# Patient Record
Sex: FEMALE | Race: WHITE | NOT HISPANIC OR LATINO | Employment: OTHER | URBAN - NONMETROPOLITAN AREA
[De-identification: names, ages, dates, MRNs, and addresses within clinical notes are randomized per-mention and may not be internally consistent; named-entity substitution may affect disease eponyms.]

---

## 2017-01-20 ENCOUNTER — HOSPITAL ENCOUNTER (INPATIENT)
Facility: HOSPITAL | Age: 77
LOS: 1 days | Discharge: HOME | End: 2017-01-21
Attending: EMERGENCY MEDICINE | Admitting: INTERNAL MEDICINE

## 2017-01-20 ENCOUNTER — APPOINTMENT (OUTPATIENT)
Dept: GENERAL RADIOLOGY | Facility: HOSPITAL | Age: 77
End: 2017-01-20

## 2017-01-20 DIAGNOSIS — I48.91 ATRIAL FIBRILLATION WITH RAPID VENTRICULAR RESPONSE (HCC): Primary | ICD-10-CM

## 2017-01-20 PROBLEM — I10 ESSENTIAL HYPERTENSION: Status: ACTIVE | Noted: 2017-01-20

## 2017-01-20 LAB
ALBUMIN SERPL-MCNC: 4.5 G/DL (ref 3.5–5)
ALBUMIN/GLOB SERPL: 1.3 G/DL (ref 1.1–2.5)
ALP SERPL-CCNC: 72 U/L (ref 24–120)
ALT SERPL W P-5'-P-CCNC: 20 U/L (ref 0–54)
ANION GAP SERPL CALCULATED.3IONS-SCNC: 14 MMOL/L (ref 4–13)
APTT PPP: 30.3 SECONDS (ref 24.1–34.8)
AST SERPL-CCNC: 20 U/L (ref 7–45)
BASOPHILS # BLD AUTO: 0.06 10*3/MM3 (ref 0–0.2)
BASOPHILS NFR BLD AUTO: 1.1 % (ref 0–2)
BILIRUB SERPL-MCNC: 0.9 MG/DL (ref 0.1–1)
BUN BLD-MCNC: 16 MG/DL (ref 5–21)
BUN/CREAT SERPL: 12.8 (ref 7–25)
CALCIUM SPEC-SCNC: 9.4 MG/DL (ref 8.4–10.4)
CHLORIDE SERPL-SCNC: 106 MMOL/L (ref 98–110)
CO2 SERPL-SCNC: 25 MMOL/L (ref 24–31)
CREAT BLD-MCNC: 1.25 MG/DL (ref 0.5–1.4)
DEPRECATED RDW RBC AUTO: 46.9 FL (ref 40–54)
EOSINOPHIL # BLD AUTO: 0.19 10*3/MM3 (ref 0–0.7)
EOSINOPHIL NFR BLD AUTO: 3.6 % (ref 0–4)
ERYTHROCYTE [DISTWIDTH] IN BLOOD BY AUTOMATED COUNT: 14.1 % (ref 12–15)
GFR SERPL CREATININE-BSD FRML MDRD: 42 ML/MIN/1.73
GLOBULIN UR ELPH-MCNC: 3.4 GM/DL
GLUCOSE BLD-MCNC: 105 MG/DL (ref 70–100)
HCT VFR BLD AUTO: 37.8 % (ref 37–47)
HGB BLD-MCNC: 12.6 G/DL (ref 12–16)
IMM GRANULOCYTES # BLD: 0.02 10*3/MM3 (ref 0–0.03)
IMM GRANULOCYTES NFR BLD: 0.4 % (ref 0–5)
INR PPP: 1.27 (ref 0.91–1.09)
LYMPHOCYTES # BLD AUTO: 1.42 10*3/MM3 (ref 0.72–4.86)
LYMPHOCYTES NFR BLD AUTO: 27.2 % (ref 15–45)
MCH RBC QN AUTO: 30.6 PG (ref 28–32)
MCHC RBC AUTO-ENTMCNC: 33.3 G/DL (ref 33–36)
MCV RBC AUTO: 91.7 FL (ref 82–98)
MONOCYTES # BLD AUTO: 0.48 10*3/MM3 (ref 0.19–1.3)
MONOCYTES NFR BLD AUTO: 9.2 % (ref 4–12)
NEUTROPHILS # BLD AUTO: 3.06 10*3/MM3 (ref 1.87–8.4)
NEUTROPHILS NFR BLD AUTO: 58.5 % (ref 39–78)
PLATELET # BLD AUTO: 167 10*3/MM3 (ref 130–400)
PMV BLD AUTO: 11.4 FL (ref 6–12)
POTASSIUM BLD-SCNC: 4.1 MMOL/L (ref 3.5–5.3)
PROT SERPL-MCNC: 7.9 G/DL (ref 6.3–8.7)
PROTHROMBIN TIME: 16.3 SECONDS (ref 11.9–14.6)
RBC # BLD AUTO: 4.12 10*6/MM3 (ref 4.2–5.4)
SODIUM BLD-SCNC: 145 MMOL/L (ref 135–145)
TROPONIN I SERPL-MCNC: 0 NG/ML (ref 0–0.07)
WBC NRBC COR # BLD: 5.23 10*3/MM3 (ref 4.8–10.8)

## 2017-01-20 PROCEDURE — 93005 ELECTROCARDIOGRAM TRACING: CPT

## 2017-01-20 PROCEDURE — 71010 HC CHEST PA OR AP: CPT

## 2017-01-20 PROCEDURE — 80053 COMPREHEN METABOLIC PANEL: CPT | Performed by: EMERGENCY MEDICINE

## 2017-01-20 PROCEDURE — 85730 THROMBOPLASTIN TIME PARTIAL: CPT | Performed by: EMERGENCY MEDICINE

## 2017-01-20 PROCEDURE — 5A2204Z RESTORATION OF CARDIAC RHYTHM, SINGLE: ICD-10-PCS | Performed by: INTERNAL MEDICINE

## 2017-01-20 PROCEDURE — 85610 PROTHROMBIN TIME: CPT | Performed by: EMERGENCY MEDICINE

## 2017-01-20 PROCEDURE — 85025 COMPLETE CBC W/AUTO DIFF WBC: CPT | Performed by: EMERGENCY MEDICINE

## 2017-01-20 PROCEDURE — 93010 ELECTROCARDIOGRAM REPORT: CPT | Performed by: INTERNAL MEDICINE

## 2017-01-20 PROCEDURE — 93005 ELECTROCARDIOGRAM TRACING: CPT | Performed by: EMERGENCY MEDICINE

## 2017-01-20 PROCEDURE — 99223 1ST HOSP IP/OBS HIGH 75: CPT | Performed by: INTERNAL MEDICINE

## 2017-01-20 PROCEDURE — 92960 CARDIOVERSION ELECTRIC EXT: CPT | Performed by: EMERGENCY MEDICINE

## 2017-01-20 PROCEDURE — 99285 EMERGENCY DEPT VISIT HI MDM: CPT

## 2017-01-20 PROCEDURE — 84484 ASSAY OF TROPONIN QUANT: CPT

## 2017-01-20 RX ORDER — ATENOLOL 25 MG/1
25 TABLET ORAL
Status: DISCONTINUED | OUTPATIENT
Start: 2017-01-20 | End: 2017-01-20

## 2017-01-20 RX ORDER — FUROSEMIDE 40 MG/1
40 TABLET ORAL DAILY
Status: DISCONTINUED | OUTPATIENT
Start: 2017-01-21 | End: 2017-01-21 | Stop reason: HOSPADM

## 2017-01-20 RX ORDER — FUROSEMIDE 20 MG/1
20 TABLET ORAL DAILY
Status: DISCONTINUED | OUTPATIENT
Start: 2017-01-20 | End: 2017-01-20

## 2017-01-20 RX ORDER — LEVOTHYROXINE SODIUM 0.05 MG/1
50 TABLET ORAL
Status: DISCONTINUED | OUTPATIENT
Start: 2017-01-20 | End: 2017-01-21 | Stop reason: HOSPADM

## 2017-01-20 RX ORDER — DILTIAZEM HYDROCHLORIDE 120 MG/1
120 TABLET, FILM COATED ORAL DAILY
Status: DISCONTINUED | OUTPATIENT
Start: 2017-01-20 | End: 2017-01-21 | Stop reason: HOSPADM

## 2017-01-20 RX ORDER — PROPAFENONE HYDROCHLORIDE 225 MG/1
225 TABLET, FILM COATED ORAL EVERY 8 HOURS SCHEDULED
Status: DISCONTINUED | OUTPATIENT
Start: 2017-01-20 | End: 2017-01-21 | Stop reason: HOSPADM

## 2017-01-20 RX ORDER — CHLORTHALIDONE 25 MG/1
12.5 TABLET ORAL DAILY
Status: DISCONTINUED | OUTPATIENT
Start: 2017-01-20 | End: 2017-01-20

## 2017-01-20 RX ORDER — SODIUM CHLORIDE 0.9 % (FLUSH) 0.9 %
1-10 SYRINGE (ML) INJECTION AS NEEDED
Status: DISCONTINUED | OUTPATIENT
Start: 2017-01-20 | End: 2017-01-21 | Stop reason: HOSPADM

## 2017-01-20 RX ORDER — POTASSIUM CHLORIDE 750 MG/1
10 CAPSULE, EXTENDED RELEASE ORAL DAILY
Status: DISCONTINUED | OUTPATIENT
Start: 2017-01-20 | End: 2017-01-21 | Stop reason: HOSPADM

## 2017-01-20 RX ORDER — ETOMIDATE 2 MG/ML
INJECTION INTRAVENOUS
Status: COMPLETED
Start: 2017-01-20 | End: 2017-01-20

## 2017-01-20 RX ORDER — MIRTAZAPINE 15 MG/1
15 TABLET, FILM COATED ORAL NIGHTLY
Status: DISCONTINUED | OUTPATIENT
Start: 2017-01-20 | End: 2017-01-21 | Stop reason: HOSPADM

## 2017-01-20 RX ORDER — DILTIAZEM HYDROCHLORIDE 5 MG/ML
10 INJECTION INTRAVENOUS ONCE
Status: DISCONTINUED | OUTPATIENT
Start: 2017-01-20 | End: 2017-01-20

## 2017-01-20 RX ORDER — DILTIAZEM HCL/D5W 125 MG/125
5-15 PLASTIC BAG, INJECTION (ML) INTRAVENOUS
Status: DISCONTINUED | OUTPATIENT
Start: 2017-01-20 | End: 2017-01-21 | Stop reason: HOSPADM

## 2017-01-20 RX ORDER — METOPROLOL TARTRATE 5 MG/5ML
INJECTION INTRAVENOUS
Status: COMPLETED
Start: 2017-01-20 | End: 2017-01-20

## 2017-01-20 RX ORDER — CARVEDILOL 6.25 MG/1
12.5 TABLET ORAL 2 TIMES DAILY WITH MEALS
Status: DISCONTINUED | OUTPATIENT
Start: 2017-01-20 | End: 2017-01-21 | Stop reason: HOSPADM

## 2017-01-20 RX ORDER — ETOMIDATE 2 MG/ML
5 INJECTION INTRAVENOUS ONCE
Status: COMPLETED | OUTPATIENT
Start: 2017-01-20 | End: 2017-01-20

## 2017-01-20 RX ORDER — MECLIZINE HYDROCHLORIDE 25 MG/1
25 TABLET ORAL 3 TIMES DAILY PRN
Status: DISCONTINUED | OUTPATIENT
Start: 2017-01-20 | End: 2017-01-21 | Stop reason: HOSPADM

## 2017-01-20 RX ORDER — METOPROLOL TARTRATE 5 MG/5ML
5 INJECTION INTRAVENOUS ONCE
Status: COMPLETED | OUTPATIENT
Start: 2017-01-20 | End: 2017-01-20

## 2017-01-20 RX ADMIN — METOPROLOL TARTRATE 5 MG: 5 INJECTION INTRAVENOUS at 14:22

## 2017-01-20 RX ADMIN — ETOMIDATE 5 MG: 2 INJECTION INTRAVENOUS at 14:17

## 2017-01-20 RX ADMIN — MIRTAZAPINE 15 MG: 15 TABLET, FILM COATED ORAL at 21:13

## 2017-01-20 RX ADMIN — RIVAROXABAN 15 MG: 15 TABLET, FILM COATED ORAL at 21:13

## 2017-01-20 RX ADMIN — CARVEDILOL 12.5 MG: 6.25 TABLET, FILM COATED ORAL at 21:13

## 2017-01-20 RX ADMIN — PROPAFENONE HYDROCHLORIDE 225 MG: 225 TABLET, COATED ORAL at 21:14

## 2017-01-20 RX ADMIN — Medication 5 MG/HR: at 12:37

## 2017-01-20 RX ADMIN — ETOMIDATE 5 MG: 2 INJECTION, SOLUTION INTRAVENOUS at 14:17

## 2017-01-21 VITALS
RESPIRATION RATE: 18 BRPM | BODY MASS INDEX: 28.21 KG/M2 | DIASTOLIC BLOOD PRESSURE: 60 MMHG | HEIGHT: 66 IN | HEART RATE: 86 BPM | TEMPERATURE: 97.8 F | OXYGEN SATURATION: 99 % | SYSTOLIC BLOOD PRESSURE: 103 MMHG | WEIGHT: 175.5 LBS

## 2017-01-21 PROCEDURE — 93005 ELECTROCARDIOGRAM TRACING: CPT | Performed by: NURSE PRACTITIONER

## 2017-01-21 PROCEDURE — 93010 ELECTROCARDIOGRAM REPORT: CPT | Performed by: INTERNAL MEDICINE

## 2017-01-21 PROCEDURE — 99238 HOSP IP/OBS DSCHRG MGMT 30/<: CPT | Performed by: INTERNAL MEDICINE

## 2017-01-21 RX ORDER — PROPAFENONE HYDROCHLORIDE 225 MG/1
225 TABLET, FILM COATED ORAL EVERY 8 HOURS SCHEDULED
Qty: 90 TABLET | Refills: 5 | Status: SHIPPED | OUTPATIENT
Start: 2017-01-21 | End: 2017-01-21 | Stop reason: HOSPADM

## 2017-01-21 RX ORDER — PROPAFENONE HYDROCHLORIDE 150 MG/1
150 TABLET, COATED ORAL EVERY 8 HOURS
Qty: 90 TABLET | Refills: 11 | Status: SHIPPED | OUTPATIENT
Start: 2017-01-21 | End: 2017-09-21 | Stop reason: SDUPTHER

## 2017-01-21 RX ADMIN — LEVOTHYROXINE SODIUM 50 MCG: 50 TABLET ORAL at 06:15

## 2017-01-21 RX ADMIN — CARVEDILOL 12.5 MG: 6.25 TABLET, FILM COATED ORAL at 08:23

## 2017-01-21 RX ADMIN — PROPAFENONE HYDROCHLORIDE 225 MG: 225 TABLET, COATED ORAL at 06:15

## 2017-01-21 RX ADMIN — FUROSEMIDE 40 MG: 40 TABLET ORAL at 08:23

## 2017-01-21 RX ADMIN — DILTIAZEM HYDROCHLORIDE 120 MG: 120 TABLET, FILM COATED ORAL at 08:23

## 2017-01-21 RX ADMIN — POTASSIUM CHLORIDE 10 MEQ: 750 CAPSULE, EXTENDED RELEASE ORAL at 08:23

## 2017-02-08 DIAGNOSIS — C50.919 MALIGNANT NEOPLASM OF FEMALE BREAST, UNSPECIFIED LATERALITY, UNSPECIFIED SITE OF BREAST: Primary | ICD-10-CM

## 2017-02-09 ENCOUNTER — LAB (OUTPATIENT)
Dept: ONCOLOGY | Facility: HOSPITAL | Age: 77
End: 2017-02-09

## 2017-02-09 DIAGNOSIS — C50.919 MALIGNANT NEOPLASM OF FEMALE BREAST, UNSPECIFIED LATERALITY, UNSPECIFIED SITE OF BREAST: ICD-10-CM

## 2017-02-09 LAB
BASOPHILS # BLD AUTO: 0.1 10*3/MM3 (ref 0–0.2)
BASOPHILS NFR BLD AUTO: 2.2 % (ref 0–2)
DEPRECATED RDW RBC AUTO: 47 FL (ref 36.4–46.3)
EOSINOPHIL # BLD AUTO: 0.27 10*3/MM3 (ref 0–0.7)
EOSINOPHIL NFR BLD AUTO: 5.9 % (ref 0–7)
ERYTHROCYTE [DISTWIDTH] IN BLOOD BY AUTOMATED COUNT: 13.8 % (ref 11.5–14.5)
HCT VFR BLD AUTO: 36.1 % (ref 35–45)
HGB BLD-MCNC: 12.1 G/DL (ref 12–15.5)
IMM GRANULOCYTES # BLD: 0.03 10*3/MM3 (ref 0–0.02)
IMM GRANULOCYTES NFR BLD: 0.7 % (ref 0–0.5)
LYMPHOCYTES # BLD AUTO: 1.19 10*3/MM3 (ref 0.6–4.2)
LYMPHOCYTES NFR BLD AUTO: 26.2 % (ref 10–50)
MCH RBC QN AUTO: 31.2 PG (ref 26.5–34)
MCHC RBC AUTO-ENTMCNC: 33.5 G/DL (ref 31.4–36)
MCV RBC AUTO: 93 FL (ref 80–98)
MONOCYTES # BLD AUTO: 0.52 10*3/MM3 (ref 0–0.9)
MONOCYTES NFR BLD AUTO: 11.5 % (ref 0–12)
NEUTROPHILS # BLD AUTO: 2.43 10*3/MM3 (ref 2–8.6)
NEUTROPHILS NFR BLD AUTO: 53.5 % (ref 37–80)
PLATELET # BLD AUTO: 178 10*3/MM3 (ref 150–450)
PMV BLD AUTO: 11.2 FL (ref 8–12)
RBC # BLD AUTO: 3.88 10*6/MM3 (ref 3.77–5.16)
WBC NRBC COR # BLD: 4.54 10*3/MM3 (ref 3.2–9.8)

## 2017-02-09 PROCEDURE — 85025 COMPLETE CBC W/AUTO DIFF WBC: CPT | Performed by: HOSPITALIST

## 2017-02-09 PROCEDURE — 36415 COLL VENOUS BLD VENIPUNCTURE: CPT | Performed by: HOSPITALIST

## 2017-02-09 PROCEDURE — 86300 IMMUNOASSAY TUMOR CA 15-3: CPT | Performed by: HOSPITALIST

## 2017-02-10 LAB — CANCER AG27-29 SERPL-ACNC: 54.9 U/ML (ref 0–38.6)

## 2017-03-03 ENCOUNTER — TRANSCRIBE ORDERS (OUTPATIENT)
Dept: ADMINISTRATIVE | Facility: HOSPITAL | Age: 77
End: 2017-03-03

## 2017-03-16 ENCOUNTER — CLINICAL SUPPORT (OUTPATIENT)
Dept: CARDIOLOGY | Facility: CLINIC | Age: 77
End: 2017-03-16

## 2017-03-16 DIAGNOSIS — Z95.0 CARDIAC PACEMAKER IN SITU: Primary | ICD-10-CM

## 2017-03-16 DIAGNOSIS — I48.0 PAROXYSMAL ATRIAL FIBRILLATION (HCC): ICD-10-CM

## 2017-03-16 PROCEDURE — 93280 PM DEVICE PROGR EVAL DUAL: CPT | Performed by: INTERNAL MEDICINE

## 2017-03-16 NOTE — PROGRESS NOTES
Dual Chamber Pacemaker Evaluation Report  In office    March 16, 2017    Primary Cardiologist: Jessica  : Medtronic Model: Advisa MRI  Implant date: March 20, 2015    Reason for evaluation:routine  Indication for pacemaker: paroxysmal a-fib    Measurements  Atrial sensing - P wave: 2.1 mV  Atrial threshold:  1.25V@ 0.4ms  Atrial lead impedance: 380 ohms  Ventricular sensing - R wave: 7.3 mV  Ventricular threshold: 1.25 V @ 0.4 ms  Ventricular lead impedance:   741 ohms     Diagnostic Data  Atrial paced: 88 %  Ventricular paced: <0.1 %  Other: no new events since checked last with Dr Mcallister  Battery status: satisfactory   Est 7.5 years      Final Parameters  Mode:  AAIR+  Lower rate: 60 bpm   Upper rate: 130 bpm  AV Delay: paced- 180 ms  Sensed-150 ms  Atrial - Amplitude: 2.5 V   Pulse width: 0.4 ms   Sensitivity: 0.3 mV     Ventricular - Amplitude: 2.5 V  Pulse width: 0.4 ms  Sensitivity: 0.9 mV    Changes made: changed atrial and ventricular outputs to 2.5V  Conclusions: normal pacemaker function and stable pacing and sensing thresholds    Follow up: 6 months

## 2017-03-19 NOTE — PROGRESS NOTES
I have reviewed the notes, assessments, and/or procedures performed by Azra Rose RN, I concur with her documentation of Sultana Lin.

## 2017-04-06 ENCOUNTER — HOSPITAL ENCOUNTER (OUTPATIENT)
Facility: HOSPITAL | Age: 77
Setting detail: HOSPITAL OUTPATIENT SURGERY
Discharge: HOME | End: 2017-04-06
Attending: INTERNAL MEDICINE | Admitting: INTERNAL MEDICINE

## 2017-04-06 VITALS
RESPIRATION RATE: 16 BRPM | DIASTOLIC BLOOD PRESSURE: 70 MMHG | OXYGEN SATURATION: 97 % | TEMPERATURE: 97.3 F | WEIGHT: 179 LBS | HEART RATE: 74 BPM | SYSTOLIC BLOOD PRESSURE: 131 MMHG | HEIGHT: 66 IN | BODY MASS INDEX: 28.77 KG/M2

## 2017-04-06 LAB
ANION GAP SERPL CALCULATED.3IONS-SCNC: 14 MMOL/L (ref 4–13)
BUN BLD-MCNC: 15 MG/DL (ref 5–21)
BUN/CREAT SERPL: 12.9 (ref 7–25)
CALCIUM SPEC-SCNC: 9.7 MG/DL (ref 8.4–10.4)
CHLORIDE SERPL-SCNC: 103 MMOL/L (ref 98–110)
CO2 SERPL-SCNC: 28 MMOL/L (ref 24–31)
CREAT BLD-MCNC: 1.16 MG/DL (ref 0.5–1.4)
DEPRECATED RDW RBC AUTO: 46.2 FL (ref 40–54)
ERYTHROCYTE [DISTWIDTH] IN BLOOD BY AUTOMATED COUNT: 13.5 % (ref 12–15)
GFR SERPL CREATININE-BSD FRML MDRD: 45 ML/MIN/1.73
GLUCOSE BLD-MCNC: 117 MG/DL (ref 70–100)
HCT VFR BLD AUTO: 37.7 % (ref 37–47)
HGB BLD-MCNC: 12.4 G/DL (ref 12–16)
MCH RBC QN AUTO: 30.6 PG (ref 28–32)
MCHC RBC AUTO-ENTMCNC: 32.9 G/DL (ref 33–36)
MCV RBC AUTO: 93.1 FL (ref 82–98)
PLATELET # BLD AUTO: 184 10*3/MM3 (ref 130–400)
PMV BLD AUTO: 11.5 FL (ref 6–12)
POTASSIUM BLD-SCNC: 4.9 MMOL/L (ref 3.5–5.3)
RBC # BLD AUTO: 4.05 10*6/MM3 (ref 4.2–5.4)
SODIUM BLD-SCNC: 145 MMOL/L (ref 135–145)
WBC NRBC COR # BLD: 5.72 10*3/MM3 (ref 4.8–10.8)

## 2017-04-06 PROCEDURE — C1733 CATH, EP, OTHR THAN COOL-TIP: HCPCS | Performed by: INTERNAL MEDICINE

## 2017-04-06 PROCEDURE — 93650 ICAR CATH ABLTJ AV NODE FUNC: CPT | Performed by: INTERNAL MEDICINE

## 2017-04-06 PROCEDURE — C1732 CATH, EP, DIAG/ABL, 3D/VECT: HCPCS | Performed by: INTERNAL MEDICINE

## 2017-04-06 PROCEDURE — C1894 INTRO/SHEATH, NON-LASER: HCPCS | Performed by: INTERNAL MEDICINE

## 2017-04-06 PROCEDURE — 25010000002 MIDAZOLAM PER 1 MG: Performed by: INTERNAL MEDICINE

## 2017-04-06 PROCEDURE — 93620 COMP EP EVL R AT VEN PAC&REC: CPT | Performed by: INTERNAL MEDICINE

## 2017-04-06 PROCEDURE — C1731 CATH, EP, 20 OR MORE ELEC: HCPCS | Performed by: INTERNAL MEDICINE

## 2017-04-06 PROCEDURE — 85027 COMPLETE CBC AUTOMATED: CPT | Performed by: INTERNAL MEDICINE

## 2017-04-06 PROCEDURE — 25010000002 FENTANYL CITRATE (PF) 100 MCG/2ML SOLUTION: Performed by: INTERNAL MEDICINE

## 2017-04-06 PROCEDURE — 80048 BASIC METABOLIC PNL TOTAL CA: CPT | Performed by: INTERNAL MEDICINE

## 2017-04-06 RX ORDER — SODIUM CHLORIDE 0.9 % (FLUSH) 0.9 %
1-10 SYRINGE (ML) INJECTION AS NEEDED
Status: DISCONTINUED | OUTPATIENT
Start: 2017-04-06 | End: 2017-04-06 | Stop reason: HOSPADM

## 2017-04-06 RX ORDER — SODIUM CHLORIDE 9 MG/ML
50 INJECTION, SOLUTION INTRAVENOUS CONTINUOUS
Status: DISCONTINUED | OUTPATIENT
Start: 2017-04-06 | End: 2017-04-06 | Stop reason: HOSPADM

## 2017-04-06 RX ORDER — MIDAZOLAM HYDROCHLORIDE 1 MG/ML
INJECTION INTRAMUSCULAR; INTRAVENOUS AS NEEDED
Status: DISCONTINUED | OUTPATIENT
Start: 2017-04-06 | End: 2017-04-06 | Stop reason: HOSPADM

## 2017-04-06 RX ORDER — LIDOCAINE HYDROCHLORIDE 20 MG/ML
INJECTION, SOLUTION INFILTRATION; PERINEURAL AS NEEDED
Status: DISCONTINUED | OUTPATIENT
Start: 2017-04-06 | End: 2017-04-06 | Stop reason: HOSPADM

## 2017-04-06 RX ORDER — ACETAMINOPHEN 325 MG/1
650 TABLET ORAL EVERY 4 HOURS PRN
Status: DISCONTINUED | OUTPATIENT
Start: 2017-04-06 | End: 2017-04-06 | Stop reason: HOSPADM

## 2017-04-06 RX ORDER — FENTANYL CITRATE 50 UG/ML
INJECTION, SOLUTION INTRAMUSCULAR; INTRAVENOUS AS NEEDED
Status: DISCONTINUED | OUTPATIENT
Start: 2017-04-06 | End: 2017-04-06 | Stop reason: HOSPADM

## 2017-04-06 RX ADMIN — SODIUM CHLORIDE 50 ML/HR: 9 INJECTION, SOLUTION INTRAVENOUS at 08:36

## 2017-04-06 NOTE — H&P
Pt seen and examined. Unchanged from office visit on 3/2.    In brief, pt with h/o persistent atrial fibrillation s/p ablation. Also required ablation of left atrial flutter. Noted persistent AFl suggestive of right atrial flutter recently. Associasted with CARDOSO.    PMH: HTN, A fib -see 3/2/2017 note for details    FMH/SH/ROS: reviewed and unchanged    PE: VSS Lungs clear. CV - normal S1S2 no S3S4m  Ext no edema Neuro - O x 3 ,alert    Imp atrial flutter - plan EPS ablation today guided by 3D mapping. Pt agrees to proceed.

## 2017-04-06 NOTE — PROCEDURES
Procedure: EPS/ablation  MD: Esvin    Findings:  1. Normal basic intervals  2. Normal AV herb conduction  3. Inducible atrial flutter - nonsustained  4. Successful Ablation of right atrial flutter with CTI line

## 2017-04-10 NOTE — OP NOTE
DATE OF PROCEDURE: 04/06/2017     PREOPERATIVE DIAGNOSIS: Atrial flutter.     POSTOPERATIVE DIAGNOSIS: Atrial flutter.     PROCEDURES PERFORMED:  1.  Atrial comprehensive ablation.   2.  Electrophysiology testing with left atrial recordings.   3.  Intracardiac 3-dimensional mapping.     DESCRIPTION OF PROCEDURE: The patient was brought to the EP lab in the fasting, nonsedated state. IV conscious sedation was administered. The patient's right groin was prepped and draped in the usual sterile fashion. After local anesthesia with 1% lidocaine, 3 sheaths were placed in the right femoral vein via the modified Seldinger technique. Under fluoroscopic guidance, a decapolar catheter was placed in the coronary sinus for left atrial recordings, 20 pole catheter placed laterally around the tricuspid annulus for mapping and 8 mm Blazer 2 large curve initially positioned in the His bundle region. The following basic cardiac intervals were noted:  ms, AH 90 ms, HV 56 ms. With programmed atrial stimulation, the AVN ERP was 280 ms with rapid atrial pacing. The AVN WCL was 450 ms. With ventricular pacing, there was no VA conduction. The patient underwent additional programmed atrial stimulation with double atrial extrastimuli. Atrial flutter was induced with a cycle length of 360 ms. The arrhythmia terminated spontaneously. The activation pattern in the right atrium and the left atrium was counterclockwise, consistent with typical isthmus-dependent atrial flutter. Ablation of the CTI region was indicated.     The ablation catheter was deflected across the tricuspid valve in the lower portion of Jurado’s triangle. This area was mapped during sinus rhythm. An area for placement of line of block was identified. A series of radiofrequency energy applications were applied during proximal CS pacing at 6:00 Kosovan. During application of these lesions, bidirectional block was achieved in the cavotricuspid isthmus. Additional pacing was  performed after completion to confirm bidirectional block.  In addition, all sharp signals were eliminated along this path.     Followup testing confirmed no adverse effect on AV herb conduction  The catheters and sheaths were removed and direct pressure was applied to achieve hemostasis. The patient was returned to the COU in good condition.     CONCLUSION:  1.  Normal basic cardiac intervals.   2.  Normal AV herb conduction.   3.  Inducible self-terminating  in atrial flutter consistent with tricuspid isthmus -dependent atrial flutter.   4.  Successful ablation of atrial flutter with the creation of bidirectional block in the cavotricuspid isthmus.         cc:                   ANABEL SAWYER/[id number]  D:  04/10/2017 07:24:07(Eastern Time)  T:  04/10/2017 09:48:25(Eastern Time)  Voice ID:  [id number]/Document ID:  [id number]

## 2017-05-12 ENCOUNTER — LAB (OUTPATIENT)
Dept: ONCOLOGY | Facility: HOSPITAL | Age: 77
End: 2017-05-12

## 2017-05-12 DIAGNOSIS — C50.919 MALIGNANT NEOPLASM OF FEMALE BREAST, UNSPECIFIED LATERALITY, UNSPECIFIED SITE OF BREAST: Primary | ICD-10-CM

## 2017-05-12 PROCEDURE — 86300 IMMUNOASSAY TUMOR CA 15-3: CPT

## 2017-05-12 PROCEDURE — 36415 COLL VENOUS BLD VENIPUNCTURE: CPT

## 2017-05-13 LAB — CANCER AG27-29 SERPL-ACNC: 66.6 U/ML (ref 0–38.6)

## 2017-08-07 DIAGNOSIS — C50.919 MALIGNANT NEOPLASM OF FEMALE BREAST, UNSPECIFIED LATERALITY, UNSPECIFIED SITE OF BREAST: Primary | ICD-10-CM

## 2017-08-11 ENCOUNTER — LAB (OUTPATIENT)
Dept: ONCOLOGY | Facility: HOSPITAL | Age: 77
End: 2017-08-11

## 2017-08-11 DIAGNOSIS — C50.919 MALIGNANT NEOPLASM OF FEMALE BREAST, UNSPECIFIED LATERALITY, UNSPECIFIED SITE OF BREAST: ICD-10-CM

## 2017-08-11 LAB
BASOPHILS # BLD AUTO: 0.06 10*3/MM3 (ref 0–0.2)
BASOPHILS NFR BLD AUTO: 1.2 % (ref 0–2)
DEPRECATED RDW RBC AUTO: 46.4 FL (ref 36.4–46.3)
EOSINOPHIL # BLD AUTO: 0.36 10*3/MM3 (ref 0–0.7)
EOSINOPHIL NFR BLD AUTO: 7.4 % (ref 0–7)
ERYTHROCYTE [DISTWIDTH] IN BLOOD BY AUTOMATED COUNT: 13.9 % (ref 11.5–14.5)
HCT VFR BLD AUTO: 37 % (ref 35–45)
HGB BLD-MCNC: 12 G/DL (ref 12–15.5)
IMM GRANULOCYTES # BLD: 0.02 10*3/MM3 (ref 0–0.02)
IMM GRANULOCYTES NFR BLD: 0.4 % (ref 0–0.5)
LYMPHOCYTES # BLD AUTO: 1.47 10*3/MM3 (ref 0.6–4.2)
LYMPHOCYTES NFR BLD AUTO: 30.2 % (ref 10–50)
MCH RBC QN AUTO: 29.9 PG (ref 26.5–34)
MCHC RBC AUTO-ENTMCNC: 32.4 G/DL (ref 31.4–36)
MCV RBC AUTO: 92.3 FL (ref 80–98)
MONOCYTES # BLD AUTO: 0.41 10*3/MM3 (ref 0–0.9)
MONOCYTES NFR BLD AUTO: 8.4 % (ref 0–12)
NEUTROPHILS # BLD AUTO: 2.54 10*3/MM3 (ref 2–8.6)
NEUTROPHILS NFR BLD AUTO: 52.4 % (ref 37–80)
PLATELET # BLD AUTO: 160 10*3/MM3 (ref 150–450)
PMV BLD AUTO: 11 FL (ref 8–12)
RBC # BLD AUTO: 4.01 10*6/MM3 (ref 3.77–5.16)
WBC NRBC COR # BLD: 4.86 10*3/MM3 (ref 3.2–9.8)

## 2017-08-11 PROCEDURE — 86300 IMMUNOASSAY TUMOR CA 15-3: CPT | Performed by: INTERNAL MEDICINE

## 2017-08-11 PROCEDURE — 36415 COLL VENOUS BLD VENIPUNCTURE: CPT | Performed by: INTERNAL MEDICINE

## 2017-08-11 PROCEDURE — 85025 COMPLETE CBC W/AUTO DIFF WBC: CPT | Performed by: INTERNAL MEDICINE

## 2017-08-12 LAB — CANCER AG27-29 SERPL-ACNC: 63.9 U/ML (ref 0–38.6)

## 2017-09-18 ENCOUNTER — CLINICAL SUPPORT (OUTPATIENT)
Dept: CARDIOLOGY | Facility: CLINIC | Age: 77
End: 2017-09-18

## 2017-09-18 DIAGNOSIS — Z95.0 PACEMAKER: ICD-10-CM

## 2017-09-18 PROCEDURE — 93294 REM INTERROG EVL PM/LDLS PM: CPT | Performed by: PHYSICIAN ASSISTANT

## 2017-09-18 PROCEDURE — 93296 REM INTERROG EVL PM/IDS: CPT | Performed by: PHYSICIAN ASSISTANT

## 2017-09-19 ENCOUNTER — TELEPHONE (OUTPATIENT)
Dept: CARDIOLOGY | Facility: CLINIC | Age: 77
End: 2017-09-19

## 2017-09-19 NOTE — PROGRESS NOTES
Dual Chamber Pacemaker Evaluation Report  [facility]    September 19, 2017    Primary Cardiologist: Jessica  : Medtronic Model: Advisa  Implant date: 3/20/15    Reason for evaluation: routine  Indication for pacemaker: a-fib and bradycardia    Measurements  Atrial sensing - P wave: 1.5 mV  Atrial threshold: 0.875 V@ 0.4 ms  Atrial lead impedance: 361 ohms  Ventricular sensing - R wave: 7.0 mV  Ventricular threshold: 0.875 V @ 0.4 ms  Ventricular lead impedance:   722 ohms     Diagnostic Data  Atrial paced: 66.6 %  Ventricular paced: 3.2 %  Other: multiple episodes of AF noted; rates over 100  Battery status: satisfactory         Final Parameters  Mode:  AAIR+  Lower rate: 60 bpm   Upper rate: 130 bpm  AV Delay: paced- 180 ms  Sensed-150 ms  Atrial - Amplitude: 1.75 V   Pulse width: 0.4 ms   Sensitivity: 0.3mV     Ventricular - Amplitude: 2.0 V  Pulse width: 0.4 ms  Sensitivity: 0.9 mV    Changes made: n/a  Conclusions: normal pacemaker function    Follow up: 6 months; will schedule for ov to increase rate limiting meds.

## 2017-09-20 NOTE — PROGRESS NOTES
I have reviewed the notes, assessments, and/or procedures performed by  Loretta Peralta PA-C  , I concur with her  documentation of  Sultana Lin.

## 2017-09-21 ENCOUNTER — OFFICE VISIT (OUTPATIENT)
Dept: CARDIOLOGY | Facility: CLINIC | Age: 77
End: 2017-09-21

## 2017-09-21 VITALS
DIASTOLIC BLOOD PRESSURE: 81 MMHG | OXYGEN SATURATION: 98 % | WEIGHT: 173.8 LBS | BODY MASS INDEX: 27.28 KG/M2 | SYSTOLIC BLOOD PRESSURE: 137 MMHG | HEART RATE: 85 BPM | HEIGHT: 67 IN

## 2017-09-21 DIAGNOSIS — I10 ESSENTIAL HYPERTENSION: ICD-10-CM

## 2017-09-21 DIAGNOSIS — I48.0 PAROXYSMAL ATRIAL FIBRILLATION (HCC): Primary | ICD-10-CM

## 2017-09-21 DIAGNOSIS — R00.1 BRADYCARDIA: ICD-10-CM

## 2017-09-21 PROCEDURE — 93000 ELECTROCARDIOGRAM COMPLETE: CPT | Performed by: NURSE PRACTITIONER

## 2017-09-21 PROCEDURE — 99213 OFFICE O/P EST LOW 20 MIN: CPT | Performed by: NURSE PRACTITIONER

## 2017-09-21 RX ORDER — CARVEDILOL 12.5 MG/1
18.75 TABLET ORAL 2 TIMES DAILY WITH MEALS
Qty: 90 TABLET | Refills: 11 | Status: SHIPPED | OUTPATIENT
Start: 2017-09-21 | End: 2021-10-15 | Stop reason: HOSPADM

## 2017-09-21 RX ORDER — PROPAFENONE HYDROCHLORIDE 225 MG/1
225 TABLET, FILM COATED ORAL EVERY 8 HOURS
Start: 2017-09-21 | End: 2018-12-11 | Stop reason: HOSPADM

## 2017-09-21 NOTE — PROGRESS NOTES
"    Subjective:     Encounter Date:09/21/2017      Patient ID: Sultana Lin is a 76 y.o. female.    Chief Complaint:  HPI Comments: Pt reports she is feeling well. No complaints of dyspnea, chest pain, palpitations, edema, weight gain, syncope or pre syncope. Stamina is good. Pt was noted to have multiple episodes of atrial fibrillation with heart rates above 100 per Loretta Peralta PA-C, on her most recent pacemaker interrogation this month and follow up appointment was ordered. The patient does admit a recent increase in stress.       The following portions of the patient's history were reviewed and updated as appropriate: allergies, current medications, past family history, past medical history, past social history, past surgical history and problem list.  /81 (BP Location: Right arm, Patient Position: Sitting, Cuff Size: Adult)  Pulse 85  Ht 67\" (170.2 cm)  Wt 173 lb 12.8 oz (78.8 kg)  SpO2 98%  BMI 27.22 kg/m2  Allergies   Allergen Reactions   • Penicillins        Current Outpatient Prescriptions:   •  carvedilol (COREG) 12.5 MG tablet, Take 1.5 tablets by mouth 2 (Two) Times a Day With Meals., Disp: 90 tablet, Rfl: 11  •  diltiaZEM (CARDIZEM) 120 MG tablet, Take 120 mg by mouth Daily., Disp: , Rfl:   •  furosemide (LASIX) 20 MG tablet, Take 40 mg by mouth Daily., Disp: , Rfl:   •  HYDROcodone-acetaminophen (NORCO)  MG per tablet, Take 1 tablet by mouth 2 (Two) Times a Day As Needed for moderate pain (4-6)., Disp: , Rfl:   •  levothyroxine (SYNTHROID, LEVOTHROID) 50 MCG tablet, Take 50 mcg by mouth Daily., Disp: , Rfl:   •  meclizine (ANTIVERT) 25 MG tablet, Take 25 mg by mouth 3 (three) times a day as needed for dizziness., Disp: , Rfl:   •  mirtazapine (REMERON) 15 MG tablet, Take 15 mg by mouth Every Night., Disp: , Rfl:   •  potassium chloride (K-DUR,KLOR-CON) 10 MEQ CR tablet, Take 10 mEq by mouth daily., Disp: , Rfl:   •  promethazine-codeine (PHENERGAN with CODEINE) 6.25-10 MG/5ML syrup, " Take 10 mL by mouth 2 (two) times a day as needed for cough., Disp: , Rfl:   •  propafenone (RYTHMOL) 225 MG tablet, Take 1 tablet by mouth Every 8 (Eight) Hours., Disp: , Rfl:   •  rivaroxaban (XARELTO) 15 MG tablet, Take 1 tablet by mouth Daily With Dinner., Disp: 30 tablet, Rfl: 11  Past Medical History:   Diagnosis Date   • Arthritis    • Atrial fibrillation    • Atrial fibrillation    • Breast cancer     right   • Cancer    • CHF (congestive heart failure)    • Disease of thyroid gland    • Hypertension      Past Surgical History:   Procedure Laterality Date   • ABLATION OF DYSRHYTHMIC FOCUS     • BREAST SURGERY     • CARDIAC ELECTROPHYSIOLOGY PROCEDURE N/A 4/6/2017    Procedure: EP/Ablation;  Surgeon: Blake Mcallister MD;  Location: [facility] INVASIVE LOC[facility];  Service:    • CARDIOVERSION     • INSERT / REPLACE / REMOVE PACEMAKER     • PACEMAKER IMPLANTATION     • SKIN BIOPSY       Social History     Social History   • Marital status:      Spouse name: N/A   • Number of children: N/A   • Years of education: N/A     Occupational History   • Not on file.     Social History Main Topics   • Smoking status: Never Smoker   • Smokeless tobacco: Never Used   • Alcohol use No   • Drug use: Yes     Special: Oxycodone   • Sexual activity: Defer     Other Topics Concern   • Not on file     Social History Narrative     Family History   Problem Relation Age of Onset   • Hypertension Daughter    • Hypertension Son    • No Known Problems Mother    • No Known Problems Father        Review of Systems   Constitution: Negative for chills, diaphoresis, fever and weakness.   HENT: Negative for nosebleeds.    Eyes: Negative for visual disturbance.   Cardiovascular: Negative for chest pain, claudication, cyanosis, dyspnea on exertion, irregular heartbeat, leg swelling, near-syncope, orthopnea, palpitations, paroxysmal nocturnal dyspnea and syncope.   Respiratory: Negative for cough, hemoptysis, shortness of breath,  sputum production and wheezing.    Hematologic/Lymphatic: Negative for bleeding problem.   Skin: Negative for color change and flushing.   Musculoskeletal: Negative for falls.   Gastrointestinal: Negative for bloating, abdominal pain, hematemesis, hematochezia, melena, nausea and vomiting.   Genitourinary: Negative for hematuria.   Neurological: Negative for dizziness and light-headedness.   Psychiatric/Behavioral: Negative for altered mental status.         ECG 12 Lead  Date/Time: 9/21/2017 10:54 AM  Performed by: BERNARDO VANG  Authorized by: BERNARDO VANG   Comparison: compared with previous ECG from 1/21/2017  Similar to previous ECG  Comparison to previous ECG: NSR with atrial pacing   Rhythm: sinus rhythm and paced               Objective:     Physical Exam   Constitutional: She is oriented to person, place, and time. She appears well-developed and well-nourished. No distress.   HENT:   Head: Normocephalic and atraumatic.   Eyes: Pupils are equal, round, and reactive to light.   Neck: Normal range of motion. Neck supple. No JVD present. No thyromegaly present.   Cardiovascular: Normal rate, regular rhythm, normal heart sounds and intact distal pulses.  Exam reveals no gallop and no friction rub.    No murmur heard.  Pulses:       Dorsalis pedis pulses are 2+ on the right side, and 2+ on the left side.   Pulmonary/Chest: Effort normal and breath sounds normal. No respiratory distress. She has no wheezes. She has no rales. She exhibits no tenderness.   Abdominal: Soft. Bowel sounds are normal. She exhibits no distension. There is no tenderness.   Musculoskeletal: Normal range of motion. She exhibits no edema.   Neurological: She is alert and oriented to person, place, and time. No cranial nerve deficit.   Skin: Skin is warm and dry. She is not diaphoretic.   Psychiatric: She has a normal mood and affect. Her behavior is normal.       Lab Review:       Assessment:          Diagnosis Plan   1. Paroxysmal atrial  fibrillation  Multiple episodes of afib, rvr per interrogation this month- appt scheduled by TERESA Pride; asymptomatic- increase coreg for better rate control and follow up with Dr. Mcallister.  Continue Xarelto for anticoagulation, Rythmol 225 mg TID as prescribed by Dr. Mcallister, and cardizem cd 120 mg daily as well  History of ablations x3 per Dr. Mcallister - most recent 4/2017 for atrial flutter    2. Bradycardia  History of ; s/p pacemaker   3. Essential hypertension  controlled     12/2016 echo LVEF 55%     Plan:       Increase coreg to 18.75 mg BID  Follow up with Dr. Mcallister- will notify his office of interrogation results and med change.  Keep follow up with Dr. Lopez in [place] in December.

## 2017-09-25 ENCOUNTER — TELEPHONE (OUTPATIENT)
Dept: CARDIOLOGY | Facility: CLINIC | Age: 77
End: 2017-09-25

## 2017-09-25 ENCOUNTER — DOCUMENTATION (OUTPATIENT)
Dept: CARDIOLOGY | Facility: CLINIC | Age: 77
End: 2017-09-25

## 2017-09-25 NOTE — PROGRESS NOTES
I called and talked to Mariel (Dr Simms nurse) about this patient and she requested the patients last interrogation report faxed to them. I faxed the last office visit to him and sent a message to Criselda to fax what she had on her pacemaker workup.

## 2017-09-25 NOTE — TELEPHONE ENCOUNTER
----- Message from Mariel Chu MA sent at 9/25/2017 12:31 PM CDT -----  I talked to his nurse and I have faxed over information she needed.  ----- Message -----     From: DOC Hare     Sent: 9/21/2017  11:06 AM       To: Mariel Chu MA    Please notify Dr. Mcallister's office that she was seen today for episodes of afib, rvr on her pacemaker interrogation this month and her coreg has been increased. She is asymptomatic. Her Rythmol, Xarelto, and cardizem were continued at the same doses. Please make sure she has a follow up scheduled with Dr. Mcallister and ask if they would like to see her any sooner. Thanks.

## 2017-12-06 ENCOUNTER — LAB (OUTPATIENT)
Dept: ONCOLOGY | Facility: HOSPITAL | Age: 77
End: 2017-12-06

## 2017-12-06 DIAGNOSIS — C50.919 MALIGNANT NEOPLASM OF FEMALE BREAST, UNSPECIFIED ESTROGEN RECEPTOR STATUS, UNSPECIFIED LATERALITY, UNSPECIFIED SITE OF BREAST (HCC): Primary | ICD-10-CM

## 2017-12-06 LAB
BASOPHILS # BLD AUTO: 0.06 10*3/MM3 (ref 0–0.2)
BASOPHILS NFR BLD AUTO: 1.1 % (ref 0–2)
DEPRECATED RDW RBC AUTO: 44.1 FL (ref 36.4–46.3)
EOSINOPHIL # BLD AUTO: 0.24 10*3/MM3 (ref 0–0.7)
EOSINOPHIL NFR BLD AUTO: 4.6 % (ref 0–7)
ERYTHROCYTE [DISTWIDTH] IN BLOOD BY AUTOMATED COUNT: 13.5 % (ref 11.5–14.5)
HCT VFR BLD AUTO: 37.3 % (ref 35–45)
HGB BLD-MCNC: 12.3 G/DL (ref 12–15.5)
IMM GRANULOCYTES # BLD: 0.01 10*3/MM3 (ref 0–0.02)
IMM GRANULOCYTES NFR BLD: 0.2 % (ref 0–0.5)
LYMPHOCYTES # BLD AUTO: 1.59 10*3/MM3 (ref 0.6–4.2)
LYMPHOCYTES NFR BLD AUTO: 30.4 % (ref 10–50)
MCH RBC QN AUTO: 29.6 PG (ref 26.5–34)
MCHC RBC AUTO-ENTMCNC: 33 G/DL (ref 31.4–36)
MCV RBC AUTO: 89.9 FL (ref 80–98)
MONOCYTES # BLD AUTO: 0.45 10*3/MM3 (ref 0–0.9)
MONOCYTES NFR BLD AUTO: 8.6 % (ref 0–12)
NEUTROPHILS # BLD AUTO: 2.88 10*3/MM3 (ref 2–8.6)
NEUTROPHILS NFR BLD AUTO: 55.1 % (ref 37–80)
PLATELET # BLD AUTO: 173 10*3/MM3 (ref 150–450)
PMV BLD AUTO: 11.3 FL (ref 8–12)
RBC # BLD AUTO: 4.15 10*6/MM3 (ref 3.77–5.16)
WBC NRBC COR # BLD: 5.23 10*3/MM3 (ref 3.2–9.8)

## 2017-12-06 PROCEDURE — 36415 COLL VENOUS BLD VENIPUNCTURE: CPT | Performed by: NURSE PRACTITIONER

## 2017-12-06 PROCEDURE — 85025 COMPLETE CBC W/AUTO DIFF WBC: CPT

## 2017-12-06 PROCEDURE — 86300 IMMUNOASSAY TUMOR CA 15-3: CPT

## 2017-12-07 LAB — CANCER AG27-29 SERPL-ACNC: 55.3 U/ML (ref 0–38.6)

## 2017-12-17 ENCOUNTER — HOSPITAL ENCOUNTER (EMERGENCY)
Facility: HOSPITAL | Age: 77
Discharge: HOME | End: 2017-12-18
Attending: EMERGENCY MEDICINE | Admitting: EMERGENCY MEDICINE

## 2017-12-17 DIAGNOSIS — I48.91 ATRIAL FIBRILLATION WITH RVR (HCC): Primary | ICD-10-CM

## 2017-12-17 DIAGNOSIS — R00.2 PALPITATIONS: ICD-10-CM

## 2017-12-17 PROCEDURE — 85610 PROTHROMBIN TIME: CPT | Performed by: EMERGENCY MEDICINE

## 2017-12-17 PROCEDURE — 84484 ASSAY OF TROPONIN QUANT: CPT | Performed by: EMERGENCY MEDICINE

## 2017-12-17 PROCEDURE — 85025 COMPLETE CBC W/AUTO DIFF WBC: CPT | Performed by: EMERGENCY MEDICINE

## 2017-12-17 PROCEDURE — 99284 EMERGENCY DEPT VISIT MOD MDM: CPT

## 2017-12-17 PROCEDURE — 80053 COMPREHEN METABOLIC PANEL: CPT | Performed by: EMERGENCY MEDICINE

## 2017-12-17 PROCEDURE — 85730 THROMBOPLASTIN TIME PARTIAL: CPT | Performed by: EMERGENCY MEDICINE

## 2017-12-17 PROCEDURE — 93005 ELECTROCARDIOGRAM TRACING: CPT

## 2017-12-17 PROCEDURE — 93010 ELECTROCARDIOGRAM REPORT: CPT | Performed by: INTERNAL MEDICINE

## 2017-12-18 VITALS
DIASTOLIC BLOOD PRESSURE: 81 MMHG | RESPIRATION RATE: 18 BRPM | HEIGHT: 67 IN | OXYGEN SATURATION: 98 % | BODY MASS INDEX: 26.84 KG/M2 | TEMPERATURE: 97.6 F | HEART RATE: 90 BPM | SYSTOLIC BLOOD PRESSURE: 123 MMHG | WEIGHT: 171 LBS

## 2017-12-18 LAB
ALBUMIN SERPL-MCNC: 4.4 G/DL (ref 3.5–5)
ALBUMIN/GLOB SERPL: 1.3 G/DL (ref 1.1–2.5)
ALP SERPL-CCNC: 85 U/L (ref 24–120)
ALT SERPL W P-5'-P-CCNC: 29 U/L (ref 0–54)
ANION GAP SERPL CALCULATED.3IONS-SCNC: 12 MMOL/L (ref 4–13)
APTT PPP: 40 SECONDS (ref 24.1–34.8)
AST SERPL-CCNC: 29 U/L (ref 7–45)
BASOPHILS # BLD AUTO: 0.08 10*3/MM3 (ref 0–0.2)
BASOPHILS NFR BLD AUTO: 1.1 % (ref 0–2)
BILIRUB SERPL-MCNC: 0.4 MG/DL (ref 0.1–1)
BUN BLD-MCNC: 21 MG/DL (ref 5–21)
BUN/CREAT SERPL: 15.8 (ref 7–25)
CALCIUM SPEC-SCNC: 9.6 MG/DL (ref 8.4–10.4)
CHLORIDE SERPL-SCNC: 106 MMOL/L (ref 98–110)
CO2 SERPL-SCNC: 27 MMOL/L (ref 24–31)
CREAT BLD-MCNC: 1.33 MG/DL (ref 0.5–1.4)
DEPRECATED RDW RBC AUTO: 45.6 FL (ref 40–54)
EOSINOPHIL # BLD AUTO: 0.3 10*3/MM3 (ref 0–0.7)
EOSINOPHIL NFR BLD AUTO: 4.1 % (ref 0–4)
ERYTHROCYTE [DISTWIDTH] IN BLOOD BY AUTOMATED COUNT: 13.9 % (ref 12–15)
GFR SERPL CREATININE-BSD FRML MDRD: 39 ML/MIN/1.73
GLOBULIN UR ELPH-MCNC: 3.4 GM/DL
GLUCOSE BLD-MCNC: 113 MG/DL (ref 70–100)
HCT VFR BLD AUTO: 38.5 % (ref 37–47)
HGB BLD-MCNC: 12.5 G/DL (ref 12–16)
HOLD SPECIMEN: NORMAL
HOLD SPECIMEN: NORMAL
IMM GRANULOCYTES # BLD: 0.02 10*3/MM3 (ref 0–0.03)
IMM GRANULOCYTES NFR BLD: 0.3 % (ref 0–5)
INR PPP: 2.28 (ref 0.91–1.09)
LYMPHOCYTES # BLD AUTO: 2.48 10*3/MM3 (ref 0.72–4.86)
LYMPHOCYTES NFR BLD AUTO: 33.8 % (ref 15–45)
MCH RBC QN AUTO: 29.8 PG (ref 28–32)
MCHC RBC AUTO-ENTMCNC: 32.5 G/DL (ref 33–36)
MCV RBC AUTO: 91.9 FL (ref 82–98)
MONOCYTES # BLD AUTO: 0.75 10*3/MM3 (ref 0.19–1.3)
MONOCYTES NFR BLD AUTO: 10.2 % (ref 4–12)
NEUTROPHILS # BLD AUTO: 3.7 10*3/MM3 (ref 1.87–8.4)
NEUTROPHILS NFR BLD AUTO: 50.5 % (ref 39–78)
PLATELET # BLD AUTO: 180 10*3/MM3 (ref 130–400)
PMV BLD AUTO: 12.5 FL (ref 6–12)
POTASSIUM BLD-SCNC: 4.9 MMOL/L (ref 3.5–5.3)
PROT SERPL-MCNC: 7.8 G/DL (ref 6.3–8.7)
PROTHROMBIN TIME: 26 SECONDS (ref 11.9–14.6)
RBC # BLD AUTO: 4.19 10*6/MM3 (ref 4.2–5.4)
SODIUM BLD-SCNC: 145 MMOL/L (ref 135–145)
TROPONIN I SERPL-MCNC: <0.012 NG/ML (ref 0–0.03)
TROPONIN I SERPL-MCNC: <0.012 NG/ML (ref 0–0.03)
WBC NRBC COR # BLD: 7.33 10*3/MM3 (ref 4.8–10.8)
WHOLE BLOOD HOLD SPECIMEN: NORMAL
WHOLE BLOOD HOLD SPECIMEN: NORMAL

## 2017-12-18 PROCEDURE — 96375 TX/PRO/DX INJ NEW DRUG ADDON: CPT

## 2017-12-18 PROCEDURE — 84484 ASSAY OF TROPONIN QUANT: CPT | Performed by: EMERGENCY MEDICINE

## 2017-12-18 PROCEDURE — 93005 ELECTROCARDIOGRAM TRACING: CPT | Performed by: EMERGENCY MEDICINE

## 2017-12-18 PROCEDURE — 96374 THER/PROPH/DIAG INJ IV PUSH: CPT

## 2017-12-18 PROCEDURE — 93010 ELECTROCARDIOGRAM REPORT: CPT | Performed by: INTERNAL MEDICINE

## 2017-12-18 PROCEDURE — 25010000002 ONDANSETRON PER 1 MG: Performed by: EMERGENCY MEDICINE

## 2017-12-18 RX ORDER — DILTIAZEM HYDROCHLORIDE EXTENDED-RELEASE TABLETS 180 MG/1
180 TABLET, EXTENDED RELEASE ORAL DAILY
Qty: 30 TABLET | Refills: 0 | Status: SHIPPED | OUTPATIENT
Start: 2017-12-18 | End: 2021-10-15 | Stop reason: HOSPADM

## 2017-12-18 RX ORDER — ONDANSETRON 2 MG/ML
4 INJECTION INTRAMUSCULAR; INTRAVENOUS ONCE
Status: COMPLETED | OUTPATIENT
Start: 2017-12-18 | End: 2017-12-18

## 2017-12-18 RX ORDER — DILTIAZEM HYDROCHLORIDE 5 MG/ML
0.25 INJECTION INTRAVENOUS ONCE
Status: COMPLETED | OUTPATIENT
Start: 2017-12-18 | End: 2017-12-18

## 2017-12-18 RX ADMIN — DILTIAZEM HYDROCHLORIDE 30 MG: 30 TABLET, FILM COATED ORAL at 02:12

## 2017-12-18 RX ADMIN — ONDANSETRON 4 MG: 2 INJECTION, SOLUTION INTRAMUSCULAR; INTRAVENOUS at 01:53

## 2017-12-18 RX ADMIN — DILTIAZEM HYDROCHLORIDE 19.4 MG: 5 INJECTION INTRAVENOUS at 01:53

## 2017-12-18 NOTE — ED PROVIDER NOTES
Subjective   HPI Comments: Patient complaining of a rapid heartbeat.  She states that the approximate 4 days ago she noticed that her heart was beating rapidly.  She states that she has a history of A. fib and when her heart, rhythm she has some dyspnea and feels short of breath as well as nausea associated with it.  She is not complaining of any chest pain associated with the rapid heart rate.        History provided by:  Patient      Review of Systems   Constitutional: Negative for activity change, fatigue and fever.   HENT: Negative for congestion, ear pain, facial swelling, postnasal drip, rhinorrhea, sinus pressure, sore throat and trouble swallowing.    Eyes: Negative for photophobia and redness.   Respiratory: Positive for shortness of breath. Negative for chest tightness and wheezing.    Cardiovascular: Positive for palpitations. Negative for chest pain.   Gastrointestinal: Positive for nausea. Negative for abdominal distention, abdominal pain, diarrhea and vomiting.   Genitourinary: Negative for difficulty urinating, dysuria and flank pain.   Musculoskeletal: Negative for back pain and neck pain.   Skin: Negative for color change and wound.   Neurological: Negative for dizziness, speech difficulty, weakness, light-headedness and headaches.   Psychiatric/Behavioral: Negative for agitation, confusion, self-injury and suicidal ideas.       Past Medical History:   Diagnosis Date   • Arthritis    • Atrial fibrillation    • Atrial fibrillation    • Breast cancer     right   • Cancer    • CHF (congestive heart failure)    • Disease of thyroid gland    • Hypertension        Allergies   Allergen Reactions   • Penicillins        Past Surgical History:   Procedure Laterality Date   • ABLATION OF DYSRHYTHMIC FOCUS     • BREAST SURGERY     • CARDIAC ELECTROPHYSIOLOGY PROCEDURE N/A 4/6/2017    Procedure: EP/Ablation;  Surgeon: Blake Mcallister MD;  Location: [facility] INVASIVE LOCATION;  Service:    • CARDIOVERSION      • INSERT / REPLACE / REMOVE PACEMAKER     • PACEMAKER IMPLANTATION     • SKIN BIOPSY         Family History   Problem Relation Age of Onset   • Hypertension Daughter    • Hypertension Son    • No Known Problems Mother    • No Known Problems Father        Social History     Social History   • Marital status:      Spouse name: N/A   • Number of children: N/A   • Years of education: N/A     Social History Main Topics   • Smoking status: Never Smoker   • Smokeless tobacco: Never Used   • Alcohol use No   • Drug use: Yes     Special: Oxycodone   • Sexual activity: Defer     Other Topics Concern   • None     Social History Narrative           Objective   Physical Exam   Constitutional: She is oriented to person, place, and time. She appears well-developed and well-nourished. No distress.   HENT:   Head: Normocephalic and atraumatic.   Mouth/Throat: Oropharynx is clear and moist. No oropharyngeal exudate.   Eyes: EOM are normal. Pupils are equal, round, and reactive to light.   Neck: Normal range of motion. Neck supple. No JVD present.   Cardiovascular: S1 normal, S2 normal and normal heart sounds.  An irregularly irregular rhythm present. Tachycardia present.  Exam reveals no friction rub.    No murmur heard.  Pulmonary/Chest: Effort normal and breath sounds normal. No respiratory distress. She has no wheezes. She has no rales.   Abdominal: Soft. Bowel sounds are normal. She exhibits no distension. There is no tenderness. There is no rebound and no guarding.   Musculoskeletal: Normal range of motion. She exhibits no edema.   Neurological: She is alert and oriented to person, place, and time. No cranial nerve deficit.   Skin: Skin is warm and dry. No rash noted.   Psychiatric: She has a normal mood and affect. Her behavior is normal. Judgment and thought content normal.   Nursing note and vitals reviewed.      Procedures         ED Course  ED Course   Value Comment By Time   ECG 12 Lead H will fib with a rate of  98, no acute ST elevations or depressions.  There T-wave inversions in lead 2, 3, aVF, V3, V4, V5, V6, there are pacer spikes present. Mark Atkinson MD 12/18 0152   ECG 12 Lead Normal sinus rhythm with a rate of 84, normal axis, T-wave inversions in leads 2, 3, aVF, V3 through V6 similar to the initial 12-lead Mark Atkinson MD 12/18 0218      Lab Results (last 24 hours)     Procedure Component Value Units Date/Time    CBC & Differential [[id number]] Collected:  12/17/17 2336    Specimen:  Blood Updated:  12/18/17 0116    Narrative:       The following orders were created for panel order CBC & Differential.  Procedure                               Abnormality         Status                     ---------                               -----------         ------                     CBC Auto Differential[[id number]]        Abnormal            Final result                 Please view results for these tests on the individual orders.    Comprehensive Metabolic Panel [[id number]]  (Abnormal) Collected:  12/17/17 2336    Specimen:  Blood Updated:  12/18/17 0116     Glucose 113 (H) mg/dL      BUN 21 mg/dL      Creatinine 1.33 mg/dL      Sodium 145 mmol/L      Potassium 4.9 mmol/L      Chloride 106 mmol/L      CO2 27.0 mmol/L      Calcium 9.6 mg/dL      Total Protein 7.8 g/dL      Albumin 4.40 g/dL      ALT (SGPT) 29 U/L      AST (SGOT) 29 U/L      Alkaline Phosphatase 85 U/L      Total Bilirubin 0.4 mg/dL      eGFR Non African Amer 39 (L) mL/min/1.73      Globulin 3.4 gm/dL      A/G Ratio 1.3 g/dL      BUN/Creatinine Ratio 15.8     Anion Gap 12.0 mmol/L     Narrative:       The MDRD GFR formula is only valid for adults with stable renal function between ages 18 and 70.    Protime-INR [[id number]]  (Abnormal) Collected:  12/17/17 2336    Specimen:  Blood Updated:  12/18/17 0118     Protime 26.0 (H) Seconds      INR 2.28 (H)    aPTT [[id number]]  (Abnormal) Collected:  12/17/17 2336    Specimen:  Blood Updated:  12/18/17  0118     PTT 40.0 (H) seconds     Troponin [[id number]]  (Normal) Collected:  12/17/17 2336    Specimen:  Blood Updated:  12/18/17 0126     Troponin I <0.012 ng/mL     CBC Auto Differential [[id number]]  (Abnormal) Collected:  12/17/17 2336    Specimen:  Blood Updated:  12/18/17 0116     WBC 7.33 10*3/mm3      RBC 4.19 (L) 10*6/mm3      Hemoglobin 12.5 g/dL      Hematocrit 38.5 %      MCV 91.9 fL      MCH 29.8 pg      MCHC 32.5 (L) g/dL      RDW 13.9 %      RDW-SD 45.6 fl      MPV 12.5 (H) fL      Platelets 180 10*3/mm3      Neutrophil % 50.5 %      Lymphocyte % 33.8 %      Monocyte % 10.2 %      Eosinophil % 4.1 (H) %      Basophil % 1.1 %      Immature Grans % 0.3 %      Neutrophils, Absolute 3.70 10*3/mm3      Lymphocytes, Absolute 2.48 10*3/mm3      Monocytes, Absolute 0.75 10*3/mm3      Eosinophils, Absolute 0.30 10*3/mm3      Basophils, Absolute 0.08 10*3/mm3      Immature Grans, Absolute 0.02 10*3/mm3     Troponin [[id number]]  (Normal) Collected:  12/18/17 0201    Specimen:  Blood Updated:  12/18/17 0237     Troponin I <0.012 ng/mL                       MDM  Number of Diagnoses or Management Options  Atrial fibrillation with RVR: new and requires workup  Palpitations: new and requires workup  Diagnosis management comments: Patient's rate was controlled with Cardizem.  Her palpitations and the sensation of shortness of breath resolved.  I discussed lab work with her.  I spoke to Dr. Lopez and I explained to him that I feel the patient is flipping in and out of A. fib and not being properly rate controlled.  He would like to increase her Cardizem from 120 her day to 180 per day.  She has follow-up with him in the next 3 days.  She was told to keep this appointment.  Should to return to ED if any further issues or new complaints       Amount and/or Complexity of Data Reviewed  Clinical lab tests: ordered and reviewed  Tests in the medicine section of CPT®: ordered and reviewed  Decide to obtain previous medical  records or to obtain history from someone other than the patient: yes  Independent visualization of images, tracings, or specimens: yes    Risk of Complications, Morbidity, and/or Mortality  Presenting problems: moderate  Diagnostic procedures: moderate  Management options: moderate    Patient Progress  Patient progress: stable      Final diagnoses:   None            Mark Atkinson MD  12/18/17 0443

## 2018-01-23 ENCOUNTER — CLINICAL SUPPORT NO REQUIREMENTS (OUTPATIENT)
Dept: CARDIOLOGY | Facility: CLINIC | Age: 78
End: 2018-01-23

## 2018-01-23 DIAGNOSIS — R00.1 BRADYCARDIA: ICD-10-CM

## 2018-01-23 PROCEDURE — 93288 INTERROG EVL PM/LDLS PM IP: CPT | Performed by: PHYSICIAN ASSISTANT

## 2018-02-19 RX ORDER — RIVAROXABAN 15 MG/1
TABLET, FILM COATED ORAL
Qty: 30 TABLET | Refills: 11 | Status: SHIPPED | OUTPATIENT
Start: 2018-02-19 | End: 2019-08-06

## 2018-10-20 ENCOUNTER — HOSPITAL ENCOUNTER (EMERGENCY)
Facility: HOSPITAL | Age: 78
Discharge: HOME | End: 2018-10-20
Attending: EMERGENCY MEDICINE | Admitting: EMERGENCY MEDICINE

## 2018-10-20 VITALS
DIASTOLIC BLOOD PRESSURE: 72 MMHG | WEIGHT: 172 LBS | RESPIRATION RATE: 20 BRPM | BODY MASS INDEX: 27 KG/M2 | HEART RATE: 83 BPM | SYSTOLIC BLOOD PRESSURE: 118 MMHG | TEMPERATURE: 97.8 F | OXYGEN SATURATION: 92 % | HEIGHT: 67 IN

## 2018-10-20 DIAGNOSIS — R00.2 PALPITATIONS: Primary | ICD-10-CM

## 2018-10-20 LAB
ALBUMIN SERPL-MCNC: 4.9 G/DL (ref 3.5–5)
ALBUMIN/GLOB SERPL: 1.3 G/DL (ref 1.1–2.5)
ALP SERPL-CCNC: 73 U/L (ref 24–120)
ALT SERPL W P-5'-P-CCNC: <15 U/L (ref 0–54)
ANION GAP SERPL CALCULATED.3IONS-SCNC: 15 MMOL/L (ref 4–13)
AST SERPL-CCNC: 38 U/L (ref 7–45)
BASOPHILS # BLD AUTO: 0.07 10*3/MM3 (ref 0–0.2)
BASOPHILS NFR BLD AUTO: 0.7 % (ref 0–2)
BILIRUB SERPL-MCNC: 0.8 MG/DL (ref 0.1–1)
BUN BLD-MCNC: 22 MG/DL (ref 5–21)
BUN/CREAT SERPL: 19 (ref 7–25)
CALCIUM SPEC-SCNC: 9.8 MG/DL (ref 8.4–10.4)
CHLORIDE SERPL-SCNC: 104 MMOL/L (ref 98–110)
CO2 SERPL-SCNC: 26 MMOL/L (ref 24–31)
CREAT BLD-MCNC: 1.16 MG/DL (ref 0.5–1.4)
DEPRECATED RDW RBC AUTO: 43.4 FL (ref 40–54)
EOSINOPHIL # BLD AUTO: 0.08 10*3/MM3 (ref 0–0.7)
EOSINOPHIL NFR BLD AUTO: 0.8 % (ref 0–4)
ERYTHROCYTE [DISTWIDTH] IN BLOOD BY AUTOMATED COUNT: 13.4 % (ref 12–15)
GFR SERPL CREATININE-BSD FRML MDRD: 45 ML/MIN/1.73
GLOBULIN UR ELPH-MCNC: 3.9 GM/DL
GLUCOSE BLD-MCNC: 140 MG/DL (ref 70–100)
HCT VFR BLD AUTO: 38.8 % (ref 37–47)
HGB BLD-MCNC: 13.2 G/DL (ref 12–16)
IMM GRANULOCYTES # BLD: 0.08 10*3/MM3 (ref 0–0.03)
IMM GRANULOCYTES NFR BLD: 0.8 % (ref 0–5)
LYMPHOCYTES # BLD AUTO: 1.73 10*3/MM3 (ref 0.72–4.86)
LYMPHOCYTES NFR BLD AUTO: 16.9 % (ref 15–45)
MCH RBC QN AUTO: 30.6 PG (ref 28–32)
MCHC RBC AUTO-ENTMCNC: 34 G/DL (ref 33–36)
MCV RBC AUTO: 89.8 FL (ref 82–98)
MONOCYTES # BLD AUTO: 0.65 10*3/MM3 (ref 0.19–1.3)
MONOCYTES NFR BLD AUTO: 6.3 % (ref 4–12)
NEUTROPHILS # BLD AUTO: 7.63 10*3/MM3 (ref 1.87–8.4)
NEUTROPHILS NFR BLD AUTO: 74.5 % (ref 39–78)
NRBC BLD MANUAL-RTO: 0 /100 WBC (ref 0–0)
PLATELET # BLD AUTO: 257 10*3/MM3 (ref 130–400)
PMV BLD AUTO: 11.2 FL (ref 6–12)
POTASSIUM BLD-SCNC: 4.9 MMOL/L (ref 3.5–5.3)
PROT SERPL-MCNC: 8.8 G/DL (ref 6.3–8.7)
RBC # BLD AUTO: 4.32 10*6/MM3 (ref 4.2–5.4)
SODIUM BLD-SCNC: 145 MMOL/L (ref 135–145)
TROPONIN I SERPL-MCNC: <0.012 NG/ML (ref 0–0.03)
WBC NRBC COR # BLD: 10.24 10*3/MM3 (ref 4.8–10.8)

## 2018-10-20 PROCEDURE — 84484 ASSAY OF TROPONIN QUANT: CPT | Performed by: EMERGENCY MEDICINE

## 2018-10-20 PROCEDURE — 93005 ELECTROCARDIOGRAM TRACING: CPT | Performed by: EMERGENCY MEDICINE

## 2018-10-20 PROCEDURE — 93005 ELECTROCARDIOGRAM TRACING: CPT

## 2018-10-20 PROCEDURE — 85025 COMPLETE CBC W/AUTO DIFF WBC: CPT | Performed by: EMERGENCY MEDICINE

## 2018-10-20 PROCEDURE — 99284 EMERGENCY DEPT VISIT MOD MDM: CPT

## 2018-10-20 PROCEDURE — 80053 COMPREHEN METABOLIC PANEL: CPT | Performed by: EMERGENCY MEDICINE

## 2018-10-20 PROCEDURE — 93010 ELECTROCARDIOGRAM REPORT: CPT | Performed by: INTERNAL MEDICINE

## 2018-10-20 RX ORDER — SODIUM CHLORIDE 0.9 % (FLUSH) 0.9 %
10 SYRINGE (ML) INJECTION AS NEEDED
Status: DISCONTINUED | OUTPATIENT
Start: 2018-10-20 | End: 2018-10-20 | Stop reason: HOSPADM

## 2018-10-20 NOTE — ED PROVIDER NOTES
"Subjective   Patient says she was awakened early this morning with a sudden onset of racing in her heart.  She has had this before and has a history of atrial fibrillation.  She says it lasted for \"a long time\" so she decided to come get checked out though it has stopped now.  She says it feels normal now.  She says this seems to happen each time she has some type of procedure done and she did recently have some cancers cut off her face.        History provided by:  Patient   used: No    Palpitations   Palpitations quality:  Fast  Onset quality:  Sudden  Duration:  3 hours  Timing:  Constant  Progression:  Resolved  Chronicity:  Recurrent  Context: not anxiety, not appetite suppressants, not blood loss, not bronchodilators, not caffeine, not dehydration, not exercise, not hyperventilation, not illicit drugs, not nicotine and not stimulant use    Relieved by:  Nothing  Worsened by:  Nothing  Ineffective treatments:  None tried  Associated symptoms: no back pain, no chest pain, no chest pressure, no cough, no diaphoresis, no dizziness, no hemoptysis, no leg pain, no lower extremity edema, no malaise/fatigue, no nausea, no near-syncope, no numbness, no orthopnea, no PND, no shortness of breath, no syncope, no vomiting and no weakness    Risk factors: hx of atrial fibrillation    Risk factors: no diabetes mellitus, no heart disease, no hx of DVT, no hx of PE, no hx of thyroid disease, no hypercoagulable state, no hyperthyroidism, no OTC sinus medications and no stress        Review of Systems   Constitutional: Negative.  Negative for diaphoresis and malaise/fatigue.   HENT: Negative.    Respiratory: Negative.  Negative for cough, hemoptysis and shortness of breath.    Cardiovascular: Positive for palpitations. Negative for chest pain, orthopnea, syncope, PND and near-syncope.   Gastrointestinal: Negative.  Negative for nausea and vomiting.   Genitourinary: Negative.    Musculoskeletal: Negative.  " Negative for back pain.   Skin: Negative.    Neurological: Negative.  Negative for dizziness, weakness and numbness.   Hematological: Negative.    Psychiatric/Behavioral: Negative.    All other systems reviewed and are negative.      Past Medical History:   Diagnosis Date   • Arthritis    • Atrial fibrillation (CMS/HCC)    • Atrial fibrillation (CMS/HCC)    • Breast cancer (CMS/HCC)     right   • Cancer (CMS/HCC)    • CHF (congestive heart failure) (CMS/HCC)    • Disease of thyroid gland    • Hypertension        Allergies   Allergen Reactions   • Penicillins        Past Surgical History:   Procedure Laterality Date   • ABLATION OF DYSRHYTHMIC FOCUS     • BREAST SURGERY     • CARDIAC ELECTROPHYSIOLOGY PROCEDURE N/A 4/6/2017    Procedure: EP/Ablation;  Surgeon: Blake Mcallister MD;  Location: [facility] CATH INVASIVE LOCATION;  Service:    • CARDIOVERSION     • INSERT / REPLACE / REMOVE PACEMAKER     • PACEMAKER IMPLANTATION     • SKIN BIOPSY     • SKIN TAG REMOVAL         Family History   Problem Relation Age of Onset   • Hypertension Daughter    • Hypertension Son    • No Known Problems Mother    • No Known Problems Father        Social History     Social History   • Marital status:      Social History Main Topics   • Smoking status: Never Smoker   • Smokeless tobacco: Never Used   • Alcohol use No   • Drug use: Yes     Types: Oxycodone   • Sexual activity: Defer     Other Topics Concern   • Not on file       Prior to Admission medications    Medication Sig Start Date End Date Taking? Authorizing Provider   carvedilol (COREG) 12.5 MG tablet Take 1.5 tablets by mouth 2 (Two) Times a Day With Meals. 9/21/17   Cheli Monsalve APRN   diltiazem LA (CARDIZEM LA) 180 MG 24 hr tablet Take 1 tablet by mouth Daily. 12/18/17 12/18/18  Mark Atkinson MD   furosemide (LASIX) 20 MG tablet Take 40 mg by mouth Daily.    Provider, MD Bradly   HYDROcodone-acetaminophen (NORCO)  MG per tablet Take 1 tablet by mouth  2 (Two) Times a Day As Needed for moderate pain (4-6).    Bradly Montana MD   levothyroxine (SYNTHROID, LEVOTHROID) 50 MCG tablet Take 50 mcg by mouth Daily.    Bradly Montana MD   meclizine (ANTIVERT) 25 MG tablet Take 25 mg by mouth 3 (three) times a day as needed for dizziness.    Bradly Montana MD   mirtazapine (REMERON) 15 MG tablet Take 15 mg by mouth Every Night.    Bradly Montana MD   potassium chloride (K-DUR,KLOR-CON) 10 MEQ CR tablet Take 10 mEq by mouth daily.    Bradly Montana MD   propafenone (RYTHMOL) 225 MG tablet Take 1 tablet by mouth Every 8 (Eight) Hours. 9/21/17   Gricel, DOC Alexander   XARELTO 15 MG tablet TAKE 1 TABLET BY MOUTH DAILY WITH DINNER. 2/19/18   Flash Lopez MD       Medications   sodium chloride 0.9 % flush 10 mL (not administered)       Vitals:    10/20/18 1301   BP: 107/57   Pulse: 81   Resp:    Temp:    SpO2: 96%         Objective   Physical Exam   Constitutional: She is oriented to person, place, and time. She appears well-developed and well-nourished.   HENT:   Head: Normocephalic.   Patient has a dressing over the left side of her face where she had a surgical procedure performed.   Eyes: Pupils are equal, round, and reactive to light. EOM are normal.   Cardiovascular: Normal rate and regular rhythm.    Pulmonary/Chest: Effort normal and breath sounds normal.   Musculoskeletal: Normal range of motion.   Neurological: She is alert and oriented to person, place, and time.   Skin: Skin is warm and dry.   Psychiatric: She has a normal mood and affect. Her behavior is normal.   Nursing note and vitals reviewed.      Procedures         Lab Results (last 24 hours)     Procedure Component Value Units Date/Time    CBC & Differential [[id number]] Collected:  10/20/18 1252    Specimen:  Blood Updated:  10/20/18 1303    Narrative:       The following orders were created for panel order CBC & Differential.  Procedure                                Abnormality         Status                     ---------                               -----------         ------                     CBC Auto Differential[[id number]]        Abnormal            Final result                 Please view results for these tests on the individual orders.    Comprehensive Metabolic Panel [[id number]]  (Abnormal) Collected:  10/20/18 1252    Specimen:  Blood Updated:  10/20/18 1317     Glucose 140 (H) mg/dL      BUN 22 (H) mg/dL      Creatinine 1.16 mg/dL      Sodium 145 mmol/L      Potassium 4.9 mmol/L      Comment: Specimen hemolyzed.  Results may be affected.        Chloride 104 mmol/L      CO2 26.0 mmol/L      Calcium 9.8 mg/dL      Total Protein 8.8 (H) g/dL      Albumin 4.90 g/dL      ALT (SGPT) <15 U/L      Comment: Specimen hemolyzed.  Results may be affected.        AST (SGOT) 38 U/L      Comment: Specimen hemolyzed.  Results may be affected.        Alkaline Phosphatase 73 U/L      Comment: Specimen hemolyzed. Results may be affected.        Total Bilirubin 0.8 mg/dL      eGFR Non African Amer 45 (L) mL/min/1.73      Globulin 3.9 gm/dL      A/G Ratio 1.3 g/dL      BUN/Creatinine Ratio 19.0     Anion Gap 15.0 (H) mmol/L     Narrative:       The MDRD GFR formula is only valid for adults with stable renal function between ages 18 and 70.    Troponin [[id number]]  (Normal) Collected:  10/20/18 1252    Specimen:  Blood Updated:  10/20/18 1323     Troponin I <0.012 ng/mL     CBC Auto Differential [[id number]]  (Abnormal) Collected:  10/20/18 1252    Specimen:  Blood Updated:  10/20/18 1303     WBC 10.24 10*3/mm3      RBC 4.32 10*6/mm3      Hemoglobin 13.2 g/dL      Hematocrit 38.8 %      MCV 89.8 fL      MCH 30.6 pg      MCHC 34.0 g/dL      RDW 13.4 %      RDW-SD 43.4 fl      MPV 11.2 fL      Platelets 257 10*3/mm3      Neutrophil % 74.5 %      Lymphocyte % 16.9 %      Monocyte % 6.3 %      Eosinophil % 0.8 %      Basophil % 0.7 %      Immature Grans % 0.8 %      Neutrophils, Absolute  7.63 10*3/mm3      Lymphocytes, Absolute 1.73 10*3/mm3      Monocytes, Absolute 0.65 10*3/mm3      Eosinophils, Absolute 0.08 10*3/mm3      Basophils, Absolute 0.07 10*3/mm3      Immature Grans, Absolute 0.08 (H) 10*3/mm3      nRBC 0.0 /100 WBC           No orders to display       ED Course  ED Course as of Oct 20 1339   Sat Oct 20, 2018   1337 I told the patient that her testing was all fine here.  She almost certainly had an episode of atrial fib earlier this morning but it is already resolved so there is nothing more to do.  I told her this would not be and, her body was under some type of physical stress such as the procedure she had done earlier.  If this is the only time is occurring a would not worried about it but it continued to occur at other times and was problematic she should follow with her physician or cardiologist.  She is discharged stable condition.  [TR]      ED Course User Index  [TR] Jd Hunter Jr., MD          MDM  Number of Diagnoses or Management Options  Palpitations: new and requires workup     Amount and/or Complexity of Data Reviewed  Clinical lab tests: ordered and reviewed  Tests in the radiology section of CPT®: ordered and reviewed  Tests in the medicine section of CPT®: ordered and reviewed    Risk of Complications, Morbidity, and/or Mortality  Presenting problems: moderate  Diagnostic procedures: moderate  Management options: moderate    Patient Progress  Patient progress: stable      Final diagnoses:   Palpitations          Jd Hunter Jr., MD  10/20/18 3876

## 2018-12-10 ENCOUNTER — APPOINTMENT (OUTPATIENT)
Dept: GENERAL RADIOLOGY | Facility: HOSPITAL | Age: 78
End: 2018-12-10

## 2018-12-10 ENCOUNTER — HOSPITAL ENCOUNTER (OUTPATIENT)
Facility: HOSPITAL | Age: 78
Setting detail: OBSERVATION
Discharge: HOME | End: 2018-12-11
Attending: INTERNAL MEDICINE | Admitting: FAMILY MEDICINE

## 2018-12-10 DIAGNOSIS — R00.0 IRREGULAR TACHYCARDIA: Primary | ICD-10-CM

## 2018-12-10 LAB
ALBUMIN SERPL-MCNC: 4 G/DL (ref 3.5–5)
ALBUMIN/GLOB SERPL: 1.1 G/DL (ref 1.1–2.5)
ALP SERPL-CCNC: 96 U/L (ref 24–120)
ALT SERPL W P-5'-P-CCNC: 15 U/L (ref 0–54)
ANION GAP SERPL CALCULATED.3IONS-SCNC: 9 MMOL/L (ref 4–13)
APTT PPP: 33.2 SECONDS (ref 24.1–34.8)
AST SERPL-CCNC: 30 U/L (ref 7–45)
BASOPHILS # BLD AUTO: 0.1 10*3/MM3 (ref 0–0.2)
BASOPHILS NFR BLD AUTO: 1.2 % (ref 0–2)
BILIRUB SERPL-MCNC: 0.5 MG/DL (ref 0.1–1)
BUN BLD-MCNC: 18 MG/DL (ref 5–21)
BUN/CREAT SERPL: 15.7 (ref 7–25)
CALCIUM SPEC-SCNC: 9 MG/DL (ref 8.4–10.4)
CHLORIDE SERPL-SCNC: 102 MMOL/L (ref 98–110)
CO2 SERPL-SCNC: 27 MMOL/L (ref 24–31)
CREAT BLD-MCNC: 1.15 MG/DL (ref 0.5–1.4)
D DIMER PPP FEU-MCNC: 0.23 MG/L (FEU) (ref 0–0.5)
DEPRECATED RDW RBC AUTO: 49.3 FL (ref 40–54)
EOSINOPHIL # BLD AUTO: 0.37 10*3/MM3 (ref 0–0.7)
EOSINOPHIL NFR BLD AUTO: 4.4 % (ref 0–4)
ERYTHROCYTE [DISTWIDTH] IN BLOOD BY AUTOMATED COUNT: 14.6 % (ref 12–15)
GFR SERPL CREATININE-BSD FRML MDRD: 46 ML/MIN/1.73
GLOBULIN UR ELPH-MCNC: 3.5 GM/DL
GLUCOSE BLD-MCNC: 117 MG/DL (ref 70–100)
HCT VFR BLD AUTO: 34.5 % (ref 37–47)
HGB BLD-MCNC: 11.2 G/DL (ref 12–16)
HOLD SPECIMEN: NORMAL
HOLD SPECIMEN: NORMAL
IMM GRANULOCYTES # BLD: 0.07 10*3/MM3 (ref 0–0.03)
IMM GRANULOCYTES NFR BLD: 0.8 % (ref 0–5)
INR PPP: 1.33 (ref 0.91–1.09)
LIPASE SERPL-CCNC: 131 U/L (ref 23–203)
LYMPHOCYTES # BLD AUTO: 1.59 10*3/MM3 (ref 0.72–4.86)
LYMPHOCYTES NFR BLD AUTO: 19.1 % (ref 15–45)
MAGNESIUM SERPL-MCNC: 1.7 MG/DL (ref 1.4–2.2)
MCH RBC QN AUTO: 30 PG (ref 28–32)
MCHC RBC AUTO-ENTMCNC: 32.5 G/DL (ref 33–36)
MCV RBC AUTO: 92.5 FL (ref 82–98)
MONOCYTES # BLD AUTO: 0.63 10*3/MM3 (ref 0.19–1.3)
MONOCYTES NFR BLD AUTO: 7.6 % (ref 4–12)
NEUTROPHILS # BLD AUTO: 5.58 10*3/MM3 (ref 1.87–8.4)
NEUTROPHILS NFR BLD AUTO: 66.9 % (ref 39–78)
NRBC BLD MANUAL-RTO: 0 /100 WBC (ref 0–0)
PHOSPHATE SERPL-MCNC: 3.9 MG/DL (ref 2.5–4.5)
PLATELET # BLD AUTO: 243 10*3/MM3 (ref 130–400)
PMV BLD AUTO: 11.1 FL (ref 6–12)
POTASSIUM BLD-SCNC: 4.2 MMOL/L (ref 3.5–5.3)
PROT SERPL-MCNC: 7.5 G/DL (ref 6.3–8.7)
PROTHROMBIN TIME: 16.9 SECONDS (ref 11.9–14.6)
RBC # BLD AUTO: 3.73 10*6/MM3 (ref 4.2–5.4)
SODIUM BLD-SCNC: 138 MMOL/L (ref 135–145)
TROPONIN I SERPL-MCNC: <0.012 NG/ML (ref 0–0.03)
TSH SERPL DL<=0.05 MIU/L-ACNC: 2.35 MIU/ML (ref 0.47–4.68)
WBC NRBC COR # BLD: 8.34 10*3/MM3 (ref 4.8–10.8)
WHOLE BLOOD HOLD SPECIMEN: NORMAL
WHOLE BLOOD HOLD SPECIMEN: NORMAL

## 2018-12-10 PROCEDURE — 96366 THER/PROPH/DIAG IV INF ADDON: CPT

## 2018-12-10 PROCEDURE — G0378 HOSPITAL OBSERVATION PER HR: HCPCS

## 2018-12-10 PROCEDURE — 93010 ELECTROCARDIOGRAM REPORT: CPT | Performed by: INTERNAL MEDICINE

## 2018-12-10 PROCEDURE — 84484 ASSAY OF TROPONIN QUANT: CPT | Performed by: INTERNAL MEDICINE

## 2018-12-10 PROCEDURE — 93005 ELECTROCARDIOGRAM TRACING: CPT

## 2018-12-10 PROCEDURE — 83880 ASSAY OF NATRIURETIC PEPTIDE: CPT | Performed by: INTERNAL MEDICINE

## 2018-12-10 PROCEDURE — 84443 ASSAY THYROID STIM HORMONE: CPT | Performed by: PHYSICIAN ASSISTANT

## 2018-12-10 PROCEDURE — 85730 THROMBOPLASTIN TIME PARTIAL: CPT | Performed by: PHYSICIAN ASSISTANT

## 2018-12-10 PROCEDURE — 96376 TX/PRO/DX INJ SAME DRUG ADON: CPT

## 2018-12-10 PROCEDURE — 96365 THER/PROPH/DIAG IV INF INIT: CPT

## 2018-12-10 PROCEDURE — 93005 ELECTROCARDIOGRAM TRACING: CPT | Performed by: INTERNAL MEDICINE

## 2018-12-10 PROCEDURE — 93005 ELECTROCARDIOGRAM TRACING: CPT | Performed by: PHYSICIAN ASSISTANT

## 2018-12-10 PROCEDURE — 84484 ASSAY OF TROPONIN QUANT: CPT | Performed by: PHYSICIAN ASSISTANT

## 2018-12-10 PROCEDURE — 83735 ASSAY OF MAGNESIUM: CPT | Performed by: PHYSICIAN ASSISTANT

## 2018-12-10 PROCEDURE — 94640 AIRWAY INHALATION TREATMENT: CPT

## 2018-12-10 PROCEDURE — 94799 UNLISTED PULMONARY SVC/PX: CPT

## 2018-12-10 PROCEDURE — 85610 PROTHROMBIN TIME: CPT | Performed by: PHYSICIAN ASSISTANT

## 2018-12-10 PROCEDURE — 80053 COMPREHEN METABOLIC PANEL: CPT | Performed by: PHYSICIAN ASSISTANT

## 2018-12-10 PROCEDURE — 84436 ASSAY OF TOTAL THYROXINE: CPT | Performed by: PHYSICIAN ASSISTANT

## 2018-12-10 PROCEDURE — 85379 FIBRIN DEGRADATION QUANT: CPT | Performed by: PHYSICIAN ASSISTANT

## 2018-12-10 PROCEDURE — 93005 ELECTROCARDIOGRAM TRACING: CPT | Performed by: NURSE PRACTITIONER

## 2018-12-10 PROCEDURE — 99284 EMERGENCY DEPT VISIT MOD MDM: CPT

## 2018-12-10 PROCEDURE — 96375 TX/PRO/DX INJ NEW DRUG ADDON: CPT

## 2018-12-10 PROCEDURE — 71045 X-RAY EXAM CHEST 1 VIEW: CPT

## 2018-12-10 PROCEDURE — 96361 HYDRATE IV INFUSION ADD-ON: CPT

## 2018-12-10 PROCEDURE — 83690 ASSAY OF LIPASE: CPT | Performed by: PHYSICIAN ASSISTANT

## 2018-12-10 PROCEDURE — 84100 ASSAY OF PHOSPHORUS: CPT | Performed by: PHYSICIAN ASSISTANT

## 2018-12-10 PROCEDURE — 85025 COMPLETE CBC W/AUTO DIFF WBC: CPT | Performed by: PHYSICIAN ASSISTANT

## 2018-12-10 RX ORDER — DILTIAZEM HYDROCHLORIDE 180 MG/1
180 CAPSULE, COATED, EXTENDED RELEASE ORAL
Status: DISCONTINUED | OUTPATIENT
Start: 2018-12-11 | End: 2018-12-11 | Stop reason: HOSPADM

## 2018-12-10 RX ORDER — FUROSEMIDE 10 MG/ML
40 INJECTION INTRAMUSCULAR; INTRAVENOUS ONCE
Status: COMPLETED | OUTPATIENT
Start: 2018-12-11 | End: 2018-12-10

## 2018-12-10 RX ORDER — ONDANSETRON 4 MG/1
4 TABLET, ORALLY DISINTEGRATING ORAL EVERY 6 HOURS PRN
Status: DISCONTINUED | OUTPATIENT
Start: 2018-12-10 | End: 2018-12-11 | Stop reason: HOSPADM

## 2018-12-10 RX ORDER — HYDROCODONE BITARTRATE AND ACETAMINOPHEN 10; 325 MG/1; MG/1
1 TABLET ORAL 2 TIMES DAILY PRN
Status: DISCONTINUED | OUTPATIENT
Start: 2018-12-10 | End: 2018-12-11 | Stop reason: HOSPADM

## 2018-12-10 RX ORDER — NITROGLYCERIN 0.4 MG/1
0.4 TABLET SUBLINGUAL
Status: DISCONTINUED | OUTPATIENT
Start: 2018-12-10 | End: 2018-12-11 | Stop reason: HOSPADM

## 2018-12-10 RX ORDER — LEVOTHYROXINE SODIUM 0.05 MG/1
50 TABLET ORAL DAILY
Status: DISCONTINUED | OUTPATIENT
Start: 2018-12-11 | End: 2018-12-11 | Stop reason: HOSPADM

## 2018-12-10 RX ORDER — ESOMEPRAZOLE MAGNESIUM 40 MG/1
40 CAPSULE, DELAYED RELEASE ORAL
COMMUNITY

## 2018-12-10 RX ORDER — ONDANSETRON 4 MG/1
4 TABLET, FILM COATED ORAL EVERY 6 HOURS PRN
Status: DISCONTINUED | OUTPATIENT
Start: 2018-12-10 | End: 2018-12-11 | Stop reason: HOSPADM

## 2018-12-10 RX ORDER — CARVEDILOL 6.25 MG/1
18.75 TABLET ORAL 2 TIMES DAILY WITH MEALS
Status: DISCONTINUED | OUTPATIENT
Start: 2018-12-11 | End: 2018-12-11 | Stop reason: HOSPADM

## 2018-12-10 RX ORDER — IPRATROPIUM BROMIDE AND ALBUTEROL SULFATE 2.5; .5 MG/3ML; MG/3ML
3 SOLUTION RESPIRATORY (INHALATION) ONCE
Status: COMPLETED | OUTPATIENT
Start: 2018-12-10 | End: 2018-12-10

## 2018-12-10 RX ORDER — PANTOPRAZOLE SODIUM 40 MG/1
40 TABLET, DELAYED RELEASE ORAL EVERY MORNING
Status: DISCONTINUED | OUTPATIENT
Start: 2018-12-11 | End: 2018-12-11 | Stop reason: HOSPADM

## 2018-12-10 RX ORDER — DILTIAZEM HYDROCHLORIDE 5 MG/ML
10 INJECTION INTRAVENOUS ONCE
Status: COMPLETED | OUTPATIENT
Start: 2018-12-10 | End: 2018-12-10

## 2018-12-10 RX ORDER — POTASSIUM CHLORIDE 750 MG/1
10 CAPSULE, EXTENDED RELEASE ORAL DAILY
Status: DISCONTINUED | OUTPATIENT
Start: 2018-12-11 | End: 2018-12-11 | Stop reason: HOSPADM

## 2018-12-10 RX ORDER — SODIUM CHLORIDE 0.9 % (FLUSH) 0.9 %
3-10 SYRINGE (ML) INJECTION AS NEEDED
Status: DISCONTINUED | OUTPATIENT
Start: 2018-12-10 | End: 2018-12-11 | Stop reason: HOSPADM

## 2018-12-10 RX ORDER — MECLIZINE HYDROCHLORIDE 25 MG/1
25 TABLET ORAL 3 TIMES DAILY PRN
Status: DISCONTINUED | OUTPATIENT
Start: 2018-12-10 | End: 2018-12-11 | Stop reason: HOSPADM

## 2018-12-10 RX ORDER — IPRATROPIUM BROMIDE AND ALBUTEROL SULFATE 2.5; .5 MG/3ML; MG/3ML
3 SOLUTION RESPIRATORY (INHALATION) EVERY 4 HOURS PRN
Status: DISCONTINUED | OUTPATIENT
Start: 2018-12-10 | End: 2018-12-11 | Stop reason: HOSPADM

## 2018-12-10 RX ORDER — PROPAFENONE HYDROCHLORIDE 225 MG/1
225 TABLET, FILM COATED ORAL EVERY 8 HOURS
Status: DISCONTINUED | OUTPATIENT
Start: 2018-12-11 | End: 2018-12-11

## 2018-12-10 RX ORDER — ONDANSETRON 2 MG/ML
4 INJECTION INTRAMUSCULAR; INTRAVENOUS EVERY 6 HOURS PRN
Status: DISCONTINUED | OUTPATIENT
Start: 2018-12-10 | End: 2018-12-11 | Stop reason: HOSPADM

## 2018-12-10 RX ORDER — SODIUM CHLORIDE 0.9 % (FLUSH) 0.9 %
3 SYRINGE (ML) INJECTION EVERY 12 HOURS SCHEDULED
Status: DISCONTINUED | OUTPATIENT
Start: 2018-12-11 | End: 2018-12-11 | Stop reason: HOSPADM

## 2018-12-10 RX ORDER — MIRTAZAPINE 15 MG/1
15 TABLET, FILM COATED ORAL NIGHTLY
Status: DISCONTINUED | OUTPATIENT
Start: 2018-12-11 | End: 2018-12-11 | Stop reason: HOSPADM

## 2018-12-10 RX ADMIN — DILTIAZEM HYDROCHLORIDE 10 MG: 5 INJECTION INTRAVENOUS at 19:20

## 2018-12-10 RX ADMIN — RIVAROXABAN 15 MG: 15 TABLET, FILM COATED ORAL at 23:40

## 2018-12-10 RX ADMIN — CARVEDILOL 18.75 MG: 6.25 TABLET, FILM COATED ORAL at 23:40

## 2018-12-10 RX ADMIN — DILTIAZEM HYDROCHLORIDE 5 MG/HR: 5 INJECTION INTRAVENOUS at 21:42

## 2018-12-10 RX ADMIN — MIRTAZAPINE 15 MG: 15 TABLET, FILM COATED ORAL at 23:40

## 2018-12-10 RX ADMIN — FUROSEMIDE 40 MG: 10 INJECTION, SOLUTION INTRAVENOUS at 23:40

## 2018-12-10 RX ADMIN — SODIUM CHLORIDE 1000 ML: 9 INJECTION, SOLUTION INTRAVENOUS at 19:12

## 2018-12-10 RX ADMIN — PROPAFENONE HYDROCHLORIDE 225 MG: 225 TABLET, COATED ORAL at 23:40

## 2018-12-10 RX ADMIN — IPRATROPIUM BROMIDE AND ALBUTEROL SULFATE 3 ML: 2.5; .5 SOLUTION RESPIRATORY (INHALATION) at 19:21

## 2018-12-11 VITALS
SYSTOLIC BLOOD PRESSURE: 121 MMHG | RESPIRATION RATE: 18 BRPM | HEART RATE: 77 BPM | OXYGEN SATURATION: 96 % | WEIGHT: 169 LBS | DIASTOLIC BLOOD PRESSURE: 65 MMHG | BODY MASS INDEX: 27.16 KG/M2 | HEIGHT: 66 IN | TEMPERATURE: 97.5 F

## 2018-12-11 PROBLEM — I48.0 PAROXYSMAL ATRIAL FIBRILLATION WITH RAPID VENTRICULAR RESPONSE: Status: ACTIVE | Noted: 2018-12-11

## 2018-12-11 LAB
ANION GAP SERPL CALCULATED.3IONS-SCNC: 12 MMOL/L (ref 4–13)
ARTICHOKE IGE QN: 136 MG/DL (ref 0–99)
BUN BLD-MCNC: 14 MG/DL (ref 5–21)
BUN/CREAT SERPL: 15.4 (ref 7–25)
CALCIUM SPEC-SCNC: 9.1 MG/DL (ref 8.4–10.4)
CHLORIDE SERPL-SCNC: 104 MMOL/L (ref 98–110)
CHOLEST SERPL-MCNC: 228 MG/DL (ref 130–200)
CO2 SERPL-SCNC: 26 MMOL/L (ref 24–31)
CREAT BLD-MCNC: 0.91 MG/DL (ref 0.5–1.4)
DEPRECATED RDW RBC AUTO: 47.8 FL (ref 40–54)
ERYTHROCYTE [DISTWIDTH] IN BLOOD BY AUTOMATED COUNT: 14.3 % (ref 12–15)
GFR SERPL CREATININE-BSD FRML MDRD: 60 ML/MIN/1.73
GLUCOSE BLD-MCNC: 110 MG/DL (ref 70–100)
HCT VFR BLD AUTO: 33.5 % (ref 37–47)
HDLC SERPL-MCNC: 31 MG/DL
HGB BLD-MCNC: 11.2 G/DL (ref 12–16)
LDLC/HDLC SERPL: 5.02 {RATIO}
MAGNESIUM SERPL-MCNC: 1.7 MG/DL (ref 1.4–2.2)
MCH RBC QN AUTO: 30.9 PG (ref 28–32)
MCHC RBC AUTO-ENTMCNC: 33.4 G/DL (ref 33–36)
MCV RBC AUTO: 92.3 FL (ref 82–98)
NT-PROBNP SERPL-MCNC: 1500 PG/ML (ref 0–1800)
NT-PROBNP SERPL-MCNC: 1650 PG/ML (ref 0–1800)
PHOSPHATE SERPL-MCNC: 3.9 MG/DL (ref 2.5–4.5)
PLATELET # BLD AUTO: 209 10*3/MM3 (ref 130–400)
PMV BLD AUTO: 10.6 FL (ref 6–12)
POTASSIUM BLD-SCNC: 4.2 MMOL/L (ref 3.5–5.3)
RBC # BLD AUTO: 3.63 10*6/MM3 (ref 4.2–5.4)
SODIUM BLD-SCNC: 142 MMOL/L (ref 135–145)
TRIGL SERPL-MCNC: 207 MG/DL (ref 0–149)
TROPONIN I SERPL-MCNC: <0.012 NG/ML (ref 0–0.03)
TROPONIN I SERPL-MCNC: <0.012 NG/ML (ref 0–0.03)
WBC NRBC COR # BLD: 6.87 10*3/MM3 (ref 4.8–10.8)

## 2018-12-11 PROCEDURE — 83880 ASSAY OF NATRIURETIC PEPTIDE: CPT | Performed by: INTERNAL MEDICINE

## 2018-12-11 PROCEDURE — 25010000002 FUROSEMIDE PER 20 MG: Performed by: INTERNAL MEDICINE

## 2018-12-11 PROCEDURE — 94799 UNLISTED PULMONARY SVC/PX: CPT

## 2018-12-11 PROCEDURE — G0378 HOSPITAL OBSERVATION PER HR: HCPCS

## 2018-12-11 PROCEDURE — 80048 BASIC METABOLIC PNL TOTAL CA: CPT | Performed by: INTERNAL MEDICINE

## 2018-12-11 PROCEDURE — 85027 COMPLETE CBC AUTOMATED: CPT | Performed by: INTERNAL MEDICINE

## 2018-12-11 PROCEDURE — 84100 ASSAY OF PHOSPHORUS: CPT | Performed by: INTERNAL MEDICINE

## 2018-12-11 PROCEDURE — 96366 THER/PROPH/DIAG IV INF ADDON: CPT

## 2018-12-11 PROCEDURE — 84484 ASSAY OF TROPONIN QUANT: CPT | Performed by: INTERNAL MEDICINE

## 2018-12-11 PROCEDURE — 93010 ELECTROCARDIOGRAM REPORT: CPT | Performed by: INTERNAL MEDICINE

## 2018-12-11 PROCEDURE — 94760 N-INVAS EAR/PLS OXIMETRY 1: CPT

## 2018-12-11 PROCEDURE — 93005 ELECTROCARDIOGRAM TRACING: CPT | Performed by: INTERNAL MEDICINE

## 2018-12-11 PROCEDURE — 80061 LIPID PANEL: CPT | Performed by: INTERNAL MEDICINE

## 2018-12-11 PROCEDURE — 99214 OFFICE O/P EST MOD 30 MIN: CPT | Performed by: INTERNAL MEDICINE

## 2018-12-11 PROCEDURE — 83735 ASSAY OF MAGNESIUM: CPT | Performed by: INTERNAL MEDICINE

## 2018-12-11 RX ORDER — AMIODARONE HYDROCHLORIDE 200 MG/1
200 TABLET ORAL
Status: DISCONTINUED | OUTPATIENT
Start: 2018-12-17 | End: 2018-12-11 | Stop reason: HOSPADM

## 2018-12-11 RX ORDER — AMIODARONE HYDROCHLORIDE 200 MG/1
TABLET ORAL
Qty: 40 TABLET | Refills: 1 | Status: SHIPPED | OUTPATIENT
Start: 2018-12-11 | End: 2019-08-06

## 2018-12-11 RX ORDER — AMIODARONE HYDROCHLORIDE 200 MG/1
200 TABLET ORAL 3 TIMES DAILY
Status: DISCONTINUED | OUTPATIENT
Start: 2018-12-11 | End: 2018-12-11 | Stop reason: HOSPADM

## 2018-12-11 RX ADMIN — PANTOPRAZOLE SODIUM 40 MG: 40 TABLET, DELAYED RELEASE ORAL at 08:14

## 2018-12-11 RX ADMIN — DILTIAZEM HYDROCHLORIDE 180 MG: 180 CAPSULE, COATED, EXTENDED RELEASE ORAL at 08:14

## 2018-12-11 RX ADMIN — HYDROCODONE BITARTRATE AND ACETAMINOPHEN 1 TABLET: 10; 325 TABLET ORAL at 13:06

## 2018-12-11 RX ADMIN — PROPAFENONE HYDROCHLORIDE 225 MG: 225 TABLET, COATED ORAL at 08:14

## 2018-12-11 RX ADMIN — AMIODARONE HYDROCHLORIDE 200 MG: 200 TABLET ORAL at 13:39

## 2018-12-11 RX ADMIN — LEVOTHYROXINE SODIUM 50 MCG: 50 TABLET ORAL at 08:14

## 2018-12-11 RX ADMIN — POTASSIUM CHLORIDE 10 MEQ: 750 CAPSULE, EXTENDED RELEASE ORAL at 08:14

## 2018-12-11 RX ADMIN — CARVEDILOL 18.75 MG: 6.25 TABLET, FILM COATED ORAL at 08:14

## 2018-12-11 NOTE — PLAN OF CARE
Problem: Patient Care Overview  Goal: Plan of Care Review  Outcome: Ongoing (interventions implemented as appropriate)   12/11/18 0408   Coping/Psychosocial   Plan of Care Reviewed With patient   Plan of Care Review   Progress improving   OTHER   Outcome Summary Patient HR slowing, no AF noted. Cardizem drip continued, HR . Cardiology consult this am. Taking xarelto at home. Continue to monitor.      Goal: Individualization and Mutuality  Outcome: Ongoing (interventions implemented as appropriate)    Goal: Discharge Needs Assessment  Outcome: Ongoing (interventions implemented as appropriate)    Goal: Interprofessional Rounds/Family Conf  Outcome: Ongoing (interventions implemented as appropriate)      Problem: Arrhythmia/Dysrhythmia (Symptomatic) (Adult)  Goal: Signs and Symptoms of Listed Potential Problems Will be Absent, Minimized or Managed (Arrhythmia/Dysrhythmia)  Outcome: Ongoing (interventions implemented as appropriate)

## 2018-12-11 NOTE — PROGRESS NOTES
Discharge Planning Assessment  [facility]     Patient Name: Sultana Lin  MRN: [id number]  Today's Date: 12/11/2018    Admit Date: 12/10/2018    Discharge Needs Assessment     Row Name 12/11/18 1520       Living Environment    Lives With  spouse    Current Living Arrangements  home/apartment/condo    Primary Care Provided by  self    Provides Primary Care For  no one    Family Caregiver if Needed  spouse    Quality of Family Relationships  helpful;involved;supportive    Able to Return to Prior Arrangements  yes       Resource/Environmental Concerns    Resource/Environmental Concerns  none    Transportation Concerns  car, none       Transition Planning    Patient/Family Anticipates Transition to  home    Patient/Family Anticipated Services at Transition  none    Transportation Anticipated  family or friend will provide       Discharge Needs Assessment    Readmission Within the Last 30 Days  no previous admission in last 30 days    Concerns to be Addressed  denies needs/concerns at this time    Equipment Currently Used at Home  cane, straight    Anticipated Changes Related to Illness  none    Equipment Needed After Discharge  none        Discharge Plan     Row Name 12/11/18 1521       Plan    Plan  HOME    Patient/Family in Agreement with Plan  yes    Plan Comments  PT. REPORTS SHE LIVES INDEPENDENTLY AT HOME WITH HER SPOUSE AND DENIES ANY IDENTIFIED D/C PLANNING NEEDS AT PRESENT.  PT. STATES SHE HAS RX INS COVERAGE AND CAN AFFORD COPAY'S.      Final Discharge Disposition Code  01 - home or self-care        Destination      No service coordination in this encounter.      Durable Medical Equipment      No service coordination in this encounter.      Dialysis/Infusion      No service coordination in this encounter.      Home Medical Care      No service coordination in this encounter.      Community Resources      No service coordination in this encounter.        Expected Discharge Date and Time     Expected Discharge  Date Expected Discharge Time    Dec 11, 2018         Demographic Summary    No documentation.       Functional Status    No documentation.       Psychosocial    No documentation.       Abuse/Neglect    No documentation.       Legal    No documentation.       Substance Abuse    No documentation.       Patient Forms    No documentation.           SADAF Donaldson

## 2018-12-11 NOTE — CONSULTS
LOS: 0 days   Patient Care Team:  Sandip Lozano MD as PCP - General  Sandip Lozano MD as PCP - Family Medicine  Adam Prather MD as Consulting Physician (Hematology and Oncology)    Chief Complaint:   Paroxysmal atrial fibrillation with rapid ventricular response (CMS/HCC)    Hypothyroidism    Essential hypertension    Paroxysmal atrial fibrillation (CMS/HCC)    Reason for evaluation: Versus major phonation.    History of current illness    Extremely pleasant 78-year-old  lady accompanied by her family member is being evaluated for paroxysmal atrial fibrillation with rapid ventricular response.  She has sick sinus syndrome and underwent AV sequential MRI compatible pacemaker in the past.  She is compliant with her prescribed medication regimen.  She has breakthrough paroxysmal atrial fibrillation with rapid ventricular response that are highly symptomatic.  Denies any chest pain  No significant change in baseline shortness of breath  No presyncope or syncope  Denies any orthopnea paroxysmal nocturnal dyspnea.  Has had some generalized weakness and easy fatigability  Currently in sinus rhythm with intermittent atrial pacing    Interval History: Improved overall    Patient Complaints:   Chief Complaint   Patient presents with   • Palpitations       Telemetry: no malignant arrhythmia. No significant pauses.    Review of Systems     Constitutional: No chills   Has fatigue   No fever.     HENT: Negative.    Eyes: Negative.      Respiratory: Negative for cough,   No chest wall soreness,   Shortness of breath,   no wheezing, no stridor.      Cardiovascular:  Palpitations     Gastrointestinal: Negative for abdominal distention,  No abdominal pain,   No blood in stool,   No constipation,   No diarrhea,   No nausea   No vomiting.     Endocrine: Negative.    Genitourinary: Negative for difficulty urinating, dysuria, flank pain and hematuria.     Musculoskeletal: Negative.    Skin: Negative for rash and wound.    Allergic/Immunologic: Negative.      Neurological: Negative for dizziness, syncope, weakness,   No light-headedness  No  headaches.     Hematological: Does not bruise/bleed easily.     Psychiatric/Behavioral: Negative for agitation or behavioral problems,   No confusion,   the patient is  nervous/anxious.       History:   Past Medical History:   Diagnosis Date   • Arthritis    • Atrial fibrillation (CMS/HCC)    • Atrial fibrillation (CMS/HCC)    • Atrial fibrillation with rapid ventricular response (CMS/HCC) 1/20/2017   • Breast cancer (CMS/HCC)     right   • Cancer (CMS/HCC)    • Disease of thyroid gland    • Hypertension      Past Surgical History:   Procedure Laterality Date   • ABLATION OF DYSRHYTHMIC FOCUS     • BREAST SURGERY     • CARDIOVERSION     • INSERT / REPLACE / REMOVE PACEMAKER     • PACEMAKER IMPLANTATION     • SKIN BIOPSY     • SKIN TAG REMOVAL       Social History     Socioeconomic History   • Marital status:      Spouse name: Not on file   • Number of children: Not on file   • Years of education: Not on file   • Highest education level: Not on file   Social Needs   • Financial resource strain: Not on file   • Food insecurity - worry: Not on file   • Food insecurity - inability: Not on file   • Transportation needs - medical: Not on file   • Transportation needs - non-medical: Not on file   Occupational History   • Not on file   Tobacco Use   • Smoking status: Never Smoker   • Smokeless tobacco: Former User     Types: Snuff, Chew   Substance and Sexual Activity   • Alcohol use: No   • Drug use: Yes     Types: Oxycodone   • Sexual activity: Defer   Other Topics Concern   • Not on file   Social History Narrative   • Not on file     Family History   Problem Relation Age of Onset   • Hypertension Daughter    • Hypertension Son    • No Known Problems Mother    • No Known Problems Father        Labs:  WBC WBC   Date Value Ref Range Status   12/11/2018 6.87 4.80 - 10.80 10*3/mm3 Final    12/10/2018 8.34 4.80 - 10.80 10*3/mm3 Final      HGB Hemoglobin   Date Value Ref Range Status   12/11/2018 11.2 (L) 12.0 - 16.0 g/dL Final   12/10/2018 11.2 (L) 12.0 - 16.0 g/dL Final      HCT Hematocrit   Date Value Ref Range Status   12/11/2018 33.5 (L) 37.0 - 47.0 % Final   12/10/2018 34.5 (L) 37.0 - 47.0 % Final      Platelets Platelets   Date Value Ref Range Status   12/11/2018 209 130 - 400 10*3/mm3 Final   12/10/2018 243 130 - 400 10*3/mm3 Final      MCV MCV   Date Value Ref Range Status   12/11/2018 92.3 82.0 - 98.0 fL Final   12/10/2018 92.5 82.0 - 98.0 fL Final        Results from last 7 days   Lab Units  12/11/18   0345  12/10/18   1530   SODIUM mmol/L  142  138   POTASSIUM mmol/L  4.2  4.2   CHLORIDE mmol/L  104  102   CO2 mmol/L  26.0  27.0   BUN mg/dL  14  18   CREATININE mg/dL  0.91  1.15   CALCIUM mg/dL  9.1  9.0   BILIRUBIN mg/dL   --   0.5   ALK PHOS U/L   --   96   ALT (SGPT) U/L   --   15   AST (SGOT) U/L   --   30   GLUCOSE mg/dL  110*  117*     Lab Results   Component Value Date    CKMB 0.82 12/13/2016    TROPONINI <0.012 12/11/2018     PT/INR:    Protime   Date Value Ref Range Status   12/10/2018 16.9 (H) 11.9 - 14.6 Seconds Final   /  INR   Date Value Ref Range Status   12/10/2018 1.33 (H) 0.91 - 1.09 Final       Imaging Results (last 72 hours)     Procedure Component Value Units Date/Time    XR Chest 1 View [[id number]] Collected:  12/10/18 1831     Updated:  12/10/18 1835    Narrative:       Exam:   XR CHEST 1 VW-       Date:  12/10/2018      History:  Female, age  78 years;cp     COMPARISON:  Chest x-ray dated 1/30/2018.     Findings :  Low lung volumes. Left-sided cardiac device in place.  The heart and mediastinum are normal in size. Lungs are without focal  infiltrate, mass or effusions.  The bones show no acute pathology.         Impression:       Impression:     No acute cardiopulmonary disease.     This report was finalized on 12/10/2018 18:32 by Dr. Jyoti Soto MD.           Objective     Allergies   Allergen Reactions   • Penicillins        Medication Review: Performed  Current Facility-Administered Medications   Medication Dose Route Frequency Provider Last Rate Last Dose   • carvedilol (COREG) tablet 18.75 mg  18.75 mg Oral BID With Meals Mark Delacruz MD   18.75 mg at 12/11/18 0814   • diltiaZEM (CARDIZEM) 125 mg in sodium chloride 0.9 % 125 mL (1 mg/mL) infusion  5-15 mg/hr Intravenous Titrated Minh Whitman PA-C 10 mL/hr at 12/11/18 0816 10 mg/hr at 12/11/18 0816   • diltiaZEM CD (CARDIZEM CD) 24 hr capsule 180 mg  180 mg Oral Q24H Mark Delacruz MD   180 mg at 12/11/18 0814   • HYDROcodone-acetaminophen (NORCO)  MG per tablet 1 tablet  1 tablet Oral BID PRN Mark Delacruz MD       • ipratropium-albuterol (DUO-NEB) nebulizer solution 3 mL  3 mL Nebulization Q4H PRN Mark Delacruz MD       • levothyroxine (SYNTHROID, LEVOTHROID) tablet 50 mcg  50 mcg Oral Daily Mark Delacruz MD   50 mcg at 12/11/18 0814   • meclizine (ANTIVERT) tablet 25 mg  25 mg Oral TID PRN Mark Delacruz MD       • mirtazapine (REMERON) tablet 15 mg  15 mg Oral Nightly Mark Delacruz MD   15 mg at 12/10/18 2340   • nitroglycerin (NITROSTAT) SL tablet 0.4 mg  0.4 mg Sublingual Q5 Min PRN Mark Delacruz MD       • ondansetron (ZOFRAN) tablet 4 mg  4 mg Oral Q6H PRN Mark Delacruz MD        Or   • ondansetron ODT (ZOFRAN-ODT) disintegrating tablet 4 mg  4 mg Oral Q6H PRN Mark Delacruz MD        Or   • ondansetron (ZOFRAN) injection 4 mg  4 mg Intravenous Q6H PRN Mark Delacruz MD       • pantoprazole (PROTONIX) EC tablet 40 mg  40 mg Oral QAM Mark Delacruz MD   40 mg at 12/11/18 0814   • potassium chloride (MICRO-K) CR capsule 10 mEq  10 mEq Oral Daily Mark Delacruz MD   10 mEq at 12/11/18 0814   • propafenone (RYTHMOL) tablet 225 mg  225 mg Oral Q8H Mark Delacruz MD   225 mg at 12/11/18 0814   • rivaroxaban (XARELTO) tablet 15 mg  15  "mg Oral Daily With Dinner Mark Delacruz MD   15 mg at 12/10/18 7330   • sodium chloride 0.9 % flush 3 mL  3 mL Intravenous Q12H Mark Delacruz MD       • sodium chloride 0.9 % flush 3-10 mL  3-10 mL Intravenous PRN Mark Delacruz MD           Vital Sign Min/Max for last 24 hours  Temp  Min: 97 °F (36.1 °C)  Max: 98.2 °F (36.8 °C)   BP  Min: 120/69  Max: 141/68   Pulse  Min: 64  Max: 125   Resp  Min: 18  Max: 20   SpO2  Min: 95 %  Max: 100 %   No Data Recorded   Weight  Min: 76.7 kg (169 lb)  Max: 76.7 kg (169 lb)     Flowsheet Rows      First Filed Value   Admission Height  167.6 cm (66\") Documented at 12/10/2018 1523   Admission Weight  76.7 kg (169 lb) Documented at 12/10/2018 1523          Results for orders placed during the hospital encounter of 12/13/16   Adult Transthoracic Echo Complete    Narrative · All left ventricular wall segments contract normally.  · Left ventricular function is normal. Estimated EF = 55%.  · Left ventricular diastolic dysfunction (grade I) consistent with   impaired relaxation.  · No pulmonary hypertension          Physical Exam:    General Appearance: Awake, alert, in no acute distress  Eyes: Pupils equal and reactive    Ears: Appear intact with no abnormalities noted  Nose: Nares normal, no drainage  Neck: supple, trachea midline, no carotid bruit and no JVD  Back: no kyphosis present,    Lungs: respirations regular, respirations even and respirations unlabored    Heart: normal S1, S2,  2/6 pansystolic murmur in the left sternal border,  no rub and no click    Abdomen: normal bowel sounds, no tenderness   Skin: no bleeding, bruising or rash  Extremities: no cyanosis  Psychiatric/Behavioral: Negative for agitation, behavioral problems, confusion, the patient does  appear to be nervous/anxious.          Results Review:   I reviewed the patient's new clinical results.  I reviewed the patient's new imaging results and agree with the interpretation.  I reviewed the " patient's other test results and agree with the interpretation  I personally viewed and interpreted the patient's EKG/Telemetry data  Discussed with patient    Reviewed available prior notes, consults, prior visits, laboratory findings, radiology and cardiology relevant reports. Updated chart as applicable. I have reviewed the patient's medical history in detail and updated the computerized patient record as relevant.      Updated patient regarding any new or relevant abnormalities on review of records or any new findings on physical exam. Mentioned to patient about purpose of visit and desirable health short and long term goals and objectives.     Assessment/Plan       Paroxysmal atrial fibrillation with rapid ventricular response (CMS/HCC)    Hypothyroidism    Essential hypertension    Paroxysmal atrial fibrillation (CMS/HCC)      Plan      Patient has a [employer] pacemaker will check this and see atrial fibrillation burden.  If this substantial atrial fibrillation burden will   discontinue propafenone and start amiodarone 200 mg 3 times a day for 5 days and then 200 mg daily  Supportive care  Telemetry  Optimal medical therapy    Recommend serial TSH and maintain euthyroid status  TSH yesterday was within normal range    If no recent echocardiogram will repeat this.    Deep vein thrombosis prophylaxis/precautions  Appropriate diet, fluid, sodium, caffeine, stimulants intake     Questions were encouraged, asked and answered to the patient's  understanding and satisfaction.    Compliance to diet and medications   Avoid NSAIDS    Flash Lopez MD  12/11/18  8:18 AM    EMR Dragon/Transcription was used to dictate part of this note

## 2018-12-11 NOTE — H&P
[facility] Medicine Services  HISTORY AND PHYSICAL    Date of Admission: 12/10/2018  Primary Care Physician: Sandip Lozano MD    Subjective     Chief Complaint: Palpitations    History of Present Illness  Patient is a 78-year-old white female past medical history of atrial fibrillation with previous ablation.  She also has a pacemaker and is on chronic therapy for rhythm control.  She presents to the emergency room with about a 2 to three-day history of episodic palpitations and rapid heart rate.  She finally came in today because it progressively got worse and did not seem to be going away like it had in the past times.  She denies missing any medications she denies any changes in her medications and that she is very adamant about how often to take it and when to take it.  This leads me to believe that she is compliant with it.  She is being admitted for further evaluation and management of this rapid tachycardia A. Fib.  She has been started on a Cardizem drip which seems to be improving her overall rate.        Review of Systems   14 point review of systems negative except for as per HPI    Otherwise complete ROS reviewed and negative except as mentioned in the HPI.    Past Medical History:   Past Medical History:   Diagnosis Date   • Arthritis    • Atrial fibrillation (CMS/HCC)    • Atrial fibrillation (CMS/HCC)    • Breast cancer (CMS/HCC)     right   • Cancer (CMS/HCC)    • CHF (congestive heart failure) (CMS/HCC)    • Disease of thyroid gland    • Hypertension      Past Surgical History:  Past Surgical History:   Procedure Laterality Date   • ABLATION OF DYSRHYTHMIC FOCUS     • BREAST SURGERY     • CARDIOVERSION     • INSERT / REPLACE / REMOVE PACEMAKER     • PACEMAKER IMPLANTATION     • SKIN BIOPSY     • SKIN TAG REMOVAL       Social History:  reports that  has never smoked. she has never used smokeless tobacco. She reports that she uses drugs. Drug: Oxycodone. She  "reports that she does not drink alcohol.    Family History: family history includes Hypertension in her daughter and son; No Known Problems in her father and mother.       Allergies:  Allergies   Allergen Reactions   • Penicillins      Medications:  Prior to Admission medications    Medication Sig Start Date End Date Taking? Authorizing Provider   carvedilol (COREG) 12.5 MG tablet Take 1.5 tablets by mouth 2 (Two) Times a Day With Meals. 9/21/17  Yes Cheli Monsalve APRN   diltiazem LA (CARDIZEM LA) 180 MG 24 hr tablet Take 1 tablet by mouth Daily. 12/18/17 12/18/18 Yes Mark Atkinson MD   esomeprazole (nexIUM) 40 MG capsule Take 40 mg by mouth Every Morning Before Breakfast.   Yes ProviderBradly MD   furosemide (LASIX) 20 MG tablet Take 40 mg by mouth Daily.   Yes ProviderBradly MD   HYDROcodone-acetaminophen (NORCO)  MG per tablet Take 1 tablet by mouth 2 (Two) Times a Day As Needed for moderate pain (4-6).   Yes ProviderBradly MD   levothyroxine (SYNTHROID, LEVOTHROID) 50 MCG tablet Take 50 mcg by mouth Daily.   Yes ProviderBradly MD   meclizine (ANTIVERT) 25 MG tablet Take 25 mg by mouth 3 (three) times a day as needed for dizziness.   Yes ProviderBradly MD   mirtazapine (REMERON) 15 MG tablet Take 15 mg by mouth Every Night.   Yes ProviderBradly MD   potassium chloride (K-DUR,KLOR-CON) 10 MEQ CR tablet Take 10 mEq by mouth daily.   Yes ProviderBradly MD   propafenone (RYTHMOL) 225 MG tablet Take 1 tablet by mouth Every 8 (Eight) Hours. 9/21/17  Yes Cheli Monsalve APRN   XARELTO 15 MG tablet TAKE 1 TABLET BY MOUTH DAILY WITH DINNER. 2/19/18  Yes Flash Lopez MD     Objective     Vital Signs: /87 (BP Location: Left arm, Patient Position: Lying)   Pulse (!) 122 Comment: tachy  Temp 97.8 °F (36.6 °C) (Tympanic)   Resp 18   Ht 167.6 cm (66\")   Wt 76.7 kg (169 lb)   LMP  (LMP Unknown)   SpO2 96%   BMI 27.28 kg/m²   Physical Exam  Gen. " well-nourished well-developed WF in no acute distress  HEENT: Atraumatic normocephalic pupils equal round reactive to light extraocular movements intact sclerae anicteric TMs negative mucous membranes moist neck is supple without lymphadenopathy no JVD is noted no carotid bruits are auscultated oropharynx is without erythema or exudate  Chest: coarse BS bilaterally   CV: irregular rate and rhythm normal S1-S2 no gallops murmurs or rubs  Abdomen: Soft nontender nondistended active bowel sounds no hepatosplenomegaly no masses no hernias  Extremities: No clubbing edema or cyanosis capillary refill is normal pulses are 2+ and symmetric at radial and dorsalis pedis posterior tibial pulses are intact and 2+ palpable  Neuro: Patient is awake alert and oriented ×3, cranial nerves II through XII are grossly intact, motor is 5 out of 5 bilaterally, DTRs are 2+ and symmetric bilaterally sensory exam is nonfocal  Skin: Warm dry and intact no evidence of breakdown  Sensorium: Normal thought and affect  Nursing notes and vital signs reviewed        Results Reviewed:  Lab Results (last 24 hours)     Procedure Component Value Units Date/Time    Troponin [[id number]] Collected:  12/10/18 2358    Specimen:  Blood Updated:  12/11/18 0036    TSH [[id number]]  (Normal) Collected:  12/10/18 1530    Specimen:  Blood from Arm, Right Updated:  12/10/18 1833     TSH 2.350 mIU/mL     T4 [[id number]] Collected:  12/10/18 1813    Specimen:  Blood Updated:  12/10/18 1829    Troponin [[id number]]  (Normal) Collected:  12/10/18 1530    Specimen:  Blood from Arm, Right Updated:  12/10/18 1806     Troponin I <0.012 ng/mL     Comprehensive Metabolic Panel [[id number]]  (Abnormal) Collected:  12/10/18 1530    Specimen:  Blood from Arm, Right Updated:  12/10/18 1755     Glucose 117 mg/dL      BUN 18 mg/dL      Creatinine 1.15 mg/dL      Sodium 138 mmol/L      Potassium 4.2 mmol/L      Chloride 102 mmol/L      CO2 27.0 mmol/L      Calcium 9.0 mg/dL       Total Protein 7.5 g/dL      Albumin 4.00 g/dL      ALT (SGPT) 15 U/L      AST (SGOT) 30 U/L      Alkaline Phosphatase 96 U/L      Total Bilirubin 0.5 mg/dL      eGFR Non African Amer 46 mL/min/1.73      Globulin 3.5 gm/dL      A/G Ratio 1.1 g/dL      BUN/Creatinine Ratio 15.7     Anion Gap 9.0 mmol/L     Narrative:       The MDRD GFR formula is only valid for adults with stable renal function between ages 18 and 70.    Lipase [[id number]]  (Normal) Collected:  12/10/18 1530    Specimen:  Blood from Arm, Right Updated:  12/10/18 1755     Lipase 131 U/L     Phosphorus [[id number]]  (Normal) Collected:  12/10/18 1530    Specimen:  Blood from Arm, Right Updated:  12/10/18 1755     Phosphorus 3.9 mg/dL     Magnesium [[id number]]  (Normal) Collected:  12/10/18 1530    Specimen:  Blood from Arm, Right Updated:  12/10/18 1755     Magnesium 1.7 mg/dL     D-dimer, Quantitative [[id number]]  (Normal) Collected:  12/10/18 1530    Specimen:  Blood from Arm, Right Updated:  12/10/18 1740     D-Dimer, Quantitative 0.23 mg/L (FEU)     Narrative:       Reference Range is 0-0.50 mg/L FEU. However, results <0.50 mg/L FEU tends to rule out DVT or PE. Results >0.50 mg/L FEU are not useful in predicting absence or presence of DVT or PE.    Protime-INR [[id number]]  (Abnormal) Collected:  12/10/18 1530    Specimen:  Blood from Arm, Right Updated:  12/10/18 1740     Protime 16.9 Seconds      INR 1.33    aPTT [[id number]]  (Normal) Collected:  12/10/18 1530    Specimen:  Blood from Arm, Right Updated:  12/10/18 1740     PTT 33.2 seconds     CBC & Differential [[id number]] Collected:  12/10/18 1530    Specimen:  Blood Updated:  12/10/18 1736    Narrative:       The following orders were created for panel order CBC & Differential.  Procedure                               Abnormality         Status                     ---------                               -----------         ------                     CBC Auto Differential[[id number]]         Abnormal            Final result                 Please view results for these tests on the individual orders.    CBC Auto Differential [[id number]]  (Abnormal) Collected:  12/10/18 1530    Specimen:  Blood from Arm, Right Updated:  12/10/18 1736     WBC 8.34 10*3/mm3      RBC 3.73 10*6/mm3      Hemoglobin 11.2 g/dL      Hematocrit 34.5 %      MCV 92.5 fL      MCH 30.0 pg      MCHC 32.5 g/dL      RDW 14.6 %      RDW-SD 49.3 fl      MPV 11.1 fL      Platelets 243 10*3/mm3      Neutrophil % 66.9 %      Lymphocyte % 19.1 %      Monocyte % 7.6 %      Eosinophil % 4.4 %      Basophil % 1.2 %      Immature Grans % 0.8 %      Neutrophils, Absolute 5.58 10*3/mm3      Lymphocytes, Absolute 1.59 10*3/mm3      Monocytes, Absolute 0.63 10*3/mm3      Eosinophils, Absolute 0.37 10*3/mm3      Basophils, Absolute 0.10 10*3/mm3      Immature Grans, Absolute 0.07 10*3/mm3      nRBC 0.0 /100 WBC     [place] Draw [[id number]] Collected:  12/10/18 1530    Specimen:  Blood Updated:  12/10/18 1646    Narrative:       The following orders were created for panel order [place] Draw.  Procedure                               Abnormality         Status                     ---------                               -----------         ------                     Light Blue Top[[id number]]                                   Final result               Green Top (Gel)[[id number]]                                  Final result               Lavender Top[[id number]]                                     Final result               Red Top[[id number]]                                          Final result                 Please view results for these tests on the individual orders.    Light Blue Top [[id number]] Collected:  12/10/18 1530    Specimen:  Blood from Arm, Right Updated:  12/10/18 1646     Extra Tube hold for add-on     Comment: Auto resulted       Green Top (Gel) [[id number]] Collected:  12/10/18 1530    Specimen:  Blood from Arm, Right Updated:  12/10/18  1646     Extra Tube Hold for add-ons.     Comment: Auto resulted.       Lavender Top [[id number]] Collected:  12/10/18 1530    Specimen:  Blood from Arm, Right Updated:  12/10/18 1646     Extra Tube hold for add-on     Comment: Auto resulted       Red Top [[id number]] Collected:  12/10/18 1530    Specimen:  Blood from Arm, Right Updated:  12/10/18 1646     Extra Tube Hold for add-ons.     Comment: Auto resulted.           Imaging Results (last 24 hours)     Procedure Component Value Units Date/Time    XR Chest 1 View [[id number]] Collected:  12/10/18 1831     Updated:  12/10/18 1835    Narrative:       Exam:   XR CHEST 1 VW-       Date:  12/10/2018      History:  Female, age  78 years;cp     COMPARISON:  Chest x-ray dated 1/30/2018.     Findings :  Low lung volumes. Left-sided cardiac device in place.  The heart and mediastinum are normal in size. Lungs are without focal  infiltrate, mass or effusions.  The bones show no acute pathology.         Impression:       Impression:     No acute cardiopulmonary disease.     This report was finalized on 12/10/2018 18:32 by Dr. Jyoti Soto MD.        I have personally reviewed and interpreted the radiology studies and ECG obtained at time of admission.     Assessment / Plan     Assessment:   Active Hospital Problems    Diagnosis   • Paroxysmal atrial fibrillation with rapid ventricular response (CMS/HCC)   • Irregular tachycardia   1.  A. fib with rapid ventricular response is to admit patient monitor on telemetry.  We will continue Cardizem drip.  We will consult cardiology to follow and evaluate.  We will monitor serial enzymes and EKGs.  We will check electrolytes as well.  Also check BNP.  Patient may need some diuresis.  She is being given a neb treatments steroids in the emergency room which she denies a history of smoking she does admit to previous tobacco use with snuff but she states she has not done that in many years.  So therefore we will hold off on any further  nebulizer treatments even though she does have home but I do not see any active evidence of COPD.  2.  Shortness of breath we will get BNP as stated consider diuresing with Lasix if continues to be a problem.  3.  Hypertension monitor blood pressure while here adjust meds as needed.    Patient seen prior to midnight         Code Status: Full     I discussed the patient's findings and my recommendations with the patient and granddaughter.  Her daughter is her POA    Estimated length of stay 1-2 days    Mark Delacruz MD   12/11/18   12:53 AM

## 2018-12-11 NOTE — DISCHARGE SUMMARY
[facility] Medicine Services  DISCHARGE SUMMARY       Date of Admission: 12/10/2018  Date of Discharge:  12/11/2018  Primary Care Physician: Sandip Lozano MD    Presenting Problem/History of Present Illness:  Irregular tachycardia [R00.0]     Discharge Diagnoses:  1.  Paroxysmal atrial fibrillation with rapid ventricular response, status post ablation 4/6/17  2.  Essential hypertension  3.  Hypothyroidism  4.  Sick sinus syndrome status post AV sequential pacemaker MRI compatible pacemaker  5.  Elevated proBNP secondary to atrial fibrillation with rapid ventricular response.  No heart failure.    Chief Complaint on Day of Discharge: Feels better today.  History of Present Illness on Day of Discharge:   Sitting up in bed.  Nephew in room.  Denies further palpitations.  Has remained normal sinus rhythm on telemetry.  Pacemaker interrogated.  Settings adjusted.  Dr. Lopez has seen.  Change medications and has cleared for discharge today.  She feels better.  She denies nausea, vomiting or abdominal pain.  She denies palpitations.  She is not wearing oxygen.     Consults: Dr. Lopez, cardiology    Procedures Performed: None    Pertinent Test Results:   Echocardiogram 8/18/18 outside facility  Left ventricular cavity size normal  Ejection fraction 40%  Moderate pulmonary hypertension  Insignificant pericardial effusion    Laboratory Data:    Results from last 7 days   Lab Units  12/11/18   0345  12/10/18   1530   WBC 10*3/mm3  6.87  8.34   HEMOGLOBIN g/dL  11.2*  11.2*   HEMATOCRIT %  33.5*  34.5*   PLATELETS 10*3/mm3  209  243        Results from last 7 days   Lab Units  12/11/18   0345  12/10/18   1530   SODIUM mmol/L  142  138   POTASSIUM mmol/L  4.2  4.2   CHLORIDE mmol/L  104  102   CO2 mmol/L  26.0  27.0   BUN mg/dL  14  18   CREATININE mg/dL  0.91  1.15   CALCIUM mg/dL  9.1  9.0   BILIRUBIN mg/dL   --   0.5   ALK PHOS U/L   --   96   ALT (SGPT) U/L   --   15   AST (SGOT) U/L   --    30   GLUCOSE mg/dL  110*  117*       Troponin I <0.012 ng/mL                12/11/2018 0345  12/11/2018 0438  Lipid Panel [[id number]]   (Abnormal)   Blood     Final result  Total Cholesterol 228 Abnormally high  mg/dL   Triglycerides 207 Abnormally high  mg/dL   HDL Cholesterol 31 Abnormally low  mg/dL   LDL Cholesterol  136 Abnormally high  mg/dL   LDL/HDL Ratio 5.02           12/11/2018 0345  12/11/2018 0439  BNP [[id number]]   Blood     Final result  proBNP 1,650.0 pg/mL          Magnesium 1.7 mg/dL                12/11/2018 0345 12/11/2018 0428  Phosphorus [[id number]]   Blood     Final result  Phosphorus 3.9 mg/dL         Troponin I <0.012 ng/mL                 12/10/2018 2358  12/11/2018 0120  BNP [[id number]]   Blood     Final result  proBNP 1,500.0 pg/mL         Protime 16.9 Abnormally high  Seconds   INR 1.33 Abnormally high                   12/10/2018 1530  12/10/2018 1740  aPTT [[id number]]   Blood from Arm, Right     Final result  aPTT 33.2 seconds          12/10/2018 1530  12/10/2018 1740  D-dimer, Quantitative [[id number]]    Blood from Arm, Right     Final result  D-dimer, Quant 0.23 mg/L (FEU)          12/10/2018 1530  12/10/2018 1755  Lipase [[id number]]   Blood from Arm, Right     Final result  Lipase 131 U/L          12/10/2018 1530  12/10/2018 1806  Troponin [[id number]]   Blood from Arm, Right     Final result  Troponin I <0.012 ng/mL          12/10/2018 1530  12/10/2018 1755  Phosphorus [[id number]]   Blood from Arm, Right     Final result  Phosphorus 3.9 mg/dL          12/10/2018 1530  12/10/2018 1755  Magnesium [[id number]]   Blood from Arm, Right     Final result  Magnesium 1.7 mg/dL          12/10/2018 1530  12/10/2018 1833  TSH [[id number]]   Blood from Arm, Right     Final result  TSH Baseline 2.350 mIU/mL        Imaging Results (all)     Procedure Component Value Units Date/Time    XR Chest 1 View [[id number]] Collected:  12/10/18 1831     Updated:  12/10/18 1835    Narrative:       Exam:   XR  CHEST 1 VW-       Date:  12/10/2018      History:  Female, age  78 years;cp     COMPARISON:  Chest x-ray dated 1/30/2018.     Findings :  Low lung volumes. Left-sided cardiac device in place.  The heart and mediastinum are normal in size. Lungs are without focal  infiltrate, mass or effusions.  The bones show no acute pathology.         Impression:       Impression:     No acute cardiopulmonary disease.     This report was finalized on 12/10/2018 18:32 by Dr. Jyoti Soto MD.        Hospital Course  Patient is a 78 y.o. female presented to [facility] emergency room 12/10/18 with 2-3 day history of episodic palpitations and increased heart rate.  She has history of atrial fibrillation status post ablation and pacemaker insertion.  She is on Rythmol for rate control.  She had sick sinus syndrome and underwent AV sequential pacemaker insertion MRI compatible.  She is noted to have breakthrough paroxysmal atrial fibrillation symptomatic.  She reports worsening progressive episodic palpitations over the last 2-3 days.  She denies missing any medications.  She is compliant with her medication.  She also takes Xarelto as anticoagulation.  She denies chest pain, syncopal episodes.  She denies orthopnea or nocturnal dyspnea.  She noted easily fatigued.  She was found be in atrial fibrillation with rapid ventricular response placed on a Cardizem drip.    She was admitted to the telemetry floor.  She was on Cardizem drip initially.  She has remained normal sinus rhythm with intermittent pacing.  No further atrial fibrillation.  Cardizem discontinued. She denied chest pain.  Serial troponins were monitored and were negative.  She was seen in consultation by Dr. Lopez with cardiology as he has followed her in the past. He recommended to have pacemaker interrogated which was done.  Results of interrogation reported to Dr. Lopez by pacemaker representative.  Dr. Lopez elected to discontinue propafenone and start  "amiodarone 200 mg 3 times daily for 5 days then 200 mg daily.  Patient had recent echocardiogram 8/18/18 at outside facility.  Results noted left ventricular cavity size normal.  Ejection fraction 40%.  Moderate pulmonary hypertension.  Insignificant pericardial effusion.  Patient was cleared for discharge by Dr. Lopez.  Recommend follow-up TSH and maintaining euthyroid status.  Primary care provider to follow.    On 12/11/18 she is medically stable for discharge.  She has remained normal sinus rhythm and paced rhythm on telemetry.  No further atrial fibrillation.  Medications adjusted by Dr. Lopez and then cleared for discharge.  She denies chest pain or shortness of breath.  Troponins negative.  Have arranged follow-up with her primary care provider Dr. Sandip Lozano on 12/18/18.  She will follow-up with Dr. Lopez in the [facility] office.  Medication changes discussed with patient and nephew present in room.  Patient and nephew voice understanding.     Physical Exam on Discharge:  /65 (BP Location: Left arm, Patient Position: Lying)   Pulse 77   Temp 97.5 °F (36.4 °C) (Temporal)   Resp 18   Ht 167.6 cm (66\")   Wt 76.7 kg (169 lb)   LMP  (LMP Unknown)   SpO2 96%   BMI 27.28 kg/m²   Physical Exam   Constitutional: She is oriented to person, place, and time. She appears well-developed and well-nourished.   HENT:   Head: Normocephalic and atraumatic.   Eyes: EOM are normal. Pupils are equal, round, and reactive to light.   Neck: Normal range of motion. Neck supple.   Cardiovascular: Normal rate, regular rhythm, normal heart sounds and intact distal pulses. Exam reveals no gallop and no friction rub.   No murmur heard.  Paced rhythm intermittent sinus.  No atrial fibrillation   Pulmonary/Chest: Effort normal and breath sounds normal. No respiratory distress. She has no wheezes. She has no rales.   No oxygen in use   Abdominal: Soft. Bowel sounds are normal. She exhibits no distension.   Genitourinary: "   Genitourinary Comments: Voiding   Musculoskeletal: Normal range of motion. She exhibits no edema.   Neurological: She is alert and oriented to person, place, and time.   Skin: Skin is warm and dry.   Psychiatric: She has a normal mood and affect. Her behavior is normal. Judgment and thought content normal.     Condition on Discharge: Stable    Discharge Disposition:  Home with family    Discharge Diet:   Diet Instructions     Advance Diet As Tolerated         as tolerated    Activity at Discharge:   Activity Instructions     Activity as Tolerated         as tolerated    Discharge Care Plan / Instructions:   1.  For worsening returning symptoms of persistent palpitations and rapid heart rate medical attention.  2.  Discontinue propafenone (rhthmol) per Dr. Lopez  3.  Amiodarone 200 mg 3 times daily ×5 days then 200 mg daily per Dr. Lopez  4.  Follow-up with Dr. Lopez 2/17/18 [facility].    Discharge Medications:     Discharge Medications      New Medications      Instructions Start Date   amiodarone 200 MG tablet  Commonly known as:  PACERONE   Take 1 tablet 3 times daily for 5 days, then 1 tablet daily         Continue These Medications      Instructions Start Date   carvedilol 12.5 MG tablet  Commonly known as:  COREG   18.75 mg, Oral, 2 Times Daily With Meals      diltiazem  MG 24 hr tablet  Commonly known as:  CARDIZEM LA   180 mg, Oral, Daily      esomeprazole 40 MG capsule  Commonly known as:  nexIUM   40 mg, Oral, Every Morning Before Breakfast      furosemide 20 MG tablet  Commonly known as:  LASIX   40 mg, Oral, Daily      HYDROcodone-acetaminophen  MG per tablet  Commonly known as:  NORCO   1 tablet, Oral, 2 Times Daily PRN      levothyroxine 50 MCG tablet  Commonly known as:  SYNTHROID, LEVOTHROID   50 mcg, Oral, Daily      meclizine 25 MG tablet  Commonly known as:  ANTIVERT   25 mg, Oral, 3 Times Daily PRN      mirtazapine 15 MG tablet  Commonly known as:  REMERON   15 mg, Oral,  Nightly      potassium chloride 10 MEQ CR tablet  Commonly known as:  K-DUR,KLOR-CON   10 mEq, Oral, Daily      XARELTO 15 MG tablet  Generic drug:  rivaroxaban   TAKE 1 TABLET BY MOUTH DAILY WITH DINNER.         Stop These Medications    propafenone 225 MG tablet  Commonly known as:  RYTHMOL          Follow-up Appointments:   Follow-up Information     Sandip Lozano MD. Go in 1 week(s).    Specialty:  Family Medicine  Why:  Follow up on 12/18 @ 8:30am.   Contact information:  [address]  [address]  [place] [address]  [phone]             Flash Lopez MD Follow up.    Specialty:  Cardiology  Why:  APPOINTMENT ON FEBRUARY 07, 2019 AT 01:15 PM  Contact information:  [address] [facility] [address]  [phone]                   Future Appointments:  No future appointments.    Test Results Pending at Discharge: none    The above documentation resulted from a face-to-face encounter by me Allyssa ROACH, [facility].    DOC Cuevas  12/11/18  2:10 PM    Time:  This discharge process required 35 minutes for completion.     Plan discussed with Dr. Torre, Dr. Lopez, patient, and nephew.    Time spent in face-to-face evaluation, chart review, planning and education 35 minutes.  Please note that portions of this note may have been completed with a voice recognition program. Efforts were made to edit the dictations, but occasionally words are mistranscribed.

## 2018-12-11 NOTE — ED PROVIDER NOTES
Subjective   History of Present Illness  78-year-old female presents a chief complaint of palpitations and rapid heart rate.  The patient reports she has a history of atrial fibrillation and frequently has episodes of A. fib.  Patient reports associated symptoms include shortness of breath and weakness.  No chest pain nausea vomiting diarrhea or cough.  Review of Systems   All other systems reviewed and are negative.      Past Medical History:   Diagnosis Date   • Arthritis    • Atrial fibrillation (CMS/HCC)    • Atrial fibrillation (CMS/HCC)    • Breast cancer (CMS/HCC)     right   • Cancer (CMS/HCC)    • CHF (congestive heart failure) (CMS/HCC)    • Disease of thyroid gland    • Hypertension        Allergies   Allergen Reactions   • Penicillins        Past Surgical History:   Procedure Laterality Date   • ABLATION OF DYSRHYTHMIC FOCUS     • BREAST SURGERY     • CARDIOVERSION     • INSERT / REPLACE / REMOVE PACEMAKER     • PACEMAKER IMPLANTATION     • SKIN BIOPSY     • SKIN TAG REMOVAL         Family History   Problem Relation Age of Onset   • Hypertension Daughter    • Hypertension Son    • No Known Problems Mother    • No Known Problems Father        Social History     Socioeconomic History   • Marital status:      Spouse name: Not on file   • Number of children: Not on file   • Years of education: Not on file   • Highest education level: Not on file   Tobacco Use   • Smoking status: Never Smoker   • Smokeless tobacco: Never Used   Substance and Sexual Activity   • Alcohol use: No   • Drug use: Yes     Types: Oxycodone   • Sexual activity: Defer           Objective   Physical Exam   Constitutional: She is oriented to person, place, and time. She appears well-developed and well-nourished.   HENT:   Head: Normocephalic and atraumatic.   Eyes: EOM are normal. Pupils are equal, round, and reactive to light.   Neck: Normal range of motion. Neck supple.   Cardiovascular: Tachycardia present.   Pulmonary/Chest:  Effort normal and breath sounds normal.   Abdominal: Soft. Bowel sounds are normal.   Musculoskeletal: Normal range of motion.   Neurological: She is alert and oriented to person, place, and time. No cranial nerve deficit. Coordination normal.   Skin: Skin is warm and dry.   Psychiatric: She has a normal mood and affect. Her behavior is normal.   Nursing note and vitals reviewed.      Procedures           ED Course      Labs Reviewed   COMPREHENSIVE METABOLIC PANEL - Abnormal; Notable for the following components:       Result Value    Glucose 117 (*)     eGFR Non  Amer 46 (*)     All other components within normal limits    Narrative:     The MDRD GFR formula is only valid for adults with stable renal function between ages 18 and 70.   PROTIME-INR - Abnormal; Notable for the following components:    Protime 16.9 (*)     INR 1.33 (*)     All other components within normal limits   CBC WITH AUTO DIFFERENTIAL - Abnormal; Notable for the following components:    RBC 3.73 (*)     Hemoglobin 11.2 (*)     Hematocrit 34.5 (*)     MCHC 32.5 (*)     Eosinophil % 4.4 (*)     Immature Grans, Absolute 0.07 (*)     All other components within normal limits   APTT - Normal   D-DIMER, QUANTITATIVE - Normal    Narrative:     Reference Range is 0-0.50 mg/L FEU. However, results <0.50 mg/L FEU tends to rule out DVT or PE. Results >0.50 mg/L FEU are not useful in predicting absence or presence of DVT or PE.   LIPASE - Normal   TROPONIN (IN-HOUSE) - Normal   PHOSPHORUS - Normal   MAGNESIUM - Normal   TSH - Normal   RAINBOW DRAW    Narrative:     The following orders were created for panel order [place] Draw.  Procedure                               Abnormality         Status                     ---------                               -----------         ------                     Light Blue Top[[id number]]                                   Final result               Green Top (Gel)[[id number]]                                   Final result               Lavender Top[[id number]]                                     Final result               Red Top[[id number]]                                          Final result                 Please view results for these tests on the individual orders.   T4   LIGHT BLUE TOP   GREEN TOP   LAVENDER TOP   RED TOP   CBC AND DIFFERENTIAL    Narrative:     The following orders were created for panel order CBC & Differential.  Procedure                               Abnormality         Status                     ---------                               -----------         ------                     CBC Auto Differential[[id number]]        Abnormal            Final result                 Please view results for these tests on the individual orders.     XR Chest 1 View   Final Result   Impression:       No acute cardiopulmonary disease.       This report was finalized on 12/10/2018 18:32 by Dr. Jyoti Soto MD.                    MDM  Number of Diagnoses or Management Options  Diagnosis management comments: Will admit for continued irregular tachycardia       Amount and/or Complexity of Data Reviewed  Clinical lab tests: reviewed and ordered  Tests in the radiology section of CPT®: ordered and reviewed  Tests in the medicine section of CPT®: ordered and reviewed    Risk of Complications, Morbidity, and/or Mortality  Presenting problems: moderate  Diagnostic procedures: moderate  Management options: moderate    Patient Progress  Patient progress: stable        Final diagnoses:   Irregular tachycardia            Minh Whitman PA-C  12/10/18 2228

## 2018-12-11 NOTE — ED NOTES
Minh MOORE notified pt requesting something to eat and drink. Ok per Minh MOORE.  Milton and sprite provided for pt     Desmond Maldonado RN  12/10/18 1913

## 2018-12-12 LAB — T4 SERPL-MCNC: 7.9 UG/DL (ref 4.5–12)

## 2019-08-06 RX ORDER — AMIODARONE HYDROCHLORIDE 200 MG/1
200 TABLET ORAL 2 TIMES DAILY
Status: ON HOLD | COMMUNITY
End: 2021-10-15 | Stop reason: SDUPTHER

## 2019-08-09 ENCOUNTER — ANESTHESIA EVENT (OUTPATIENT)
Dept: PERIOP | Facility: HOSPITAL | Age: 79
End: 2019-08-09

## 2019-08-09 ENCOUNTER — ANESTHESIA (OUTPATIENT)
Dept: PERIOP | Facility: HOSPITAL | Age: 79
End: 2019-08-09

## 2019-08-09 ENCOUNTER — HOSPITAL ENCOUNTER (OUTPATIENT)
Facility: HOSPITAL | Age: 79
Setting detail: HOSPITAL OUTPATIENT SURGERY
Discharge: HOME | End: 2019-08-09
Attending: OPHTHALMOLOGY | Admitting: OPHTHALMOLOGY

## 2019-08-09 VITALS
HEART RATE: 75 BPM | OXYGEN SATURATION: 99 % | SYSTOLIC BLOOD PRESSURE: 161 MMHG | TEMPERATURE: 97.1 F | WEIGHT: 176.15 LBS | BODY MASS INDEX: 27.65 KG/M2 | DIASTOLIC BLOOD PRESSURE: 68 MMHG | RESPIRATION RATE: 18 BRPM | HEIGHT: 67 IN

## 2019-08-09 PROCEDURE — 25010000002 VANCOMYCIN 1 G RECONSTITUTED SOLUTION 1 EACH VIAL: Performed by: OPHTHALMOLOGY

## 2019-08-09 PROCEDURE — 25010000002 MIDAZOLAM PER 1 MG: Performed by: NURSE ANESTHETIST, CERTIFIED REGISTERED

## 2019-08-09 PROCEDURE — V2632 POST CHMBR INTRAOCULAR LENS: HCPCS | Performed by: OPHTHALMOLOGY

## 2019-08-09 PROCEDURE — 25010000002 EPINEPHRINE PER 0.1 MG: Performed by: OPHTHALMOLOGY

## 2019-08-09 DEVICE — LENS ACRYSOF IQ 6X13MM SN60WF 19.5: Type: IMPLANTABLE DEVICE | Site: EYE | Status: FUNCTIONAL

## 2019-08-09 RX ORDER — BRIMONIDINE TARTRATE 0.15 %
DROPS OPHTHALMIC (EYE) AS NEEDED
Status: DISCONTINUED | OUTPATIENT
Start: 2019-08-09 | End: 2019-08-09 | Stop reason: HOSPADM

## 2019-08-09 RX ORDER — TETRACAINE HYDROCHLORIDE 5 MG/ML
1 SOLUTION OPHTHALMIC AS NEEDED
Status: COMPLETED | OUTPATIENT
Start: 2019-08-09 | End: 2019-08-09

## 2019-08-09 RX ORDER — PHENYLEPHRINE HCL 2.5 %
1 DROPS OPHTHALMIC (EYE)
Status: COMPLETED | OUTPATIENT
Start: 2019-08-09 | End: 2019-08-09

## 2019-08-09 RX ORDER — MOXIFLOXACIN 5 MG/ML
SOLUTION/ DROPS OPHTHALMIC AS NEEDED
Status: DISCONTINUED | OUTPATIENT
Start: 2019-08-09 | End: 2019-08-09 | Stop reason: HOSPADM

## 2019-08-09 RX ORDER — SODIUM CHLORIDE 0.9 % (FLUSH) 0.9 %
10 SYRINGE (ML) INJECTION AS NEEDED
Status: DISCONTINUED | OUTPATIENT
Start: 2019-08-09 | End: 2019-08-09 | Stop reason: HOSPADM

## 2019-08-09 RX ORDER — TETRACAINE HYDROCHLORIDE 5 MG/ML
SOLUTION OPHTHALMIC AS NEEDED
Status: DISCONTINUED | OUTPATIENT
Start: 2019-08-09 | End: 2019-08-09 | Stop reason: HOSPADM

## 2019-08-09 RX ORDER — CYCLOPENTOLATE HYDROCHLORIDE 10 MG/ML
1 SOLUTION/ DROPS OPHTHALMIC
Status: COMPLETED | OUTPATIENT
Start: 2019-08-09 | End: 2019-08-09

## 2019-08-09 RX ORDER — MIDAZOLAM HYDROCHLORIDE 1 MG/ML
INJECTION INTRAMUSCULAR; INTRAVENOUS AS NEEDED
Status: DISCONTINUED | OUTPATIENT
Start: 2019-08-09 | End: 2019-08-09 | Stop reason: SURG

## 2019-08-09 RX ORDER — PREDNISOLONE ACETATE 10 MG/ML
SUSPENSION/ DROPS OPHTHALMIC AS NEEDED
Status: DISCONTINUED | OUTPATIENT
Start: 2019-08-09 | End: 2019-08-09 | Stop reason: HOSPADM

## 2019-08-09 RX ADMIN — TETRACAINE HYDROCHLORIDE 1 DROP: 5 SOLUTION OPHTHALMIC at 06:58

## 2019-08-09 RX ADMIN — CYCLOPENTOLATE HYDROCHLORIDE 1 DROP: 10 SOLUTION/ DROPS OPHTHALMIC at 06:58

## 2019-08-09 RX ADMIN — PHENYLEPHRINE HYDROCHLORIDE 1 DROP: 25 SOLUTION/ DROPS OPHTHALMIC at 06:58

## 2019-08-09 RX ADMIN — PHENYLEPHRINE HYDROCHLORIDE 1 DROP: 25 SOLUTION/ DROPS OPHTHALMIC at 07:20

## 2019-08-09 RX ADMIN — SODIUM CHLORIDE, PRESERVATIVE FREE 10 ML: 5 INJECTION INTRAVENOUS at 07:09

## 2019-08-09 RX ADMIN — CYCLOPENTOLATE HYDROCHLORIDE 1 DROP: 10 SOLUTION/ DROPS OPHTHALMIC at 07:20

## 2019-08-09 RX ADMIN — TETRACAINE HYDROCHLORIDE 1 DROP: 5 SOLUTION OPHTHALMIC at 07:20

## 2019-08-09 RX ADMIN — SODIUM CHLORIDE, PRESERVATIVE FREE 5 ML: 5 INJECTION INTRAVENOUS at 08:55

## 2019-08-09 RX ADMIN — MIDAZOLAM HYDROCHLORIDE 1 MG: 2 INJECTION, SOLUTION INTRAMUSCULAR; INTRAVENOUS at 08:55

## 2019-08-09 RX ADMIN — PHENYLEPHRINE HYDROCHLORIDE 1 DROP: 25 SOLUTION/ DROPS OPHTHALMIC at 07:09

## 2019-08-09 RX ADMIN — CYCLOPENTOLATE HYDROCHLORIDE 1 DROP: 10 SOLUTION/ DROPS OPHTHALMIC at 07:09

## 2019-08-09 NOTE — BRIEF OP NOTE
OPERATIVE NOTE  Sultana Lin  1940  8/9/2019    PREOP DIAGNOSES:  age-related nuclear cataract  of right eye    POSTOP DIAGNOSES:  Post-Op Diagnosis Codes:     * Age-related nuclear cataract of right eye [H25.11]    PROCEDURE:      Procedure(s):  REMOVE CATARACT AND IMPLANT INTRAOCULAR LENS    SURGEON: Lenin Dennison MD    STAFF:   Circulator: Clemencia Rodriguez RN; Lorie Byrnes RN  Scrub Person: Enedina Steinberg; Candie Mariee    ANESTHESIA: Monitored Anesthesia Care    ANESTHESIA STAFF:  Anesthesiologist: Clark Whalen MD  CRNA: Ramonita Tran CRNA    ESTIMATED BLOOD LOSS: none    SPECIMENS: none    URINE VOIDED: none    FINDINGS: Age related cataract right eye    COMPLICATIONS: none      This document has been electronically signed by Lenin Dennison MD on August 9, 2019 10:14 AM

## 2019-08-09 NOTE — ANESTHESIA PREPROCEDURE EVALUATION
Anesthesia Evaluation     Patient summary reviewed and Nursing notes reviewed   no history of anesthetic complications:  NPO Solid Status: > 8 hours  NPO Liquid Status: > 8 hours           Airway   Mallampati: II  TM distance: >3 FB  Neck ROM: full  possible difficult intubation  Dental    (+) upper dentures and lower dentures    Pulmonary - negative pulmonary ROS and normal exam    breath sounds clear to auscultation  Cardiovascular - normal exam    ECG reviewed  PT is on anticoagulation therapy  Patient on routine beta blocker and Beta blocker given within 24 hours of surgery  Rhythm: regular  Rate: normal    (+) pacemaker pacemaker interrogated >year ago, hypertension, dysrhythmias Paroxysmal Atrial Fib,   (-) murmur    ROS comment: Sinus tachycardia  Nonspecific ST abnormality  Abnormal ECG  When compared with ECG of 10-DEC-2018 17:49,  Premature supraventricular complexes are no longer Present  Nonspecific T wave abnormality no longer evident in Lateral leads  Atrial pacing     Referred By:  ALEA           Confirmed By:Flash Lopez MD    Specimen Collected: 12/11/18 07:53      · All left ventricular wall segments contract normally.  · Left ventricular function is normal. Estimated EF = 55%.  · Left ventricular diastolic dysfunction (grade I) consistent with impaired relaxation.  · No pulmonary hypertension       Neuro/Psych- negative ROS  GI/Hepatic/Renal/Endo    (+)   hypothyroidism,     Musculoskeletal     Abdominal    Substance History - negative use     OB/GYN negative ob/gyn ROS         Other   (+) arthritis   history of cancer (Breast treated.) remission                    Anesthesia Plan    ASA 3     MAC     intravenous induction   Anesthetic plan, all risks, benefits, and alternatives have been provided, discussed and informed consent has been obtained with: patient and spouse/significant other.

## 2019-08-09 NOTE — ANESTHESIA POSTPROCEDURE EVALUATION
Patient: Sultana Lin    Procedure Summary     Date:  08/09/19 Room / Location:  [facility] OR 06 / [facility] OR    Anesthesia Start:  0855 Anesthesia Stop:      Procedure:  REMOVE CATARACT AND IMPLANT INTRAOCULAR LENS (Right Eye) Diagnosis:       Age-related nuclear cataract of right eye      (age-related nuclear cataract  of right eye)    Surgeon:  Lenin Dennison MD Provider:  Clark Whalen MD    Anesthesia Type:  MAC ASA Status:  3          Anesthesia Type: MAC  Last vitals  BP   131/86 (08/09/19 0703)   Temp   97.2 °F (36.2 °C) (08/09/19 0703)   Pulse   60 (08/09/19 0703)   Resp   18 (08/09/19 0703)     SpO2   96 % (08/09/19 0703)     Post Anesthesia Care and Evaluation    Patient location during evaluation: bedside  Patient participation: complete - patient participated  Level of consciousness: awake  Pain score: 0  Pain management: adequate  Airway patency: patent  Anesthetic complications: No anesthetic complications  PONV Status: none  Cardiovascular status: acceptable  Respiratory status: acceptable  Hydration status: acceptable

## 2019-08-16 ENCOUNTER — HOSPITAL ENCOUNTER (OUTPATIENT)
Facility: HOSPITAL | Age: 79
Setting detail: HOSPITAL OUTPATIENT SURGERY
Discharge: HOME | End: 2019-08-16
Attending: OPHTHALMOLOGY | Admitting: OPHTHALMOLOGY

## 2019-08-16 ENCOUNTER — ANESTHESIA (OUTPATIENT)
Dept: PERIOP | Facility: HOSPITAL | Age: 79
End: 2019-08-16

## 2019-08-16 ENCOUNTER — ANESTHESIA EVENT (OUTPATIENT)
Dept: PERIOP | Facility: HOSPITAL | Age: 79
End: 2019-08-16

## 2019-08-16 VITALS
TEMPERATURE: 97.8 F | DIASTOLIC BLOOD PRESSURE: 82 MMHG | HEIGHT: 67 IN | BODY MASS INDEX: 27.75 KG/M2 | WEIGHT: 176.81 LBS | OXYGEN SATURATION: 97 % | SYSTOLIC BLOOD PRESSURE: 169 MMHG | HEART RATE: 82 BPM | RESPIRATION RATE: 18 BRPM

## 2019-08-16 PROCEDURE — V2632 POST CHMBR INTRAOCULAR LENS: HCPCS | Performed by: OPHTHALMOLOGY

## 2019-08-16 PROCEDURE — 25010000002 MIDAZOLAM PER 1 MG: Performed by: NURSE ANESTHETIST, CERTIFIED REGISTERED

## 2019-08-16 PROCEDURE — 25010000002 EPINEPHRINE PER 0.1 MG: Performed by: OPHTHALMOLOGY

## 2019-08-16 PROCEDURE — 25010000002 VANCOMYCIN 1 G RECONSTITUTED SOLUTION 1 EACH VIAL: Performed by: OPHTHALMOLOGY

## 2019-08-16 DEVICE — LENS ACRYSOF IQ 6X13MM SN60WF 19.0: Type: IMPLANTABLE DEVICE | Site: EYE | Status: FUNCTIONAL

## 2019-08-16 RX ORDER — BRIMONIDINE TARTRATE 0.15 %
DROPS OPHTHALMIC (EYE) AS NEEDED
Status: DISCONTINUED | OUTPATIENT
Start: 2019-08-16 | End: 2019-08-16 | Stop reason: HOSPADM

## 2019-08-16 RX ORDER — MOXIFLOXACIN 5 MG/ML
SOLUTION/ DROPS OPHTHALMIC AS NEEDED
Status: DISCONTINUED | OUTPATIENT
Start: 2019-08-16 | End: 2019-08-16 | Stop reason: HOSPADM

## 2019-08-16 RX ORDER — KETAMINE HYDROCHLORIDE 100 MG/ML
INJECTION INTRAMUSCULAR; INTRAVENOUS AS NEEDED
Status: DISCONTINUED | OUTPATIENT
Start: 2019-08-16 | End: 2019-08-16 | Stop reason: SURG

## 2019-08-16 RX ORDER — PHENYLEPHRINE HCL 2.5 %
1 DROPS OPHTHALMIC (EYE)
Status: COMPLETED | OUTPATIENT
Start: 2019-08-16 | End: 2019-08-16

## 2019-08-16 RX ORDER — TETRACAINE HYDROCHLORIDE 5 MG/ML
1 SOLUTION OPHTHALMIC AS NEEDED
Status: COMPLETED | OUTPATIENT
Start: 2019-08-16 | End: 2019-08-16

## 2019-08-16 RX ORDER — TETRACAINE HYDROCHLORIDE 5 MG/ML
SOLUTION OPHTHALMIC AS NEEDED
Status: DISCONTINUED | OUTPATIENT
Start: 2019-08-16 | End: 2019-08-16 | Stop reason: HOSPADM

## 2019-08-16 RX ORDER — MIDAZOLAM HYDROCHLORIDE 1 MG/ML
INJECTION INTRAMUSCULAR; INTRAVENOUS AS NEEDED
Status: DISCONTINUED | OUTPATIENT
Start: 2019-08-16 | End: 2019-08-16 | Stop reason: SURG

## 2019-08-16 RX ORDER — PREDNISOLONE ACETATE 10 MG/ML
SUSPENSION/ DROPS OPHTHALMIC AS NEEDED
Status: DISCONTINUED | OUTPATIENT
Start: 2019-08-16 | End: 2019-08-16 | Stop reason: HOSPADM

## 2019-08-16 RX ORDER — SODIUM CHLORIDE 0.9 % (FLUSH) 0.9 %
10 SYRINGE (ML) INJECTION AS NEEDED
Status: DISCONTINUED | OUTPATIENT
Start: 2019-08-16 | End: 2019-08-16 | Stop reason: HOSPADM

## 2019-08-16 RX ORDER — BALANCED SALT SOLUTION ENRICHED WITH BICARBONATE, DEXTROSE, AND GLUTATHIONE
KIT INTRAOCULAR AS NEEDED
Status: DISCONTINUED | OUTPATIENT
Start: 2019-08-16 | End: 2019-08-16 | Stop reason: HOSPADM

## 2019-08-16 RX ORDER — CYCLOPENTOLATE HYDROCHLORIDE 10 MG/ML
1 SOLUTION/ DROPS OPHTHALMIC
Status: COMPLETED | OUTPATIENT
Start: 2019-08-16 | End: 2019-08-16

## 2019-08-16 RX ADMIN — KETAMINE HYDROCHLORIDE 5 MG: 100 INJECTION INTRAMUSCULAR; INTRAVENOUS at 08:33

## 2019-08-16 RX ADMIN — MIDAZOLAM HYDROCHLORIDE 1 MG: 2 INJECTION, SOLUTION INTRAMUSCULAR; INTRAVENOUS at 08:30

## 2019-08-16 RX ADMIN — PHENYLEPHRINE HYDROCHLORIDE 1 DROP: 25 SOLUTION/ DROPS OPHTHALMIC at 06:48

## 2019-08-16 RX ADMIN — TETRACAINE HYDROCHLORIDE 1 DROP: 5 SOLUTION OPHTHALMIC at 06:27

## 2019-08-16 RX ADMIN — SODIUM CHLORIDE, PRESERVATIVE FREE 10 ML: 5 INJECTION INTRAVENOUS at 06:34

## 2019-08-16 RX ADMIN — CYCLOPENTOLATE HYDROCHLORIDE 1 DROP: 10 SOLUTION/ DROPS OPHTHALMIC at 06:27

## 2019-08-16 RX ADMIN — PHENYLEPHRINE HYDROCHLORIDE 1 DROP: 25 SOLUTION/ DROPS OPHTHALMIC at 06:37

## 2019-08-16 RX ADMIN — CYCLOPENTOLATE HYDROCHLORIDE 1 DROP: 10 SOLUTION/ DROPS OPHTHALMIC at 06:37

## 2019-08-16 RX ADMIN — PHENYLEPHRINE HYDROCHLORIDE 1 DROP: 25 SOLUTION/ DROPS OPHTHALMIC at 06:27

## 2019-08-16 RX ADMIN — TETRACAINE HYDROCHLORIDE 1 DROP: 5 SOLUTION OPHTHALMIC at 06:48

## 2019-08-16 RX ADMIN — CYCLOPENTOLATE HYDROCHLORIDE 1 DROP: 10 SOLUTION/ DROPS OPHTHALMIC at 06:48

## 2019-08-16 NOTE — ANESTHESIA POSTPROCEDURE EVALUATION
Patient: Sultana Lin    Procedure Summary     Date:  08/16/19 Room / Location:  [facility] OR 06 / [facility] OR    Anesthesia Start:  0833 Anesthesia Stop:  0848    Procedure:  REMOVE CATARACT AND IMPLANT INTRAOCULAR LENS (Left Eye) Diagnosis:       Age-related nuclear cataract of left eye      (age-related nuclear cataract of left eye)    Surgeon:  Lenin Dennison MD Provider:  Clark Whalen MD    Anesthesia Type:  MAC ASA Status:  3          Anesthesia Type: MAC  Last vitals  BP   154/78 (08/16/19 0626)   Temp   97.8 °F (36.6 °C) (08/16/19 0626)   Pulse   79 (08/16/19 0626)   Resp   18 (08/16/19 0626)     SpO2   98 % (08/16/19 0626)     Post Anesthesia Care and Evaluation    Patient location during evaluation: bedside  Patient participation: complete - patient participated  Level of consciousness: sleepy but conscious  Pain score: 0  Pain management: adequate  Airway patency: patent  Anesthetic complications: No anesthetic complications  PONV Status: none  Cardiovascular status: acceptable and stable  Respiratory status: acceptable  Hydration status: stable

## 2019-08-16 NOTE — ANESTHESIA PREPROCEDURE EVALUATION
Anesthesia Evaluation     Patient summary reviewed and Nursing notes reviewed   no history of anesthetic complications:  NPO Solid Status: > 8 hours  NPO Liquid Status: > 8 hours           Airway   Mallampati: II  TM distance: >3 FB  Neck ROM: full  possible difficult intubation  Dental    (+) upper dentures and lower dentures    Pulmonary - negative pulmonary ROS and normal exam    breath sounds clear to auscultation  Cardiovascular - normal exam    ECG reviewed  PT is on anticoagulation therapy  Patient on routine beta blocker and Beta blocker given within 24 hours of surgery  Rhythm: regular  Rate: normal    (+) pacemaker pacemaker interrogated >year ago, hypertension, dysrhythmias Paroxysmal Atrial Fib,   (-) murmur    ROS comment: Sinus tachycardia  Nonspecific ST abnormality  Abnormal ECG  When compared with ECG of 10-DEC-2018 17:49,  Premature supraventricular complexes are no longer Present  Nonspecific T wave abnormality no longer evident in Lateral leads  Atrial pacing     Referred By:  ALEA           Confirmed By:Flash Lopez MD    Specimen Collected: 12/11/18 07:53      · All left ventricular wall segments contract normally.  · Left ventricular function is normal. Estimated EF = 55%.  · Left ventricular diastolic dysfunction (grade I) consistent with impaired relaxation.  · No pulmonary hypertension       Neuro/Psych- negative ROS  GI/Hepatic/Renal/Endo    (+)   hypothyroidism,     Musculoskeletal     Abdominal    Substance History - negative use     OB/GYN negative ob/gyn ROS         Other   (+) arthritis   history of cancer (Breast treated.) remission                      Anesthesia Plan    ASA 3     MAC     intravenous induction   Anesthetic plan, all risks, benefits, and alternatives have been provided, discussed and informed consent has been obtained with: patient and child.

## 2019-08-16 NOTE — BRIEF OP NOTE
OPERATIVE NOTE  Sultana Lin  1940  8/16/2019    PREOP DIAGNOSES:  age-related nuclear cataract of left eye    POSTOP DIAGNOSES:  Post-Op Diagnosis Codes:     * Age-related nuclear cataract of left eye [H25.12]    PROCEDURE:      Procedure(s):  REMOVE CATARACT AND IMPLANT INTRAOCULAR LENS    SURGEON: Lenin Dennison MD    STAFF:   Circulator: Clemencia Rodriguez RN; Cee Gutierrez RN  Scrub Person: Sue Gibbs    ANESTHESIA: Monitored Anesthesia Care    ANESTHESIA STAFF:  Anesthesiologist: Clark Whalen MD  CRNA: Benny Mathews CRNA    ESTIMATED BLOOD LOSS: none    SPECIMENS: none    URINE VOIDED: none    FINDINGS: Age related cataract left eye    COMPLICATIONS: none      This document has been electronically signed by Lenin Dennison MD on August 16, 2019 9:29 AM

## 2020-02-14 ENCOUNTER — HOSPITAL ENCOUNTER (INPATIENT)
Facility: HOSPITAL | Age: 80
LOS: 2 days | Discharge: HOME HEALTH | End: 2020-02-21
Attending: EMERGENCY MEDICINE | Admitting: HOSPITALIST

## 2020-02-14 ENCOUNTER — APPOINTMENT (OUTPATIENT)
Dept: CT IMAGING | Facility: HOSPITAL | Age: 80
End: 2020-02-14

## 2020-02-14 ENCOUNTER — APPOINTMENT (OUTPATIENT)
Dept: GENERAL RADIOLOGY | Facility: HOSPITAL | Age: 80
End: 2020-02-14

## 2020-02-14 ENCOUNTER — APPOINTMENT (OUTPATIENT)
Dept: ULTRASOUND IMAGING | Facility: HOSPITAL | Age: 80
End: 2020-02-14

## 2020-02-14 DIAGNOSIS — Z74.09 IMPAIRED FUNCTIONAL MOBILITY, BALANCE, GAIT, AND ENDURANCE: ICD-10-CM

## 2020-02-14 DIAGNOSIS — Z78.9 IMPAIRED MOBILITY AND ADLS: ICD-10-CM

## 2020-02-14 DIAGNOSIS — K81.9 CHOLECYSTITIS: ICD-10-CM

## 2020-02-14 DIAGNOSIS — Z74.09 IMPAIRED MOBILITY AND ADLS: ICD-10-CM

## 2020-02-14 DIAGNOSIS — K29.80 DUODENITIS: ICD-10-CM

## 2020-02-14 DIAGNOSIS — K81.0 ACUTE CHOLECYSTITIS: Primary | ICD-10-CM

## 2020-02-14 LAB
ALBUMIN SERPL-MCNC: 3.8 G/DL (ref 3.5–5.2)
ALBUMIN/GLOB SERPL: 1.3 G/DL
ALP SERPL-CCNC: 68 U/L (ref 39–117)
ALT SERPL W P-5'-P-CCNC: 6 U/L (ref 1–33)
ANION GAP SERPL CALCULATED.3IONS-SCNC: 14 MMOL/L (ref 5–15)
AST SERPL-CCNC: 15 U/L (ref 1–32)
BACTERIA UR QL AUTO: ABNORMAL /HPF
BASOPHILS # BLD AUTO: 0.06 10*3/MM3 (ref 0–0.2)
BASOPHILS NFR BLD AUTO: 0.6 % (ref 0–1.5)
BILIRUB SERPL-MCNC: 1.4 MG/DL (ref 0.2–1.2)
BILIRUB UR QL STRIP: NEGATIVE
BUN BLD-MCNC: 16 MG/DL (ref 8–23)
BUN/CREAT SERPL: 13.3 (ref 7–25)
CALCIUM SPEC-SCNC: 8.8 MG/DL (ref 8.6–10.5)
CHLORIDE SERPL-SCNC: 102 MMOL/L (ref 98–107)
CLARITY UR: ABNORMAL
CO2 SERPL-SCNC: 23 MMOL/L (ref 22–29)
COLOR UR: YELLOW
CREAT BLD-MCNC: 1.2 MG/DL (ref 0.57–1)
D-LACTATE SERPL-SCNC: 1.9 MMOL/L (ref 0.5–2)
D-LACTATE SERPL-SCNC: 2.3 MMOL/L (ref 0.5–2)
DEPRECATED RDW RBC AUTO: 45.1 FL (ref 37–54)
EOSINOPHIL # BLD AUTO: 0.01 10*3/MM3 (ref 0–0.4)
EOSINOPHIL NFR BLD AUTO: 0.1 % (ref 0.3–6.2)
ERYTHROCYTE [DISTWIDTH] IN BLOOD BY AUTOMATED COUNT: 13.4 % (ref 12.3–15.4)
GFR SERPL CREATININE-BSD FRML MDRD: 43 ML/MIN/1.73
GLOBULIN UR ELPH-MCNC: 3 GM/DL
GLUCOSE BLD-MCNC: 116 MG/DL (ref 65–99)
GLUCOSE UR STRIP-MCNC: NEGATIVE MG/DL
HCT VFR BLD AUTO: 33.8 % (ref 34–46.6)
HGB BLD-MCNC: 11.1 G/DL (ref 12–15.9)
HGB UR QL STRIP.AUTO: ABNORMAL
HOLD SPECIMEN: NORMAL
HYALINE CASTS UR QL AUTO: ABNORMAL /LPF
IMM GRANULOCYTES # BLD AUTO: 0.09 10*3/MM3 (ref 0–0.05)
IMM GRANULOCYTES NFR BLD AUTO: 0.8 % (ref 0–0.5)
KETONES UR QL STRIP: NEGATIVE
LEUKOCYTE ESTERASE UR QL STRIP.AUTO: NEGATIVE
LIPASE SERPL-CCNC: 16 U/L (ref 13–60)
LYMPHOCYTES # BLD AUTO: 0.7 10*3/MM3 (ref 0.7–3.1)
LYMPHOCYTES NFR BLD AUTO: 6.4 % (ref 19.6–45.3)
MCH RBC QN AUTO: 30.2 PG (ref 26.6–33)
MCHC RBC AUTO-ENTMCNC: 32.8 G/DL (ref 31.5–35.7)
MCV RBC AUTO: 91.8 FL (ref 79–97)
MONOCYTES # BLD AUTO: 0.89 10*3/MM3 (ref 0.1–0.9)
MONOCYTES NFR BLD AUTO: 8.2 % (ref 5–12)
NEUTROPHILS # BLD AUTO: 9.14 10*3/MM3 (ref 1.7–7)
NEUTROPHILS NFR BLD AUTO: 83.9 % (ref 42.7–76)
NITRITE UR QL STRIP: NEGATIVE
NRBC BLD AUTO-RTO: 0 /100 WBC (ref 0–0.2)
PH UR STRIP.AUTO: 6.5 [PH] (ref 5–9)
PLATELET # BLD AUTO: 119 10*3/MM3 (ref 140–450)
PMV BLD AUTO: 11.2 FL (ref 6–12)
POTASSIUM BLD-SCNC: 3.4 MMOL/L (ref 3.5–5.2)
PROT SERPL-MCNC: 6.8 G/DL (ref 6–8.5)
PROT UR QL STRIP: ABNORMAL
RBC # BLD AUTO: 3.68 10*6/MM3 (ref 3.77–5.28)
RBC # UR: ABNORMAL /HPF
REF LAB TEST METHOD: ABNORMAL
SODIUM BLD-SCNC: 139 MMOL/L (ref 136–145)
SP GR UR STRIP: 1.02 (ref 1–1.03)
SQUAMOUS #/AREA URNS HPF: ABNORMAL /HPF
TROPONIN T SERPL-MCNC: <0.01 NG/ML (ref 0–0.03)
UROBILINOGEN UR QL STRIP: ABNORMAL
WBC NRBC COR # BLD: 10.89 10*3/MM3 (ref 3.4–10.8)
WBC UR QL AUTO: ABNORMAL /HPF
WHOLE BLOOD HOLD SPECIMEN: NORMAL
WHOLE BLOOD HOLD SPECIMEN: NORMAL

## 2020-02-14 PROCEDURE — 74176 CT ABD & PELVIS W/O CONTRAST: CPT

## 2020-02-14 PROCEDURE — G0378 HOSPITAL OBSERVATION PER HR: HCPCS

## 2020-02-14 PROCEDURE — 80053 COMPREHEN METABOLIC PANEL: CPT | Performed by: EMERGENCY MEDICINE

## 2020-02-14 PROCEDURE — 93005 ELECTROCARDIOGRAM TRACING: CPT | Performed by: EMERGENCY MEDICINE

## 2020-02-14 PROCEDURE — 25010000002 LEVOFLOXACIN PER 250 MG: Performed by: EMERGENCY MEDICINE

## 2020-02-14 PROCEDURE — 81001 URINALYSIS AUTO W/SCOPE: CPT | Performed by: EMERGENCY MEDICINE

## 2020-02-14 PROCEDURE — 76705 ECHO EXAM OF ABDOMEN: CPT

## 2020-02-14 PROCEDURE — 84484 ASSAY OF TROPONIN QUANT: CPT | Performed by: EMERGENCY MEDICINE

## 2020-02-14 PROCEDURE — 99284 EMERGENCY DEPT VISIT MOD MDM: CPT

## 2020-02-14 PROCEDURE — 25010000002 MORPHINE PER 10 MG: Performed by: HOSPITALIST

## 2020-02-14 PROCEDURE — 85025 COMPLETE CBC W/AUTO DIFF WBC: CPT | Performed by: EMERGENCY MEDICINE

## 2020-02-14 PROCEDURE — 83690 ASSAY OF LIPASE: CPT | Performed by: EMERGENCY MEDICINE

## 2020-02-14 PROCEDURE — 25010000002 MORPHINE PER 10 MG: Performed by: EMERGENCY MEDICINE

## 2020-02-14 PROCEDURE — 25010000002 ONDANSETRON PER 1 MG: Performed by: HOSPITALIST

## 2020-02-14 PROCEDURE — 25010000002 ONDANSETRON PER 1 MG: Performed by: EMERGENCY MEDICINE

## 2020-02-14 PROCEDURE — 74022 RADEX COMPL AQT ABD SERIES: CPT

## 2020-02-14 PROCEDURE — 93010 ELECTROCARDIOGRAM REPORT: CPT | Performed by: INTERNAL MEDICINE

## 2020-02-14 PROCEDURE — 25010000002 CEFTRIAXONE PER 250 MG: Performed by: EMERGENCY MEDICINE

## 2020-02-14 PROCEDURE — 83605 ASSAY OF LACTIC ACID: CPT | Performed by: EMERGENCY MEDICINE

## 2020-02-14 RX ORDER — HYDROCODONE BITARTRATE AND ACETAMINOPHEN 10; 325 MG/1; MG/1
1 TABLET ORAL 2 TIMES DAILY PRN
Status: DISCONTINUED | OUTPATIENT
Start: 2020-02-14 | End: 2020-02-15

## 2020-02-14 RX ORDER — ONDANSETRON 2 MG/ML
4 INJECTION INTRAMUSCULAR; INTRAVENOUS EVERY 6 HOURS PRN
Status: DISCONTINUED | OUTPATIENT
Start: 2020-02-14 | End: 2020-02-17 | Stop reason: SDUPTHER

## 2020-02-14 RX ORDER — DILTIAZEM HYDROCHLORIDE 180 MG/1
180 CAPSULE, COATED, EXTENDED RELEASE ORAL DAILY
Status: DISCONTINUED | OUTPATIENT
Start: 2020-02-14 | End: 2020-02-21 | Stop reason: HOSPADM

## 2020-02-14 RX ORDER — SODIUM CHLORIDE 0.9 % (FLUSH) 0.9 %
10 SYRINGE (ML) INJECTION EVERY 12 HOURS SCHEDULED
Status: DISCONTINUED | OUTPATIENT
Start: 2020-02-14 | End: 2020-02-21 | Stop reason: HOSPADM

## 2020-02-14 RX ORDER — SODIUM CHLORIDE 0.9 % (FLUSH) 0.9 %
10 SYRINGE (ML) INJECTION AS NEEDED
Status: DISCONTINUED | OUTPATIENT
Start: 2020-02-14 | End: 2020-02-21 | Stop reason: HOSPADM

## 2020-02-14 RX ORDER — AMIODARONE HYDROCHLORIDE 200 MG/1
200 TABLET ORAL 2 TIMES DAILY
Status: DISCONTINUED | OUTPATIENT
Start: 2020-02-14 | End: 2020-02-21 | Stop reason: HOSPADM

## 2020-02-14 RX ORDER — LEVOTHYROXINE SODIUM 0.05 MG/1
50 TABLET ORAL DAILY
Status: DISCONTINUED | OUTPATIENT
Start: 2020-02-14 | End: 2020-02-21 | Stop reason: HOSPADM

## 2020-02-14 RX ORDER — LEVOFLOXACIN 5 MG/ML
500 INJECTION, SOLUTION INTRAVENOUS EVERY 24 HOURS
Status: DISCONTINUED | OUTPATIENT
Start: 2020-02-14 | End: 2020-02-14

## 2020-02-14 RX ORDER — PANTOPRAZOLE SODIUM 40 MG/1
40 TABLET, DELAYED RELEASE ORAL EVERY MORNING
Status: DISCONTINUED | OUTPATIENT
Start: 2020-02-15 | End: 2020-02-21 | Stop reason: HOSPADM

## 2020-02-14 RX ORDER — ONDANSETRON 4 MG/1
4 TABLET, FILM COATED ORAL EVERY 8 HOURS PRN
COMMUNITY

## 2020-02-14 RX ORDER — ALPRAZOLAM 0.25 MG/1
0.25 TABLET ORAL 2 TIMES DAILY PRN
Status: DISCONTINUED | OUTPATIENT
Start: 2020-02-14 | End: 2020-02-21 | Stop reason: HOSPADM

## 2020-02-14 RX ORDER — DILTIAZEM HYDROCHLORIDE 180 MG/1
180 CAPSULE, COATED, EXTENDED RELEASE ORAL DAILY
Status: ON HOLD | COMMUNITY
End: 2021-10-15 | Stop reason: SDUPTHER

## 2020-02-14 RX ORDER — LEVOFLOXACIN 5 MG/ML
750 INJECTION, SOLUTION INTRAVENOUS ONCE
Status: COMPLETED | OUTPATIENT
Start: 2020-02-14 | End: 2020-02-15

## 2020-02-14 RX ORDER — FUROSEMIDE 40 MG/1
40 TABLET ORAL DAILY
Status: DISCONTINUED | OUTPATIENT
Start: 2020-02-14 | End: 2020-02-21 | Stop reason: HOSPADM

## 2020-02-14 RX ORDER — ONDANSETRON 2 MG/ML
4 INJECTION INTRAMUSCULAR; INTRAVENOUS ONCE
Status: COMPLETED | OUTPATIENT
Start: 2020-02-14 | End: 2020-02-14

## 2020-02-14 RX ORDER — MECLIZINE HYDROCHLORIDE 25 MG/1
25 TABLET ORAL 3 TIMES DAILY PRN
Status: DISCONTINUED | OUTPATIENT
Start: 2020-02-14 | End: 2020-02-21 | Stop reason: HOSPADM

## 2020-02-14 RX ORDER — SODIUM CHLORIDE 9 MG/ML
125 INJECTION, SOLUTION INTRAVENOUS CONTINUOUS
Status: DISCONTINUED | OUTPATIENT
Start: 2020-02-14 | End: 2020-02-17 | Stop reason: SDUPTHER

## 2020-02-14 RX ORDER — FAMOTIDINE 10 MG/ML
20 INJECTION, SOLUTION INTRAVENOUS ONCE
Status: COMPLETED | OUTPATIENT
Start: 2020-02-14 | End: 2020-02-14

## 2020-02-14 RX ORDER — ALPRAZOLAM 0.25 MG/1
0.25 TABLET ORAL 2 TIMES DAILY PRN
COMMUNITY

## 2020-02-14 RX ORDER — ONDANSETRON 4 MG/1
4 TABLET, FILM COATED ORAL EVERY 8 HOURS PRN
Status: DISCONTINUED | OUTPATIENT
Start: 2020-02-14 | End: 2020-02-17 | Stop reason: SDUPTHER

## 2020-02-14 RX ORDER — ONDANSETRON 2 MG/ML
4 INJECTION INTRAMUSCULAR; INTRAVENOUS EVERY 6 HOURS PRN
Status: DISCONTINUED | OUTPATIENT
Start: 2020-02-14 | End: 2020-02-14 | Stop reason: SDUPTHER

## 2020-02-14 RX ORDER — MIRTAZAPINE 15 MG/1
15 TABLET, FILM COATED ORAL NIGHTLY
Status: DISCONTINUED | OUTPATIENT
Start: 2020-02-14 | End: 2020-02-21 | Stop reason: HOSPADM

## 2020-02-14 RX ADMIN — SODIUM CHLORIDE 125 ML/HR: 9 INJECTION, SOLUTION INTRAVENOUS at 13:13

## 2020-02-14 RX ADMIN — ONDANSETRON HYDROCHLORIDE 4 MG: 2 INJECTION INTRAMUSCULAR; INTRAVENOUS at 18:20

## 2020-02-14 RX ADMIN — MORPHINE SULFATE 4 MG: 4 INJECTION, SOLUTION INTRAMUSCULAR; INTRAVENOUS at 18:16

## 2020-02-14 RX ADMIN — SODIUM CHLORIDE 1000 ML: 9 INJECTION, SOLUTION INTRAVENOUS at 09:33

## 2020-02-14 RX ADMIN — LEVOFLOXACIN 750 MG: 5 INJECTION, SOLUTION INTRAVENOUS at 13:10

## 2020-02-14 RX ADMIN — FAMOTIDINE 20 MG: 10 INJECTION INTRAVENOUS at 09:00

## 2020-02-14 RX ADMIN — METRONIDAZOLE 500 MG: 500 INJECTION, SOLUTION INTRAVENOUS at 21:47

## 2020-02-14 RX ADMIN — CEFTRIAXONE SODIUM 1 G: 1 INJECTION, POWDER, FOR SOLUTION INTRAMUSCULAR; INTRAVENOUS at 10:29

## 2020-02-14 RX ADMIN — MORPHINE SULFATE 4 MG: 4 INJECTION, SOLUTION INTRAMUSCULAR; INTRAVENOUS at 21:43

## 2020-02-14 RX ADMIN — ONDANSETRON 4 MG: 2 INJECTION INTRAMUSCULAR; INTRAVENOUS at 09:00

## 2020-02-14 RX ADMIN — MORPHINE SULFATE 4 MG: 4 INJECTION INTRAVENOUS at 13:09

## 2020-02-14 NOTE — ED PROVIDER NOTES
Subjective   Patient presents emergency department complaint of abdominal pain.  This is epigastric in nature and has no history of the same.  Patient denies any surgeries on her abdomen.  Patient denies any fevers or chills.  There is been no nausea vomiting bowel or bladder changes.  There is been no chest pain or shortness of breath.  Patient states the discomfort started yesterday and is gone into today.  Patient states she feels generally weak.          Review of Systems   Constitutional: Positive for activity change, appetite change and fatigue. Negative for chills and fever.   HENT: Negative.  Negative for congestion.    Eyes: Negative.  Negative for photophobia and visual disturbance.   Respiratory: Negative.  Negative for cough, chest tightness and shortness of breath.    Cardiovascular: Negative.  Negative for chest pain and palpitations.   Gastrointestinal: Positive for abdominal pain. Negative for constipation, diarrhea, nausea and vomiting.   Endocrine: Negative.    Genitourinary: Negative.  Negative for decreased urine volume, dysuria, flank pain and hematuria.   Musculoskeletal: Negative.  Negative for arthralgias, back pain, myalgias, neck pain and neck stiffness.   Skin: Negative.  Negative for pallor.   Neurological: Positive for weakness. Negative for dizziness, syncope, light-headedness, numbness and headaches.   Psychiatric/Behavioral: Negative.  Negative for confusion and suicidal ideas. The patient is not nervous/anxious.    All other systems reviewed and are negative.      Past Medical History:   Diagnosis Date   • Arthritis    • Atrial fibrillation (CMS/HCC)    • Atrial fibrillation (CMS/HCC)    • Atrial fibrillation with rapid ventricular response (CMS/HCC) 1/20/2017   • Breast cancer (CMS/HCC)     right   • Cancer (CMS/HCC)    • Disease of thyroid gland    • Hypertension        Allergies   Allergen Reactions   • Penicillins Rash       Past Surgical History:   Procedure Laterality Date   •  ABLATION OF DYSRHYTHMIC FOCUS     • BREAST SURGERY     • CARDIAC ELECTROPHYSIOLOGY PROCEDURE N/A 4/6/2017    Procedure: EP/Ablation;  Surgeon: Blake Mcallister MD;  Location: [facility] CATH INVASIVE LOCATION;  Service:    • CARDIOVERSION     • CATARACT EXTRACTION W/ INTRAOCULAR LENS IMPLANT Right 8/9/2019    Procedure: REMOVE CATARACT AND IMPLANT INTRAOCULAR LENS;  Surgeon: Lenin Dennison MD;  Location: [facility] OR;  Service: Ophthalmology   • CATARACT EXTRACTION W/ INTRAOCULAR LENS IMPLANT Left 8/16/2019    Procedure: REMOVE CATARACT AND IMPLANT INTRAOCULAR LENS;  Surgeon: Lenin Dennison MD;  Location: [facility] OR;  Service: Ophthalmology   • INSERT / REPLACE / REMOVE PACEMAKER     • PACEMAKER IMPLANTATION     • SKIN BIOPSY     • SKIN TAG REMOVAL         Family History   Problem Relation Age of Onset   • Hypertension Daughter    • Hypertension Son    • No Known Problems Mother    • No Known Problems Father        Social History     Socioeconomic History   • Marital status:      Spouse name: Not on file   • Number of children: Not on file   • Years of education: Not on file   • Highest education level: Not on file   Tobacco Use   • Smoking status: Never Smoker   • Smokeless tobacco: Former User     Types: Snuff, Chew   Substance and Sexual Activity   • Alcohol use: No   • Drug use: No   • Sexual activity: Defer           Objective   Physical Exam   Constitutional: She is oriented to person, place, and time. She appears well-developed and well-nourished. She appears ill. She appears distressed.   HENT:   Head: Normocephalic and atraumatic.   Nose: Nose normal.   Mouth/Throat: Oropharynx is clear and moist.   Eyes: Conjunctivae and EOM are normal. No scleral icterus.   Neck: Normal range of motion. Neck supple. No JVD present.   Cardiovascular: Normal rate, regular rhythm, normal heart sounds and intact distal pulses. Exam reveals no gallop and no friction rub.   No murmur  heard.  Pulmonary/Chest: Effort normal. No respiratory distress. She has no wheezes. She has no rales. She exhibits no tenderness.   Abdominal: Soft. She exhibits no distension and no mass. There is tenderness in the epigastric area. There is no rigidity, no rebound and no guarding.   Musculoskeletal: Normal range of motion. She exhibits no edema, tenderness or deformity.   Lymphadenopathy:     She has no cervical adenopathy.   Neurological: She is alert and oriented to person, place, and time. No cranial nerve deficit. She exhibits normal muscle tone.   Skin: Skin is warm and dry. Capillary refill takes less than 2 seconds. No rash noted. She is not diaphoretic. No erythema. No pallor.   Psychiatric: She has a normal mood and affect. Her behavior is normal. Judgment and thought content normal.   Nursing note and vitals reviewed.      Procedures           ED Course                                 Labs Reviewed   COMPREHENSIVE METABOLIC PANEL - Abnormal; Notable for the following components:       Result Value    Glucose 116 (*)     Creatinine 1.20 (*)     Potassium 3.4 (*)     Total Bilirubin 1.4 (*)     eGFR Non  Amer 43 (*)     All other components within normal limits    Narrative:     GFR Normal >60  Chronic Kidney Disease <60  Kidney Failure <15     URINALYSIS W/ MICROSCOPIC IF INDICATED (NO CULTURE) - Abnormal; Notable for the following components:    Appearance, UA Cloudy (*)     Blood, UA Moderate (2+) (*)     Protein, UA 30 mg/dL (1+) (*)     All other components within normal limits   LACTIC ACID, PLASMA - Abnormal; Notable for the following components:    Lactate 2.3 (*)     All other components within normal limits   CBC WITH AUTO DIFFERENTIAL - Abnormal; Notable for the following components:    WBC 10.89 (*)     RBC 3.68 (*)     Hemoglobin 11.1 (*)     Hematocrit 33.8 (*)     Platelets 119 (*)     Neutrophil % 83.9 (*)     Lymphocyte % 6.4 (*)     Eosinophil % 0.1 (*)     Immature Grans % 0.8 (*)      Neutrophils, Absolute 9.14 (*)     Immature Grans, Absolute 0.09 (*)     All other components within normal limits   URINALYSIS, MICROSCOPIC ONLY - Abnormal; Notable for the following components:    RBC, UA 6-12 (*)     Bacteria, UA 4+ (*)     All other components within normal limits   LIPASE - Normal   TROPONIN (IN-HOUSE) - Normal    Narrative:     Troponin T Reference Range:  <= 0.03 ng/mL-   Negative for AMI  >0.03 ng/mL-     Abnormal for myocardial necrosis.  Clinicians would have to utilize clinical acumen, EKG, Troponin and serial changes to determine if it is an Acute Myocardial Infarction or myocardial injury due to an underlying chronic condition.       Results may be falsely decreased if patient taking Biotin.     RAINBOW DRAW    Narrative:     The following orders were created for panel order [place] Draw.  Procedure                               Abnormality         Status                     ---------                               -----------         ------                     Light Blue Top[[id number]]                                   Final result               Green Top (Gel)[[id number]]                                  Final result               Lavender Top[[id number]]                                     Final result               Gold Top - SST[[id number]]                                   Final result                 Please view results for these tests on the individual orders.   LACTIC ACID REFLEX TIMER   LACTIC ACID, REFLEX   CBC AND DIFFERENTIAL    Narrative:     The following orders were created for panel order CBC & Differential.  Procedure                               Abnormality         Status                     ---------                               -----------         ------                     CBC Auto Differential[[id number]]        Abnormal            Final result                 Please view results for these tests on the individual orders.   LIGHT BLUE TOP   GREEN TOP   LAVENDER  TOP   GOLD TOP - [facility]       US Gallbladder   Final Result   CONCLUSION: Abnormal gallbladder. Cholelithiasis and gallbladder   wall thickening. This is suggestive of acute cholecystitis.      Normal common bile duct. Normal pancreatic head and body.   Pancreatic tail obscured by overlying bowel gas. Normal liver.   Normal right kidney.      Electronically signed by:  Aoln Smith MD  2/14/2020 1:17 PM CST   Workstation: [id number]      CT Abdomen Pelvis Without Contrast   Final Result   There is mild distention of the gallbladder with prominent wall   and pericholecystic inflammatory changes. No calcified stones   seen. The findings are suspect for acute cholecystitis. There   appears to be associated duodenitis. Please correlate to the   clinical exam and laboratory data. Also, recommend follow-up with   gallbladder ultrasound.      Colonic diverticulosis.      Scarring and/or atelectasis within the lung bases.      Cardiomegaly.      7.5 mm nonobstructive left side nephrolith.      Electronically signed by:  Ciro Temple MD  2/14/2020 12:07 PM   CST Workstation: [id number]      XR Abdomen 2+ VW with Chest 1 VW   Final Result   CONCLUSION:     No acute cardiopulmonary disease.   Normal bowel gas pattern.      Electronically signed by:  Christopher Calderón MD  2/14/2020 9:28 AM CST   Workstation: [id number]        Symptoms most consistent with acute cholecystitis.  Patient be admitted to the medicine service, antibiotics initiated with Levaquin with the patient's known penicillin allergy.  Pending repeat lactate.          MDM    Final diagnoses:   Cholecystitis   Duodenitis            Arturo Trevino MD  02/14/20 [id number]

## 2020-02-14 NOTE — PLAN OF CARE
Problem: Patient Care Overview  Goal: Plan of Care Review  Outcome: Ongoing (interventions implemented as appropriate)  Flowsheets (Taken 2/14/2020 1711)  Progress: no change  Plan of Care Reviewed With: patient;daughter  Outcome Summary: VSS, c/o pain, currently awaiting orders from physician.  will continue to monitor

## 2020-02-15 ENCOUNTER — ANESTHESIA EVENT (OUTPATIENT)
Dept: PERIOP | Facility: HOSPITAL | Age: 80
End: 2020-02-15

## 2020-02-15 PROBLEM — K81.0 ACUTE CHOLECYSTITIS: Status: ACTIVE | Noted: 2020-02-14

## 2020-02-15 PROBLEM — I10 HYPERTENSION: Chronic | Status: ACTIVE | Noted: 2017-01-20

## 2020-02-15 PROCEDURE — 99233 SBSQ HOSP IP/OBS HIGH 50: CPT | Performed by: SURGERY

## 2020-02-15 PROCEDURE — 25010000002 ONDANSETRON PER 1 MG: Performed by: HOSPITALIST

## 2020-02-15 PROCEDURE — G0378 HOSPITAL OBSERVATION PER HR: HCPCS

## 2020-02-15 PROCEDURE — 25010000002 MORPHINE PER 10 MG: Performed by: HOSPITALIST

## 2020-02-15 PROCEDURE — 25010000002 CEFTRIAXONE PER 250 MG: Performed by: HOSPITALIST

## 2020-02-15 PROCEDURE — 93005 ELECTROCARDIOGRAM TRACING: CPT | Performed by: HOSPITALIST

## 2020-02-15 PROCEDURE — 25010000002 HYDROMORPHONE 1 MG/ML SOLUTION: Performed by: SURGERY

## 2020-02-15 PROCEDURE — 93010 ELECTROCARDIOGRAM REPORT: CPT | Performed by: INTERNAL MEDICINE

## 2020-02-15 RX ORDER — MORPHINE SULFATE 2 MG/ML
2 INJECTION, SOLUTION INTRAMUSCULAR; INTRAVENOUS ONCE
Status: DISCONTINUED | OUTPATIENT
Start: 2020-02-15 | End: 2020-02-15

## 2020-02-15 RX ADMIN — SODIUM CHLORIDE 125 ML/HR: 9 INJECTION, SOLUTION INTRAVENOUS at 23:52

## 2020-02-15 RX ADMIN — HYDROMORPHONE HYDROCHLORIDE 1 MG: 1 INJECTION, SOLUTION INTRAMUSCULAR; INTRAVENOUS; SUBCUTANEOUS at 09:06

## 2020-02-15 RX ADMIN — AMIODARONE HYDROCHLORIDE 200 MG: 200 TABLET ORAL at 20:16

## 2020-02-15 RX ADMIN — LEVOTHYROXINE SODIUM 50 MCG: 50 TABLET ORAL at 09:06

## 2020-02-15 RX ADMIN — CARVEDILOL 18.75 MG: 12.5 TABLET, FILM COATED ORAL at 17:40

## 2020-02-15 RX ADMIN — METRONIDAZOLE 500 MG: 500 INJECTION, SOLUTION INTRAVENOUS at 23:52

## 2020-02-15 RX ADMIN — HYDROMORPHONE HYDROCHLORIDE 1 MG: 1 INJECTION, SOLUTION INTRAMUSCULAR; INTRAVENOUS; SUBCUTANEOUS at 20:16

## 2020-02-15 RX ADMIN — METRONIDAZOLE 500 MG: 500 INJECTION, SOLUTION INTRAVENOUS at 15:36

## 2020-02-15 RX ADMIN — CEFTRIAXONE SODIUM 1 G: 1 INJECTION, POWDER, FOR SOLUTION INTRAMUSCULAR; INTRAVENOUS at 10:30

## 2020-02-15 RX ADMIN — MORPHINE SULFATE 4 MG: 4 INJECTION, SOLUTION INTRAMUSCULAR; INTRAVENOUS at 00:50

## 2020-02-15 RX ADMIN — ONDANSETRON HYDROCHLORIDE 4 MG: 2 INJECTION INTRAMUSCULAR; INTRAVENOUS at 03:40

## 2020-02-15 RX ADMIN — SODIUM CHLORIDE 125 ML/HR: 9 INJECTION, SOLUTION INTRAVENOUS at 12:56

## 2020-02-15 RX ADMIN — HYDROMORPHONE HYDROCHLORIDE 1 MG: 1 INJECTION, SOLUTION INTRAMUSCULAR; INTRAVENOUS; SUBCUTANEOUS at 15:36

## 2020-02-15 RX ADMIN — METRONIDAZOLE 500 MG: 500 INJECTION, SOLUTION INTRAVENOUS at 08:52

## 2020-02-15 RX ADMIN — MORPHINE SULFATE 4 MG: 4 INJECTION, SOLUTION INTRAMUSCULAR; INTRAVENOUS at 05:21

## 2020-02-15 RX ADMIN — MORPHINE SULFATE 4 MG: 4 INJECTION, SOLUTION INTRAMUSCULAR; INTRAVENOUS at 03:40

## 2020-02-15 RX ADMIN — MIRTAZAPINE 15 MG: 15 TABLET, FILM COATED ORAL at 20:16

## 2020-02-15 RX ADMIN — HYDROCODONE BITARTRATE AND ACETAMINOPHEN 1 TABLET: 10; 325 TABLET ORAL at 04:44

## 2020-02-15 NOTE — PROGRESS NOTES
Progress Note  Mihir Arredondo MD  Hospitalist    Date of visit: 2/15/2020     LOS: 1 day   Patient Care Team:  Sandip Lozano MD as PCP - General    Chief Complaint: RUQ pain    Subjective     Interval History:     Patient Complaints: RUQ pain / minimal nausea    History taken from: patient / nursing    Medication Review:   Current Facility-Administered Medications   Medication Dose Route Frequency Provider Last Rate Last Dose   • ALPRAZolam (XANAX) tablet 0.25 mg  0.25 mg Oral BID PRN Bryon Cuenca MD       • amiodarone (PACERONE) tablet 200 mg  200 mg Oral BID Bryon Cuenca MD       • carvedilol (COREG) tablet 18.75 mg  18.75 mg Oral BID With Meals Bryon Cuenca MD       • cefTRIAXone (ROCEPHIN) 1 g/100 mL 0.9% NS (MBP)  1 g Intravenous Q24H Bryon Cuenca MD   1 g at 02/15/20 1030   • dilTIAZem CD (CARDIZEM CD) 24 hr capsule 180 mg  180 mg Oral Daily Bryon Cuenca MD       • furosemide (LASIX) tablet 40 mg  40 mg Oral Daily Bryon Cuenca MD       • HYDROmorphone (DILAUDID) injection 1 mg  1 mg Intravenous Q3H PRN Denilson Richardson MD   1 mg at 02/15/20 0906   • levothyroxine (SYNTHROID, LEVOTHROID) tablet 50 mcg  50 mcg Oral Daily Bryon Cuenca MD   50 mcg at 02/15/20 0906   • meclizine (ANTIVERT) tablet 25 mg  25 mg Oral TID PRN Bryon Cuenca MD       • metroNIDAZOLE (FLAGYL) 500 mg/100mL IVPB  500 mg Intravenous Q8H Bryon Cuenca MD   500 mg at 02/15/20 0852   • mirtazapine (REMERON) tablet 15 mg  15 mg Oral Nightly Bryon Cuenca MD       • morphine injection 4 mg  4 mg Intravenous Q3H PRN Bryon Cuenca MD   4 mg at 02/15/20 0521   • ondansetron (ZOFRAN) injection 4 mg  4 mg Intravenous Q6H PRN Bryon Cuenca MD   4 mg at 02/15/20 0340   • ondansetron (ZOFRAN) tablet 4 mg  4 mg Oral Q8H PRN Bryon Cuenca MD       • pantoprazole (PROTONIX) EC tablet 40 mg  40 mg Oral QAM Bryon Cuenca MD       • sodium chloride 0.9 % flush 10 mL  10 mL Intravenous  Bryon Murray MD       • sodium chloride 0.9 % flush 10 mL  10 mL Intravenous Q12H Bryon Cuenca MD       • sodium chloride 0.9 % flush 10 mL  10 mL Intravenous Bryon Murray MD       • sodium chloride 0.9 % infusion  125 mL/hr Intravenous Continuous Bryon Cuenca  mL/hr at 02/14/20 1820 125 mL/hr at 02/14/20 1820       Review of Systems:   Review of Systems   Constitutional: Positive for fatigue. Negative for fever.   Respiratory: Negative for cough, choking, shortness of breath and wheezing.    Cardiovascular: Negative for chest pain and leg swelling.   Gastrointestinal: Positive for abdominal distention, abdominal pain (RUQ pain) and nausea. Negative for anal bleeding, blood in stool and vomiting.   Genitourinary: Negative for dysuria, enuresis, frequency, genital sores, hematuria, pelvic pain, urgency and vaginal bleeding.   Musculoskeletal: Positive for arthralgias. Negative for back pain, gait problem and joint swelling.   Skin: Positive for pallor. Negative for color change and rash.   Neurological: Positive for weakness. Negative for seizures, syncope, facial asymmetry, numbness and headaches.   Psychiatric/Behavioral: Negative for agitation, behavioral problems and confusion.   All other systems reviewed and are negative.      Objective     Vital Signs  Temp:  [97.3 °F (36.3 °C)-98.5 °F (36.9 °C)] 97.6 °F (36.4 °C)  Heart Rate:  [74-81] 80  Resp:  [18] 18  BP: (116-138)/(58-72) 116/65    Physical Exam:  Physical Exam   Constitutional: She is oriented to person, place, and time. She appears ill. She appears distressed (due to pain).   HENT:   Head: Normocephalic and atraumatic.   Eyes: Pupils are equal, round, and reactive to light. EOM are normal. No scleral icterus.   Neck: Normal range of motion. Neck supple.   Cardiovascular: Normal rate and regular rhythm.   Pulmonary/Chest: Effort normal and breath sounds normal. No stridor. No respiratory distress.   Abdominal: Soft.  She exhibits no mass. There is tenderness. There is no guarding. No hernia.   Musculoskeletal: Normal range of motion. She exhibits no edema or tenderness.   Neurological: She is alert and oriented to person, place, and time. She displays normal reflexes. No cranial nerve deficit. Coordination normal.   Skin: Skin is warm and dry. No rash noted. There is pallor.   Psychiatric: She has a normal mood and affect. Her behavior is normal.   Vitals reviewed.       Results Review:    Lab Results (last 24 hours)     Procedure Component Value Units Date/Time    Lactic Acid, Reflex [[id number]]  (Normal) Collected:  02/14/20 1314    Specimen:  Blood Updated:  02/14/20 1333     Lactate 1.9 mmol/L     Lactic Acid, Reflex Timer (This will reflex a repeat order 3-3:15 hours after ordered.) [[id number]] Collected:  02/14/20 0847    Specimen:  Blood Updated:  02/14/20 1230     Extra Tube Hold for add-ons.     Comment: Auto resulted.             Imaging Results (Last 24 Hours)     Procedure Component Value Units Date/Time    US Gallbladder [[id number]] Collected:  02/14/20 1230     Updated:  02/14/20 1318    Narrative:       Ultrasound gallbladder, right upper quadrant.    HISTORY: Cholecystitis, abdominal pain.    Prior exam: CT February 14, 2020.    FINDINGS: Normal liver. No masses or intrahepatic biliary  dilatation.    Abnormal gallbladder. Numerous stones. Gallbladder wall  thickening. Normal common bile duct 0.45 cm. Normal pancreatic  head and body. Pancreatic tail obscured by overlying bowel gas.  Normal right kidney 10.2 x 4.9 x 4.5 cm.      Impression:       CONCLUSION: Abnormal gallbladder. Cholelithiasis and gallbladder  wall thickening. This is suggestive of acute cholecystitis.    Normal common bile duct. Normal pancreatic head and body.  Pancreatic tail obscured by overlying bowel gas. Normal liver.  Normal right kidney.    Electronically signed by:  Alon Smith MD  2/14/2020 1:17 PM CST  Workstation: [id number]    CT  Abdomen Pelvis Without Contrast [[id number]] Collected:  02/14/20 1133     Updated:  02/14/20 1208    Narrative:         Patient Name: CRISTIANO MARTIN    ORDERING: HARPAL DIAZ     ATTENDING: HARPAL DIAZ     REFERRING: HARPAL DIAZ    -----------------------  EXAM DESCRIPTION: CT ABDOMEN PELVIS WO CONTRAST    CLINICAL HISTORY: epigastric pain    COMPARISON: None    Dose Length Product: 386.3.    This exam was performed according to our departmental dose  optimization program, which includes automated exposure control,  adjustment of the mA and/or KV according to patient size and/or  use of iterative reconstruction technique.       TECHNIQUE: Multiple contiguous noncontrast axial images are  obtained of the abdomen and pelvis with coronal and sagittal  reformat images provided. Oral contrast agent was given.    FINDINGS:     LOWER CHEST: There is fibrotic change and/or linear atelectasis  within both lung bases. No consolidation or pleural effusion.  There is cardiac enlargement and coronary vascular calcification.  No pericardial effusion.    HEPATOBILIARY: Allowing for the lack of intravenous contrast no  suspicious hepatic lesion identified. The gallbladder is mildly  distended at 6.5 cm. The wall does appear uniformly mildly  prominent. There is no calcified stones within the lumen. There  is stranding of the surrounding fat. This extends to involve the  region of the second portion of the duodenum in the jennifer hepatis  region. The findings however favor acute cholecystitis with  secondary duodenitis.  SPLEEN: Unremarkable.  PANCREAS: Unremarkable  ADRENAL GLANDS: Unremarkable.  KIDNEYS/URETERS: There is a 7.5 mm nonobstructive mid level left  renal calculi. Mild perinephric fat stranding present  bilaterally, likely chronic. No suspicious renal mass or  obstructive uropathy identified.    GASTROINTESTINAL: The oral contrast is seen within the stomach  and distributed throughout the small bowel reaching  large bowel  to the level of the rectum. There is no obstruction. The terminal  ileum and ileocecal junction are unremarkable. A normal appendix  identified. There is colonic diverticulosis without findings of  acute diverticulitis.  REPRODUCTIVE ORGANS: Unremarkable.  URINARY BLADDER: Unremarkable    VASCULAR: Vascular calcification without abdominal aortic  aneurysm.  LYMPH NODES: No pathologically enlarged nodes by size criteria.  PERITONEUM/RETROPERITONEUM: No free air or free fluid..     OSSEOUS STRUCTURES: Degenerative changes within the spine and  hips. No acute finding identified.    ADDITIONAL FINDINGS: None      Impression:       There is mild distention of the gallbladder with prominent wall  and pericholecystic inflammatory changes. No calcified stones  seen. The findings are suspect for acute cholecystitis. There  appears to be associated duodenitis. Please correlate to the  clinical exam and laboratory data. Also, recommend follow-up with  gallbladder ultrasound.    Colonic diverticulosis.    Scarring and/or atelectasis within the lung bases.    Cardiomegaly.    7.5 mm nonobstructive left side nephrolith.    Electronically signed by:  Ciro Temple MD  2/14/2020 12:07 PM  CST Workstation: [id number]          Assessment/Plan       Acute cholecystitis    Hypertension    Paced atrial rhythm on the ECG, a good LV EF on an Echo obtained few years ago. Continue with the symptomatic relief, IV fluid, hold anticoagulation. She is scheduled for cholecystectomy on Monday.    Mihir Arredondo MD  02/15/20  11:36 AM

## 2020-02-15 NOTE — NURSING NOTE
Dr. Snow notified that pt writhing in pain. MD ordered 2mg IV morphine now then asked about home pain med and I mentioned that she takes PO hydrocodone. MD changed order to one time dose of home dose hydrocodone. MD noted that pt was NPO at this time and ordered to give PO pain med anyway rather than the IV morphine.

## 2020-02-15 NOTE — NURSING NOTE
Called Dr Snow again d/t pt pain not improving and seeming to be in the chest area as well. One time dose of 2mg morphine and stat ekg ordered.

## 2020-02-15 NOTE — PLAN OF CARE
Problem: Patient Care Overview  Goal: Plan of Care Review  Outcome: Ongoing (interventions implemented as appropriate)  Flowsheets  Taken 2/14/2020 1711  Progress: no change  Taken 2/15/2020 0942  Plan of Care Reviewed With: patient;daughter  Taken 2/15/2020 1317  Outcome Summary: VSS, pain controlled with new dilaudid ordered, surgery planned for Monday, will continue to monitor.

## 2020-02-15 NOTE — PROGRESS NOTES
"   LOS: 1 day   Patient Care Team:  Sandip Lozano MD as PCP - General  Sandip Lozano MD as PCP - Family Medicine  Adam Prather MD as Consulting Physician (Hematology and Oncology)    Chief Complaint: PAD1 cholecystitis    Subjective     No AEO. Patient resting uncomfortably in bed, patient and daughter endorse patient had trouble sleeping all night due to pain and nausea. Patient was given IV morhpine but felt it did not help, did feel 5mg norco PO was effective pain control for ~4 hours. Nausea controlled with zofran. This am patient rates pain 7/10. Patient denies vomiting, extremity pain, SOB, chest pain. Patient wants surgery to remember gallbladder, but realizes limitations due to blood thinner.      Subjective:  Symptoms:  Stable.  No shortness of breath, malaise, cough, chest pain, weakness, headache, chest pressure, anorexia, diarrhea or anxiety.    Diet:  NPO.  She reports  nausea.  No vomiting.    Activity level: Impaired due to pain.    Pain:  She complains of pain that is severe.  She reports pain is unchanged.  Pain is partially controlled.        History taken from: patient chart family RN    Objective     Vital Signs  Temp:  [97.3 °F (36.3 °C)-98.5 °F (36.9 °C)] 98.5 °F (36.9 °C)  Heart Rate:  [74-83] 81  Resp:  [18] 18  BP: (115-138)/(55-72) 136/62    Objective:  General Appearance:  Uncomfortable and ill-appearing.    Vital signs: (most recent): Blood pressure 136/62, pulse 81, temperature 98.5 °F (36.9 °C), resp. rate 18, height 170.2 cm (67\"), weight 83.3 kg (183 lb 11.2 oz), SpO2 97 %.  Vital signs are normal.  No fever.    Output: Producing urine.    Lungs:  Normal effort and normal respiratory rate.    Heart: Normal rate.  Regular rhythm.    Abdomen: Abdomen is soft and non-distended.  Hypoactive bowel sounds.   There is epigastric and rusty-umbilical tenderness.    Pulses: Distal pulses are intact.    Skin:  Warm and dry.              Results Review:     I reviewed the patient's new " clinical results.    02/14/2020 1314 02/14/2020 1333 Lactic Acid, Reflex [[id number]]   Blood    Final result Lactate 1.9        02/14/2020 0946 02/14/2020 1007 Urinalysis With Microscopic If Indicated (No Culture) - Urine, Catheter [[id number]]   (Abnormal)   Urine, Catheter    Final result Color, UA Yellow   Appearance, UA CloudyAbnormal    pH, UA 6.5   Specific Gravity, UA 1.017   Glucose Negative   Ketones, UA Negative   Bilirubin, UA Negative   Blood, UA Moderate (2+)Abnormal    Protein, UA 30 mg/dL (1+)Abnormal    Leukocytes, UA Negative   Nitrite, UA Negative   Urobilinogen, UA 1.0 E.U./dL           02/14/2020 0946 02/14/2020 1007 Urinalysis, Microscopic Only - Urine, Catheter [[id number]]   (Abnormal)   Urine, Catheter    Final result RBC, UA 6-12Abnormal  /HPF   WBC, UA 3-5 /HPF   Bacteria, UA 4+Abnormal  /HPF   Squamous Epithelial Cells, UA None Seen /HPF   Hyaline Casts, UA None Seen /LPF   Methodology: Automated Microscopy            02/14/2020 0847 02/14/2020 1000 [place] Draw [[id number]]    Blood    Final result No component results           02/14/2020 0847 02/14/2020 0919 Comprehensive Metabolic Panel [[id number]]    (Abnormal)   Blood    Final result Glucose 116High  mg/dL   BUN 16 mg/dL   Creatinine 1.20High  mg/dL   Sodium 139 mmol/L   Potassium 3.4Low  mmol/L   Chloride 102 mmol/L   CO2 23.0 mmol/L   Calcium 8.8 mg/dL   Total Protein 6.8 g/dL   Albumin 3.80 g/dL   ALT (SGPT) 6 U/L   AST (SGOT) 15 U/L   Alkaline Phosphatase 68 U/L   Total Bilirubin 1.4High  mg/dL   eGFR Non African Am 43Low  mL/min/1.73   Globulin 3.0 gm/dL   A/G Ratio 1.3 g/dL   BUN/Creatinine Ratio 13.3    Anion Gap 14.0 mmol/L           02/14/2020 0847 02/14/2020 0918 Lipase [[id number]]   Blood    Final result Lipase 16 U/L               Medication Review: Done    Assessment/Plan       Cholecystitis      Assessment:    Condition: In stable condition.  Unchanged.   (Nausea is well controlled NPO and w/ zofran, pain is not well  controlled. Currently not on scheduled dose of pain medication. Is on Norco 10 BID at home. Day 2 off plavix. LFTs, bilirubin, alk phos relatively normal with no need for acute surgery at this time. Given U/S findings likely elective lap caio in near future.).     Plan:   NPO.  Increase analgesics.   (Continue NPO  Consider Norco 10mg Q8 for pain control and comfort  Plan elective lap caio in 2 days (4 total)  Continue IVF).       Felix Kitchen, Medical Student  02/15/20  7:43 AM            This document has been electronically signed by Denilson Richardson MD on February 15, 2020 11:34 AM      Time: 10 mins

## 2020-02-15 NOTE — CONSULTS
Inpatient General Surgery Consult  Consult performed by: Denilson Richardson MD  Consult ordered by: Bryon Cuenca MD  Reason for consult: 1.  Acute cholecystitis 2.  Hypertension 3.  Chronic Xarelto use-I can find no evidence that she is currently on clopidogrel  Assessment/Recommendations: 1.  Laparoscopic possible open cholecystectomy is planned with cholangiogram on Monday having stopped clopidogrel for 4 days total.    The following were discussed with the patient:    What are the indications that have led your doctor to the opinion that an operation is necessary?    * Symptoms and studies indicate that there is gallbladder disease causing pain the abdomen.    What, if any, alternative treatments are available for your condition?    * Watching and waiting with no surgery  * Removing the gallbladder through one large incision made in the abdomen.    What will be the likely result if you don't have the operation?    * Pain, infection, inflammation, and/or stones may continue or get worse    What are the basic procedures involved in the operation?    * The surgery may be done laparoscopically. Laparoscopic surgery is done using a scope and hollow tube(s) called ports. These are inserted through small cuts in the abdomen. A scope is a thin, lighted instrument with a camera attached. Tools are passed through the ports. Carbon dioxide gas is pumped into the abdomen to create workspace.   If the surgery cannot be performed with a scope, it may be done through a larger cut. This occurs 2% of the time.  The gall bladder will be removed after it is  from the liver, bile duct, and surrounding arteries. An x-ray of the bile ducts is usually performed with contrast dye injected into the ducts.  If stones are found, another procedure may be required to remove them.  A drain may rarely be inserted to keep fluid from building up in the treatment area.     What are the risks?    * Exposure to radiation. Pregnant women  and women of childbearing age should talk with their doctor about this.  * Pain, numbness, swelling, weakness or scarring where tissue is cut.  * The gas used in laparoscopic procedures to inflate the abdomen can become trapped in tissues. Gas in the bloodstream can dangerously affect blood flow and  heart function.  * The procedure may not cure or relieve your condition or symptoms. They may come back and even worsen.  * You may need additional tests or treatment.  * Bleeding. You may need blood transfusions or other treatments. This may be discovered during the procedure, or later.  * Embolism. An embolism is an object that moves through your body in your bloodstream. It can be an air bubble, a blood clot, a piece of fat or other material. It can block a blood vessel. This can lead to stroke, pulmonary embolism (blockage of the main artery of the lung), or injury to organs or extremities.  * Reactions to dye used for imaging. These may include hives, swelling of the face and/or throat, difficulty breathing, and kidney failure.  * Retained stones in bile ducts.  * Wound infection, poor healing or reopening. Blood or clear fluid can also collect at the wound site(s).  * Damage to the bile ducts or nearby structures. This may be discovered during the procedure, or later.  * Incisional hernia. Weak scar tissue may allow tissue to bulge through the cut.   * The instrument(s) placed in your abdomen can cause injuries to nearby structures.  * Death.    How is the operation expected to improve your health or quality of life?    * Operation is expected to decrease pain and nausea/vomiting associated with gallstones.  * This procedure may relieve or prevent infection, inflammation, and/or pain from stones or blockage of bile ducts.    Is hospitalization necessary and, if so, how long can you expect to be hospitalized?    * Most often, the operation is performed on an outpatient basis. Occasionally hospitalization is  necessary for pain or nausea control    What can you expect during your recovery period?    * The gas used in laparoscopic procedures to inflate the abdomen can become trapped    in tissues. Shoulder pain may occur for a few days after surgery.   * Nausea and pain control are obtained with oral medication.  * If you are on metformin, it will need to be held for 48 hours after surgery    When can you expect to resume normal activities?    * Normal activity can be resumed in 10-14 days if the procedure is performed laparoscopically. Open operations require no lifting or straining for 6 weeks.     Are there likely to be residual effects from the operation?    * Diarrhea often occurs, mostly temporary. Occasionally there is still minor food intolerance.    All questions were answered. The patient agrees to operation.          Patient Care Team:  Sandip Lozano MD as PCP - General    Chief complaint: Abdominal pain    Subjective   This woman is 79 years old and has a history that includes atrial fibrillation, hypertension, hypothyroidism.  She began having abdominal pain 2 days ago having never had pain like this in the past.  It is in the epigastric and right upper quadrant.  She has no nausea, vomiting, or other change in bowel function.  She states that she has had a mild fever to accompany this.  Imaging so far has demonstrated the following:    Study Result        Patient Name: CRISTIANO MARTIN     ORDERING: HARPAL DIAZ      ATTENDING: HARPAL DIAZ      REFERRING: HARPAL DIAZ     -----------------------  EXAM DESCRIPTION: CT ABDOMEN PELVIS WO CONTRAST     CLINICAL HISTORY: epigastric pain     COMPARISON: None     Dose Length Product: 386.3.     This exam was performed according to our departmental dose  optimization program, which includes automated exposure control,  adjustment of the mA and/or KV according to patient size and/or  use of iterative reconstruction technique.        TECHNIQUE: Multiple  contiguous noncontrast axial images are  obtained of the abdomen and pelvis with coronal and sagittal  reformat images provided. Oral contrast agent was given.     FINDINGS:      LOWER CHEST: There is fibrotic change and/or linear atelectasis  within both lung bases. No consolidation or pleural effusion.  There is cardiac enlargement and coronary vascular calcification.  No pericardial effusion.     HEPATOBILIARY: Allowing for the lack of intravenous contrast no  suspicious hepatic lesion identified. The gallbladder is mildly  distended at 6.5 cm. The wall does appear uniformly mildly  prominent. There is no calcified stones within the lumen. There  is stranding of the surrounding fat. This extends to involve the  region of the second portion of the duodenum in the jennifer hepatis  region. The findings however favor acute cholecystitis with  secondary duodenitis.  SPLEEN: Unremarkable.  PANCREAS: Unremarkable  ADRENAL GLANDS: Unremarkable.  KIDNEYS/URETERS: There is a 7.5 mm nonobstructive mid level left  renal calculi. Mild perinephric fat stranding present  bilaterally, likely chronic. No suspicious renal mass or  obstructive uropathy identified.     GASTROINTESTINAL: The oral contrast is seen within the stomach  and distributed throughout the small bowel reaching large bowel  to the level of the rectum. There is no obstruction. The terminal  ileum and ileocecal junction are unremarkable. A normal appendix  identified. There is colonic diverticulosis without findings of  acute diverticulitis.  REPRODUCTIVE ORGANS: Unremarkable.  URINARY BLADDER: Unremarkable     VASCULAR: Vascular calcification without abdominal aortic  aneurysm.  LYMPH NODES: No pathologically enlarged nodes by size criteria.  PERITONEUM/RETROPERITONEUM: No free air or free fluid..      OSSEOUS STRUCTURES: Degenerative changes within the spine and  hips. No acute finding identified.     ADDITIONAL FINDINGS: None     IMPRESSION:  There is mild  distention of the gallbladder with prominent wall  and pericholecystic inflammatory changes. No calcified stones  seen. The findings are suspect for acute cholecystitis. There  appears to be associated duodenitis. Please correlate to the  clinical exam and laboratory data. Also, recommend follow-up with  gallbladder ultrasound.     Colonic diverticulosis.     Scarring and/or atelectasis within the lung bases.     Cardiomegaly.     7.5 mm nonobstructive left side nephrolith.     Electronically signed by:  Ciro Temple MD  2/14/2020 12:07 PM  CST Workstation: [id number]     Study Result     Ultrasound gallbladder, right upper quadrant.     HISTORY: Cholecystitis, abdominal pain.     Prior exam: CT February 14, 2020.     FINDINGS: Normal liver. No masses or intrahepatic biliary  dilatation.     Abnormal gallbladder. Numerous stones. Gallbladder wall  thickening. Normal common bile duct 0.45 cm. Normal pancreatic  head and body. Pancreatic tail obscured by overlying bowel gas.  Normal right kidney 10.2 x 4.9 x 4.5 cm.     IMPRESSION:  CONCLUSION: Abnormal gallbladder. Cholelithiasis and gallbladder  wall thickening. This is suggestive of acute cholecystitis.     Normal common bile duct. Normal pancreatic head and body.  Pancreatic tail obscured by overlying bowel gas. Normal liver.  Normal right kidney.     Electronically signed by:  Alon Smith MD  2/14/2020 1:17 PM CST  Workstation: [id number]           Abdominal Pain     Weakness - Generalized   Associated symptoms include abdominal pain.   Dizziness   Associated symptoms include abdominal pain.       Review of Systems   Gastrointestinal: Positive for abdominal pain.   Neurological: Positive for dizziness.        Past Medical History:   Diagnosis Date   • Arthritis    • Atrial fibrillation (CMS/HCC)    • Atrial fibrillation (CMS/HCC)    • Atrial fibrillation with rapid ventricular response (CMS/HCC) 1/20/2017   • Breast cancer (CMS/HCC)     right   • Cancer (CMS/HCC)     • Disease of thyroid gland    • Hypertension    ,   Past Surgical History:   Procedure Laterality Date   • ABLATION OF DYSRHYTHMIC FOCUS     • BREAST SURGERY     • CARDIAC ELECTROPHYSIOLOGY PROCEDURE N/A 4/6/2017    Procedure: EP/Ablation;  Surgeon: Blake Mcallister MD;  Location: [facility] INVASIVE LOCATION;  Service:    • CARDIOVERSION     • CATARACT EXTRACTION W/ INTRAOCULAR LENS IMPLANT Right 8/9/2019    Procedure: REMOVE CATARACT AND IMPLANT INTRAOCULAR LENS;  Surgeon: Lenin Dennison MD;  Location: [facility] OR;  Service: Ophthalmology   • CATARACT EXTRACTION W/ INTRAOCULAR LENS IMPLANT Left 8/16/2019    Procedure: REMOVE CATARACT AND IMPLANT INTRAOCULAR LENS;  Surgeon: Lenin Dennison MD;  Location: [facility] OR;  Service: Ophthalmology   • INSERT / REPLACE / REMOVE PACEMAKER     • PACEMAKER IMPLANTATION     • SKIN BIOPSY     • SKIN TAG REMOVAL     ,   Family History   Problem Relation Age of Onset   • Hypertension Daughter    • Hypertension Son    • No Known Problems Mother    • No Known Problems Father    ,   Social History     Tobacco Use   • Smoking status: Never Smoker   • Smokeless tobacco: Former User     Types: Snuff, Chew   Substance Use Topics   • Alcohol use: No   • Drug use: No   ,   Medications Prior to Admission   Medication Sig Dispense Refill Last Dose   • ALPRAZolam (XANAX) 0.25 MG tablet Take 0.25 mg by mouth 2 (Two) Times a Day As Needed for Anxiety.      • amiodarone (PACERONE) 200 MG tablet Take 200 mg by mouth 2 (Two) Times a Day.   8/16/2019 at 0530   • carvedilol (COREG) 12.5 MG tablet Take 1.5 tablets by mouth 2 (Two) Times a Day With Meals. 90 tablet 11 8/16/2019 at 0530   • dilTIAZem CD (CARDIZEM CD) 180 MG 24 hr capsule Take 180 mg by mouth Daily.      • esomeprazole (nexIUM) 40 MG capsule Take 40 mg by mouth Every Morning Before Breakfast.   8/16/2019 at 0530   • furosemide (LASIX) 20 MG tablet Take 40 mg by mouth Daily.   8/15/2019 at 0900   •  HYDROcodone-acetaminophen (NORCO)  MG per tablet Take 1 tablet by mouth 2 (Two) Times a Day As Needed for moderate pain (4-6).   8/15/2019 at 1700   • levothyroxine (SYNTHROID, LEVOTHROID) 50 MCG tablet Take 50 mcg by mouth Daily.   8/16/2019 at 0530   • meclizine (ANTIVERT) 25 MG tablet Take 25 mg by mouth 3 (three) times a day as needed for dizziness.   8/16/2019 at 0530   • mirtazapine (REMERON) 15 MG tablet Take 15 mg by mouth Every Night.   8/15/2019 at 2100   • ondansetron (ZOFRAN) 4 MG tablet Take 4 mg by mouth Every 8 (Eight) Hours As Needed for Nausea or Vomiting.      • potassium chloride (K-DUR,KLOR-CON) 10 MEQ CR tablet Take 10 mEq by mouth daily.   8/16/2019 at 0530   • rivaroxaban (XARELTO) 15 MG tablet Take 15 mg by mouth Every Night.   8/15/2019 at 2100   , Scheduled Meds:    amiodarone 200 mg Oral BID   carvedilol 18.75 mg Oral BID With Meals   cefTRIAXone 1 g Intravenous Q24H   dilTIAZem  mg Oral Daily   furosemide 40 mg Oral Daily   levothyroxine 50 mcg Oral Daily   metroNIDAZOLE 500 mg Intravenous Q8H   mirtazapine 15 mg Oral Nightly   pantoprazole 40 mg Oral QAM   sodium chloride 10 mL Intravenous Q12H   , Continuous Infusions:    sodium chloride 125 mL/hr Last Rate: 125 mL/hr (02/14/20 1820)   , PRN Meds:  •  ALPRAZolam  •  HYDROmorphone  •  meclizine  •  Morphine  •  ondansetron  •  ondansetron  •  sodium chloride  •  sodium chloride and Allergies:  Penicillins    Review of Systems   Constitutional: Negative for appetite change, chills, fatigue, fever and unexpected weight change.   HENT: Negative for congestion, facial swelling, hearing loss, nosebleeds, rhinorrhea, sneezing, trouble swallowing and voice change.    Eyes: Negative for photophobia and visual disturbance.   Respiratory: Negative for apnea, cough, choking, chest tightness, shortness of breath, wheezing and stridor.    Cardiovascular: Negative for chest pain, palpitations and leg swelling.   Gastrointestinal: Positive  for abdominal distention and abdominal pain. Negative for blood in stool, constipation, diarrhea, nausea and vomiting.   Endocrine: Negative for cold intolerance, heat intolerance, polydipsia, polyphagia and polyuria.   Genitourinary: Negative for dysuria, flank pain and hematuria.   Musculoskeletal: Negative for arthralgias, back pain, myalgias and neck pain.   Skin: Negative for rash and wound.   Allergic/Immunologic: Negative for immunocompromised state.   Neurological: Negative for dizziness, seizures, syncope, speech difficulty, weakness, light-headedness, numbness and headaches.   Hematological: Does not bruise/bleed easily.   Psychiatric/Behavioral: Negative for agitation, behavioral problems, confusion, decreased concentration, hallucinations, self-injury and suicidal ideas. The patient is not nervous/anxious.       Objective      Vital Signs  Temp:  [97.3 °F (36.3 °C)-98.5 °F (36.9 °C)] 97.6 °F (36.4 °C)  Heart Rate:  [74-82] 80  Resp:  [18] 18  BP: (116-138)/(58-72) 116/65    Physical Exam   Constitutional: She is oriented to person, place, and time. She appears well-developed and well-nourished.   Eyes: Pupils are equal, round, and reactive to light. EOM are normal.   Neck: Normal range of motion. Neck supple.   Cardiovascular: Normal rate and regular rhythm.   Pulmonary/Chest: Effort normal and breath sounds normal.   Abdominal: Soft. Bowel sounds are normal. She exhibits no distension and no mass. There is tenderness. There is no rebound and no guarding. No hernia.   Neurological: She is alert and oriented to person, place, and time.   Psychiatric: She has a normal mood and affect. Her behavior is normal. Judgment and thought content normal.   Vitals reviewed.      Results Review:    I reviewed the patient's new clinical results.  I reviewed the patient's new imaging results and agree with the interpretation.    Results for CRISTIANO MARTIN (MRN [id number]) as of 2/15/2020 10:56   Ref. Range 2/14/2020  09:46   Color, UA Latest Ref Range: Yellow, Straw, Dark Yellow, [place]  Yellow   Appearance, UA Latest Ref Range: Clear  Cloudy (A)   Specific [place], UA Latest Ref Range: 1.003 - 1.030  1.017   pH, UA Latest Ref Range: 5.0 - 9.0  6.5   Glucose Latest Ref Range: Negative  Negative   Ketones, UA Latest Ref Range: Negative  Negative   Blood, UA Latest Ref Range: Negative  Moderate (2+) (A)   Nitrite, UA Latest Ref Range: Negative  Negative   Leukocytes, UA Latest Ref Range: Negative  Negative   Protein, UA Latest Ref Range: Negative  30 mg/dL (1+) (A)   Bilirubin, UA Latest Ref Range: Negative  Negative   Urobilinogen, UA Latest Ref Range: 0.2 - 1.0 E.U./dL  1.0 E.U./dL   RBC, UA Latest Ref Range: None Seen /HPF 6-12 (A)   WBC, UA Latest Ref Range: None Seen, 0-2, 3-5 /HPF 3-5   Bacteria, UA Latest Ref Range: None Seen /HPF 4+ (A)   Squamous Epithelial Cells, UA Latest Ref Range: None Seen, 0-2 /HPF None Seen   Hyaline Casts, UA Latest Ref Range: None Seen /LPF None Seen   Methodology: Unknown Automated Microscopy       Results for CRISTIANO MARTIN (MRN [id number]) as of 2/15/2020 10:56   Ref. Range 2/14/2020 08:47   Troponin T Latest Ref Range: 0.000 - 0.030 ng/mL <0.010   Glucose Latest Ref Range: 65 - 99 mg/dL 116 (H)   Sodium Latest Ref Range: 136 - 145 mmol/L 139   Potassium Latest Ref Range: 3.5 - 5.2 mmol/L 3.4 (L)   CO2 Latest Ref Range: 22.0 - 29.0 mmol/L 23.0   Chloride Latest Ref Range: 98 - 107 mmol/L 102   Anion Gap Latest Ref Range: 5.0 - 15.0 mmol/L 14.0   Creatinine Latest Ref Range: 0.57 - 1.00 mg/dL 1.20 (H)   BUN Latest Ref Range: 8 - 23 mg/dL 16   BUN/Creatinine Ratio Latest Ref Range: 7.0 - 25.0  13.3   Calcium Latest Ref Range: 8.6 - 10.5 mg/dL 8.8   eGFR Non  Am Latest Ref Range: >60 mL/min/1.73 43 (L)   Alkaline Phosphatase Latest Ref Range: 39 - 117 U/L 68   Total Protein Latest Ref Range: 6.0 - 8.5 g/dL 6.8   ALT (SGPT) Latest Ref Range: 1 - 33 U/L 6   AST (SGOT) Latest Ref Range: 1 - 32  U/L 15   Total Bilirubin Latest Ref Range: 0.2 - 1.2 mg/dL 1.4 (H)   Albumin Latest Ref Range: 3.50 - 5.20 g/dL 3.80   Globulin Latest Units: gm/dL 3.0   A/G Ratio Latest Units: g/dL 1.3   Lactate Latest Ref Range: 0.5 - 2.0 mmol/L 2.3 (C)   Lipase Latest Ref Range: 13 - 60 U/L 16   WBC Latest Ref Range: 3.40 - 10.80 10*3/mm3 10.89 (H)   RBC Latest Ref Range: 3.77 - 5.28 10*6/mm3 3.68 (L)   Hemoglobin Latest Ref Range: 12.0 - 15.9 g/dL 11.1 (L)   Hematocrit Latest Ref Range: 34.0 - 46.6 % 33.8 (L)   RDW Latest Ref Range: 12.3 - 15.4 % 13.4   MCV Latest Ref Range: 79.0 - 97.0 fL 91.8   MCH Latest Ref Range: 26.6 - 33.0 pg 30.2   MCHC Latest Ref Range: 31.5 - 35.7 g/dL 32.8   MPV Latest Ref Range: 6.0 - 12.0 fL 11.2   Platelets Latest Ref Range: 140 - 450 10*3/mm3 119 (L)   RDW-SD Latest Ref Range: 37.0 - 54.0 fl 45.1   Neutrophil Rel % Latest Ref Range: 42.7 - 76.0 % 83.9 (H)   Lymphocyte Rel % Latest Ref Range: 19.6 - 45.3 % 6.4 (L)   Monocyte Rel % Latest Ref Range: 5.0 - 12.0 % 8.2   Eosinophil Rel % Latest Ref Range: 0.3 - 6.2 % 0.1 (L)   Basophil Rel % Latest Ref Range: 0.0 - 1.5 % 0.6   Immature Granulocyte Rel % Latest Ref Range: 0.0 - 0.5 % 0.8 (H)   Neutrophils Absolute Latest Ref Range: 1.70 - 7.00 10*3/mm3 9.14 (H)   Lymphocytes Absolute Latest Ref Range: 0.70 - 3.10 10*3/mm3 0.70   Monocytes Absolute Latest Ref Range: 0.10 - 0.90 10*3/mm3 0.89   Eosinophils Absolute Latest Ref Range: 0.00 - 0.40 10*3/mm3 0.01   Basophils Absolute Latest Ref Range: 0.00 - 0.20 10*3/mm3 0.06   Immature Grans, Absolute Latest Ref Range: 0.00 - 0.05 10*3/mm3 0.09 (H)   nRBC Latest Ref Range: 0.0 - 0.2 /100 WBC 0.0                             Assessment & Plan    1.  Acute cholecystitis  2.  Hypertension  3.  Chronic atrial fibrillation  4.  Chronic clopidogrel use    I discussed the patients findings and my recommendations with patient, family and consulting provider    Denilson Richardson MD  02/15/20  10:57 AM    Time: 30  minutes

## 2020-02-15 NOTE — H&P
"      [facility] Admission      Date of Admission: 2/14/2020      Primary Care Physician: Sandip Lozano MD      Chief Complaint: Abdominal pain    HPI: Patient is a 79-year-old  female with a history of atrial fibrillation, hypertension, and hypothyroidism.  Patient states that she has had abdominal pain that started on that day prior to admission.  She states it is epigastric in nature.  She has never had anything like this before.  She denies nausea or vomiting.  She states that she has had \"mild fever\".      Concurrent Medical History:  has a past medical history of Arthritis, Atrial fibrillation (CMS/HCC), Atrial fibrillation (CMS/HCC), Atrial fibrillation with rapid ventricular response (CMS/HCC) (1/20/2017), Breast cancer (CMS/HCC), Cancer (CMS/HCC), Disease of thyroid gland, and Hypertension.    Past Surgical History:  has a past surgical history that includes Pacemaker Implantation; Breast surgery; Skin biopsy; Cardioversion; Ablation of dysrhythmic focus; Cardiac electrophysiology procedure (N/A, 4/6/2017); Insert / replace / remove pacemaker; Skin tag removal; Cataract extraction w/ intraocular lens implant (Right, 8/9/2019); and Cataract extraction w/ intraocular lens implant (Left, 8/16/2019).    Family History: family history includes Hypertension in her daughter and son; No Known Problems in her father and mother.     Social History:  reports that she has never smoked. She has quit using smokeless tobacco.  Her smokeless tobacco use included snuff and chew. She reports that she does not drink alcohol or use drugs.    Allergies:   Allergies   Allergen Reactions   • Penicillins Rash       Medications:   Prior to Admission medications    Medication Sig Start Date End Date Taking? Authorizing Provider   ALPRAZolam (XANAX) 0.25 MG tablet Take 0.25 mg by mouth 2 (Two) Times a Day As Needed for Anxiety.   Yes Provider, MD Bradly   amiodarone " (PACERONE) 200 MG tablet Take 200 mg by mouth 2 (Two) Times a Day.   Yes Bradly Montana MD   carvedilol (COREG) 12.5 MG tablet Take 1.5 tablets by mouth 2 (Two) Times a Day With Meals. 9/21/17  Yes Cheli Monsalve APRN   dilTIAZem CD (CARDIZEM CD) 180 MG 24 hr capsule Take 180 mg by mouth Daily.   Yes Bradly Montana MD   esomeprazole (nexIUM) 40 MG capsule Take 40 mg by mouth Every Morning Before Breakfast.   Yes Bradly Montana MD   furosemide (LASIX) 20 MG tablet Take 40 mg by mouth Daily.   Yes Bradly Montana MD   HYDROcodone-acetaminophen (NORCO)  MG per tablet Take 1 tablet by mouth 2 (Two) Times a Day As Needed for moderate pain (4-6).   Yes Bradly Montana MD   levothyroxine (SYNTHROID, LEVOTHROID) 50 MCG tablet Take 50 mcg by mouth Daily.   Yes Bradly Montana MD   meclizine (ANTIVERT) 25 MG tablet Take 25 mg by mouth 3 (three) times a day as needed for dizziness.   Yes Bradly Montana MD   mirtazapine (REMERON) 15 MG tablet Take 15 mg by mouth Every Night.   Yes Bradly Montana MD   ondansetron (ZOFRAN) 4 MG tablet Take 4 mg by mouth Every 8 (Eight) Hours As Needed for Nausea or Vomiting.   Yes Bradly Montana MD   potassium chloride (K-DUR,KLOR-CON) 10 MEQ CR tablet Take 10 mEq by mouth daily.   Yes Bradly Montana MD   rivaroxaban (XARELTO) 15 MG tablet Take 15 mg by mouth Every Night.   Yes Bradly Montana MD       Review of Systems:  Review of Systems   Constitutional: Negative for appetite change, chills, fatigue, fever and unexpected weight change.   HENT: Negative for congestion, facial swelling, hearing loss, nosebleeds, rhinorrhea, sneezing, trouble swallowing and voice change.    Eyes: Negative for photophobia and visual disturbance.   Respiratory: Negative for apnea, cough, choking, chest tightness, shortness of breath, wheezing and stridor.    Cardiovascular: Negative for chest pain, palpitations and leg swelling.    Gastrointestinal: Positive for abdominal distention and abdominal pain. Negative for blood in stool, constipation, diarrhea, nausea and vomiting.   Endocrine: Negative for cold intolerance, heat intolerance, polydipsia, polyphagia and polyuria.   Genitourinary: Negative for dysuria, flank pain and hematuria.   Musculoskeletal: Negative for arthralgias, back pain, myalgias and neck pain.   Skin: Negative for rash and wound.   Allergic/Immunologic: Negative for immunocompromised state.   Neurological: Negative for dizziness, seizures, syncope, speech difficulty, weakness, light-headedness, numbness and headaches.   Hematological: Does not bruise/bleed easily.   Psychiatric/Behavioral: Negative for agitation, behavioral problems, confusion, decreased concentration, hallucinations, self-injury and suicidal ideas. The patient is not nervous/anxious.       Otherwise complete ROS is negative except as mentioned above.    Physical Exam:   Temp:  [97.3 °F (36.3 °C)-98.3 °F (36.8 °C)] 97.3 °F (36.3 °C)  Heart Rate:  [74-83] 75  Resp:  [18] 18  BP: (115-138)/(55-72) 132/59     Physical Exam   Constitutional: She is oriented to person, place, and time. She appears well-developed and well-nourished.   HENT:   Head: Normocephalic and atraumatic.   Nose: Nose normal.   Mouth/Throat: Oropharynx is clear and moist.   Eyes: Pupils are equal, round, and reactive to light. Conjunctivae, EOM and lids are normal. No scleral icterus.   Neck: Normal range of motion. Neck supple. No JVD present. No tracheal tenderness and no spinous process tenderness present. No neck rigidity. No tracheal deviation, no edema and normal range of motion present.   Cardiovascular: Normal rate, regular rhythm, S1 normal, S2 normal, normal heart sounds and normal pulses. Exam reveals no gallop and no friction rub.   No murmur heard.  Pulmonary/Chest: Effort normal and breath sounds normal. No accessory muscle usage. No respiratory distress. She has no  decreased breath sounds. She has no wheezes. She has no rales. She exhibits no tenderness.   Abdominal: Soft. She exhibits no distension and no mass. Bowel sounds are decreased. There is generalized tenderness. There is no rebound and no guarding.   Musculoskeletal: She exhibits no edema or tenderness.   Neurological: She is alert and oriented to person, place, and time. She has normal reflexes. She displays no atrophy and normal reflexes. No cranial nerve deficit or sensory deficit. She exhibits normal muscle tone. She displays no seizure activity. Coordination normal.   Skin: Skin is warm. No rash noted. No pallor.   Psychiatric: She has a normal mood and affect. Her behavior is normal. Judgment and thought content normal.         Results Reviewed:  I have personally reviewed current lab, radiology, and data and agree with results.  Lab Results (last 24 hours)     Procedure Component Value Units Date/Time    Lactic Acid, Reflex [[id number]]  (Normal) Collected:  02/14/20 1314    Specimen:  Blood Updated:  02/14/20 1333     Lactate 1.9 mmol/L     Lactic Acid, Reflex Timer (This will reflex a repeat order 3-3:15 hours after ordered.) [[id number]] Collected:  02/14/20 0847    Specimen:  Blood Updated:  02/14/20 1230     Extra Tube Hold for add-ons.     Comment: Auto resulted.       Urinalysis With Microscopic If Indicated (No Culture) - Urine, Catheter [[id number]]  (Abnormal) Collected:  02/14/20 0946    Specimen:  Urine, Catheter Updated:  02/14/20 1007     Color, UA Yellow     Appearance, UA Cloudy     pH, UA 6.5     Specific Gravity, UA 1.017     Glucose, UA Negative     Ketones, UA Negative     Bilirubin, UA Negative     Blood, UA Moderate (2+)     Protein, UA 30 mg/dL (1+)     Leuk Esterase, UA Negative     Nitrite, UA Negative     Urobilinogen, UA 1.0 E.U./dL    Urinalysis, Microscopic Only - Urine, Catheter [[id number]]  (Abnormal) Collected:  02/14/20 0946    Specimen:  Urine, Catheter Updated:  02/14/20 1007      RBC, UA 6-12 /HPF      WBC, UA 3-5 /HPF      Bacteria, UA 4+ /HPF      Squamous Epithelial Cells, UA None Seen /HPF      Hyaline Casts, UA None Seen /LPF      Methodology Automated Microscopy    Pandora Draw [[id number]] Collected:  02/14/20 0847    Specimen:  Blood Updated:  02/14/20 1000    Narrative:       The following orders were created for panel order Pandora Draw.  Procedure                               Abnormality         Status                     ---------                               -----------         ------                     Light Blue Top[[id number]]                                   Final result               Green Top (Gel)[[id number]]                                  Final result               Lavender Top[[id number]]                                     Final result               Gold Top - SST[[id number]]                                   Final result                 Please view results for these tests on the individual orders.    Light Blue Top [[id number]] Collected:  02/14/20 0847    Specimen:  Blood Updated:  02/14/20 1000     Extra Tube hold for add-on     Comment: Auto resulted       Green Top (Gel) [[id number]] Collected:  02/14/20 0847    Specimen:  Blood Updated:  02/14/20 1000     Extra Tube Hold for add-ons.     Comment: Auto resulted.       Lavender Top [[id number]] Collected:  02/14/20 0847    Specimen:  Blood Updated:  02/14/20 1000     Extra Tube hold for add-on     Comment: Auto resulted       Gold Top - SST [[id number]] Collected:  02/14/20 0847    Specimen:  Blood Updated:  02/14/20 1000     Extra Tube Hold for add-ons.     Comment: Auto resulted.       Lactic Acid, Plasma [[id number]]  (Abnormal) Collected:  02/14/20 0847    Specimen:  Blood Updated:  02/14/20 0922     Lactate 2.3 mmol/L     Comprehensive Metabolic Panel [[id number]]  (Abnormal) Collected:  02/14/20 0847    Specimen:  Blood Updated:  02/14/20 0919     Glucose 116 mg/dL      BUN 16 mg/dL      Creatinine 1.20 mg/dL       Sodium 139 mmol/L      Potassium 3.4 mmol/L      Chloride 102 mmol/L      CO2 23.0 mmol/L      Calcium 8.8 mg/dL      Total Protein 6.8 g/dL      Albumin 3.80 g/dL      ALT (SGPT) 6 U/L      AST (SGOT) 15 U/L      Alkaline Phosphatase 68 U/L      Total Bilirubin 1.4 mg/dL      eGFR Non African Amer 43 mL/min/1.73      Globulin 3.0 gm/dL      A/G Ratio 1.3 g/dL      BUN/Creatinine Ratio 13.3     Anion Gap 14.0 mmol/L     Narrative:       GFR Normal >60  Chronic Kidney Disease <60  Kidney Failure <15      Lipase [[id number]]  (Normal) Collected:  02/14/20 0847    Specimen:  Blood Updated:  02/14/20 0918     Lipase 16 U/L     Troponin [[id number]]  (Normal) Collected:  02/14/20 0847    Specimen:  Blood Updated:  02/14/20 0918     Troponin T <0.010 ng/mL     Narrative:       Troponin T Reference Range:  <= 0.03 ng/mL-   Negative for AMI  >0.03 ng/mL-     Abnormal for myocardial necrosis.  Clinicians would have to utilize clinical acumen, EKG, Troponin and serial changes to determine if it is an Acute Myocardial Infarction or myocardial injury due to an underlying chronic condition.       Results may be falsely decreased if patient taking Biotin.      CBC & Differential [[id number]] Collected:  02/14/20 0847    Specimen:  Blood Updated:  02/14/20 0903    Narrative:       The following orders were created for panel order CBC & Differential.  Procedure                               Abnormality         Status                     ---------                               -----------         ------                     CBC Auto Differential[[id number]]        Abnormal            Final result                 Please view results for these tests on the individual orders.    CBC Auto Differential [[id number]]  (Abnormal) Collected:  02/14/20 0847    Specimen:  Blood Updated:  02/14/20 0903     WBC 10.89 10*3/mm3      RBC 3.68 10*6/mm3      Hemoglobin 11.1 g/dL      Hematocrit 33.8 %      MCV 91.8 fL      MCH 30.2 pg      MCHC 32.8 g/dL       RDW 13.4 %      RDW-SD 45.1 fl      MPV 11.2 fL      Platelets 119 10*3/mm3      Neutrophil % 83.9 %      Lymphocyte % 6.4 %      Monocyte % 8.2 %      Eosinophil % 0.1 %      Basophil % 0.6 %      Immature Grans % 0.8 %      Neutrophils, Absolute 9.14 10*3/mm3      Lymphocytes, Absolute 0.70 10*3/mm3      Monocytes, Absolute 0.89 10*3/mm3      Eosinophils, Absolute 0.01 10*3/mm3      Basophils, Absolute 0.06 10*3/mm3      Immature Grans, Absolute 0.09 10*3/mm3      nRBC 0.0 /100 WBC         Imaging Results (Last 24 Hours)     Procedure Component Value Units Date/Time    US Gallbladder [[id number]] Collected:  02/14/20 1230     Updated:  02/14/20 1318    Narrative:       Ultrasound gallbladder, right upper quadrant.    HISTORY: Cholecystitis, abdominal pain.    Prior exam: CT February 14, 2020.    FINDINGS: Normal liver. No masses or intrahepatic biliary  dilatation.    Abnormal gallbladder. Numerous stones. Gallbladder wall  thickening. Normal common bile duct 0.45 cm. Normal pancreatic  head and body. Pancreatic tail obscured by overlying bowel gas.  Normal right kidney 10.2 x 4.9 x 4.5 cm.      Impression:       CONCLUSION: Abnormal gallbladder. Cholelithiasis and gallbladder  wall thickening. This is suggestive of acute cholecystitis.    Normal common bile duct. Normal pancreatic head and body.  Pancreatic tail obscured by overlying bowel gas. Normal liver.  Normal right kidney.    Electronically signed by:  Alon Smith MD  2/14/2020 1:17 PM CST  Workstation: [id number]    CT Abdomen Pelvis Without Contrast [[id number]] Collected:  02/14/20 1133     Updated:  02/14/20 1208    Narrative:         Patient Name: CRISTIANO MARTIN    ORDERING: HARPAL DIAZ     ATTENDING: HARPAL DIAZ     REFERRING: HARPAL DIAZ    -----------------------  EXAM DESCRIPTION: CT ABDOMEN PELVIS WO CONTRAST    CLINICAL HISTORY: epigastric pain    COMPARISON: None    Dose Length Product: 386.3.    This exam was performed according to  our departmental dose  optimization program, which includes automated exposure control,  adjustment of the mA and/or KV according to patient size and/or  use of iterative reconstruction technique.       TECHNIQUE: Multiple contiguous noncontrast axial images are  obtained of the abdomen and pelvis with coronal and sagittal  reformat images provided. Oral contrast agent was given.    FINDINGS:     LOWER CHEST: There is fibrotic change and/or linear atelectasis  within both lung bases. No consolidation or pleural effusion.  There is cardiac enlargement and coronary vascular calcification.  No pericardial effusion.    HEPATOBILIARY: Allowing for the lack of intravenous contrast no  suspicious hepatic lesion identified. The gallbladder is mildly  distended at 6.5 cm. The wall does appear uniformly mildly  prominent. There is no calcified stones within the lumen. There  is stranding of the surrounding fat. This extends to involve the  region of the second portion of the duodenum in the jennifer hepatis  region. The findings however favor acute cholecystitis with  secondary duodenitis.  SPLEEN: Unremarkable.  PANCREAS: Unremarkable  ADRENAL GLANDS: Unremarkable.  KIDNEYS/URETERS: There is a 7.5 mm nonobstructive mid level left  renal calculi. Mild perinephric fat stranding present  bilaterally, likely chronic. No suspicious renal mass or  obstructive uropathy identified.    GASTROINTESTINAL: The oral contrast is seen within the stomach  and distributed throughout the small bowel reaching large bowel  to the level of the rectum. There is no obstruction. The terminal  ileum and ileocecal junction are unremarkable. A normal appendix  identified. There is colonic diverticulosis without findings of  acute diverticulitis.  REPRODUCTIVE ORGANS: Unremarkable.  URINARY BLADDER: Unremarkable    VASCULAR: Vascular calcification without abdominal aortic  aneurysm.  LYMPH NODES: No pathologically enlarged nodes by size  criteria.  PERITONEUM/RETROPERITONEUM: No free air or free fluid..     OSSEOUS STRUCTURES: Degenerative changes within the spine and  hips. No acute finding identified.    ADDITIONAL FINDINGS: None      Impression:       There is mild distention of the gallbladder with prominent wall  and pericholecystic inflammatory changes. No calcified stones  seen. The findings are suspect for acute cholecystitis. There  appears to be associated duodenitis. Please correlate to the  clinical exam and laboratory data. Also, recommend follow-up with  gallbladder ultrasound.    Colonic diverticulosis.    Scarring and/or atelectasis within the lung bases.    Cardiomegaly.    7.5 mm nonobstructive left side nephrolith.    Electronically signed by:  Ciro Temple MD  2/14/2020 12:07 PM  CST Workstation: [id number]    XR Abdomen 2+ VW with Chest 1 VW [[id number]] Collected:  02/14/20 0904     Updated:  02/14/20 0929    Narrative:       PROCEDURE: Acute abdominal series with upright chest    COMPARISON: None.    HISTORY: upper abdominal pain    TECHNIQUE: Acute abdominal series with upright chest radiograph  was obtained.     FINDINGS:   The left-sided cardiac pacer leads in the regions of the right  atrium and right ventricle.  Question small bilateral pleural effusions.  Mild pulmonary vascular congestion.  The lungs otherwise appear clear.  Cardiac silhouette is slightly enlarged. Thoracic aorta contains  atherosclerotic calcification.  Osseous structures appear unremarkable.  There is fecal debris and bowel gas in nondilated colon.  Nonobstructive small bowel gas pattern.  Surgical clips are seen in the right axilla.  The bones appear demineralized.      Impression:       CONCLUSION:    No acute cardiopulmonary disease.  Normal bowel gas pattern.    Electronically signed by:  Christopher Calderón MD  2/14/2020 9:28 AM CST  Workstation: [id number]            Assessment:    Active Hospital Problems    Diagnosis   • Cholecystitis              Plan:  1.  Acute cholecystitis: Continue IV antibiotics, IV fluids, IV analgesics, and IV antiemetics.  Discussed the case with Dr. Richardson.  He states that he would see the patient in consultation.  2.  Possible duodenitis  3.  Atrial fibrillation: We will hold Eliquis.  Continue amiodarone.  4.  Hypothyroidism: Synthroid.  5.  Hypertension: Coreg, Lasix.  6.  DVT prophylaxis: SCDs.    I discussed the patient's findings and my recommendations with: Patient.        This document has been electronically signed by Bryon Cuenca MD on February 14, 2020 6:12 PM

## 2020-02-16 PROCEDURE — 25010000002 HYDROMORPHONE 1 MG/ML SOLUTION: Performed by: SURGERY

## 2020-02-16 PROCEDURE — 94640 AIRWAY INHALATION TREATMENT: CPT

## 2020-02-16 PROCEDURE — G0378 HOSPITAL OBSERVATION PER HR: HCPCS

## 2020-02-16 PROCEDURE — 25010000002 MORPHINE PER 10 MG: Performed by: HOSPITALIST

## 2020-02-16 PROCEDURE — 25010000002 CEFTRIAXONE PER 250 MG: Performed by: HOSPITALIST

## 2020-02-16 RX ORDER — IPRATROPIUM BROMIDE AND ALBUTEROL SULFATE 2.5; .5 MG/3ML; MG/3ML
3 SOLUTION RESPIRATORY (INHALATION)
Status: DISCONTINUED | OUTPATIENT
Start: 2020-02-16 | End: 2020-02-21 | Stop reason: HOSPADM

## 2020-02-16 RX ADMIN — LEVOTHYROXINE SODIUM 50 MCG: 50 TABLET ORAL at 09:09

## 2020-02-16 RX ADMIN — AMIODARONE HYDROCHLORIDE 200 MG: 200 TABLET ORAL at 21:04

## 2020-02-16 RX ADMIN — AMIODARONE HYDROCHLORIDE 200 MG: 200 TABLET ORAL at 09:09

## 2020-02-16 RX ADMIN — HYDROMORPHONE HYDROCHLORIDE 1 MG: 1 INJECTION, SOLUTION INTRAMUSCULAR; INTRAVENOUS; SUBCUTANEOUS at 10:24

## 2020-02-16 RX ADMIN — MORPHINE SULFATE 4 MG: 4 INJECTION, SOLUTION INTRAMUSCULAR; INTRAVENOUS at 18:41

## 2020-02-16 RX ADMIN — MORPHINE SULFATE 4 MG: 4 INJECTION, SOLUTION INTRAMUSCULAR; INTRAVENOUS at 22:04

## 2020-02-16 RX ADMIN — HYDROMORPHONE HYDROCHLORIDE 1 MG: 1 INJECTION, SOLUTION INTRAMUSCULAR; INTRAVENOUS; SUBCUTANEOUS at 01:23

## 2020-02-16 RX ADMIN — CARVEDILOL 18.75 MG: 12.5 TABLET, FILM COATED ORAL at 09:09

## 2020-02-16 RX ADMIN — METRONIDAZOLE 500 MG: 500 INJECTION, SOLUTION INTRAVENOUS at 09:08

## 2020-02-16 RX ADMIN — FUROSEMIDE 40 MG: 40 TABLET ORAL at 09:09

## 2020-02-16 RX ADMIN — HYDROMORPHONE HYDROCHLORIDE 1 MG: 1 INJECTION, SOLUTION INTRAMUSCULAR; INTRAVENOUS; SUBCUTANEOUS at 14:51

## 2020-02-16 RX ADMIN — MIRTAZAPINE 15 MG: 15 TABLET, FILM COATED ORAL at 21:04

## 2020-02-16 RX ADMIN — PANTOPRAZOLE SODIUM 40 MG: 40 TABLET, DELAYED RELEASE ORAL at 06:09

## 2020-02-16 RX ADMIN — IPRATROPIUM BROMIDE AND ALBUTEROL SULFATE 3 ML: 2.5; .5 SOLUTION RESPIRATORY (INHALATION) at 20:07

## 2020-02-16 RX ADMIN — HYDROMORPHONE HYDROCHLORIDE 1 MG: 1 INJECTION, SOLUTION INTRAMUSCULAR; INTRAVENOUS; SUBCUTANEOUS at 06:12

## 2020-02-16 RX ADMIN — METRONIDAZOLE 500 MG: 500 INJECTION, SOLUTION INTRAVENOUS at 16:00

## 2020-02-16 RX ADMIN — SODIUM CHLORIDE 125 ML/HR: 9 INJECTION, SOLUTION INTRAVENOUS at 21:05

## 2020-02-16 RX ADMIN — DILTIAZEM HYDROCHLORIDE 180 MG: 180 CAPSULE, COATED, EXTENDED RELEASE ORAL at 09:09

## 2020-02-16 RX ADMIN — SODIUM CHLORIDE 125 ML/HR: 9 INJECTION, SOLUTION INTRAVENOUS at 10:24

## 2020-02-16 RX ADMIN — CEFTRIAXONE SODIUM 1 G: 1 INJECTION, POWDER, FOR SOLUTION INTRAMUSCULAR; INTRAVENOUS at 10:21

## 2020-02-16 RX ADMIN — CARVEDILOL 18.75 MG: 12.5 TABLET, FILM COATED ORAL at 17:37

## 2020-02-16 NOTE — PROGRESS NOTES
GENERAL SURGERY PROGRESS NOTE     LOS: 1 day     Chief Complaint:    Sultana Lin is an 79 year old lady who presented to the ED on 2/14 with abdominal pain.    Interval History:     Ms. Lin reports being in moderate to severe discomfort throughout the night. The pain is most prominent in the epigastric region, and IV Dilaudid has not helped. She is tolerating a clear liquid diet with no nausea/vomiting. She had a bowel movement yesterday, and is urinating without issue. She has been off Xarelto for 3 days now, and remains agreeable to cholecystectomy tomorrow.    Medication Review:     amiodarone 200 mg Oral BID   carvedilol 18.75 mg Oral BID With Meals   cefTRIAXone 1 g Intravenous Q24H   dilTIAZem  mg Oral Daily   furosemide 40 mg Oral Daily   levothyroxine 50 mcg Oral Daily   metroNIDAZOLE 500 mg Intravenous Q8H   mirtazapine 15 mg Oral Nightly   pantoprazole 40 mg Oral QAM   sodium chloride 10 mL Intravenous Q12H         sodium chloride 125 mL/hr Last Rate: 125 mL/hr (02/15/20 2352)   Objective     Vital Signs:  Temp:  [96.2 °F (35.7 °C)-98.4 °F (36.9 °C)] 98.1 °F (36.7 °C)  Heart Rate:  [74-84] 78  Resp:  [16-18] 18  BP: ()/(55-80) 136/63    Intake/Output Summary (Last 24 hours) at 2/16/2020 0612  Last data filed at 2/15/2020 1844  Gross per 24 hour   Intake 1540 ml   Output --   Net 1540 ml       Physical Exam    General: Ill-appearing lady in moderate distress  Pulmonary:  Lungs clear to auscultation bilaterally. No increased work of breathing.  Cardiovascular: Heart regular rate and rhythm with no murmurs, gallops, rales. Radial pulses strong bilaterally.  Abdominal: Soft and non-distended. Normal bowel sounds. Upper quadrants are tender to palpation.  Skin:  Warm and dry. No rashes.    Results Review:    Results from last 7 days   Lab Units 02/14/20  0847   SODIUM mmol/L 139   POTASSIUM mmol/L 3.4*   CHLORIDE mmol/L 102   CO2 mmol/L 23.0   BUN mg/dL 16   CREATININE mg/dL 1.20*   GLUCOSE  mg/dL 116*   CALCIUM mg/dL 8.8     Results from last 7 days   Lab Units 02/14/20  0847   WBC 10*3/mm3 10.89*   HEMOGLOBIN g/dL 11.1*   HEMATOCRIT % 33.8*   PLATELETS 10*3/mm3 119*       Assessment:    Acute cholecystitis    Hypertension    Sultana Lin is a 79 year old lady who is post-admission day 2 for acute cholecystitis. She is stable, though in significant discomfort.    Plan:    - Continue clear liquid diet, begin NPO at midnight  - Continue Dilaudid 1 mg IV q3h for pain  - Laparoscopic cholecystectomy tomorrow    Mario Gage, MS3    What are the indications that have led your doctor to the opinion that an operation is necessary?    * Symptoms and studies indicate that there is gallbladder disease causing pain the abdomen.    What, if any, alternative treatments are available for your condition?    * Watching and waiting with no surgery  * Removing the gallbladder through one large incision made in the abdomen.    What will be the likely result if you don't have the operation?    * Pain, infection, inflammation, and/or stones may continue or get worse    What are the basic procedures involved in the operation?    * The surgery may be done laparoscopically. Laparoscopic surgery is done using a scope and hollow tube(s) called ports. These are inserted through small cuts in the abdomen. A scope is a thin, lighted instrument with a camera attached. Tools are passed through the ports. Carbon dioxide gas is pumped into the abdomen to create workspace.   If the surgery cannot be performed with a scope, it may be done through a larger cut. This occurs 2% of the time.  The gall bladder will be removed after it is  from the liver, bile duct, and surrounding arteries. An x-ray of the bile ducts is usually performed with contrast dye injected into the ducts.  If stones are found, another procedure may be required to remove them.  A drain may rarely be inserted to keep fluid from building up in the treatment  area.     What are the risks?    * Exposure to radiation. Pregnant women and women of childbearing age should talk with their doctor about this.  * Pain, numbness, swelling, weakness or scarring where tissue is cut.  * The gas used in laparoscopic procedures to inflate the abdomen can become trapped in tissues. Gas in the bloodstream can dangerously affect blood flow and  heart function.  * The procedure may not cure or relieve your condition or symptoms. They may come back and even worsen.  * You may need additional tests or treatment.  * Bleeding. You may need blood transfusions or other treatments. This may be discovered during the procedure, or later.  * Embolism. An embolism is an object that moves through your body in your bloodstream. It can be an air bubble, a blood clot, a piece of fat or other material. It can block a blood vessel. This can lead to stroke, pulmonary embolism (blockage of the main artery of the lung), or injury to organs or extremities.  * Reactions to dye used for imaging. These may include hives, swelling of the face and/or throat, difficulty breathing, and kidney failure.  * Retained stones in bile ducts.  * Wound infection, poor healing or reopening. Blood or clear fluid can also collect at the wound site(s).  * Damage to the bile ducts or nearby structures. This may be discovered during the procedure, or later.  * Incisional hernia. Weak scar tissue may allow tissue to bulge through the cut.  * The instrument(s) placed in your abdomen can cause injuries to nearby structures.  * Death.    How is the operation expected to improve your health or quality of life?    * Operation is expected to decrease pain and nausea/vomiting associated with gallstones.  * This procedure may relieve or prevent infection, inflammation, and/or pain from stones or blockage of bile ducts.    Is hospitalization necessary and, if so, how long can you expect to be hospitalized?    * Most often, the operation is  performed on an outpatient basis. Occasionally hospitalization is necessary for pain or nausea control    What can you expect during your recovery period?    * The gas used in laparoscopic procedures to inflate the abdomen can become trapped    in tissues. Shoulder pain may occur for a few days after surgery.   * Nausea and pain control are obtained with oral medication.  * If you are on metformin, it will need to be held for 48 hours after surgery    When can you expect to resume normal activities?    * Normal activity can be resumed in 10-14 days if the procedure is performed laparoscopically. Open operations require no lifting or straining for 6 weeks.     Are there likely to be residual effects from the operation?    * Diarrhea often occurs, mostly temporary. Occasionally there is still minor food intolerance.    All questions were answered. The patient agrees to operation.          This document has been electronically signed by Denilson Richardson MD on February 16, 2020 10:39 AM

## 2020-02-16 NOTE — PLAN OF CARE
No acute changes this shift, pt resting between care, pain controlled, surgery planned for Monday.

## 2020-02-16 NOTE — PROGRESS NOTES
Progress Note  Mihir Arredondo MD  Hospitalist    Date of visit: 2/16/2020     LOS: 1 day   Patient Care Team:  Sandip Lozano MD as PCP - General    Chief Complaint: RUQ pain    Subjective     Interval History:     Patient Complaints: RUQ pain / minimal nausea    History taken from: patient / nursing    Medication Review:   Current Facility-Administered Medications   Medication Dose Route Frequency Provider Last Rate Last Dose   • ALPRAZolam (XANAX) tablet 0.25 mg  0.25 mg Oral BID PRN Bryon Cuenca MD       • amiodarone (PACERONE) tablet 200 mg  200 mg Oral BID Bryon Cuenca MD   200 mg at 02/16/20 0909   • carvedilol (COREG) tablet 18.75 mg  18.75 mg Oral BID With Meals Bryon Cuenca MD   18.75 mg at 02/16/20 0909   • cefTRIAXone (ROCEPHIN) 1 g/100 mL 0.9% NS (MBP)  1 g Intravenous Q24H Bryon Cuenca MD   1 g at 02/15/20 1030   • dilTIAZem CD (CARDIZEM CD) 24 hr capsule 180 mg  180 mg Oral Daily Bryon Cuenca MD   180 mg at 02/16/20 0909   • furosemide (LASIX) tablet 40 mg  40 mg Oral Daily Bryon Cuenca MD   40 mg at 02/16/20 0909   • HYDROmorphone (DILAUDID) injection 1 mg  1 mg Intravenous Q3H PRN Denilson Richardson MD   1 mg at 02/16/20 0612   • levothyroxine (SYNTHROID, LEVOTHROID) tablet 50 mcg  50 mcg Oral Daily Bryon Cuenca MD   50 mcg at 02/16/20 0909   • meclizine (ANTIVERT) tablet 25 mg  25 mg Oral TID PRN Bryon Cuenca MD       • metroNIDAZOLE (FLAGYL) 500 mg/100mL IVPB  500 mg Intravenous Q8H Bryon Cuenca MD   500 mg at 02/16/20 0908   • mirtazapine (REMERON) tablet 15 mg  15 mg Oral Nightly Bryon Cuenca MD   15 mg at 02/15/20 2016   • morphine injection 4 mg  4 mg Intravenous Q3H PRN Bryon Cuenca MD   4 mg at 02/15/20 0521   • ondansetron (ZOFRAN) injection 4 mg  4 mg Intravenous Q6H PRN Bryon Cuenca MD   4 mg at 02/15/20 0340   • ondansetron (ZOFRAN) tablet 4 mg  4 mg Oral Q8H PRN Bryon Cuenca MD       • pantoprazole (PROTONIX) EC  tablet 40 mg  40 mg Oral QAM Bryon Cuenca MD   40 mg at 02/16/20 0609   • sodium chloride 0.9 % flush 10 mL  10 mL Intravenous PRN Bryon Cuenca MD       • sodium chloride 0.9 % flush 10 mL  10 mL Intravenous Q12H Bryon Cuenca MD       • sodium chloride 0.9 % flush 10 mL  10 mL Intravenous PRN Bryon Cuenca MD       • sodium chloride 0.9 % infusion  125 mL/hr Intravenous Continuous Bryon Cuenca  mL/hr at 02/15/20 2352 125 mL/hr at 02/15/20 2352       Review of Systems:   Review of Systems   Constitutional: Positive for fatigue. Negative for fever.   Respiratory: Negative for cough, choking, shortness of breath and wheezing.    Cardiovascular: Negative for chest pain and leg swelling.   Gastrointestinal: Positive for abdominal distention, abdominal pain (RUQ pain) and nausea. Negative for anal bleeding, blood in stool and vomiting.   Genitourinary: Negative for dysuria, enuresis, frequency, genital sores, hematuria, pelvic pain, urgency and vaginal bleeding.   Musculoskeletal: Positive for arthralgias. Negative for back pain, gait problem and joint swelling.   Skin: Positive for pallor. Negative for color change and rash.   Neurological: Positive for weakness. Negative for seizures, syncope, facial asymmetry, numbness and headaches.   Psychiatric/Behavioral: Negative for agitation, behavioral problems and confusion.   All other systems reviewed and are negative.      Objective     Vital Signs  Temp:  [96 °F (35.6 °C)-98.4 °F (36.9 °C)] 96 °F (35.6 °C)  Heart Rate:  [74-86] 81  Resp:  [16-18] 18  BP: ()/(55-80) 144/65    Physical Exam:  Physical Exam   Constitutional: She is oriented to person, place, and time. She appears ill. She appears distressed (due to pain).   HENT:   Head: Normocephalic and atraumatic.   Eyes: Pupils are equal, round, and reactive to light. EOM are normal. No scleral icterus.   Neck: Normal range of motion. Neck supple.   Cardiovascular: Normal rate and  regular rhythm.   Pulmonary/Chest: Effort normal and breath sounds normal. No stridor. No respiratory distress.   Abdominal: Soft. She exhibits no mass. There is tenderness. There is no guarding. No hernia.   Musculoskeletal: Normal range of motion. She exhibits no edema or tenderness.   Neurological: She is alert and oriented to person, place, and time. She displays normal reflexes. No cranial nerve deficit. Coordination normal.   Skin: Skin is warm and dry. No rash noted. There is pallor.   Psychiatric: She has a normal mood and affect. Her behavior is normal.   Vitals reviewed.       Results Review:    Lab Results (last 24 hours)     ** No results found for the last 24 hours. **          Imaging Results (Last 24 Hours)     ** No results found for the last 24 hours. **          Assessment/Plan       Acute cholecystitis    Hypertension    Paced atrial rhythm on the ECG, a good LV EF on an Echo obtained few years ago. Continue with the symptomatic relief, IV fluid, hold anticoagulation for now. She is scheduled for cholecystectomy on Monday.    Mihir Arredondo MD  02/16/20  10:18 AM

## 2020-02-16 NOTE — ANESTHESIA PREPROCEDURE EVALUATION
Anesthesia Evaluation     Patient summary reviewed and Nursing notes reviewed   no history of anesthetic complications:  NPO Solid Status: > 8 hours  NPO Liquid Status: > 8 hours           Airway   Mallampati: II  TM distance: >3 FB  Neck ROM: full  possible difficult intubation  Dental    (+) upper dentures and lower dentures    Pulmonary    (+) asthma,decreased breath sounds, wheezes,   (-) not a smoker    ROS comment: Findings :  Low lung volumes. Left-sided cardiac device in place.  The heart and mediastinum are normal in size. Lungs are without focal  infiltrate, mass or effusions.  The bones show no acute pathology.       IMPRESSION:  Impression:     No acute cardiopulmonary disease.     This report was finalized on 12/10/2018 18:32 by Dr. Jyoti Soto MD.  PE comment: Albuterol treatment ordered pre-op.  Cardiovascular - normal exam    ECG reviewed  PT is on anticoagulation therapy  Patient on routine beta blocker and Beta blocker given within 24 hours of surgery  Rhythm: regular  Rate: normal    (+) pacemaker (2014.) pacemaker interrogated >year ago, hypertension, dysrhythmias Paroxysmal Atrial Fib,   (-) murmur, carotid bruits    ROS comment: Sinus tachycardia  Nonspecific ST abnormality  Abnormal ECG  When compared with ECG of 10-DEC-2018 17:49,  Premature supraventricular complexes are no longer Present  Nonspecific T wave abnormality no longer evident in Lateral leads  Atrial pacing     Referred By:  ALEA           Confirmed By:Flash Lopez MD    Specimen Collected: 12/11/18 07:53      · All left ventricular wall segments contract normally.  · Left ventricular function is normal. Estimated EF = 55%.  · Left ventricular diastolic dysfunction (grade I) consistent with impaired relaxation.  · No pulmonary hypertension   Electronic atrial pacemaker  Low voltage QRS  Nonspecific ST abnormality  Abnormal ECG  When compared with ECG of 14-FEB-2020 08:51,  Electronic atrial pacemaker has replaced Sinus  rhythm  Confirmed by [id number] on 2/15/2020 2:36:49 PM       Neuro/Psych  (+) psychiatric history Anxiety,     GI/Hepatic/Renal/Endo    (+) obesity,  GERD well controlled,  renal disease (Creatinine 1.2), thyroid problem hypothyroidism    ROS Comment: HGB 11.1 HCT 33.8    Musculoskeletal     Abdominal   (+) obese,    Substance History - negative use     OB/GYN negative ob/gyn ROS         Other   arthritis,    history of cancer (Breast treated.) remission                      Anesthesia Plan    ASA 4     general   (Discussed central line,arterial line if necessary,additional peripheral IV,possible post op ventilation,ICU and patient,family understand possible complications,risks and agrees.)  intravenous induction     Anesthetic plan, all risks, benefits, and alternatives have been provided, discussed and informed consent has been obtained with: patient, child and other.

## 2020-02-17 ENCOUNTER — APPOINTMENT (OUTPATIENT)
Dept: GENERAL RADIOLOGY | Facility: HOSPITAL | Age: 80
End: 2020-02-17

## 2020-02-17 ENCOUNTER — ANESTHESIA (OUTPATIENT)
Dept: PERIOP | Facility: HOSPITAL | Age: 80
End: 2020-02-17

## 2020-02-17 PROBLEM — K81.0 ACUTE CHOLECYSTITIS: Status: RESOLVED | Noted: 2020-02-14 | Resolved: 2020-02-17

## 2020-02-17 PROCEDURE — BF101ZZ FLUOROSCOPY OF BILE DUCTS USING LOW OSMOLAR CONTRAST: ICD-10-PCS | Performed by: SURGERY

## 2020-02-17 PROCEDURE — 0FT44ZZ RESECTION OF GALLBLADDER, PERCUTANEOUS ENDOSCOPIC APPROACH: ICD-10-PCS | Performed by: SURGERY

## 2020-02-17 PROCEDURE — 94799 UNLISTED PULMONARY SVC/PX: CPT

## 2020-02-17 PROCEDURE — 25010000002 ONDANSETRON PER 1 MG: Performed by: HOSPITALIST

## 2020-02-17 PROCEDURE — G0378 HOSPITAL OBSERVATION PER HR: HCPCS

## 2020-02-17 PROCEDURE — 88304 TISSUE EXAM BY PATHOLOGIST: CPT | Performed by: SURGERY

## 2020-02-17 PROCEDURE — 25010000002 HYDROMORPHONE 1 MG/ML SOLUTION: Performed by: NURSE ANESTHETIST, CERTIFIED REGISTERED

## 2020-02-17 PROCEDURE — 87205 SMEAR GRAM STAIN: CPT | Performed by: SURGERY

## 2020-02-17 PROCEDURE — C1758 CATHETER, URETERAL: HCPCS | Performed by: SURGERY

## 2020-02-17 PROCEDURE — 25010000002 PROPOFOL 10 MG/ML EMULSION: Performed by: NURSE ANESTHETIST, CERTIFIED REGISTERED

## 2020-02-17 PROCEDURE — 94760 N-INVAS EAR/PLS OXIMETRY 1: CPT

## 2020-02-17 PROCEDURE — 88304 TISSUE EXAM BY PATHOLOGIST: CPT | Performed by: PATHOLOGY

## 2020-02-17 PROCEDURE — 87186 SC STD MICRODIL/AGAR DIL: CPT | Performed by: SURGERY

## 2020-02-17 PROCEDURE — 25010000002 DEXAMETHASONE PER 1 MG: Performed by: NURSE ANESTHETIST, CERTIFIED REGISTERED

## 2020-02-17 PROCEDURE — 76000 FLUOROSCOPY <1 HR PHYS/QHP: CPT

## 2020-02-17 PROCEDURE — 87070 CULTURE OTHR SPECIMN AEROBIC: CPT | Performed by: SURGERY

## 2020-02-17 PROCEDURE — 25010000002 CEFTRIAXONE PER 250 MG: Performed by: HOSPITALIST

## 2020-02-17 PROCEDURE — 25010000002 SUCCINYLCHOLINE PER 20 MG: Performed by: NURSE ANESTHETIST, CERTIFIED REGISTERED

## 2020-02-17 PROCEDURE — 25010000002 NEOSTIGMINE 4 MG/4ML SOLUTION PREFILLED SYRINGE: Performed by: NURSE ANESTHETIST, CERTIFIED REGISTERED

## 2020-02-17 PROCEDURE — 25010000002 HYDROMORPHONE PER 4 MG: Performed by: NURSE ANESTHETIST, CERTIFIED REGISTERED

## 2020-02-17 PROCEDURE — 25010000002 IOPAMIDOL 61 % SOLUTION: Performed by: SURGERY

## 2020-02-17 PROCEDURE — 25010000002 HYDROMORPHONE 1 MG/ML SOLUTION: Performed by: SURGERY

## 2020-02-17 RX ORDER — SODIUM CHLORIDE 0.9 % (FLUSH) 0.9 %
10 SYRINGE (ML) INJECTION EVERY 12 HOURS SCHEDULED
Status: DISCONTINUED | OUTPATIENT
Start: 2020-02-17 | End: 2020-02-21 | Stop reason: HOSPADM

## 2020-02-17 RX ORDER — NEOSTIGMINE METHYLSULFATE 4 MG/4 ML
SYRINGE (ML) INTRAVENOUS AS NEEDED
Status: DISCONTINUED | OUTPATIENT
Start: 2020-02-17 | End: 2020-02-17 | Stop reason: SURG

## 2020-02-17 RX ORDER — PROMETHAZINE HYDROCHLORIDE 25 MG/1
25 SUPPOSITORY RECTAL ONCE AS NEEDED
Status: DISCONTINUED | OUTPATIENT
Start: 2020-02-17 | End: 2020-02-17 | Stop reason: HOSPADM

## 2020-02-17 RX ORDER — ONDANSETRON 2 MG/ML
4 INJECTION INTRAMUSCULAR; INTRAVENOUS ONCE AS NEEDED
Status: DISCONTINUED | OUTPATIENT
Start: 2020-02-17 | End: 2020-02-17 | Stop reason: HOSPADM

## 2020-02-17 RX ORDER — NALOXONE HCL 0.4 MG/ML
0.1 VIAL (ML) INJECTION
Status: DISCONTINUED | OUTPATIENT
Start: 2020-02-17 | End: 2020-02-21 | Stop reason: HOSPADM

## 2020-02-17 RX ORDER — LABETALOL HYDROCHLORIDE 5 MG/ML
5 INJECTION, SOLUTION INTRAVENOUS
Status: DISCONTINUED | OUTPATIENT
Start: 2020-02-17 | End: 2020-02-17 | Stop reason: HOSPADM

## 2020-02-17 RX ORDER — PROMETHAZINE HYDROCHLORIDE 25 MG/ML
12.5 INJECTION, SOLUTION INTRAMUSCULAR; INTRAVENOUS ONCE AS NEEDED
Status: DISCONTINUED | OUTPATIENT
Start: 2020-02-17 | End: 2020-02-17 | Stop reason: HOSPADM

## 2020-02-17 RX ORDER — HYDROCODONE BITARTRATE AND ACETAMINOPHEN 7.5; 325 MG/1; MG/1
1 TABLET ORAL EVERY 4 HOURS PRN
Status: DISCONTINUED | OUTPATIENT
Start: 2020-02-17 | End: 2020-02-18

## 2020-02-17 RX ORDER — BUPIVACAINE HYDROCHLORIDE AND EPINEPHRINE 5; 5 MG/ML; UG/ML
INJECTION, SOLUTION EPIDURAL; INTRACAUDAL; PERINEURAL AS NEEDED
Status: DISCONTINUED | OUTPATIENT
Start: 2020-02-17 | End: 2020-02-17 | Stop reason: HOSPADM

## 2020-02-17 RX ORDER — ACETAMINOPHEN 325 MG/1
650 TABLET ORAL ONCE AS NEEDED
Status: DISCONTINUED | OUTPATIENT
Start: 2020-02-17 | End: 2020-02-17 | Stop reason: HOSPADM

## 2020-02-17 RX ORDER — DEXAMETHASONE SODIUM PHOSPHATE 4 MG/ML
INJECTION, SOLUTION INTRA-ARTICULAR; INTRALESIONAL; INTRAMUSCULAR; INTRAVENOUS; SOFT TISSUE AS NEEDED
Status: DISCONTINUED | OUTPATIENT
Start: 2020-02-17 | End: 2020-02-17 | Stop reason: SURG

## 2020-02-17 RX ORDER — FLUMAZENIL 0.1 MG/ML
0.2 INJECTION INTRAVENOUS AS NEEDED
Status: DISCONTINUED | OUTPATIENT
Start: 2020-02-17 | End: 2020-02-17 | Stop reason: HOSPADM

## 2020-02-17 RX ORDER — ACETAMINOPHEN 325 MG/1
650 TABLET ORAL EVERY 4 HOURS PRN
Status: DISCONTINUED | OUTPATIENT
Start: 2020-02-17 | End: 2020-02-21 | Stop reason: HOSPADM

## 2020-02-17 RX ORDER — SODIUM CHLORIDE 0.9 % (FLUSH) 0.9 %
10 SYRINGE (ML) INJECTION AS NEEDED
Status: DISCONTINUED | OUTPATIENT
Start: 2020-02-17 | End: 2020-02-21 | Stop reason: HOSPADM

## 2020-02-17 RX ORDER — ACETAMINOPHEN 325 MG/1
650 TABLET ORAL ONCE
Status: DISCONTINUED | OUTPATIENT
Start: 2020-02-17 | End: 2020-02-17 | Stop reason: HOSPADM

## 2020-02-17 RX ORDER — PROMETHAZINE HYDROCHLORIDE 25 MG/1
25 TABLET ORAL ONCE AS NEEDED
Status: DISCONTINUED | OUTPATIENT
Start: 2020-02-17 | End: 2020-02-17 | Stop reason: HOSPADM

## 2020-02-17 RX ORDER — HYDROMORPHONE HCL 110MG/55ML
PATIENT CONTROLLED ANALGESIA SYRINGE INTRAVENOUS AS NEEDED
Status: DISCONTINUED | OUTPATIENT
Start: 2020-02-17 | End: 2020-02-17 | Stop reason: SURG

## 2020-02-17 RX ORDER — DIPHENHYDRAMINE HYDROCHLORIDE 50 MG/ML
12.5 INJECTION INTRAMUSCULAR; INTRAVENOUS
Status: DISCONTINUED | OUTPATIENT
Start: 2020-02-17 | End: 2020-02-17 | Stop reason: HOSPADM

## 2020-02-17 RX ORDER — ACETAMINOPHEN 650 MG/1
650 SUPPOSITORY RECTAL EVERY 4 HOURS PRN
Status: DISCONTINUED | OUTPATIENT
Start: 2020-02-17 | End: 2020-02-21 | Stop reason: HOSPADM

## 2020-02-17 RX ORDER — SODIUM CHLORIDE 9 MG/ML
150 INJECTION, SOLUTION INTRAVENOUS CONTINUOUS
Status: DISCONTINUED | OUTPATIENT
Start: 2020-02-17 | End: 2020-02-20

## 2020-02-17 RX ORDER — KETAMINE HYDROCHLORIDE 100 MG/ML
INJECTION INTRAMUSCULAR; INTRAVENOUS AS NEEDED
Status: DISCONTINUED | OUTPATIENT
Start: 2020-02-17 | End: 2020-02-17 | Stop reason: SURG

## 2020-02-17 RX ORDER — SODIUM CHLORIDE, SODIUM GLUCONATE, SODIUM ACETATE, POTASSIUM CHLORIDE, AND MAGNESIUM CHLORIDE 526; 502; 368; 37; 30 MG/100ML; MG/100ML; MG/100ML; MG/100ML; MG/100ML
INJECTION, SOLUTION INTRAVENOUS CONTINUOUS PRN
Status: DISCONTINUED | OUTPATIENT
Start: 2020-02-17 | End: 2020-02-17 | Stop reason: SURG

## 2020-02-17 RX ORDER — SUCCINYLCHOLINE CHLORIDE 20 MG/ML
INJECTION INTRAMUSCULAR; INTRAVENOUS AS NEEDED
Status: DISCONTINUED | OUTPATIENT
Start: 2020-02-17 | End: 2020-02-17 | Stop reason: SURG

## 2020-02-17 RX ORDER — SODIUM CHLORIDE 0.9 % (FLUSH) 0.9 %
10 SYRINGE (ML) INJECTION AS NEEDED
Status: DISCONTINUED | OUTPATIENT
Start: 2020-02-17 | End: 2020-02-17 | Stop reason: HOSPADM

## 2020-02-17 RX ORDER — SODIUM CHLORIDE, SODIUM LACTATE, POTASSIUM CHLORIDE, CALCIUM CHLORIDE 600; 310; 30; 20 MG/100ML; MG/100ML; MG/100ML; MG/100ML
100 INJECTION, SOLUTION INTRAVENOUS CONTINUOUS
Status: DISCONTINUED | OUTPATIENT
Start: 2020-02-17 | End: 2020-02-21 | Stop reason: HOSPADM

## 2020-02-17 RX ORDER — NALOXONE HCL 0.4 MG/ML
0.4 VIAL (ML) INJECTION AS NEEDED
Status: DISCONTINUED | OUTPATIENT
Start: 2020-02-17 | End: 2020-02-17 | Stop reason: HOSPADM

## 2020-02-17 RX ORDER — ONDANSETRON 4 MG/1
4 TABLET, FILM COATED ORAL EVERY 6 HOURS PRN
Status: DISCONTINUED | OUTPATIENT
Start: 2020-02-17 | End: 2020-02-21 | Stop reason: HOSPADM

## 2020-02-17 RX ORDER — MEPERIDINE HYDROCHLORIDE 100 MG/ML
12.5 INJECTION INTRAMUSCULAR; INTRAVENOUS; SUBCUTANEOUS
Status: DISCONTINUED | OUTPATIENT
Start: 2020-02-17 | End: 2020-02-17 | Stop reason: HOSPADM

## 2020-02-17 RX ORDER — ACETAMINOPHEN 650 MG/1
650 SUPPOSITORY RECTAL ONCE AS NEEDED
Status: DISCONTINUED | OUTPATIENT
Start: 2020-02-17 | End: 2020-02-17 | Stop reason: HOSPADM

## 2020-02-17 RX ORDER — PROPOFOL 10 MG/ML
VIAL (ML) INTRAVENOUS AS NEEDED
Status: DISCONTINUED | OUTPATIENT
Start: 2020-02-17 | End: 2020-02-17 | Stop reason: SURG

## 2020-02-17 RX ORDER — LIDOCAINE HYDROCHLORIDE 20 MG/ML
INJECTION, SOLUTION INFILTRATION; PERINEURAL AS NEEDED
Status: DISCONTINUED | OUTPATIENT
Start: 2020-02-17 | End: 2020-02-17 | Stop reason: SURG

## 2020-02-17 RX ORDER — EPHEDRINE SULFATE 50 MG/ML
5 INJECTION, SOLUTION INTRAVENOUS ONCE AS NEEDED
Status: DISCONTINUED | OUTPATIENT
Start: 2020-02-17 | End: 2020-02-17 | Stop reason: HOSPADM

## 2020-02-17 RX ORDER — SODIUM CHLORIDE, SODIUM LACTATE, POTASSIUM CHLORIDE, CALCIUM CHLORIDE 600; 310; 30; 20 MG/100ML; MG/100ML; MG/100ML; MG/100ML
100 INJECTION, SOLUTION INTRAVENOUS CONTINUOUS
Status: DISCONTINUED | OUTPATIENT
Start: 2020-02-17 | End: 2020-02-17 | Stop reason: SDUPTHER

## 2020-02-17 RX ORDER — ROCURONIUM BROMIDE 10 MG/ML
INJECTION, SOLUTION INTRAVENOUS AS NEEDED
Status: DISCONTINUED | OUTPATIENT
Start: 2020-02-17 | End: 2020-02-17 | Stop reason: SURG

## 2020-02-17 RX ORDER — ONDANSETRON 2 MG/ML
4 INJECTION INTRAMUSCULAR; INTRAVENOUS EVERY 6 HOURS PRN
Status: DISCONTINUED | OUTPATIENT
Start: 2020-02-17 | End: 2020-02-21 | Stop reason: HOSPADM

## 2020-02-17 RX ORDER — ALBUTEROL SULFATE 2.5 MG/3ML
2.5 SOLUTION RESPIRATORY (INHALATION) ONCE
Status: DISCONTINUED | OUTPATIENT
Start: 2020-02-17 | End: 2020-02-17 | Stop reason: HOSPADM

## 2020-02-17 RX ORDER — SODIUM CHLORIDE 0.9 % (FLUSH) 0.9 %
3 SYRINGE (ML) INJECTION EVERY 12 HOURS SCHEDULED
Status: DISCONTINUED | OUTPATIENT
Start: 2020-02-17 | End: 2020-02-17 | Stop reason: HOSPADM

## 2020-02-17 RX ORDER — 0.9 % SODIUM CHLORIDE 0.9 %
VIAL (ML) INJECTION AS NEEDED
Status: DISCONTINUED | OUTPATIENT
Start: 2020-02-17 | End: 2020-02-17 | Stop reason: HOSPADM

## 2020-02-17 RX ADMIN — METRONIDAZOLE 500 MG: 500 INJECTION, SOLUTION INTRAVENOUS at 08:21

## 2020-02-17 RX ADMIN — HYDROMORPHONE HYDROCHLORIDE 0.5 MG: 2 INJECTION INTRAMUSCULAR; INTRAVENOUS; SUBCUTANEOUS at 16:07

## 2020-02-17 RX ADMIN — SODIUM CHLORIDE, POTASSIUM CHLORIDE, SODIUM LACTATE AND CALCIUM CHLORIDE 100 ML/HR: 600; 310; 30; 20 INJECTION, SOLUTION INTRAVENOUS at 22:11

## 2020-02-17 RX ADMIN — CEFTRIAXONE SODIUM 1 G: 1 INJECTION, POWDER, FOR SOLUTION INTRAMUSCULAR; INTRAVENOUS at 12:31

## 2020-02-17 RX ADMIN — Medication 3 MG: at 18:17

## 2020-02-17 RX ADMIN — IPRATROPIUM BROMIDE AND ALBUTEROL SULFATE 3 ML: 2.5; .5 SOLUTION RESPIRATORY (INHALATION) at 08:08

## 2020-02-17 RX ADMIN — DILTIAZEM HYDROCHLORIDE 180 MG: 180 CAPSULE, COATED, EXTENDED RELEASE ORAL at 22:12

## 2020-02-17 RX ADMIN — ROCURONIUM BROMIDE 15 MG: 10 INJECTION INTRAVENOUS at 16:48

## 2020-02-17 RX ADMIN — HYDROMORPHONE HYDROCHLORIDE 1 MG: 1 INJECTION, SOLUTION INTRAMUSCULAR; INTRAVENOUS; SUBCUTANEOUS at 06:11

## 2020-02-17 RX ADMIN — AMIODARONE HYDROCHLORIDE 200 MG: 200 TABLET ORAL at 22:12

## 2020-02-17 RX ADMIN — IPRATROPIUM BROMIDE AND ALBUTEROL SULFATE 3 ML: 2.5; .5 SOLUTION RESPIRATORY (INHALATION) at 01:32

## 2020-02-17 RX ADMIN — METRONIDAZOLE 500 MG: 500 INJECTION, SOLUTION INTRAVENOUS at 00:27

## 2020-02-17 RX ADMIN — ROCURONIUM BROMIDE 10 MG: 10 INJECTION INTRAVENOUS at 16:29

## 2020-02-17 RX ADMIN — ROCURONIUM BROMIDE 10 MG: 10 INJECTION INTRAVENOUS at 16:41

## 2020-02-17 RX ADMIN — CARVEDILOL 18.75 MG: 12.5 TABLET, FILM COATED ORAL at 08:21

## 2020-02-17 RX ADMIN — HYDROMORPHONE HYDROCHLORIDE 1 MG: 1 INJECTION, SOLUTION INTRAMUSCULAR; INTRAVENOUS; SUBCUTANEOUS at 01:18

## 2020-02-17 RX ADMIN — CARVEDILOL 18.75 MG: 12.5 TABLET, FILM COATED ORAL at 22:11

## 2020-02-17 RX ADMIN — KETAMINE HYDROCHLORIDE 30 MG: 100 INJECTION INTRAMUSCULAR; INTRAVENOUS at 16:13

## 2020-02-17 RX ADMIN — SODIUM CHLORIDE, SODIUM GLUCONATE, SODIUM ACETATE, POTASSIUM CHLORIDE, AND MAGNESIUM CHLORIDE: 526; 502; 368; 37; 30 INJECTION, SOLUTION INTRAVENOUS at 16:30

## 2020-02-17 RX ADMIN — PROPOFOL 50 MG: 10 INJECTION, EMULSION INTRAVENOUS at 16:13

## 2020-02-17 RX ADMIN — SODIUM CHLORIDE, PRESERVATIVE FREE 10 ML: 5 INJECTION INTRAVENOUS at 22:13

## 2020-02-17 RX ADMIN — GLYCOPYRROLATE 0.6 MG: 0.2 INJECTION, SOLUTION INTRAMUSCULAR; INTRAVITREAL at 18:17

## 2020-02-17 RX ADMIN — SUCCINYLCHOLINE CHLORIDE 170 MG: 20 INJECTION, SOLUTION INTRAMUSCULAR; INTRAVENOUS at 16:14

## 2020-02-17 RX ADMIN — HYDROMORPHONE HYDROCHLORIDE 0.5 MG: 1 INJECTION, SOLUTION INTRAMUSCULAR; INTRAVENOUS; SUBCUTANEOUS at 19:32

## 2020-02-17 RX ADMIN — HYDROMORPHONE HYDROCHLORIDE 1 MG: 1 INJECTION, SOLUTION INTRAMUSCULAR; INTRAVENOUS; SUBCUTANEOUS at 22:12

## 2020-02-17 RX ADMIN — SODIUM CHLORIDE, SODIUM GLUCONATE, SODIUM ACETATE, POTASSIUM CHLORIDE, AND MAGNESIUM CHLORIDE: 526; 502; 368; 37; 30 INJECTION, SOLUTION INTRAVENOUS at 15:49

## 2020-02-17 RX ADMIN — DEXAMETHASONE SODIUM PHOSPHATE 4 MG: 4 INJECTION, SOLUTION INTRAMUSCULAR; INTRAVENOUS at 17:09

## 2020-02-17 RX ADMIN — ONDANSETRON HYDROCHLORIDE 4 MG: 2 INJECTION INTRAMUSCULAR; INTRAVENOUS at 18:05

## 2020-02-17 RX ADMIN — MIRTAZAPINE 15 MG: 15 TABLET, FILM COATED ORAL at 22:12

## 2020-02-17 RX ADMIN — ROCURONIUM BROMIDE 5 MG: 10 INJECTION INTRAVENOUS at 16:14

## 2020-02-17 RX ADMIN — HYDROMORPHONE HYDROCHLORIDE 1 MG: 1 INJECTION, SOLUTION INTRAMUSCULAR; INTRAVENOUS; SUBCUTANEOUS at 11:09

## 2020-02-17 RX ADMIN — HYDROMORPHONE HYDROCHLORIDE 0.5 MG: 2 INJECTION INTRAMUSCULAR; INTRAVENOUS; SUBCUTANEOUS at 16:00

## 2020-02-17 RX ADMIN — METRONIDAZOLE 500 MG: 500 INJECTION, SOLUTION INTRAVENOUS at 23:49

## 2020-02-17 RX ADMIN — LIDOCAINE HYDROCHLORIDE 80 MG: 20 INJECTION, SOLUTION INFILTRATION; PERINEURAL at 16:13

## 2020-02-17 NOTE — ANESTHESIA PROCEDURE NOTES
Arterial Line    Pre-sedation assessment completed: 2/17/2020 4:05 PM    Patient reassessed immediately prior to procedure    Patient location during procedure: OR  Start time: 2/17/2020 4:05 PM  Stop Time:2/17/2020 4:10 PM       Line placed for hemodynamic monitoring.  Performed By   CRNA: Kimberly Tran SRNA  Preanesthetic Checklist  Completed: patient identified, site marked, surgical consent, pre-op evaluation, timeout performed, IV checked, risks and benefits discussed and monitors and equipment checked  Arterial Line Prep   Sterile Tech: cap, gloves, gown, mask and sterile barriers  Prep: Betadine  Patient monitoring: blood pressure monitoring, continuous pulse oximetry and EKG  Arterial Line Procedure   Laterality:left  Location:  radial artery  Catheter size: 22 G   Guidance: landmark technique and palpation technique  Number of attempts: 2  Successful placement: yes  Post Assessment   Dressing Type: occlusive dressing applied.   Complications no  Circ/Move/Sens Assessment: normal.   Patient Tolerance: patient tolerated the procedure well with no apparent complications

## 2020-02-17 NOTE — INTERVAL H&P NOTE
H&P reviewed. The patient was examined and there are no changes to the H&P.      Temp:  [96.9 °F (36.1 °C)-98.6 °F (37 °C)] 97.9 °F (36.6 °C)  Heart Rate:  [62-82] 70  Resp:  [16-20] 20  BP: (121-182)/(53-86) 182/86

## 2020-02-17 NOTE — H&P (VIEW-ONLY)
GENERAL SURGERY PROGRESS NOTE     LOS: 1 day     Chief Complaint:    Sultana Lin is an 79 year old lady who presented to the ED on 2/14 with abdominal pain.    Interval History:     Remains in pain.  Ready to proceed to OR.  Abdomen with RLQ ttp. No other complaints.     Medication Review:     acetaminophen 650 mg Oral Once   albuterol 2.5 mg Nebulization Once   amiodarone 200 mg Oral BID   carvedilol 18.75 mg Oral BID With Meals   cefTRIAXone 1 g Intravenous Q24H   dilTIAZem  mg Oral Daily   furosemide 40 mg Oral Daily   ipratropium-albuterol 3 mL Nebulization Q6H - RT   levothyroxine 50 mcg Oral Daily   metroNIDAZOLE 500 mg Intravenous Q8H   mirtazapine 15 mg Oral Nightly   pantoprazole 40 mg Oral QAM   sodium chloride 10 mL Intravenous Q12H   sodium chloride 3 mL Intravenous Q12H         lactated ringers 100 mL/hr    sodium chloride 125 mL/hr Last Rate: 125 mL/hr (02/17/20 0356)   Objective     Vital Signs:  Temp:  [96 °F (35.6 °C)-98.6 °F (37 °C)] 98.1 °F (36.7 °C)  Heart Rate:  [63-86] 64  Resp:  [16-24] 16  BP: (121-144)/(53-69) 144/69    Intake/Output Summary (Last 24 hours) at 2/17/2020 0552  Last data filed at 2/17/2020 0138  Gross per 24 hour   Intake 1700 ml   Output 250 ml   Net 1450 ml       Physical Exam    General: Ill-appearing lady in moderate distress  Pulmonary:  Lungs clear to auscultation bilaterally. No increased work of breathing.  Cardiovascular: Heart regular rate and rhythm with no murmurs, gallops, rales. Radial pulses strong bilaterally.  Abdominal: Soft and non-distended. Normal bowel sounds. Upper quadrants are tender to palpation.  Skin:  Warm and dry. No rashes.    Results Review:    Results from last 7 days   Lab Units 02/14/20  0847   SODIUM mmol/L 139   POTASSIUM mmol/L 3.4*   CHLORIDE mmol/L 102   CO2 mmol/L 23.0   BUN mg/dL 16   CREATININE mg/dL 1.20*   GLUCOSE mg/dL 116*   CALCIUM mg/dL 8.8     Results from last 7 days   Lab Units 02/14/20  0847   WBC 10*3/mm3 10.89*    HEMOGLOBIN g/dL 11.1*   HEMATOCRIT % 33.8*   PLATELETS 10*3/mm3 119*       Assessment:    Acute cholecystitis    Hypertension    Sultana Lin is a 79 year old lady who presents with acute cholecystitis. She is stable, though in discomfort.    Plan:  - NPO  - PRNs pain  - Laparoscopic cholecystectomy today    This document has been electronically signed by Aime Looney MD on February 17, 2020 5:52 AM

## 2020-02-17 NOTE — PROGRESS NOTES
[facility]  INPATIENT PROGRESS NOTE    Length of Stay: 1  Date of Admission: 2/14/2020  Primary Care Physician: Sandip Lozano MD    Subjective   Chief Complaint: none  HPI:  Pt seen and examined. Pt has no complaints today. She will go to or today for cholecystectomy    Review of Systems     All pertinent negatives and positives are as above. All other systems have been reviewed and are negative unless otherwise stated.     Objective    Temp:  [96.9 °F (36.1 °C)-98.5 °F (36.9 °C)] 97.9 °F (36.6 °C)  Heart Rate:  [62-82] 70  Resp:  [16-20] 20  BP: (128-182)/(53-86) 182/86    Physical Exam  Constitutional: She is oriented to person, place, and time. She appears ill. She appears distressed (due to pain).   HENT:   Head: Normocephalic and atraumatic.   Eyes: Pupils are equal, round, and reactive to light. EOM are normal. No scleral icterus.   Neck: Normal range of motion. Neck supple.   Cardiovascular: Normal rate and regular rhythm.   Pulmonary/Chest: Effort normal and breath sounds normal. No stridor. No respiratory distress.   Abdominal: Soft. She exhibits no mass. There is tenderness. There is no guarding. No hernia.   Musculoskeletal: Normal range of motion. She exhibits no edema or tenderness.   Neurological: She is alert and oriented to person, place, and time. She displays normal reflexes. No cranial nerve deficit. Coordination normal.   Skin: Skin is warm and dry. No rash noted. There is pallor.   Psychiatric: She has a normal mood and affect. Her behavior is normal.   Vitals reviewed      Results Review:  I have reviewed the labs, radiology results, and diagnostic studies.    Laboratory Data:   Results from last 7 days   Lab Units 02/14/20  0847   SODIUM mmol/L 139   POTASSIUM mmol/L 3.4*   CHLORIDE mmol/L 102   CO2 mmol/L 23.0   BUN mg/dL 16   CREATININE mg/dL 1.20*   GLUCOSE mg/dL 116*   CALCIUM mg/dL 8.8   BILIRUBIN mg/dL 1.4*   ALK PHOS U/L 68    ALT (SGPT) U/L 6   AST (SGOT) U/L 15   ANION GAP mmol/L 14.0     Estimated Creatinine Clearance: 41.5 mL/min (A) (by C-G formula based on SCr of 1.2 mg/dL (H)).          Results from last 7 days   Lab Units 02/14/20  0847   WBC 10*3/mm3 10.89*   HEMOGLOBIN g/dL 11.1*   HEMATOCRIT % 33.8*   PLATELETS 10*3/mm3 119*           Culture Data:   No results found for: BLOODCX  No results found for: URINECX  No results found for: RESPCX  No results found for: WOUNDCX  No results found for: STOOLCX  No components found for: BODYFLD    Radiology Data:   Imaging Results (Last 24 Hours)     Procedure Component Value Units Date/Time    FL C Arm During Surgery [[id number]] Resulted:  02/17/20 1739     Updated:  02/17/20 1739          I have reviewed the patient's current medications.     Assessment/Plan     Active Hospital Problems    Diagnosis   • **Acute cholecystitis   • Hypertension       Plan:    Paced atrial rhythm on the ECG, a good LV EF on an Echo obtained few years ago. Continue with the symptomatic relief, IV fluid, hold anticoagulation for now. She is scheduled for cholecystectomy on today

## 2020-02-17 NOTE — PLAN OF CARE
Problem: Patient Care Overview  Goal: Plan of Care Review  Outcome: Ongoing (interventions implemented as appropriate)  Flowsheets (Taken 2/17/2020 0440)  Progress: no change  Plan of Care Reviewed With: patient  Outcome Summary: VSS, pain controlled. NPO since MN. Plans for OR today. Will continue to monitor.

## 2020-02-17 NOTE — ANESTHESIA PROCEDURE NOTES
Airway  Date/Time: 2/17/2020 4:20 PM  End Time:2/17/2020 4:21 PM  Airway not difficult    General Information and Staff    Patient location during procedure: OB  CRNA: Kimberly Tran SRNA    Indications and Patient Condition  Indications for airway management: airway protection    Preoxygenated: yes  Mask difficulty assessment: 0 - not attempted    Final Airway Details  Final airway type: endotracheal airway      Successful airway: ETT  Cuffed: yes   Successful intubation technique: direct laryngoscopy  Facilitating devices/methods: intubating stylet  Endotracheal tube insertion site: oral  Blade: Denise  Blade size: 3  ETT size (mm): 7.0  Cormack-Lehane Classification: grade I - full view of glottis  Placement verified by: chest auscultation and capnometry   Measured from: gums  ETT/EBT to gums (cm): 21  Number of attempts at approach: 1  Assessment: lips, teeth, and gum same as pre-op and atraumatic intubation

## 2020-02-17 NOTE — PLAN OF CARE
Problem: Pain, Acute (Adult)  Goal: Acceptable Pain Control/Comfort Level  Outcome: Ongoing (interventions implemented as appropriate)

## 2020-02-18 ENCOUNTER — APPOINTMENT (OUTPATIENT)
Dept: INTERVENTIONAL RADIOLOGY/VASCULAR | Facility: HOSPITAL | Age: 80
End: 2020-02-18

## 2020-02-18 ENCOUNTER — APPOINTMENT (OUTPATIENT)
Dept: ULTRASOUND IMAGING | Facility: HOSPITAL | Age: 80
End: 2020-02-18

## 2020-02-18 LAB
ALBUMIN SERPL-MCNC: 3 G/DL (ref 3.5–5.2)
ALBUMIN/GLOB SERPL: 1 G/DL
ALP SERPL-CCNC: 66 U/L (ref 39–117)
ALT SERPL W P-5'-P-CCNC: 7 U/L (ref 1–33)
ANION GAP SERPL CALCULATED.3IONS-SCNC: 12 MMOL/L (ref 5–15)
AST SERPL-CCNC: 21 U/L (ref 1–32)
BASOPHILS # BLD AUTO: 0.02 10*3/MM3 (ref 0–0.2)
BASOPHILS NFR BLD AUTO: 0.3 % (ref 0–1.5)
BILIRUB SERPL-MCNC: 0.4 MG/DL (ref 0.2–1.2)
BUN BLD-MCNC: 14 MG/DL (ref 8–23)
BUN/CREAT SERPL: 20.3 (ref 7–25)
CALCIUM SPEC-SCNC: 8.1 MG/DL (ref 8.6–10.5)
CHLORIDE SERPL-SCNC: 108 MMOL/L (ref 98–107)
CO2 SERPL-SCNC: 24 MMOL/L (ref 22–29)
CREAT BLD-MCNC: 0.69 MG/DL (ref 0.57–1)
DEPRECATED RDW RBC AUTO: 46.8 FL (ref 37–54)
EOSINOPHIL # BLD AUTO: 0 10*3/MM3 (ref 0–0.4)
EOSINOPHIL NFR BLD AUTO: 0 % (ref 0.3–6.2)
ERYTHROCYTE [DISTWIDTH] IN BLOOD BY AUTOMATED COUNT: 13.4 % (ref 12.3–15.4)
GFR SERPL CREATININE-BSD FRML MDRD: 82 ML/MIN/1.73
GLOBULIN UR ELPH-MCNC: 3 GM/DL
GLUCOSE BLD-MCNC: 131 MG/DL (ref 65–99)
HCT VFR BLD AUTO: 30.9 % (ref 34–46.6)
HGB BLD-MCNC: 9.8 G/DL (ref 12–15.9)
IMM GRANULOCYTES # BLD AUTO: 0.06 10*3/MM3 (ref 0–0.05)
IMM GRANULOCYTES NFR BLD AUTO: 0.8 % (ref 0–0.5)
LYMPHOCYTES # BLD AUTO: 0.36 10*3/MM3 (ref 0.7–3.1)
LYMPHOCYTES NFR BLD AUTO: 4.9 % (ref 19.6–45.3)
MAGNESIUM SERPL-MCNC: 1.8 MG/DL (ref 1.6–2.4)
MCH RBC QN AUTO: 30.1 PG (ref 26.6–33)
MCHC RBC AUTO-ENTMCNC: 31.7 G/DL (ref 31.5–35.7)
MCV RBC AUTO: 94.8 FL (ref 79–97)
MONOCYTES # BLD AUTO: 0.48 10*3/MM3 (ref 0.1–0.9)
MONOCYTES NFR BLD AUTO: 6.5 % (ref 5–12)
NEUTROPHILS # BLD AUTO: 6.5 10*3/MM3 (ref 1.7–7)
NEUTROPHILS NFR BLD AUTO: 87.5 % (ref 42.7–76)
NRBC BLD AUTO-RTO: 0 /100 WBC (ref 0–0.2)
PHOSPHATE SERPL-MCNC: 2.5 MG/DL (ref 2.5–4.5)
PLATELET # BLD AUTO: 133 10*3/MM3 (ref 140–450)
PMV BLD AUTO: 12.7 FL (ref 6–12)
POTASSIUM BLD-SCNC: 3.5 MMOL/L (ref 3.5–5.2)
PROT SERPL-MCNC: 6 G/DL (ref 6–8.5)
RBC # BLD AUTO: 3.26 10*6/MM3 (ref 3.77–5.28)
SODIUM BLD-SCNC: 144 MMOL/L (ref 136–145)
WBC NRBC COR # BLD: 7.42 10*3/MM3 (ref 3.4–10.8)

## 2020-02-18 PROCEDURE — 94799 UNLISTED PULMONARY SVC/PX: CPT

## 2020-02-18 PROCEDURE — 97162 PT EVAL MOD COMPLEX 30 MIN: CPT

## 2020-02-18 PROCEDURE — 36410 VNPNXR 3YR/> PHY/QHP DX/THER: CPT

## 2020-02-18 PROCEDURE — 74300 X-RAY BILE DUCTS/PANCREAS: CPT | Performed by: SURGERY

## 2020-02-18 PROCEDURE — 99024 POSTOP FOLLOW-UP VISIT: CPT | Performed by: SURGERY

## 2020-02-18 PROCEDURE — 25010000002 ENOXAPARIN PER 10 MG: Performed by: SURGERY

## 2020-02-18 PROCEDURE — 25010000002 CEFTRIAXONE PER 250 MG: Performed by: SURGERY

## 2020-02-18 PROCEDURE — 85025 COMPLETE CBC W/AUTO DIFF WBC: CPT | Performed by: SURGERY

## 2020-02-18 PROCEDURE — 80053 COMPREHEN METABOLIC PANEL: CPT | Performed by: SURGERY

## 2020-02-18 PROCEDURE — G0378 HOSPITAL OBSERVATION PER HR: HCPCS

## 2020-02-18 PROCEDURE — 47563 LAPARO CHOLECYSTECTOMY/GRAPH: CPT | Performed by: SURGERY

## 2020-02-18 PROCEDURE — 84100 ASSAY OF PHOSPHORUS: CPT | Performed by: SURGERY

## 2020-02-18 PROCEDURE — 25010000002 HYDROMORPHONE 1 MG/ML SOLUTION: Performed by: SURGERY

## 2020-02-18 PROCEDURE — 94760 N-INVAS EAR/PLS OXIMETRY 1: CPT

## 2020-02-18 PROCEDURE — B54MZZA ULTRASONOGRAPHY OF RIGHT UPPER EXTREMITY VEINS, GUIDANCE: ICD-10-PCS | Performed by: SURGERY

## 2020-02-18 PROCEDURE — 05HB33Z INSERTION OF INFUSION DEVICE INTO RIGHT BASILIC VEIN, PERCUTANEOUS APPROACH: ICD-10-PCS | Performed by: SURGERY

## 2020-02-18 PROCEDURE — C1751 CATH, INF, PER/CENT/MIDLINE: HCPCS

## 2020-02-18 PROCEDURE — 83735 ASSAY OF MAGNESIUM: CPT | Performed by: SURGERY

## 2020-02-18 PROCEDURE — 76937 US GUIDE VASCULAR ACCESS: CPT

## 2020-02-18 RX ORDER — METRONIDAZOLE 500 MG/1
500 TABLET ORAL EVERY 8 HOURS SCHEDULED
Status: DISCONTINUED | OUTPATIENT
Start: 2020-02-18 | End: 2020-02-18

## 2020-02-18 RX ORDER — HYDROCODONE BITARTRATE AND ACETAMINOPHEN 10; 325 MG/1; MG/1
1 TABLET ORAL EVERY 4 HOURS PRN
Status: DISCONTINUED | OUTPATIENT
Start: 2020-02-18 | End: 2020-02-21 | Stop reason: HOSPADM

## 2020-02-18 RX ADMIN — METRONIDAZOLE 500 MG: 500 INJECTION, SOLUTION INTRAVENOUS at 08:23

## 2020-02-18 RX ADMIN — CARVEDILOL 18.75 MG: 12.5 TABLET, FILM COATED ORAL at 08:20

## 2020-02-18 RX ADMIN — IPRATROPIUM BROMIDE AND ALBUTEROL SULFATE 3 ML: 2.5; .5 SOLUTION RESPIRATORY (INHALATION) at 06:45

## 2020-02-18 RX ADMIN — METRONIDAZOLE 500 MG: 500 TABLET ORAL at 13:55

## 2020-02-18 RX ADMIN — AMIODARONE HYDROCHLORIDE 200 MG: 200 TABLET ORAL at 08:20

## 2020-02-18 RX ADMIN — IPRATROPIUM BROMIDE AND ALBUTEROL SULFATE 3 ML: 2.5; .5 SOLUTION RESPIRATORY (INHALATION) at 01:29

## 2020-02-18 RX ADMIN — HYDROMORPHONE HYDROCHLORIDE 1 MG: 1 INJECTION, SOLUTION INTRAMUSCULAR; INTRAVENOUS; SUBCUTANEOUS at 18:05

## 2020-02-18 RX ADMIN — CARVEDILOL 18.75 MG: 12.5 TABLET, FILM COATED ORAL at 18:05

## 2020-02-18 RX ADMIN — HYDROCODONE BITARTRATE AND ACETAMINOPHEN 1 TABLET: 7.5; 325 TABLET ORAL at 08:20

## 2020-02-18 RX ADMIN — DILTIAZEM HYDROCHLORIDE 180 MG: 180 CAPSULE, COATED, EXTENDED RELEASE ORAL at 08:20

## 2020-02-18 RX ADMIN — SODIUM CHLORIDE, POTASSIUM CHLORIDE, SODIUM LACTATE AND CALCIUM CHLORIDE 100 ML/HR: 600; 310; 30; 20 INJECTION, SOLUTION INTRAVENOUS at 12:21

## 2020-02-18 RX ADMIN — PANTOPRAZOLE SODIUM 40 MG: 40 TABLET, DELAYED RELEASE ORAL at 06:57

## 2020-02-18 RX ADMIN — HYDROCODONE BITARTRATE AND ACETAMINOPHEN 1 TABLET: 7.5; 325 TABLET ORAL at 16:20

## 2020-02-18 RX ADMIN — HYDROMORPHONE HYDROCHLORIDE 1 MG: 1 INJECTION, SOLUTION INTRAMUSCULAR; INTRAVENOUS; SUBCUTANEOUS at 10:26

## 2020-02-18 RX ADMIN — LEVOTHYROXINE SODIUM 50 MCG: 50 TABLET ORAL at 08:21

## 2020-02-18 RX ADMIN — HYDROMORPHONE HYDROCHLORIDE 1 MG: 1 INJECTION, SOLUTION INTRAMUSCULAR; INTRAVENOUS; SUBCUTANEOUS at 05:35

## 2020-02-18 RX ADMIN — HYDROMORPHONE HYDROCHLORIDE 1 MG: 1 INJECTION, SOLUTION INTRAMUSCULAR; INTRAVENOUS; SUBCUTANEOUS at 01:46

## 2020-02-18 RX ADMIN — HYDROCODONE BITARTRATE AND ACETAMINOPHEN 1 TABLET: 7.5; 325 TABLET ORAL at 12:21

## 2020-02-18 RX ADMIN — ENOXAPARIN SODIUM 40 MG: 40 INJECTION SUBCUTANEOUS at 08:31

## 2020-02-18 RX ADMIN — AMIODARONE HYDROCHLORIDE 200 MG: 200 TABLET ORAL at 21:08

## 2020-02-18 RX ADMIN — FUROSEMIDE 40 MG: 40 TABLET ORAL at 08:21

## 2020-02-18 RX ADMIN — CEFTRIAXONE SODIUM 1 G: 1 INJECTION, POWDER, FOR SOLUTION INTRAMUSCULAR; INTRAVENOUS at 11:14

## 2020-02-18 RX ADMIN — MIRTAZAPINE 15 MG: 15 TABLET, FILM COATED ORAL at 21:08

## 2020-02-18 RX ADMIN — SODIUM CHLORIDE, PRESERVATIVE FREE 10 ML: 5 INJECTION INTRAVENOUS at 21:09

## 2020-02-18 RX ADMIN — HYDROCODONE BITARTRATE AND ACETAMINOPHEN 1 TABLET: 10; 325 TABLET ORAL at 21:08

## 2020-02-18 RX ADMIN — METRONIDAZOLE 500 MG: 500 INJECTION, SOLUTION INTRAVENOUS at 21:08

## 2020-02-18 NOTE — PLAN OF CARE
Problem: Patient Care Overview  Goal: Individualization and Mutuality  Outcome: Ongoing (interventions implemented as appropriate)  Flowsheets (Taken 2/17/2020 1500)  Patient Specific Preferences: pt likes door closed

## 2020-02-18 NOTE — PROGRESS NOTES
[facility] Medicine Services  INPATIENT PROGRESS NOTE    Length of Stay: 1  Date of Admission: 2/14/2020  Primary Care Physician: Snadip Lozano MD    Subjective   Chief Complaint:  none  HPI:  Pt seen and examined. Pt has no complaints today. Pain is controlled.POD #1 cholecystectomy  Review of Systems     All pertinent negatives and positives are as above. All other systems have been reviewed and are negative unless otherwise stated.     Objective    Temp:  [97 °F (36.1 °C)-98.1 °F (36.7 °C)] 97.8 °F (36.6 °C)  Heart Rate:  [62-85] 66  Resp:  [16-24] 16  BP: (162-186)/(77-89) 175/79    Physical Exam  Constitutional: She is oriented to person, place, and time. NAD  HENT:   Head: Normocephalic and atraumatic.   Eyes: Pupils are equal, round, and reactive to light. EOM are normal. No scleral icterus.   Neck: Normal range of motion. Neck supple.   Cardiovascular: Normal rate and regular rhythm.   Pulmonary/Chest: Effort normal and breath sounds normal. No stridor. No respiratory distress.   Abdominal: Soft. She exhibits no mass. There is no tenderness. There is no guarding. No hernia.   Musculoskeletal: Normal range of motion. She exhibits no edema or tenderness.   Neurological: She is alert and oriented to person, place, and time. She displays normal reflexes. No cranial nerve deficit. Coordination normal.   Skin: Skin is warm and dry. No rash noted.   Psychiatric: She has a normal mood and affect. Her behavior is normal.   Vitals reviewed      Results Review:  I have reviewed the labs, radiology results, and diagnostic studies.    Laboratory Data:   Results from last 7 days   Lab Units 02/18/20  0533 02/14/20  0847   SODIUM mmol/L 144 139   POTASSIUM mmol/L 3.5 3.4*   CHLORIDE mmol/L 108* 102   CO2 mmol/L 24.0 23.0   BUN mg/dL 14 16   CREATININE mg/dL 0.69 1.20*   GLUCOSE mg/dL 131* 116*   CALCIUM mg/dL 8.1* 8.8   BILIRUBIN mg/dL 0.4 1.4*   ALK PHOS U/L 66 68   ALT (SGPT)  U/L 7 6   AST (SGOT) U/L 21 15   ANION GAP mmol/L 12.0 14.0     Estimated Creatinine Clearance: 62.2 mL/min (by C-G formula based on SCr of 0.69 mg/dL).  Results from last 7 days   Lab Units 02/18/20  0533   MAGNESIUM mg/dL 1.8   PHOSPHORUS mg/dL 2.5         Results from last 7 days   Lab Units 02/18/20  0533 02/14/20  0847   WBC 10*3/mm3 7.42 10.89*   HEMOGLOBIN g/dL 9.8* 11.1*   HEMATOCRIT % 30.9* 33.8*   PLATELETS 10*3/mm3 133* 119*           Culture Data:   No results found for: BLOODCX  No results found for: URINECX  No results found for: RESPCX  No results found for: WOUNDCX  No results found for: STOOLCX  No components found for: BODYFLD    Radiology Data:   Imaging Results (Last 24 Hours)     Procedure Component Value Units Date/Time    FL C Arm During Surgery [[id number]] Resulted:  02/18/20 0851     Updated:  02/18/20 0851          I have reviewed the patient's current medications.     Assessment/Plan     Active Hospital Problems    Diagnosis   • Hypertension       Plan:      Paced atrial rhythm on the ECG, a good LV EF on an Echo obtained few years ago. Continue with the symptomatic relief, IV fluid, hold anticoagulation for now.   -general surgery consulted   -pod #1 cholecystectomy , harley drain in

## 2020-02-18 NOTE — OP NOTE
CHOLECYSTECTOMY LAPAROSCOPIC INTRAOPERATIVE CHOLANGIOGRAM  Procedure Note    Sultana Lin  2/17/2020    Pre-op Diagnosis:   Acute cholecystitis [K81.0]    Post-op Diagnosis:     Acute cholecystitis [K81.0]    Procedure:  LAPAROSCOPIC CHOLECYSTECTOMY WITH INTRAOPERATIVE CHOLANGIOGRAM    Surgeon(s):  Denilson Richardson MD    Assistant surgeon:  Aime Looney MD    Anesthesia: General    Staff:   Circulator: Syeda Campos RN; Radha Dumont RN  Scrub Person: Efren Sandhu; Hernan Garnica  Assistant: Paige Rosario CSA    Estimated Blood Loss: minimal    Specimens:                ID Type Source Tests Collected by Time   1 (Not marked as sent) : CULTURE AND SENSITIVITY FROM FLUID FROM GALLBLADDER  Body Fluid Gallbladder BODY FLUID CULTURE Denilson Richardson MD 2/17/2020 1712   A (Not marked as sent) : PLUS CONTENTS  Tissue Gallbladder TISSUE PATHOLOGY EXAM Denilson Richardson MD 2/17/2020 1800         Drains:   Closed/Suction Drain 1 Right Abdomen Bulb 19 Fr. (Active)   Site Description Unable to view 2/17/2020  8:35 PM   Dressing Status Intact;Dry 2/17/2020  8:35 PM   Drainage Appearance Bloody 2/17/2020  8:35 PM   Status To bulb suction 2/17/2020  8:35 PM   Output (mL) 20 mL 2/18/2020  4:00 AM       Findings: Acute cholecystitis; normal operative cholangiogram    Complications: None    INDICATION:  This is a 79-year-old with epigastric and right upper quadrant abdominal pain. positive  ultrasound for stones.Taken to the operating room for laparoscopic cholecystectomy.    DESCRIPTION:  The patient was brought to the operating room and placed supine on the operating table. After adequate general endotracheal anesthesia, the abdominal area was prepped and draped in a sterile manner. A briefing and timeout were performed and all parties were in agreement.     A transverse incision was made slightly to the right of the midline in the epigastrium subcostally. A 5 mm XCEL trocar was uneventfully passed into  the abdomen under direct vision with no visceral injury. The abdomen was insufflated to a total of 15 mmHg, 4.8 L of CO2. A 5 mm laparoscope revealed an excellent view of the upper and lower abdominal areas.. A longitudinal skin incision was made at the umbilicus and a 5 mm  XCEL trocar was placed under direct vision with no visceral injury. The camera was then moved to the umbilical port site and an excellent view of the upper abdomen was obtained. An incision was made at the tip of the 12th rib on the right side and carried into the subcutaneous tissue. A 5 mm  XCEL trocar was uneventfully passed into the abdomen under direct vision with no visceral injury.  A fourth 5 mm  XCEL trocar was placed in the midclavicular line subcostally on the right side under direct vision.  The bed was then tilted in reverse Trendelenburg and tipped to the left. The patient tolerated the positioning and insufflation without difficulty.  The umbilical port site was upsized to an 11 mm trocar so that a larger trocar could be used with better visualization.    The gallbladder was drained of its bilious, purulent contents that was sent for Gram stain culture and sensitivity.  The gallbladder was retracted upwards and outwards by grasping the top and the infundibulum of the gallbladder with atraumatic graspers passed through the lateral ports.  The hilum was extremely inflamed and inflammatory adhesions were pushed away.  The peritoneum around the infundibulum was taken down for a distance of 1/3 of the length of the gallbladder on the anterior and posterior sides. The cystic duct and artery easily came into view as did the 5th segment of the liver behind these structures.     Cystic duct was  from its surrounding attachments and clipped at the junction of the cystic duct and gallbladder.  It was opened and stones within the cystic duct were milked back.  A 4 round ureteral catheter was then placed into the cystic duct and held  in place with a clip.  A fluoroscopic cholangiogram was performed by injecting contrast. This demonstrated good filling proximally and distally, intact bile ducts, no stones in the common duct, and good emptying into the duodenum.  The biliary tree was small.    The cholangiocath was removed and the cystic duct was doubly clipped and divided.  This was further closed with a Endoloop of 0 PDS.  The cystic artery was doubly clipped and divided in all branches. The gallbladder was then dissected out of the liver bed using electrocautery. Once it had been nearly completely excised, pressure in the abdomen was reduced to 8 mmHg with no further bleeding being noted from the liver bed. The remainder of the gallbladder was dissected free.  It was placed into an Endobag and brought out intact through the umbilical port.  Ports were replaced.    All areas were visualized for bleeding and bile leak. No bile leak was seen and there was only minor bleeding from the inflamed liver bed.  This was controlled with cautery and FloSeal. The patient was then placed supine.  A 19 round Moe-Lambert drain was placed at the bed of the liver and brought out through the lateralmost port site.  Sutured in place with 2-0 nylon suture.  Trocars were removed and the abdomen was allowed to flatten.     A large hernia at the umbilical port site was closed with 0 Vicryl suture on fascia.  The umbilicus was reattached to the suture line 3-0 Vicryl suture..  All port sites were closed with 4-0 subcuticular on the skin.    The procedure was terminated. It was well tolerated. Sponge and needle counts were correct, and the patient was transferred to recovery in satisfactory condition.            This document has been electronically signed by Denilson Richardson MD on February 18, 2020 9:16 AM       Date: 2/18/2020  Time: 9:16 AM

## 2020-02-18 NOTE — PROGRESS NOTES
GENERAL SURGERY PROGRESS NOTE     LOS: 1 day     Chief Complaint:     Medically comorbid 78 y/o now S/p Lap Cindy for Acute Cholecystitis    Interval History:     No issues overnight, still complaining of pain.  Drain is serosanguineous and put out 65 cc since surgery.  No other complaints of nausea.    Medication Review:     amiodarone 200 mg Oral BID   carvedilol 18.75 mg Oral BID With Meals   cefTRIAXone 1 g Intravenous Q24H   dilTIAZem  mg Oral Daily   enoxaparin 40 mg Subcutaneous Daily   furosemide 40 mg Oral Daily   ipratropium-albuterol 3 mL Nebulization Q6H - RT   levothyroxine 50 mcg Oral Daily   metroNIDAZOLE 500 mg Intravenous Q8H   mirtazapine 15 mg Oral Nightly   pantoprazole 40 mg Oral QAM   sodium chloride 10 mL Intravenous Q12H   sodium chloride 10 mL Intravenous Q12H         lactated ringers 100 mL/hr Last Rate: 100 mL/hr (02/18/20 0455)   sodium chloride 150 mL/hr    Objective     Vital Signs:  Temp:  [97 °F (36.1 °C)-98.5 °F (36.9 °C)] 98 °F (36.7 °C)  Heart Rate:  [62-85] 65  Resp:  [16-24] 18  BP: (145-186)/(70-89) 162/77    Intake/Output Summary (Last 24 hours) at 2/18/2020 0554  Last data filed at 2/18/2020 0400  Gross per 24 hour   Intake 1100 ml   Output 365 ml   Net 735 ml       Physical Exam   Constitutional: She is oriented to person, place, and time. She appears well-developed and well-nourished.   HENT:   Head: Normocephalic and atraumatic.   Eyes: Pupils are equal, round, and reactive to light. EOM are normal.   Neck: Normal range of motion. Neck supple.   Cardiovascular: Normal rate and regular rhythm.   Pulmonary/Chest: Effort normal and breath sounds normal.   Abdominal: Soft. She exhibits no distension. There is tenderness. There is no guarding.   Drain c/d/i, serosang, 65cc out   Musculoskeletal: Normal range of motion.   Neurological: She is alert and oriented to person, place, and time.   Skin: Skin is warm and dry.   Psychiatric: She has a normal mood and affect. Her  behavior is normal.       Results Review:    Results from last 7 days   Lab Units 02/14/20  0847   SODIUM mmol/L 139   POTASSIUM mmol/L 3.4*   CHLORIDE mmol/L 102   CO2 mmol/L 23.0   BUN mg/dL 16   CREATININE mg/dL 1.20*   GLUCOSE mg/dL 116*   CALCIUM mg/dL 8.8     Results from last 7 days   Lab Units 02/14/20  0847   WBC 10*3/mm3 10.89*   HEMOGLOBIN g/dL 11.1*   HEMATOCRIT % 33.8*   PLATELETS 10*3/mm3 119*       Assessment:    Hypertension      Medically comorbid 78 yo female s/p lap caio for acute cholecystitis on 2/17/20    Plan:  --DC bulb suction, serosanguineous   --PRNs pain  --Advance diet as tolerated  --Hold therapeutic anticoagulation today.  Can start prophylactic anticoagulation.  Hopefully can restart therapeutic tomorrow or the following day.  --Bile cxs pending. Continue abx  --Can be discharged when medically stable    This document has been electronically signed by Aime Looney MD on February 18, 2020 5:54 AM

## 2020-02-18 NOTE — PROGRESS NOTES
TWO PATIENT IDENTIFIERS WERE USED. THE PATIENT WAS DRAPED WITH A FULL BODY DRAPE AND THE PATIENT'S RIGHT ARM WAS PREPPED WITH CHLORA PREP. ULTRASOUND WAS USED TO LOCALIZE THERIGHT BASILIC VEIN. SUBCUTANEOUS TISSUE AT THE CATHETER SITE WAS INFILTRATED WITH 2% LIDOCAINE. UNDER ULTRASOUND GUIDANCE, THE VEIN WAS ACCESSED WITH A 21 GAUGE  NEEDLE. AN 0.018 WIRE WAS THEN THREADED THROUGH THE NEEDLE. THE 21 GAUGE NEEDLE WAS REMOVED AND A 4 Pashto SHEATH WAS PLACED OVER THE WIRE INTO THE VEIN.THE MIDLINE CATHETER WAS TRIMMED TO 15CM. THE MIDLINE CATHETER WAS THEN PLACED OVER THE WIRE INTO THE VEIN, THE SHEATH WAS PEELED AWAY, WIRE WAS REMOVED. CATHETER WAS FLUSHED WITH NORMAL SALINE AND CATHETER TIP APPLIED. BIOPATCH PLACED. CATHETER SECURED WITH STAT LOCK AND TEGADERM. PATIENT TOLERATED PROCEDURE WELL. THIS WAS DONE IN THE ULTRASOUND SUITE      IMPRESSION:SUCCESSFUL PLACEMENT OF DUAL LUMEN MIDLINE.           Jossie Adkins RN  2/18/2020  3:12 PM

## 2020-02-18 NOTE — ANESTHESIA POSTPROCEDURE EVALUATION
Patient: Sultana Lin    Procedure Summary     Date:  02/17/20 Room / Location:  [facility] OR 09 / [facility] OR    Anesthesia Start:  1549 Anesthesia Stop:  1857    Procedure:  LAPAROSCOPIC CHOLECYSTECTOMY WITH INTRAOPERATIVE CHOLANGIOGRAM (N/A Abdomen) Diagnosis:       Acute cholecystitis      (Acute cholecystitis [K81.0])    Surgeon:  Denilson Richardson MD Provider:  Nilesh Cortes MD    Anesthesia Type:  general ASA Status:  4          Anesthesia Type: general    Vitals  Vitals Value Taken Time   /84 2/17/2020  6:43 PM   Temp 98.1 °F (36.7 °C) 2/17/2020  6:43 PM   Pulse 78 2/17/2020  6:43 PM   Resp 20 2/17/2020  6:43 PM   SpO2 92 % 2/17/2020  6:43 PM           Post Anesthesia Care and Evaluation    Patient location during evaluation: bedside  Patient participation: complete - patient participated  Level of consciousness: sleepy but conscious  Pain score: 0  Pain management: adequate  Airway patency: patent  Anesthetic complications: No anesthetic complications  PONV Status: none  Cardiovascular status: acceptable and stable  Respiratory status: acceptable  Hydration status: stable

## 2020-02-18 NOTE — PLAN OF CARE
Problem: Patient Care Overview  Goal: Plan of Care Review  Outcome: Ongoing (interventions implemented as appropriate)  Flowsheets  Taken 2/18/2020 0444  Progress: no change  Outcome Summary: VSS; pain being managed with pain meds; resting well inbetween care; will continue monitor  Taken 2/17/2020 2035  Plan of Care Reviewed With: patient

## 2020-02-19 LAB
BACTERIA FLD CULT: ABNORMAL
BACTERIA FLD CULT: ABNORMAL
GRAM STN SPEC: ABNORMAL
GRAM STN SPEC: ABNORMAL
LAB AP CASE REPORT: NORMAL
PATH REPORT.FINAL DX SPEC: NORMAL
PATH REPORT.GROSS SPEC: NORMAL
TROPONIN T SERPL-MCNC: <0.01 NG/ML (ref 0–0.03)

## 2020-02-19 PROCEDURE — 93010 ELECTROCARDIOGRAM REPORT: CPT | Performed by: INTERNAL MEDICINE

## 2020-02-19 PROCEDURE — G0378 HOSPITAL OBSERVATION PER HR: HCPCS

## 2020-02-19 PROCEDURE — 93005 ELECTROCARDIOGRAM TRACING: CPT | Performed by: INTERNAL MEDICINE

## 2020-02-19 PROCEDURE — 99024 POSTOP FOLLOW-UP VISIT: CPT | Performed by: SURGERY

## 2020-02-19 PROCEDURE — 97530 THERAPEUTIC ACTIVITIES: CPT

## 2020-02-19 PROCEDURE — 25010000002 ENOXAPARIN PER 10 MG: Performed by: SURGERY

## 2020-02-19 PROCEDURE — 97535 SELF CARE MNGMENT TRAINING: CPT

## 2020-02-19 PROCEDURE — 84484 ASSAY OF TROPONIN QUANT: CPT | Performed by: INTERNAL MEDICINE

## 2020-02-19 PROCEDURE — 25010000002 CEFTRIAXONE PER 250 MG: Performed by: SURGERY

## 2020-02-19 PROCEDURE — 97166 OT EVAL MOD COMPLEX 45 MIN: CPT

## 2020-02-19 PROCEDURE — 94799 UNLISTED PULMONARY SVC/PX: CPT

## 2020-02-19 RX ORDER — METOPROLOL TARTRATE 5 MG/5ML
INJECTION INTRAVENOUS
Status: COMPLETED
Start: 2020-02-19 | End: 2020-02-19

## 2020-02-19 RX ADMIN — HYDROCODONE BITARTRATE AND ACETAMINOPHEN 1 TABLET: 10; 325 TABLET ORAL at 13:31

## 2020-02-19 RX ADMIN — HYDROCODONE BITARTRATE AND ACETAMINOPHEN 1 TABLET: 10; 325 TABLET ORAL at 06:34

## 2020-02-19 RX ADMIN — METRONIDAZOLE 500 MG: 500 INJECTION, SOLUTION INTRAVENOUS at 13:31

## 2020-02-19 RX ADMIN — SODIUM CHLORIDE, POTASSIUM CHLORIDE, SODIUM LACTATE AND CALCIUM CHLORIDE 100 ML/HR: 600; 310; 30; 20 INJECTION, SOLUTION INTRAVENOUS at 23:52

## 2020-02-19 RX ADMIN — AMIODARONE HYDROCHLORIDE 200 MG: 200 TABLET ORAL at 20:39

## 2020-02-19 RX ADMIN — MIRTAZAPINE 15 MG: 15 TABLET, FILM COATED ORAL at 20:39

## 2020-02-19 RX ADMIN — LEVOTHYROXINE SODIUM 50 MCG: 50 TABLET ORAL at 08:34

## 2020-02-19 RX ADMIN — METRONIDAZOLE 500 MG: 500 INJECTION, SOLUTION INTRAVENOUS at 22:40

## 2020-02-19 RX ADMIN — CARVEDILOL 18.75 MG: 12.5 TABLET, FILM COATED ORAL at 08:33

## 2020-02-19 RX ADMIN — AMIODARONE HYDROCHLORIDE 200 MG: 200 TABLET ORAL at 08:33

## 2020-02-19 RX ADMIN — SODIUM CHLORIDE, PRESERVATIVE FREE 10 ML: 5 INJECTION INTRAVENOUS at 20:40

## 2020-02-19 RX ADMIN — HYDROCODONE BITARTRATE AND ACETAMINOPHEN 1 TABLET: 10; 325 TABLET ORAL at 09:54

## 2020-02-19 RX ADMIN — METRONIDAZOLE 500 MG: 500 INJECTION, SOLUTION INTRAVENOUS at 06:17

## 2020-02-19 RX ADMIN — IPRATROPIUM BROMIDE AND ALBUTEROL SULFATE 3 ML: 2.5; .5 SOLUTION RESPIRATORY (INHALATION) at 19:10

## 2020-02-19 RX ADMIN — SODIUM CHLORIDE, PRESERVATIVE FREE 10 ML: 5 INJECTION INTRAVENOUS at 13:33

## 2020-02-19 RX ADMIN — PANTOPRAZOLE SODIUM 40 MG: 40 TABLET, DELAYED RELEASE ORAL at 06:16

## 2020-02-19 RX ADMIN — CEFTRIAXONE SODIUM 1 G: 1 INJECTION, POWDER, FOR SOLUTION INTRAMUSCULAR; INTRAVENOUS at 10:19

## 2020-02-19 RX ADMIN — IPRATROPIUM BROMIDE AND ALBUTEROL SULFATE 3 ML: 2.5; .5 SOLUTION RESPIRATORY (INHALATION) at 13:03

## 2020-02-19 RX ADMIN — SODIUM CHLORIDE, POTASSIUM CHLORIDE, SODIUM LACTATE AND CALCIUM CHLORIDE 100 ML/HR: 600; 310; 30; 20 INJECTION, SOLUTION INTRAVENOUS at 13:31

## 2020-02-19 RX ADMIN — SODIUM CHLORIDE, POTASSIUM CHLORIDE, SODIUM LACTATE AND CALCIUM CHLORIDE 100 ML/HR: 600; 310; 30; 20 INJECTION, SOLUTION INTRAVENOUS at 01:55

## 2020-02-19 RX ADMIN — SODIUM CHLORIDE, PRESERVATIVE FREE 10 ML: 5 INJECTION INTRAVENOUS at 08:35

## 2020-02-19 RX ADMIN — IPRATROPIUM BROMIDE AND ALBUTEROL SULFATE 3 ML: 2.5; .5 SOLUTION RESPIRATORY (INHALATION) at 01:29

## 2020-02-19 RX ADMIN — HYDROCODONE BITARTRATE AND ACETAMINOPHEN 1 TABLET: 10; 325 TABLET ORAL at 01:55

## 2020-02-19 RX ADMIN — HYDROCODONE BITARTRATE AND ACETAMINOPHEN 1 TABLET: 10; 325 TABLET ORAL at 17:50

## 2020-02-19 RX ADMIN — METOPROLOL TARTRATE 5 MG: 5 INJECTION INTRAVENOUS at 16:49

## 2020-02-19 RX ADMIN — ENOXAPARIN SODIUM 40 MG: 40 INJECTION SUBCUTANEOUS at 08:35

## 2020-02-19 RX ADMIN — CARVEDILOL 18.75 MG: 12.5 TABLET, FILM COATED ORAL at 17:15

## 2020-02-19 RX ADMIN — FUROSEMIDE 40 MG: 40 TABLET ORAL at 08:34

## 2020-02-19 RX ADMIN — DILTIAZEM HYDROCHLORIDE 180 MG: 180 CAPSULE, COATED, EXTENDED RELEASE ORAL at 08:35

## 2020-02-19 NOTE — PLAN OF CARE
Problem: Patient Care Overview  Goal: Plan of Care Review  Outcome: Ongoing (interventions implemented as appropriate)  Flowsheets (Taken 2/19/2020 0870)  Outcome Summary: OT stewart completed this date. Pt engaged well this date and fatigued quickly. Pt was CGA for sit to stand on the EOB and min to mod assist for toilet t/f. Pt mod assist for toilet t/f. Pt min assist for mobiltiy with RW. Pt was average for mod assist for sponge bath from knees up. Pt could benefit from further skilled OT to reach PLOF/max independence with ADL. Pt could benefit from further inpatient therapy before d/c home with 24/7 assist and further HH OT and PT.

## 2020-02-19 NOTE — THERAPY EVALUATION
Patient Name: Sultana Lin  : 1940    MRN: [id number]                              Today's Date: 2020     PT Eval and Treatment  Admit Date: 2020    Visit Dx:     ICD-10-CM ICD-9-CM   1. Acute cholecystitis K81.0 575.0   2. Cholecystitis K81.9 575.10   3. Duodenitis K29.80 535.60   4. Impaired functional mobility, balance, gait, and endurance Z74.09 V49.89     Patient Active Problem List   Diagnosis   • Hypothyroidism   • Hypertension   • Paroxysmal atrial fibrillation (CMS/HCC)   • Paroxysmal atrial fibrillation with rapid ventricular response (CMS/HCC)     Past Medical History:   Diagnosis Date   • Arthritis    • Atrial fibrillation (CMS/HCC)    • Atrial fibrillation (CMS/HCC)    • Atrial fibrillation with rapid ventricular response (CMS/HCC) 2017   • Breast cancer (CMS/HCC)     right   • Cancer (CMS/HCC)    • Disease of thyroid gland    • Hypertension      Past Surgical History:   Procedure Laterality Date   • ABLATION OF DYSRHYTHMIC FOCUS     • BREAST SURGERY     • CARDIAC ELECTROPHYSIOLOGY PROCEDURE N/A 2017    Procedure: EP/Ablation;  Surgeon: Blake Mcallister MD;  Location: [facility] INVASIVE LOCATION;  Service:    • CARDIOVERSION     • CATARACT EXTRACTION W/ INTRAOCULAR LENS IMPLANT Right 2019    Procedure: REMOVE CATARACT AND IMPLANT INTRAOCULAR LENS;  Surgeon: Lenin Dennison MD;  Location: [facility] OR;  Service: Ophthalmology   • CATARACT EXTRACTION W/ INTRAOCULAR LENS IMPLANT Left 2019    Procedure: REMOVE CATARACT AND IMPLANT INTRAOCULAR LENS;  Surgeon: Lenin Dennison MD;  Location: [facility];  Service: Ophthalmology   • INSERT / REPLACE / REMOVE PACEMAKER     • PACEMAKER IMPLANTATION     • SKIN BIOPSY     • SKIN TAG REMOVAL       General Information     Row Name 20 1405          PT Evaluation Time/Intention    Document Type  evaluation  -GB     Mode of Treatment  individual therapy;physical therapy  -GB     Row Name 20 1402           General Information    Patient Profile Reviewed?  yes  -GB     Prior Level of Function  independent:;all household mobility;ADL's;shopping;community mobility;driving hx falls w/ inner ear problems  -     Existing Precautions/Restrictions  fall;oxygen therapy device and L/min abd sx/lap caio  -     Row Name 02/18/20 1405          Relationship/Environment    Lives With  spouse  -     Row Name 02/18/20 1405          Resource/Environmental Concerns    Current Living Arrangements  home/apartment/condo  -GB     Row Name 02/18/20 1405          Home Main Entrance    Number of Stairs, Main Entrance  one  -GB     Row Name 02/18/20 1405          Stairs Within Home, Primary    Number of Stairs, Within Home, Primary  none  -       User Key  (r) = Recorded By, (t) = Taken By, (c) = Cosigned By    Initials Name Provider Type    Saskia Gaming, PT Physical Therapist        Mobility     Row Name 02/18/20 1808 02/18/20 1405       Bed Mobility Assessment/Treatment    Bed Mobility Assessment/Treatment  --  bed mobility (all) activities  -    [place] Level (Bed Mobility)  --  minimum assist (75% patient effort)  -    Assistive Device (Bed Mobility)  --  bed rails;head of bed elevated  -    Comment (Bed Mobility)  --  -GB  --    Row Name 02/18/20 1405          Bed-Chair Transfer    Bed-Chair [place] (Transfers)  supervision;contact guard;1 person assist  -     Assistive Device (Bed-Chair Transfers)  walker, front-wheeled  -     Row Name 02/18/20 1405          Sit-Stand Transfer    Sit-Stand [place] (Transfers)  contact guard  -     Row Name 02/18/20 1405          Gait/Stairs Assessment/Training    Gait/Stairs Assessment/Training  gait/ambulation independence;gait/ambulation assistive device;distance ambulated;gait pattern;gait deviations  -     [place] Level (Gait)  contact guard;minimum assist (75% patient effort)  -     Assistive Device (Gait)  walker, front-wheeled  -      Distance in Feet (Gait)  28,14   -     Pattern (Gait)  step-through  -     Deviations/Abnormal Patterns (Gait)  gait speed decreased;joan decreased  -     Bilateral Gait Deviations  forward flexed posture  -       User Key  (r) = Recorded By, (t) = Taken By, (c) = Cosigned By    Initials Name Provider Type     Saskia Oneal, PT Physical Therapist        Obj/Interventions     Row Name 02/18/20 1405          General ROM    GENERAL ROM COMMENTS  AROM melody UE/LES WFL except shoulder flx to ~100 deg   -[facility] Name 02/18/20 1405          MMT (Manual Muscle Testing)    General MMT Comments  UEs grossly 4-/5 elbow flx/ext and ; LEs grossly 4-/5 w/ submax testing to protect incisions  -[facility] Name 02/18/20 1405          Static Sitting Balance    Level of [place] (Unsupported Sitting, Static Balance)  independent  -[facility] Name 02/18/20 1405          Dynamic Sitting Balance    Level of [place], Reaches Outside Midline (Sitting, Dynamic Balance)  conditional independence  -     Sitting Position, Reaches Outside Midline (Sitting, Dynamic Balance)  sitting on edge of bed  -[facility] Name 02/18/20 1405          Static Standing Balance    Level of [place] (Supported Standing, Static Balance)  contact guard assist  -     Time Able to Maintain Position (Supported Standing, Static Balance)  4 to 5 minutes  -     Assistive Device Utilized (Supported Standing, Static Balance)  walker, rolling  -[facility] Name 02/18/20 1405          Dynamic Standing Balance    Level of [place], Reaches Outside Midline (Standing, Dynamic Balance)  contact guard assist  -     Time Able to Maintain Position, Reaches Outside Midline (Standing, Dynamic Balance)  more than 5 minutes  -[facility] Name 02/18/20 1405          Sensory Assessment/Intervention    Sensory General Assessment  no sensation deficits identified  -       User Key  (r) = Recorded By, (t) = Taken By, (c) = Cosigned By     Initials Name Provider Type    GB Saskia Oneal, PT Physical Therapist        Goals/Plan     Row Name 02/18/20 1443          Bed Mobility Goal 1 (PT)    Activity/Assistive Device (Bed Mobility Goal 1, PT)  bed mobility activities, all  -GB     [place] Level/Cues Needed (Bed Mobility Goal 1, PT)  conditional independence  -GB     Time Frame (Bed Mobility Goal 1, PT)  long term goal (LTG);10 days  -GB     Progress/Outcomes (Bed Mobility Goal 1, PT)  goal not met;continuing progress toward goal  -GB     Row Name 02/18/20 1443          Transfer Goal 1 (PT)    Activity/Assistive Device (Transfer Goal 1, PT)  bed-to-chair/chair-to-bed;toilet  -GB     [place] Level/Cues Needed (Transfer Goal 1, PT)  conditional independence  -GB     Time Frame (Transfer Goal 1, PT)  2 weeks  -GB     Progress/Outcome (Transfer Goal 1, PT)  good progress toward goal;goal not met  -GB     Row Name 02/18/20 1443          Gait Training Goal 1 (PT)    Activity/Assistive Device (Gait Training Goal 1, PT)  gait (walking locomotion);assistive device use;increase endurance/gait distance;walker, rolling  -GB     [place] Level (Gait Training Goal 1, PT)  conditional independence  -GB     Distance (Gait Goal 1, PT)  300 ft or more   -GB     Time Frame (Gait Training Goal 1, PT)  long term goal (LTG);2 weeks  -GB     Progress/Outcome (Gait Training Goal 1, PT)  continuing progress toward goal;goal not met  -GB     Row Name 02/18/20 1443          Stairs Goal 1 (PT)    Activity/Assistive Device (Stairs Goal 1, PT)  ascending stairs;descending stairs;assistive device use  -GB     [place] Level/Cues Needed (Stairs Goal 1, PT)  conditional independence  -GB     Number of Stairs (Stairs Goal 1, PT)  1 or more  -GB     Time Frame (Stairs Goal 1, PT)  long term goal (LTG);by discharge  -GB     Barriers (Stairs Goal 1, PT)  endurance;   -GB     Progress/Outcome (Stairs Goal 1, PT)  goal not met;continuing progress toward goal   -GB       User Key  (r) = Recorded By, (t) = Taken By, (c) = Cosigned By    Initials Name Provider Type    Saskia Gaming, PT Physical Therapist        Clinical Impression     Row Name 02/18/20 1407          Pain Assessment    Additional Documentation  Pain Scale: Numbers Pre/Post-Treatment (Group)  -GB     Row Name 02/18/20 1403          Pain Scale: Numbers Pre/Post-Treatment    Pain Scale: Numbers, Pretreatment  9/10  -GB     Pain Scale: Numbers, Post-Treatment  -- not rated; pt left for radiology  -GB     Pain Location  abdomen  -GB     Pain Intervention(s)  Ambulation/increased activity;Repositioned  -GB     Row Name 02/18/20 1408          Plan of Care Review    Plan of Care Reviewed With  patient;daughter  -GB     Outcome Summary  PT eval at bedside; Pt initially hesitant but agreed for OOB and was able to move sup-sit-sup w/ min a x 1. She ambulated 28, 14 ft in room wl FWRW and CGA of 1. She will need continued gait and ex to attempt to return to indep ADLs and gait tho family report she will need rolling walker for stabilty, a change from prior..  Pt to be followed for theraapy w/ goal of d/c to home w/ family assist. If she does not meet indep goals, may need transitional care for rehab to home program. Good rehab potentiall  -GB     Row Name 02/18/20 1533          Physical Therapy Clinical Impression    Patient/Family Goals Statement (PT Clinical Impression)  indep gait and ADLS to return home w/ spouse. Family works outside of home so she willl need to be indep for self care in home  -GB     Criteria for Skilled Interventions Met (PT Clinical Impression)  yes  -GB     Rehab Potential (PT Clinical Summary)  good, to achieve stated therapy goals  -GB     Predicted Duration of Therapy (PT)  2 wks  -GB     Row Name 02/18/20 1409          Vital Signs    Pre Systolic BP Rehab  138  -GB     Pre Treatment Diastolic BP  75  -GB     Post Systolic BP Rehab  -- unablet o take B/P post gait due to  transport taking her to radiology  -     Pretreatment Heart Rate (beats/min)  63  -GB     Intratreatment Heart Rate (beats/min)  65  -GB     Posttreatment Heart Rate (beats/min)  65  -GB     Pre SpO2 (%)  92  -GB     O2 Delivery Pre Treatment  nasal cannula  -GB     Intra SpO2 (%)  95  -GB     O2 Delivery Intra Treatment  nasal cannula  -GB     Post SpO2 (%)  92  -GB     O2 Delivery Post Treatment  nasal cannula  -GB     Pre Patient Position  Supine  -GB     Intra Patient Position  Standing  -GB     Post Patient Position  Supine taken by Transport to [facility]     Row Name 02/18/20 1402          Positioning and Restraints    Pre-Treatment Position  in bed  -GB     Post Treatment Position  bed  -GB     In Bed  with other staff family stayed to assist w / goal settting and d/c planning  -       User Key  (r) = Recorded By, (t) = Taken By, (c) = Cosigned By    Initials Name Provider Type    Saskia Gaming, PT Physical Therapist        Outcome Measures     Row Name 02/18/20 1445          How much help from another person do you currently need...    Turning from your back to your side while in flat bed without using bedrails?  3  -GB     Moving from lying on back to sitting on the side of a flat bed without bedrails?  3  -GB     Moving to and from a bed to a chair (including a wheelchair)?  3  -GB     Standing up from a chair using your arms (e.g., wheelchair, bedside chair)?  3  -GB     Climbing 3-5 steps with a railing?  2  -GB     To walk in hospital room?  3  -GB     AM-PAC 6 Clicks Score (PT)  17  -GB     Row Name 02/18/20 1445          Functional Assessment    Outcome Measure Options  AM-PAC 6 Clicks Basic Mobility (PT)  -GB       User Key  (r) = Recorded By, (t) = Taken By, (c) = Cosigned By    Initials Name Provider Type    Saskia Gaming PT Physical Therapist          PT Recommendation and Plan  Planned Therapy Interventions (PT Eval): bed mobility training, gait training,  home exercise program, strengthening, stair training, transfer training, patient/family education  Outcome Summary/Treatment Plan (PT)  Anticipated Equipment Needs at Discharge (PT): front wheeled walker  Anticipated Discharge Disposition (PT): anticipate therapy at next level of care  Plan of Care Reviewed With: patient, daughter  Outcome Summary: PT eval at bedside; Pt initially hesitant but agreed for OOB and was able to move sup-sit-sup w/ min a x 1. She ambulated 28, 14 ft in room wl FWRW and CGA of 1. She will need continued gait and ex to attempt to return to indep ADLs and gait tho family report she will need rolling walker for stabilty, a change from prior..  Pt to be followed for theraapy w/ goal of d/c to home w/ family assist. If she does not meet indep goals, may need transitional care for rehab to home program. Good rehab potentiall     Time Calculation:   PT Charges     Row Name 02/18/20 1827             Time Calculation    Start Time  1405  -GB      Stop Time  1445  -GB      Time Calculation (min)  40 min  -GB      PT Received On  02/18/20  -GB      PT Goal Re-Cert Due Date  02/27/20  -GB        User Key  (r) = Recorded By, (t) = Taken By, (c) = Cosigned By    Initials Name Provider Type    Saskia Gaming, PT Physical Therapist        Therapy Charges for Today     Code Description Service Date Service Provider Modifiers Qty    56008821536 HC PT EVAL MOD COMPLEXITY 3 2/18/2020 Saskia Oneal PT GP 1          PT G-Codes  Outcome Measure Options: AM-PAC 6 Clicks Basic Mobility (PT)  AM-PAC 6 Clicks Score (PT): 17    Saskia Oneal PT  2/18/2020

## 2020-02-19 NOTE — PLAN OF CARE
Problem: Patient Care Overview  Goal: Plan of Care Review  Outcome: Ongoing (interventions implemented as appropriate)  Flowsheets (Taken 2/18/2020 2645)  Plan of Care Reviewed With: patient; daughter  Outcome Summary: PT eval at bedside; Pt initially hesitant but agreed for OOB and was able to move sup-sit-sup w/ min a x 1. She ambulated 28, 14 ft in room wl FWRW and CGA of 1. She will need continued gait and ex to attempt to return to indep ADLs and gait tho family report she will need rolling walker for stabilty, a change from prior..  Pt to be followed for theraapy w/ goal of d/c to home w/ family assist. If she does not meet indep goals, may need transitional care for rehab to home program. Good rehab potentiall

## 2020-02-19 NOTE — PROGRESS NOTES
[facility] Medicine Services  INPATIENT PROGRESS NOTE    Length of Stay: 1  Date of Admission: 2/14/2020  Primary Care Physician: Sandip Lozano MD    Subjective   Chief Complaint: weak  HPI:  Pt seen and examined. Pt has no complaints today. Pain is controlled.POD #2 cholecystectomy. Pt c/o generalized weakness     Review of Systems     All pertinent negatives and positives are as above. All other systems have been reviewed and are negative unless otherwise stated.     Objective    Temp:  [96.5 °F (35.8 °C)-98.3 °F (36.8 °C)] 96.9 °F (36.1 °C)  Heart Rate:  [58-75] 75  Resp:  [16-18] 18  BP: (134-186)/(68-90) 159/90    Physical Exam  Constitutional: She is oriented to person, place, and time. NAD  HENT:   Head: Normocephalic and atraumatic.   Eyes: Pupils are equal, round, and reactive to light. EOM are normal. No scleral icterus.   Neck: Normal range of motion. Neck supple.   Cardiovascular: Normal rate and regular rhythm.   Pulmonary/Chest: Effort normal and breath sounds normal. No stridor. No respiratory distress.   Abdominal: Soft. She exhibits no mass. There is no tenderness. There is no guarding. No hernia.   Musculoskeletal: Normal range of motion. She exhibits no edema or tenderness.   Neurological: She is alert and oriented to person, place, and time. She displays normal reflexes. No cranial nerve deficit. Coordination normal.   Skin: Skin is warm and dry. No rash noted.   Psychiatric: She has a normal mood and affect. Her behavior is normal.   Vitals reviewed      Results Review:  I have reviewed the labs, radiology results, and diagnostic studies.    Laboratory Data:   Results from last 7 days   Lab Units 02/18/20  0533 02/14/20  0847   SODIUM mmol/L 144 139   POTASSIUM mmol/L 3.5 3.4*   CHLORIDE mmol/L 108* 102   CO2 mmol/L 24.0 23.0   BUN mg/dL 14 16   CREATININE mg/dL 0.69 1.20*   GLUCOSE mg/dL 131* 116*   CALCIUM mg/dL 8.1* 8.8   BILIRUBIN mg/dL 0.4 1.4*    ALK PHOS U/L 66 68   ALT (SGPT) U/L 7 6   AST (SGOT) U/L 21 15   ANION GAP mmol/L 12.0 14.0     Estimated Creatinine Clearance: 62.2 mL/min (by C-G formula based on SCr of 0.69 mg/dL).  Results from last 7 days   Lab Units 02/18/20  0533   MAGNESIUM mg/dL 1.8   PHOSPHORUS mg/dL 2.5         Results from last 7 days   Lab Units 02/18/20  0533 02/14/20  0847   WBC 10*3/mm3 7.42 10.89*   HEMOGLOBIN g/dL 9.8* 11.1*   HEMATOCRIT % 30.9* 33.8*   PLATELETS 10*3/mm3 133* 119*           Culture Data:   No results found for: BLOODCX  No results found for: URINECX  No results found for: RESPCX  No results found for: WOUNDCX  No results found for: STOOLCX  No components found for: BODYFLD    Radiology Data:   Imaging Results (Last 24 Hours)     Procedure Component Value Units Date/Time    IR Insert Midline Without Port Pump 5 Plus [[id number]] Resulted:  02/18/20 1513     Updated:  02/18/20 1513    Narrative:       This procedure was auto-finalized with no dictation required.    US Guided Vascular Access [[id number]] Resulted:  02/18/20 1509     Updated:  02/18/20 1509    Narrative:       This procedure was auto-finalized with no dictation required.          I have reviewed the patient's current medications.     Assessment/Plan     Active Hospital Problems    Diagnosis   • Hypertension       Plan:   Paced atrial rhythm on the ECG, a good LV EF on an Echo obtained few years ago. Continue with the symptomatic relief, IV fluid, hold anticoagulation for now.   -general surgery consulted   -pod #1 cholecystectomy , plan to take out harley drain today

## 2020-02-19 NOTE — PROGRESS NOTES
"   LOS: 1 day   Patient Care Team:  Sandip Lozano MD as PCP - General    Chief Complaint: Postoperative day 2 laparoscopic cholecystectomy    Subjective     History of Present Illness    Subjective:  Symptoms:  Improved.    Diet:  Adequate intake.    Activity level: Returning to normal.    Pain:  She reports no pain.        History taken from: patient chart    Objective     Vital Signs  Temp:  [96.5 °F (35.8 °C)-98.3 °F (36.8 °C)] 96.9 °F (36.1 °C)  Heart Rate:  [58-75] 69  Resp:  [16-18] 16  BP: (134-186)/(68-90) 159/90    Objective:  General Appearance:  Comfortable.    Vital signs: (most recent): Blood pressure 159/90, pulse 69, temperature 96.9 °F (36.1 °C), resp. rate 16, height 170.2 cm (67\"), weight 83.3 kg (183 lb 11.2 oz), SpO2 93 %.  Vital signs are normal.  No fever.    Output: Producing urine and producing stool.    Lungs:  Normal effort and normal respiratory rate.    Abdomen: Abdomen is soft.  (All incisions are healing nicely; minimal drainage from the CAMPOS drain and it is removed).              Results Review:     I reviewed the patient's new clinical results.    Medication Review: Performed    Assessment/Plan       Hypertension      Assessment:    Condition: In stable condition.  Improving.       Plan:   (1.  Can be discharged home from my point of view at any point once medically stable.  With regards to antibiotics, I would recommend 3 more days of antibiotics either IV or oral.  2.  I will gladly follow her in the office after discharge).       Denilson Richardson MD  02/19/20  2:57 PM    Time: 10 minutes      "

## 2020-02-19 NOTE — THERAPY TREATMENT NOTE
Acute Care - Physical Therapy Treatment Note  [facility]     Patient Name: Sultana Lin  : 1940  MRN: [id number]  Today's Date: 2020  Onset of Illness/Injury or Date of Surgery: 20     Referring Physician: Dr. Richardson    Admit Date: 2020    Visit Dx:    ICD-10-CM ICD-9-CM   1. Acute cholecystitis K81.0 575.0   2. Cholecystitis K81.9 575.10   3. Duodenitis K29.80 535.60   4. Impaired functional mobility, balance, gait, and endurance Z74.09 V49.89   5. Impaired mobility and ADLs Z74.09 799.89     Patient Active Problem List   Diagnosis   • Hypothyroidism   • Hypertension   • Paroxysmal atrial fibrillation (CMS/HCC)   • Paroxysmal atrial fibrillation with rapid ventricular response (CMS/HCC)       Therapy Treatment    Rehabilitation Treatment Summary     Row Name 20 1415             Treatment Time/Intention    Discipline  physical therapy assistant  -TW      Document Type  therapy note (daily note)  -TW      Subjective Information  complains of;fatigue;weakness  -TW      Mode of Treatment  physical therapy;individual therapy  -TW      Patient/Family Observations  Pt's daughter present.  -TW      Therapy Frequency (PT Clinical Impression)  daily  -TW      Patient Effort  good  -TW      Existing Precautions/Restrictions  fall  -TW      Recorded by [TW] Harry Cuenca PTA 20 1514      Row Name 20 1415             Vital Signs    Pre Systolic BP Rehab  157  -TW      Pre Treatment Diastolic BP  74  -TW      Pretreatment Heart Rate (beats/min)  76  -TW      Posttreatment Heart Rate (beats/min)  68  -TW      Pre SpO2 (%)  90  -TW      O2 Delivery Pre Treatment  room air  -TW      Post SpO2 (%)  95  -TW      Pre Patient Position  Supine  -TW      Intra Patient Position  Sitting  -TW      Post Patient Position  Supine  -TW      Recorded by [TW] Harry Cuenca PTA 20 1514      Row Name 20 1415             Cognitive Assessment/Intervention- PT/OT    Affect/Mental  Status (Cognitive)  WFL  -TW      Orientation Status (Cognition)  oriented x 4  -TW      Follows Commands (Cognition)  follows one step commands;over 90% accuracy  -TW      Personal Safety Interventions  fall prevention program maintained;gait belt;muscle strengthening facilitated;nonskid shoes/slippers when out of bed  -TW      Recorded by [TW] Harry Cuenca, PTA 02/19/20 1514      Row Name 02/19/20 1415             Safety Issues, Functional Mobility    Impairments Affecting Function (Mobility)  balance;coordination;endurance/activity tolerance;pain;shortness of breath;strength  -TW      Recorded by [TW] Harry Cuenca, PTA 02/19/20 1514      Row Name 02/19/20 1415             Bed Mobility Assessment/Treatment    Bed Mobility Assessment/Treatment  supine-sit;sit-supine  -TW      Supine-Sit [place] (Bed Mobility)  minimum assist (75% patient effort)  -TW      Sit-Supine [place] (Bed Mobility)  minimum assist (75% patient effort)  -TW      Bed Mobility, Safety Issues  impaired trunk control for bed mobility  -TW      Assistive Device (Bed Mobility)  bed rails;head of bed elevated  -TW      Comment (Bed Mobility)  Pt sat EOB for 8-9 minutes before c/o dizziness and requesting to return to supine.   -TW      Recorded by [TW] Harry Cuenca, PTA 02/19/20 1514      Row Name 02/19/20 1415             Bed-Chair Transfer    Bed-Chair [place] (Transfers)  not tested  -TW      Recorded by [TW] Harry Cuenca, PTA 02/19/20 1514      Row Name 02/19/20 1415             Sit-Stand Transfer    Sit-Stand [place] (Transfers)  not tested  -TW      Recorded by [TW] Harry Cuenca, PTA 02/19/20 1514      Row Name 02/19/20 1415             Stand-Sit Transfer    Stand-Sit [place] (Transfers)  not tested  -TW      Recorded by [TW] Harry Cuenca, PTA 02/19/20 1514      Row Name 02/19/20 1415             Gait/Stairs Assessment/Training    [place] Level (Gait)  not tested  -TW       Comment (Gait/Stairs)  Pt c/o dizziness sitting EOB and declined OOB at this time.   -TW      Recorded by [TW] Harry Cuenca PTA 02/19/20 1514      Row Name 02/19/20 1415             Static Sitting Balance    Level of [place] (Unsupported Sitting, Static Balance)  independent  -TW      Recorded by [TW] Harry Cuenca PTA 02/19/20 1514      Row Name 02/19/20 1415             Dynamic Sitting Balance    Level of [place], Reaches Outside Midline (Sitting, Dynamic Balance)  standby assist  -TW      Sitting Position, Reaches Outside Midline (Sitting, Dynamic Balance)  sitting on edge of bed  -TW      Recorded by [TW] Harry Cuenca PTA 02/19/20 1514      Row Name 02/19/20 1415             Positioning and Restraints    Pre-Treatment Position  in bed  -TW      Post Treatment Position  bed  -TW      In Bed  supine;call light within reach;encouraged to call for assist;exit alarm on;with family/caregiver  -TW      Recorded by [TW] Harry Cuenca PTA 02/19/20 1514      Row Name 02/19/20 1415             Pain Scale: Numbers Pre/Post-Treatment    Pain Scale: Numbers, Pretreatment  5/10  -TW      Pain Scale: Numbers, Post-Treatment  6/10  -TW      Pain Location  abdomen  -TW      Recorded by [TW] Harry Cuenca PTA 02/19/20 1514      Row Name                Wound 02/17/20 1744 abdomen Incision    Wound - Properties Group Date first assessed: 02/17/20 [TITO] Time first assessed: 1744 [TITO] Present on Hospital Admission: N [TITO] Location: abdomen [TITO] Primary Wound Type: Incision [TITO] Recorded by:  [TITO] Syeda Campos RN 02/17/20 1744    Row Name 02/19/20 1415             Outcome Summary/Treatment Plan (PT)    Daily Summary of Progress (PT)  progress toward functional goals is good  -TW      Plan for Continued Treatment (PT)  Cont  -TW      Anticipated Discharge Disposition (PT)  anticipate therapy at next level of care  -TW      Recorded by [TW] Harry Cuenca PTA 02/19/20 1514         User Key  (r) = Recorded By, (t) = Taken By, (c) = Cosigned By    Initials Name Effective Dates Discipline    TW Harry Cuenca, TAL 03/07/18 -  PT    TITO Syeda Campos, RN 05/09/18 -  Nurse          Wound 02/17/20 1744 abdomen Incision (Active)   Closure Adhesive closure strips;Liquid skin adhesive;Approximated 2/18/2020  8:39 PM   Drainage Amount none 2/18/2020  8:39 PM       Rehab Goal Summary     Row Name 02/19/20 1415 02/19/20 0841          Bed Mobility Goal 1 (PT)    Activity/Assistive Device (Bed Mobility Goal 1, PT)  bed mobility activities, all  -TW  --     [place] Level/Cues Needed (Bed Mobility Goal 1, PT)  conditional independence  -TW  --     Time Frame (Bed Mobility Goal 1, PT)  long term goal (LTG);10 days  -TW  --     Progress/Outcomes (Bed Mobility Goal 1, PT)  goal not met;continuing progress toward goal  -TW  --        Transfer Goal 1 (PT)    Activity/Assistive Device (Transfer Goal 1, PT)  bed-to-chair/chair-to-bed;toilet  -TW  --     [place] Level/Cues Needed (Transfer Goal 1, PT)  conditional independence  -TW  --     Time Frame (Transfer Goal 1, PT)  2 weeks  -TW  --     Progress/Outcome (Transfer Goal 1, PT)  good progress toward goal;goal not met  -TW  --        Gait Training Goal 1 (PT)    Activity/Assistive Device (Gait Training Goal 1, PT)  gait (walking locomotion);assistive device use;increase endurance/gait distance;walker, rolling  -TW  --     [place] Level (Gait Training Goal 1, PT)  conditional independence  -TW  --     Distance (Gait Goal 1, PT)  300 ft or more   -TW  --     Time Frame (Gait Training Goal 1, PT)  long term goal (LTG);2 weeks  -TW  --     Progress/Outcome (Gait Training Goal 1, PT)  continuing progress toward goal;goal not met  -TW  --        Stairs Goal 1 (PT)    Activity/Assistive Device (Stairs Goal 1, PT)  ascending stairs;descending stairs;assistive device use  -TW  --     [place] Level/Cues Needed (Stairs Goal 1, PT)  conditional  independence  -TW  --     Number of Stairs (Stairs Goal 1, PT)  1 or more  -TW  --     Time Frame (Stairs Goal 1, PT)  long term goal (LTG);by discharge  -TW  --     Barriers (Stairs Goal 1, PT)  endurance;   -TW  --     Progress/Outcome (Stairs Goal 1, PT)  goal not met;continuing progress toward goal  -TW  --        Occupational Therapy Goals    Transfer Goal Selection (OT)  --  transfer, OT goal 1  -     Bathing Goal Selection (OT)  --  bathing, OT goal 1  -     Dressing Goal Selection (OT)  --  dressing, OT goal 1  -     Toileting Goal Selection (OT)  --  toileting, OT goal 1  -     Endurance Goal Selection (OT)  --  endurance, OT goal 1  -     Functional Mobility Goal Selection (OT)  --  functional mobility, OT goal 1  -        Transfer Goal 1 (OT)    Activity/Assistive Device (Transfer Goal 1, OT)  --  toilet  -     [place] Level/Cues Needed (Transfer Goal 1, OT)  --  standby assist  -     Time Frame (Transfer Goal 1, OT)  --  long term goal (LTG);by discharge  -     Progress/Outcome (Transfer Goal 1, OT)  --  goal not met  -        Bathing Goal 1 (OT)    Activity/Assistive Device (Bathing Goal 1, OT)  --  bathing skills, all  -     [place] Level/Cues Needed (Bathing Goal 1, OT)  --  set-up required;verbal cues required;standby assist  -     Time Frame (Bathing Goal 1, OT)  --  long term goal (LTG);by discharge  -     Progress/Outcomes (Bathing Goal 1, OT)  --  goal not met  -        Dressing Goal 1 (OT)    Activity/Assistive Device (Dressing Goal 1, OT)  --  dressing skills, all  -     [place]/Cues Needed (Dressing Goal 1, OT)  --  set-up required;verbal cues required;standby assist  -     Time Frame (Dressing Goal 1, OT)  --  long term goal (LTG);by discharge  -     Progress/Outcome (Dressing Goal 1, OT)  --  goal not met  -        Toileting Goal 1 (OT)    Activity/Device (Toileting Goal 1, OT)  --  toileting skills, all  -     [place] Level/Cues  Needed (Toileting Goal 1, OT)  --  standby assist  -     Time Frame (Toileting Goal 1, OT)  --  long term goal (LTG);by discharge  -     Progress/Outcome (Toileting Goal 1, OT)  --  goal not met  -         Endurance Goal 1 (OT)    Activity Level (Endurance Goal 1, OT)  --  endurance 2 fair + 25 min functional task 3 or less rest breaks   -     Time Frame (Endurance Goal 1, OT)  --  long term goal (LTG);by discharge  -     Progress/Outcome (Endurance Goal 1, OT)  --  goal not met  -        Functional Mobility Goal 1 (OT)    Activity/Assistive Device (Functional Mobility Goal 1, OT)  --  walker, rolling  -     [place] Level/Cues Needed (Functional Mobility Goal 1, OT)  --  standby assist  -     Distance Goal 1 (Functional Mobility, OT)  --  for ADL tasks no LOB and good safety  -     Time Frame (Functional Mobility Goal 1, OT)  --  long term goal (LTG);by discharge  -     Progress/Outcome (Functional Mobility Goal 1, OT)  --  goal not met  -        Patient Education Goal (OT)    Activity (Patient Education Goal, OT)  --  Pt will communicate good home safety awareness.   -     [place]/Cues/Accuracy (Memory Goal 2, OT)  --  verbalizes understanding  -     Time Frame (Patient Education Goal, OT)  --  long term goal (LTG);by discharge  -     Progress/Outcome (Patient Education Goal, OT)  --  goal not met  -       User Key  (r) = Recorded By, (t) = Taken By, (c) = Cosigned By    Initials Name Provider Type Discipline     Milly Redding, OTR/L Occupational Therapist OT    TW Harry Cuenca, PTA Physical Therapy Assistant PT              PT Recommendation and Plan  Anticipated Discharge Disposition (PT): anticipate therapy at next level of care  Therapy Frequency (PT Clinical Impression): daily  Outcome Summary/Treatment Plan (PT)  Daily Summary of Progress (PT): progress toward functional goals is good  Plan for Continued Treatment (PT): Cont  Anticipated Discharge Disposition  (PT): anticipate therapy at next level of care  Plan of Care Reviewed With: patient, daughter  Progress: no change  Outcome Summary: Pt states she has been up most of the day and declined OOB activity but agreed to sitting EOB. Pt t/f sup to sit with min of 1 and remained sitting EOB for 8-9 minutes before c/o dizziness and requesting to return to supine. Pt would cont to benefit from therapy upon DC.  Outcome Measures     Row Name 02/19/20 1415 02/19/20 0841          How much help from another person do you currently need...    Turning from your back to your side while in flat bed without using bedrails?  3  -TW  --     Moving from lying on back to sitting on the side of a flat bed without bedrails?  3  -TW  --     Moving to and from a bed to a chair (including a wheelchair)?  3  -TW  --     Standing up from a chair using your arms (e.g., wheelchair, bedside chair)?  3  -TW  --     Climbing 3-5 steps with a railing?  2  -TW  --     To walk in hospital room?  3  -TW  --     AM-PAC 6 Clicks Score (PT)  17  -TW  --        How much help from another is currently needed...    Putting on and taking off regular lower body clothing?  --  2  -BH     Bathing (including washing, rinsing, and drying)  --  2  -BH     Toileting (which includes using toilet bed pan or urinal)  --  2  -BH     Putting on and taking off regular upper body clothing  --  3  -BH     Taking care of personal grooming (such as brushing teeth)  --  3  -BH     Eating meals  --  3  -BH     AM-PAC 6 Clicks Score (OT)  --  15  -        Functional Assessment    Outcome Measure Options  --  AM-PAC 6 Clicks Daily Activity (OT)  -       User Key  (r) = Recorded By, (t) = Taken By, (c) = Cosigned By    Initials Name Provider Type     Milly Redding, OTR/L Occupational Therapist    TW Harry Cuenca, PTA Physical Therapy Assistant         Time Calculation:   PT Charges     Row Name 02/19/20 2796             Time Calculation    Start Time  1415  -TW       Stop Time  1444  -TW      Time Calculation (min)  29 min  -TW      PT Received On  02/19/20  -TW      PT Goal Re-Cert Due Date  02/27/20  -TW         Time Calculation- PT    Total Timed Code Minutes- PT  29 minute(s)  -TW        User Key  (r) = Recorded By, (t) = Taken By, (c) = Cosigned By    Initials Name Provider Type     Harry Cuenca PTA Physical Therapy Assistant        Therapy Charges for Today     Code Description Service Date Service Provider Modifiers Qty    [id number]  PT THERAPEUTIC ACT EA 15 MIN 2/19/2020 Harry Cuenca PTA GP 2          PT G-Codes  Outcome Measure Options: AM-PAC 6 Clicks Daily Activity (OT)  AM-PAC 6 Clicks Score (PT): 17  AM-PAC 6 Clicks Score (OT): 15    Harry Cuenca PTA  2/19/2020

## 2020-02-19 NOTE — PLAN OF CARE
Problem: Patient Care Overview  Goal: Plan of Care Review  Outcome: Ongoing (interventions implemented as appropriate)  Flowsheets  Taken 2/19/2020 0447  Progress: improving  Outcome Summary: VSS; pain being controlled with pain meds; resting well inbetween care; will continue to monitor  Taken 2/18/2020 2039  Plan of Care Reviewed With: patient;daughter

## 2020-02-19 NOTE — PLAN OF CARE
Problem: Patient Care Overview  Goal: Plan of Care Review  Outcome: Ongoing (interventions implemented as appropriate)  Flowsheets (Taken 2/19/2020 1415)  Progress: no change  Plan of Care Reviewed With: patient; daughter  Outcome Summary: Pt states she has been up most of the day and declined OOB activity but agreed to sitting EOB. Pt t/f sup to sit with min of 1 and remained sitting EOB for 8-9 minutes before c/o dizziness and requesting to return to supine. Pt would cont to benefit from therapy upon DC.

## 2020-02-19 NOTE — THERAPY EVALUATION
Acute Care - Occupational Therapy Initial Evaluation  [facility]     Patient Name: Sultana Lin  : 1940  MRN: [id number]  Today's Date: 2020  Onset of Illness/Injury or Date of Surgery: 20  Date of Referral to OT: 20  Referring Physician: Dr. Richardson    Admit Date: 2020       ICD-10-CM ICD-9-CM   1. Acute cholecystitis K81.0 575.0   2. Cholecystitis K81.9 575.10   3. Duodenitis K29.80 535.60   4. Impaired functional mobility, balance, gait, and endurance Z74.09 V49.89   5. Impaired mobility and ADLs Z74.09 799.89     Patient Active Problem List   Diagnosis   • Hypothyroidism   • Hypertension   • Paroxysmal atrial fibrillation (CMS/HCC)   • Paroxysmal atrial fibrillation with rapid ventricular response (CMS/HCC)     Past Medical History:   Diagnosis Date   • Arthritis    • Atrial fibrillation (CMS/HCC)    • Atrial fibrillation (CMS/HCC)    • Atrial fibrillation with rapid ventricular response (CMS/HCC) 2017   • Breast cancer (CMS/HCC)     right   • Cancer (CMS/HCC)    • Disease of thyroid gland    • Hypertension      Past Surgical History:   Procedure Laterality Date   • ABLATION OF DYSRHYTHMIC FOCUS     • BREAST SURGERY     • CARDIAC ELECTROPHYSIOLOGY PROCEDURE N/A 2017    Procedure: EP/Ablation;  Surgeon: Blake Mcallister MD;  Location: [facility] INVASIVE LOCATION;  Service:    • CARDIOVERSION     • CATARACT EXTRACTION W/ INTRAOCULAR LENS IMPLANT Right 2019    Procedure: REMOVE CATARACT AND IMPLANT INTRAOCULAR LENS;  Surgeon: Lenin Dennison MD;  Location: [facility] OR;  Service: Ophthalmology   • CATARACT EXTRACTION W/ INTRAOCULAR LENS IMPLANT Left 2019    Procedure: REMOVE CATARACT AND IMPLANT INTRAOCULAR LENS;  Surgeon: Lenin Dennison MD;  Location: [facility] OR;  Service: Ophthalmology   • INSERT / REPLACE / REMOVE PACEMAKER     • PACEMAKER IMPLANTATION     • SKIN BIOPSY     • SKIN TAG REMOVAL            OT ASSESSMENT FLOWSHEET (last 12 hours)       Occupational Therapy Evaluation     Row Name 02/19/20 0841                   OT Evaluation Time/Intention    Subjective Information  complains of;pain;fatigue;weakness  -        Document Type  evaluation  -        Mode of Treatment  individual therapy;occupational therapy  -        Total Evaluation Minutes, Occupational Therapy  71  -        Patient Effort  good  -        Symptoms Noted During/After Treatment  fatigue  -        Comment  RN informed of session, including fatigue, pt having a bowel movement, recommendations for further therapy,  therapy   -           General Information    Patient Profile Reviewed?  yes  -        Onset of Illness/Injury or Date of Surgery  02/14/20  -        Referring Physician  Dr. Richardson  -        Patient Observations  alert;cooperative;agree to therapy  -        Patient/Family Observations  no family present   -        General Observations of Patient  pt sitting up on the EOB eating breakfast on room air, bed alarm, tele, midline in RUE, drain   -        Prior Level of Function  independent:;all household mobility;community mobility;transfer;bed mobility;ADL's;home management;cooking;cleaning;driving;shopping;using stairs  -        Equipment Currently Used at Home  shower chair has a    -        Existing Precautions/Restrictions  fall  abd sx/lap caio  -        Limitations/Impairments  safety/cognitive;hearing  -        Risks Reviewed  patient:;LOB;dizziness;increased discomfort;change in vital signs  -        Benefits Reviewed  patient:;improve function;increase independence;increase strength;increase balance;increase knowledge  [facility]        Barriers to Rehab  medically complex  -           Relationship/Environment    Lives With  spouse  -           Resource/Environmental Concerns    Current Living Arrangements  home/apartment/condo  -           Home Main Entrance    Number of Stairs, Main Entrance  one  -        Stair Railings,  Main Entrance  railings on both sides of stairs  -           Cognitive Assessment/Interventions    Additional Documentation  Cognitive Assessment/Intervention (Group)  -           Cognitive Assessment/Intervention- PT/OT    Affect/Mental Status (Cognitive)  WFL  -        Orientation Status (Cognition)  oriented x 4  -        Follows Commands (Cognition)  follows one step commands;over 90% accuracy;75-90% accuracy;verbal cues/prompting required;repetition of directions required  -        Safety Deficit (Cognitive)  moderate deficit  -        Personal Safety Interventions  fall prevention program maintained;gait belt;nonskid shoes/slippers when out of bed;supervised activity  -           Safety Issues, Functional Mobility    Safety Issues Affecting Function (Mobility)  ability to follow commands;awareness of need for assistance;impulsivity;insight into deficits/self awareness;judgment;positioning of assistive device;problem solving;safety precaution awareness;safety precautions follow-through/compliance;sequencing abilities  -        Impairments Affecting Function (Mobility)  balance;coordination;endurance/activity tolerance;motor control;motor planning;pain;postural/trunk control;range of motion (ROM);strength  -           Bed Mobility Assessment/Treatment    Comment (Bed Mobility)  pt defer this date was sitting on EOB and wanted to stay on EOB   -           Functional Mobility    Functional Mobility- Ind. Level  contact guard assist;minimum assist (75% patient effort);nonverbal cues required (demo/gesture);verbal cues required  -        Functional Mobility- Device  rolling walker  -        Functional Mobility- Comment  pt unsteady fatigued towards end required rest breaks, assist with IV pole and RW, trouble in tight places. educated to use her RW for now until stronger pt voiced understanding.   -           Transfer Assessment/Treatment    Transfer Assessment/Treatment  sit-stand  transfer;stand-sit transfer;toilet transfer  -        Comment (Transfers)  cues and assist for safe hand placement   -           Sit-Stand Transfer    Sit-Stand Daniels (Transfers)  contact guard;nonverbal cues (demo/gesture);verbal cues  -        Assistive Device (Sit-Stand Transfers)  walker, front-wheeled  -           Stand-Sit Transfer    Stand-Sit Daniels (Transfers)  contact guard;nonverbal cues (demo/gesture);verbal cues  -        Assistive Device (Stand-Sit Transfers)  walker, front-wheeled  -           Toilet Transfer    Type (Toilet Transfer)  stand-sit;sit-stand  -        Daniels Level (Toilet Transfer)  moderate assist (50% patient effort);minimum assist (75% patient effort);nonverbal cues (demo/gesture);verbal cues  -        Assistive Device (Toilet Transfer)  grab bars/safety frame;walker, front-wheeled  -           ADL Assessment/Intervention    BADL Assessment/Intervention  feeding;upper body dressing;bathing;toileting  -           Bathing Assessment/Intervention    Comment (Bathing)  pt dependent with back, pt min assist with front; pt mod assist for pericare, pt only washed knees up. sitting on toilet averaged mod assist. ; pt educated to use her shower chair and not to shower without assist. Educated on safety with t/f and to use  and letty of home health therapy   -           Upper Body Dressing Assessment/Training    Upper Body Dressing Daniels Level  doff;don;minimum assist (75% patient effort) hospital gown   -           Self-Feeding Assessment/Training    Daniels Level (Feeding)  feeding skills;set up;verbal cues;supervision  -        Comment (Feeding)  extended time   -           Toileting Assessment/Training    Daniels Level (Toileting)  toileting skills;moderate assist (50% patient effort)  -           BADL Safety/Performance    Impairments, BADL Safety/Performance  balance;endurance/activity tolerance;coordination;motor  control;motor planning;pain;range of motion;strength;trunk/postural control  -           General ROM    GENERAL ROM COMMENTS  BUE grossly WFL but decreased shoulder range worse on LUE verse RUE grossly 80 degrees   -           MMT (Manual Muscle Testing)    General MMT Comments  BUE  grossly 4-/5; BUE grossly 4-/5   -           Static Sitting Balance    Level of [place] (Unsupported Sitting, Static Balance)  independent  -           Dynamic Sitting Balance    Level of [place], Reaches Outside Midline (Sitting, Dynamic Balance)  standby assist  -           Static Standing Balance    Level of [place] (Supported Standing, Static Balance)  contact guard assist  -           Dynamic Standing Balance    Level of [place], Reaches Outside Midline (Standing, Dynamic Balance)  minimal assist, 75% patient effort  -           Sensory Assessment/Intervention    Additional Documentation  Hearing Assessment (Group);Vision Assessment/Intervention (Group)  -           Light Touch Sensation Assessment    Left Upper Extremity: Light Touch Sensation Assessment  intact  -        Right Upper Extremity: Light Touch Sensation Assessment  intact  -           Hearing Assessment    Hearing Status  hearing impairment, bilaterally hearing aides not present   -           Vision Assessment/Intervention    Visual Impairment/Limitations  corrective lenses for reading  -           Positioning and Restraints    Pre-Treatment Position  in bed  -        Post Treatment Position  bed  -        In Bed  sitting EOB;call light within reach;encouraged to call for assist;exit alarm on;with [facility]  -           Pain Assessment    Additional Documentation  Pain Scale: Numbers Pre/Post-Treatment (Group)  -           Pain Scale: Numbers Pre/Post-Treatment    Pain Scale: Numbers, Pretreatment  8/10  -        Pain Scale: Numbers, Post-Treatment  8/10  [facility]        Pain Location  abdomen  -        Pre/Post  Treatment Pain Comment  RN aware  -        Pain Intervention(s)  Repositioned;Medication (See MAR);Ambulation/increased activity;Distraction;Emotional support;Prayers;Rest  -           Wound 02/17/20 1744 abdomen Incision    Wound - Properties Group Date first assessed: 02/17/20  -TITO Time first assessed: 1744  -TITO Present on Hospital Admission: N  -TITO Location: abdomen  -TITO Primary Wound Type: Incision  -TITO       Plan of Care Review    Plan of Care Reviewed With  patient  -           Clinical Impression (OT)    Date of Referral to OT  02/17/20  -        OT Diagnosis  Impaired mobility and ADL   -        Prognosis (OT Eval)  fair  -        Functional Level at Time of Evaluation (OT Eval)  pt decreased strength, endurance, safety and independence with ADL   -        Patient/Family Goals Statement (OT Eval)  to go home  -        Criteria for Skilled Therapeutic Interventions Met (OT Eval)  yes;treatment indicated  -        Rehab Potential (OT Eval)  fair, will monitor progress closely  -        Therapy Frequency (OT Eval)  other (see comments) 5-7 days a week   -        Predicted Duration of Therapy Intervention (Therapy Eval)  until d/c   -        Care Plan Review (OT)  evaluation/treatment results reviewed;care plan/treatment goals reviewed;risks/benefits reviewed;current/potential barriers reviewed;patient/other agree to care plan  -        Anticipated Discharge Disposition (OT)  home with 24/7 care;home with home health  -           Vital Signs    Pre Systolic BP Rehab  166  -BH        Pre Treatment Diastolic BP  82  -BH        Post Systolic BP Rehab  160  -BH        Post Treatment Diastolic BP  82  -BH        Pretreatment Heart Rate (beats/min)  63  -BH        Posttreatment Heart Rate (beats/min)  66  -BH        Pre SpO2 (%)  93  -BH        O2 Delivery Pre Treatment  room air  -        Post SpO2 (%)  95  -BH        O2 Delivery Post Treatment  room air  -BH        Pre Patient Position   Sitting  -        Post Patient Position  Sitting  -           Planned OT Interventions    Planned Therapy Interventions (OT Eval)  activity tolerance training;adaptive equipment training;BADL retraining;cognitive/visual perception retraining;functional balance retraining;IADL retraining;neuromuscular control/coordination retraining;occupation/activity based interventions;passive ROM/stretching;patient/caregiver education/training;ROM/therapeutic exercise;strengthening exercise;transfer/mobility retraining  -           OT Goals    Transfer Goal Selection (OT)  transfer, OT goal 1  -        Bathing Goal Selection (OT)  bathing, OT goal 1  -        Dressing Goal Selection (OT)  dressing, OT goal 1  -        Toileting Goal Selection (OT)  toileting, OT goal 1  -        Endurance Goal Selection (OT)  endurance, OT goal 1  -        Functional Mobility Goal Selection (OT)  functional mobility, OT goal 1  -        Additional Documentation  Endurance Goal Selection (OT) (Row);Functional Mobility Selection (OT) (Row)  -           Transfer Goal 1 (OT)    Activity/Assistive Device (Transfer Goal 1, OT)  toilet  -        [place] Level/Cues Needed (Transfer Goal 1, OT)  standby assist  -        Time Frame (Transfer Goal 1, OT)  long term goal (LTG);by discharge  -        Progress/Outcome (Transfer Goal 1, OT)  goal not met  -           Bathing Goal 1 (OT)    Activity/Assistive Device (Bathing Goal 1, OT)  bathing skills, all  -        [place] Level/Cues Needed (Bathing Goal 1, OT)  set-up required;verbal cues required;standby assist  -        Time Frame (Bathing Goal 1, OT)  long term goal (LTG);by discharge  -        Progress/Outcomes (Bathing Goal 1, OT)  goal not met  -           Dressing Goal 1 (OT)    Activity/Assistive Device (Dressing Goal 1, OT)  dressing skills, all  -        [place]/Cues Needed (Dressing Goal 1, OT)  set-up required;verbal cues required;standby assist   -        Time Frame (Dressing Goal 1, OT)  long term goal (LTG);by discharge  -        Progress/Outcome (Dressing Goal 1, OT)  goal not met  -           Toileting Goal 1 (OT)    Activity/Device (Toileting Goal 1, OT)  toileting skills, all  -        [place] Level/Cues Needed (Toileting Goal 1, OT)  standby assist  -        Time Frame (Toileting Goal 1, OT)  long term goal (LTG);by discharge  -        Progress/Outcome (Toileting Goal 1, OT)  goal not met  -            Endurance Goal 1 (OT)    Activity Level (Endurance Goal 1, OT)  endurance 2 fair + 25 min functional task 3 or less rest breaks   -        Time Frame (Endurance Goal 1, OT)  long term goal (LTG);by discharge  -        Progress/Outcome (Endurance Goal 1, OT)  goal not met  -           Functional Mobility Goal 1 (OT)    Activity/Assistive Device (Functional Mobility Goal 1, OT)  walker, rolling  -        [place] Level/Cues Needed (Functional Mobility Goal 1, OT)  standby assist  -        Distance Goal 1 (Functional Mobility, OT)  for ADL tasks no LOB and good safety  -        Time Frame (Functional Mobility Goal 1, OT)  long term goal (LTG);by discharge  -        Progress/Outcome (Functional Mobility Goal 1, OT)  goal not met  -           Patient Education Goal (OT)    Activity (Patient Education Goal, OT)  Pt will communicate good home safety awareness.   -        [place]/Cues/Accuracy (Memory Goal 2, OT)  verbalizes understanding  -        Time Frame (Patient Education Goal, OT)  long term goal (LTG);by discharge  -        Progress/Outcome (Patient Education Goal, OT)  goal not met  -           Living Environment    Home Accessibility  stairs to enter home;tub/shower is not walk in  -          User Key  (r) = Recorded By, (t) = Taken By, (c) = Cosigned By    Initials Name Effective Dates     Milly Redding, OTR/L 06/08/18 -     Syeda Casillas, RN 05/09/18 -                OT Recommendation  and Plan  Outcome Summary/Treatment Plan (OT)  Anticipated Discharge Disposition (OT): home with 24/7 care, home with home health  Planned Therapy Interventions (OT Eval): activity tolerance training, adaptive equipment training, BADL retraining, cognitive/visual perception retraining, functional balance retraining, IADL retraining, neuromuscular control/coordination retraining, occupation/activity based interventions, passive ROM/stretching, patient/caregiver education/training, ROM/therapeutic exercise, strengthening exercise, transfer/mobility retraining  Therapy Frequency (OT Eval): other (see comments)(5-7 days a week )  Plan of Care Review  Plan of Care Reviewed With: patient  Plan of Care Reviewed With: patient  Outcome Summary: OT eval completed this date. Pt engaged well this date and fatigued quickly. Pt was CGA for sit to stand on the EOB and min to mod assist for toilet t/f. Pt mod assist for toilet t/f. Pt min assist for mobiltiy with RW. Pt was average for mod assist for sponge bath from knees up. Pt could benefit from further skilled OT to reach PLOF/max independence with ADL. Pt could benefit from further inpatient therapy before d/c home with 24/7 assist and further  OT and PT.    Outcome Measures     Row Name 02/19/20 0841             How much help from another is currently needed...    Putting on and taking off regular lower body clothing?  2  -BH      Bathing (including washing, rinsing, and drying)  2  -BH      Toileting (which includes using toilet bed pan or urinal)  2  -BH      Putting on and taking off regular upper body clothing  3  -BH      Taking care of personal grooming (such as brushing teeth)  3  -BH      Eating meals  3  -BH      AM-PAC 6 Clicks Score (OT)  15  -BH         Functional Assessment    Outcome Measure Options  AM-PAC 6 Clicks Daily Activity (OT)  -        User Key  (r) = Recorded By, (t) = Taken By, (c) = Cosigned By    Initials Name Provider Type    Milly Odonnell  LIZA OTR/L Occupational Therapist          Time Calculation:   Time Calculation- OT     Row Name 02/19/20 1008             Time Calculation-     OT Start Time  0841  -      OT Stop Time  0952  -      OT Time Calculation (min)  71 min  -      Total Timed Code Minutes- OT  11 minute(s)  -      OT Received On  02/19/20  -      OT Goal Re-Cert Due Date  03/03/20  -        User Key  (r) = Recorded By, (t) = Taken By, (c) = Cosigned By    Initials Name Provider Type     Milly Redding, OTR/L Occupational Therapist        Therapy Charges for Today     Code Description Service Date Service Provider Modifiers Qty    72805608740 HC OT SELF CARE/MGMT/TRAIN EA 15 MIN 2/19/2020 Milly Redding OTR/L GO 1    08343139715 HC OT EVAL MOD COMPLEXITY 4 2/19/2020 Milly Redding OTR/L GO 1               LEVI Brambila/ANTHONY  2/19/2020

## 2020-02-20 ENCOUNTER — APPOINTMENT (OUTPATIENT)
Dept: CT IMAGING | Facility: HOSPITAL | Age: 80
End: 2020-02-20

## 2020-02-20 PROBLEM — K81.0 ACUTE CHOLECYSTITIS: Status: ACTIVE | Noted: 2020-02-20

## 2020-02-20 LAB — GLUCOSE BLDC GLUCOMTR-MCNC: 105 MG/DL (ref 70–130)

## 2020-02-20 PROCEDURE — 97164 PT RE-EVAL EST PLAN CARE: CPT

## 2020-02-20 PROCEDURE — 94799 UNLISTED PULMONARY SVC/PX: CPT

## 2020-02-20 PROCEDURE — 70450 CT HEAD/BRAIN W/O DYE: CPT

## 2020-02-20 PROCEDURE — 25010000002 ONDANSETRON PER 1 MG: Performed by: SURGERY

## 2020-02-20 PROCEDURE — 82962 GLUCOSE BLOOD TEST: CPT

## 2020-02-20 PROCEDURE — 97116 GAIT TRAINING THERAPY: CPT

## 2020-02-20 PROCEDURE — 25010000002 ENOXAPARIN PER 10 MG: Performed by: SURGERY

## 2020-02-20 PROCEDURE — 25010000002 CEFTRIAXONE PER 250 MG: Performed by: SURGERY

## 2020-02-20 PROCEDURE — 97530 THERAPEUTIC ACTIVITIES: CPT

## 2020-02-20 RX ADMIN — AMIODARONE HYDROCHLORIDE 200 MG: 200 TABLET ORAL at 20:22

## 2020-02-20 RX ADMIN — SODIUM CHLORIDE, PRESERVATIVE FREE 10 ML: 5 INJECTION INTRAVENOUS at 10:29

## 2020-02-20 RX ADMIN — SODIUM CHLORIDE, POTASSIUM CHLORIDE, SODIUM LACTATE AND CALCIUM CHLORIDE 100 ML/HR: 600; 310; 30; 20 INJECTION, SOLUTION INTRAVENOUS at 20:28

## 2020-02-20 RX ADMIN — HYDROCODONE BITARTRATE AND ACETAMINOPHEN 1 TABLET: 10; 325 TABLET ORAL at 12:20

## 2020-02-20 RX ADMIN — DILTIAZEM HYDROCHLORIDE 180 MG: 180 CAPSULE, COATED, EXTENDED RELEASE ORAL at 10:28

## 2020-02-20 RX ADMIN — HYDROCODONE BITARTRATE AND ACETAMINOPHEN 1 TABLET: 10; 325 TABLET ORAL at 01:36

## 2020-02-20 RX ADMIN — LEVOTHYROXINE SODIUM 50 MCG: 50 TABLET ORAL at 10:27

## 2020-02-20 RX ADMIN — IPRATROPIUM BROMIDE AND ALBUTEROL SULFATE 3 ML: 2.5; .5 SOLUTION RESPIRATORY (INHALATION) at 12:05

## 2020-02-20 RX ADMIN — CARVEDILOL 18.75 MG: 12.5 TABLET, FILM COATED ORAL at 10:28

## 2020-02-20 RX ADMIN — ENOXAPARIN SODIUM 40 MG: 40 INJECTION SUBCUTANEOUS at 10:28

## 2020-02-20 RX ADMIN — CEFTRIAXONE SODIUM 1 G: 1 INJECTION, POWDER, FOR SOLUTION INTRAMUSCULAR; INTRAVENOUS at 11:11

## 2020-02-20 RX ADMIN — METRONIDAZOLE 500 MG: 500 INJECTION, SOLUTION INTRAVENOUS at 15:05

## 2020-02-20 RX ADMIN — METRONIDAZOLE 500 MG: 500 INJECTION, SOLUTION INTRAVENOUS at 05:10

## 2020-02-20 RX ADMIN — FUROSEMIDE 40 MG: 40 TABLET ORAL at 13:17

## 2020-02-20 RX ADMIN — PANTOPRAZOLE SODIUM 40 MG: 40 TABLET, DELAYED RELEASE ORAL at 06:34

## 2020-02-20 RX ADMIN — SODIUM CHLORIDE, POTASSIUM CHLORIDE, SODIUM LACTATE AND CALCIUM CHLORIDE 100 ML/HR: 600; 310; 30; 20 INJECTION, SOLUTION INTRAVENOUS at 11:10

## 2020-02-20 RX ADMIN — ONDANSETRON HYDROCHLORIDE 4 MG: 2 INJECTION, SOLUTION INTRAMUSCULAR; INTRAVENOUS at 06:46

## 2020-02-20 RX ADMIN — HYDROCODONE BITARTRATE AND ACETAMINOPHEN 1 TABLET: 10; 325 TABLET ORAL at 06:34

## 2020-02-20 RX ADMIN — SODIUM CHLORIDE, PRESERVATIVE FREE 10 ML: 5 INJECTION INTRAVENOUS at 20:22

## 2020-02-20 RX ADMIN — METRONIDAZOLE 500 MG: 500 INJECTION, SOLUTION INTRAVENOUS at 22:11

## 2020-02-20 RX ADMIN — HYDROCODONE BITARTRATE AND ACETAMINOPHEN 1 TABLET: 10; 325 TABLET ORAL at 18:09

## 2020-02-20 RX ADMIN — AMIODARONE HYDROCHLORIDE 200 MG: 200 TABLET ORAL at 10:28

## 2020-02-20 RX ADMIN — MIRTAZAPINE 15 MG: 15 TABLET, FILM COATED ORAL at 20:22

## 2020-02-20 RX ADMIN — CARVEDILOL 18.75 MG: 12.5 TABLET, FILM COATED ORAL at 17:49

## 2020-02-20 RX ADMIN — SODIUM CHLORIDE, PRESERVATIVE FREE 10 ML: 5 INJECTION INTRAVENOUS at 20:23

## 2020-02-20 NOTE — PLAN OF CARE
Problem: Patient Care Overview  Goal: Plan of Care Review  Outcome: Ongoing (interventions implemented as appropriate)  Flowsheets (Taken 2/20/2020 1706)  Plan of Care Reviewed With: patient  Outcome Summary: PT seen BID today for recheck after code stroke and for mobility in pm. This am she had elevated b/p at 200 systolic so bed recheck only which she romain well. In pm she was up to chair and went to toilet w/ PT, demonstrated increased indep w/ toiletting, and then walked in room for distance.She shows no new weaknss and romain upright w/out c/o and w/ VSS. Systolic staying in 170's. Pt is not indep in gait or mobilty but can perform w/ min assist of 1 for safety.She needs capable caregiver to assist w/ mobilty and ADLs for safety. She can benefit from rehab to home therapy program till more indep.

## 2020-02-20 NOTE — SIGNIFICANT NOTE
02/20/20 1442   Rehab Time/Intention   Evaluation Not Performed other (see comments)   Rehab Treatment   Discipline occupational therapy assistant   Pt had code stroke early this am and tf off floor

## 2020-02-20 NOTE — CONSULTS
Stroke Consult Note    Patient Name: Sultana Lin   MRN: [id number]  Age: 79 y.o.  Sex: female  : 1940    Primary Care Physician: Sandip Lozano MD  Referring Physician:  Arturo Trevino MD    TIME STROKE TEAM CALLED: 9:36 am EST     TIME PATIENT SEEN: 9:43am EST    Handedness: right  Race: white     Chief Complaint/Reason for Consultation: left side of mouth drawn up    HPI:   She had acute cholecystitis and had an cholecystectomy 2 days ago.    Daughter and nurses noted that the left side of her mouth was drawn upwards and stuck, so code stroke was called.  That has resolved by now.    She notes weakness everywhere but not focally.  She denies numbness.      Last Known Normal Date/Time: back to baseline     Review of Systems     Temp:  [96.9 °F (36.1 °C)-97.6 °F (36.4 °C)] 97.6 °F (36.4 °C)  Heart Rate:  [] 72  Resp:  [16-20] 16  BP: (140-184)/() 178/80    Neurological Exam  Mental Status  Awake, alert and oriented to person, place and time. Speech is normal. Language is fluent with no aphasia. Attention and concentration are normal.    Cranial Nerves  CN II: Visual fields full to confrontation.  CN III, IV, VI: Extraocular movements intact bilaterally. Normal lids and orbits bilaterally.  CN V:  Right: Facial sensation is normal.  Left: Facial sensation is normal on the left.  CN VII:  Right: There is no facial weakness.  Left: There is no facial weakness.  CN XI:  Right: Sternocleidomastoid strength is normal.  Left: Sternocleidomastoid strength is normal.  CN XII: Tongue midline without atrophy or fasciculations.    Motor  Normal muscle bulk throughout.  No pronator drift bilaterally.  Normal fine finger movements bilaterally.  Leg raise 5/5 bilaterally without drift.  .    Sensory  Light touch is normal in upper and lower extremities.     Coordination  Right: Finger-to-nose normal. Rapid alternating movement normal. Heel-to-shin normal.  Left: Finger-to-nose normal. Rapid alternating  movement normal. Heel-to-shin normal.    Gait  Casual gait is normal including stance, stride, and arm swing.      Physical Exam   Eyes: Lids are normal.   Psychiatric: Her speech is normal.       Acute Stroke Data    Alteplase (tPA) Inclusion / Exclusion Criteria    Time: 8:41 AM  Person Administering Scale: Bhaskar Gauthier MD    Inclusion Criteria  []   18 years of age or greater   []   Onset of symptoms < 4.5 hours before beginning treatment (stroke onset = time patient was last seen well or without symptoms).   []   Diagnosis of acute ischemic stroke causing measurable disabling deficit (Complete Hemianopia, Any Aphasia, Visual or Sensory Extinction, Any weakness limiting sustained effort against gravity)   []   Any remaining deficit considered potentially disabling in view of patient and practitioner   Exclusion criteria (Do not proceed with Alteplase if any are checked under exclusion criteria)  []   Onset unknown or GREATER than 4.5 hours   []   ICH on CT/MRI   []   CT demonstrates hypodensity representing acute or subacute infarct   []   Significant head trauma or prior stroke in the previous 3 months   []   Symptoms suggestive of subarachnoid hemorrhage   []   History of un-ruptured intracranial aneurysm GREATER than 10 mm   []   Recent intracranial or intraspinal surgery within the last 3 months   []   Arterial puncture at a non-compressible site in the previous 7 days   []   Active internal bleeding   []   Acute bleeding tendency   []   Platelet count LESS than 100,000 for known hematological diseases such as leukemia, thrombocytopenia or chronic cirrhosis   []   Current use of anticoagulant with INR GREATER than 1.7 or PT GREATER than 15 seconds, aPTT GREATER than 40 seconds   []   Heparin received within 48 hours, resulting in abnormally elevated aPTT GREATER than upper limit of normal   []   Current use of direct thrombin inhibitors or direct factor Xa inhibitors in the past 48 hours   []   Elevated  blood pressure refractory to treatment (systolic GREATER than 185 mm/Hg or diastolic  GREATER than 110 mm/Hg   []   Suspected infective endocarditis and aortic arch dissection   []   Current use of therapeutic treatment dose of low-molecular-weight heparin (LMWH) within the previous 24 hours   []   Structural GI malignancy or bleed   Relative exclusion for all patients  []   Only minor non-disabling symptoms   []   Pregnancy   []   Seizure at onset with postictal residual neurological impairments   []   Major surgery or previous trauma within past 14 days   []   History of previous spontaneous ICH, intracranial neoplasm, or AV malformation   []   Postpartum (within previous 14 days)   []   Recent GI or urinary tract hemorrhage (within previous 21 days)   []   Recent acute MI (within previous 3 months)   []   History of un-ruptured intracranial aneurysm LESS than 10 mm   []   History of ruptured intracranial aneurysm   []   Blood glucose LESS than 50 mg/dL (2.7 mmol/L)   []   Dural puncture within the last 7 days   []   Known GREATER than 10 cerebral microbleeds   Additional exclusions for patients with symptoms onset between 3 and 4.5 hours.  []   Age > 80.   []   On any anticoagulants regardless of INR  >>> Warfarin (Coumadin), Heparin, Enoxaparin (Lovenox), fondaparinux (Arixtra), bivalirudin (Angiomax), Argatroban, dabigatran (Pradaxa), rivaroxaban (Xarelto), or apixaban (Eliquis)   []   Severe stroke (NIHSS > 25).   []   History of BOTH diabetes and previous ischemic stroke.   []   The risks and benefits have been discussed with the patient or family related to the administration of IV Alteplase for stroke symptoms.   []   I have discussed and reviewed the patient's case and imaging with the attending prior to IV Alteplase.    Time Alteplase administered       Past Medical History:   Diagnosis Date   • Arthritis    • Atrial fibrillation (CMS/HCC)    • Atrial fibrillation (CMS/HCC)    • Atrial fibrillation with  rapid ventricular response (CMS/HCC) 1/20/2017   • Breast cancer (CMS/HCC)     right   • Cancer (CMS/HCC)    • Disease of thyroid gland    • Hypertension      Past Surgical History:   Procedure Laterality Date   • ABLATION OF DYSRHYTHMIC FOCUS     • BREAST SURGERY     • CARDIAC ELECTROPHYSIOLOGY PROCEDURE N/A 4/6/2017    Procedure: EP/Ablation;  Surgeon: Blake Mcallister MD;  Location: [facility] CATH INVASIVE LOC[facility];  Service:    • CARDIOVERSION     • CATARACT EXTRACTION W/ INTRAOCULAR LENS IMPLANT Right 8/9/2019    Procedure: REMOVE CATARACT AND IMPLANT INTRAOCULAR LENS;  Surgeon: Lenin Dennison MD;  Location: [facility] OR;  Service: Ophthalmology   • CATARACT EXTRACTION W/ INTRAOCULAR LENS IMPLANT Left 8/16/2019    Procedure: REMOVE CATARACT AND IMPLANT INTRAOCULAR LENS;  Surgeon: Lenin Dennison MD;  Location: [facility] OR;  Service: Ophthalmology   • CHOLECYSTECTOMY WITH INTRAOPERATIVE CHOLANGIOGRAM N/A 2/17/2020    Procedure: LAPAROSCOPIC CHOLECYSTECTOMY WITH INTRAOPERATIVE CHOLANGIOGRAM;  Surgeon: Denilson Richardson MD;  Location: [facility] OR;  Service: General;  Laterality: N/A;   • INSERT / REPLACE / REMOVE PACEMAKER     • PACEMAKER IMPLANTATION     • SKIN BIOPSY     • SKIN TAG REMOVAL       Family History   Problem Relation Age of Onset   • Hypertension Daughter    • Hypertension Son    • No Known Problems Mother    • No Known Problems Father      Social History     Socioeconomic History   • Marital status:      Spouse name: Not on file   • Number of children: Not on file   • Years of education: Not on file   • Highest education level: Not on file   Tobacco Use   • Smoking status: Never Smoker   • Smokeless tobacco: Former User     Types: Snuff, Chew   Substance and Sexual Activity   • Alcohol use: No   • Drug use: No   • Sexual activity: Defer     Allergies   Allergen Reactions   • Penicillins Rash     Prior to Admission medications    Medication Sig Start Date End Date Taking? Authorizing  Provider   ALPRAZolam (XANAX) 0.25 MG tablet Take 0.25 mg by mouth 2 (Two) Times a Day As Needed for Anxiety.   Yes Bradly Montana MD   amiodarone (PACERONE) 200 MG tablet Take 200 mg by mouth 2 (Two) Times a Day.   Yes Bradly Montana MD   carvedilol (COREG) 12.5 MG tablet Take 1.5 tablets by mouth 2 (Two) Times a Day With Meals. 9/21/17  Yes Cheli Monsalve APRN   dilTIAZem CD (CARDIZEM CD) 180 MG 24 hr capsule Take 180 mg by mouth Daily.   Yes Bradly Montana MD   esomeprazole (nexIUM) 40 MG capsule Take 40 mg by mouth Every Morning Before Breakfast.   Yes Bradly Montana MD   furosemide (LASIX) 20 MG tablet Take 40 mg by mouth Daily.   Yes Bradly Montana MD   HYDROcodone-acetaminophen (NORCO)  MG per tablet Take 1 tablet by mouth 2 (Two) Times a Day As Needed for moderate pain (4-6).   Yes Bradly Montana MD   levothyroxine (SYNTHROID, LEVOTHROID) 50 MCG tablet Take 50 mcg by mouth Daily.   Yes Bradly Montana MD   meclizine (ANTIVERT) 25 MG tablet Take 25 mg by mouth 3 (three) times a day as needed for dizziness.   Yes Bradly Montana MD   mirtazapine (REMERON) 15 MG tablet Take 15 mg by mouth Every Night.   Yes Bradly Montana MD   ondansetron (ZOFRAN) 4 MG tablet Take 4 mg by mouth Every 8 (Eight) Hours As Needed for Nausea or Vomiting.   Yes Bradly Montana MD   potassium chloride (K-DUR,KLOR-CON) 10 MEQ CR tablet Take 10 mEq by mouth daily.   Yes Bradly Montana MD   rivaroxaban (XARELTO) 15 MG tablet Take 15 mg by mouth Every Night.   Yes Bradly Montana MD       Hospital Meds:  Scheduled-   amiodarone 200 mg Oral BID   carvedilol 18.75 mg Oral BID With Meals   cefTRIAXone 1 g Intravenous Q24H   dilTIAZem  mg Oral Daily   enoxaparin 40 mg Subcutaneous Daily   furosemide 40 mg Oral Daily   ipratropium-albuterol 3 mL Nebulization Q6H - RT   levothyroxine 50 mcg Oral Daily   metoprolol tartrate 5 mg Intravenous Once    metroNIDAZOLE 500 mg Intravenous Q8H   mirtazapine 15 mg Oral Nightly   pantoprazole 40 mg Oral QAM   sodium chloride 10 mL Intravenous Q12H   sodium chloride 10 mL Intravenous Q12H     Infusions-   lactated ringers 100 mL/hr Last Rate: 100 mL/hr (20 2088)   sodium chloride 150 mL/hr Last Rate: Stopped (20 2490)      PRNs- •  acetaminophen **OR** acetaminophen  •  ALPRAZolam  •  HYDROcodone-acetaminophen  •  HYDROmorphone **AND** naloxone  •  HYDROmorphone  •  meclizine  •  ondansetron **OR** ondansetron  •  sodium chloride  •  sodium chloride  •  sodium chloride    Functional Status Prior to Current Stroke/[place] Score: 1    NIH Stroke Scale  Time: 8:41 AM  Person Administering Scale: Bhaskar Gauthier MD    1a  Level of consciousness: 0=alert; keenly responsive   1b. LOC questions:  0=Performs both tasks correctly   1c. LOC commands: 0=Performs both tasks correctly   2.  Best Gaze: 0=normal   3.  Visual: 0=No visual loss   4. Facial Palsy: 0=Normal symmetric movement   5a.  Motor left arm: 0=No drift, limb holds 90 (or 45) degrees for full 10 seconds   5b.  Motor right arm: 0=No drift, limb holds 90 (or 45) degrees for full 10 seconds   6a. motor left le=No drift, limb holds 90 (or 45) degrees for full 10 seconds   6b  Motor right le=No drift, limb holds 90 (or 45) degrees for full 10 seconds   7. Limb Ataxia: 0=Absent   8.  Sensory: 0=Normal; no sensory loss   9. Best Language:  0=No aphasia, normal   10. Dysarthria: 0=Normal   11. Extinction and Inattention: 0=No abnormality    Total:   0       Results Reviewed:  I have personally reviewed current lab, radiology, and data and agree with results.  Lab Results (last 24 hours)     Procedure Component Value Units Date/Time    Troponin [[id number]]  (Normal) Collected:  20 165    Specimen:  Blood Updated:  204     Troponin T <0.010 ng/mL     Narrative:       Troponin T Reference Range:  <= 0.03 ng/mL-   Negative for AMI  >0.03  ng/mL-     Abnormal for myocardial necrosis.  Clinicians would have to utilize clinical acumen, EKG, Troponin and serial changes to determine if it is an Acute Myocardial Infarction or myocardial injury due to an underlying chronic condition.       Results may be falsely decreased if patient taking Biotin.      Tissue Pathology Exam [[id number]] Collected:  02/17/20 1800    Specimen:  Tissue from Gallbladder Updated:  02/19/20 1518     Case Report --     Surgical Pathology Report                         Case: [id number]                                  Authorizing Provider:  Denilson Richardson MD           Collected:           02/17/2020 06:00 PM          Ordering Location:     [facility]             Received:            02/18/2020 06:38 AM                                 [facility] OR                                                              Pathologist:           Yovanny Reyes MD                                                        Specimen:    Gallbladder, PLUS CONTENTS                                                                  Final Diagnosis --     SUBACUTE CHOLECYSTITIS WITH CHOLELITHIASIS.       Gross Description --     The specimen consists of a pear-shaped gallbladder measuring 8.0 x 6.0 x 5.0 cm.  The external surface is focally fibrotic and on further examination, its wall is thickened measuring 0.8 cm in thickness.  The mucosa is smooth and numerous yellowish-brown stones measuring from 0.2-0.6 cm in dimension are obtained from the lumen.  Representative sections are embedded as 1A.          Imaging Results (Last 24 Hours)     ** No results found for the last 24 hours. **        Results for orders placed during the hospital encounter of 12/13/16   Adult Transthoracic Echo Complete    Narrative · All left ventricular wall segments contract normally.  · Left ventricular function is normal. Estimated EF = 55%.  · Left ventricular diastolic dysfunction (grade I) consistent with   impaired  relaxation.  · No pulmonary hypertension          Assessment/Plan:      Sultana Lin is a 79 year-old woman with PMH HTN admitted for cholecystectomy, now post op day 2, consult for left side of mouth drawn up.  She was noted to have elevation of the upper part of her mouth but now is back to baseline.      Exam is unremarkable without weakness or facial droop.    This is very unlikely to represent a stroke.  Will sign off.            Bhaskar Gauthier MD  February 20, 2020  8:41 AM    Verbal consent taken.  Patient agreeable to be seen via telemedicine.    This was an audio and video enabled telemedicine encounter.

## 2020-02-20 NOTE — NURSING NOTE
Spoke with Jessica regarding pt's /82 manually. He stated that the hospital does not do PRN hypertension meds. Will continue to monitor.

## 2020-02-20 NOTE — THERAPY PROGRESS REPORT/RE-CERT
Acute Care - Physical Therapy Re-Assessment  [facility]     Patient Name: Sultana Lin  : 1940  MRN: [id number]  Today's Date: 2020   Onset of Illness/Injury or Date of Surgery: 20     Referring Physician: Dylan      Admit Date: 2020    Visit Dx:     ICD-10-CM ICD-9-CM   1. Acute cholecystitis K81.0 575.0   2. Cholecystitis K81.9 575.10   3. Duodenitis K29.80 535.60   4. Impaired functional mobility, balance, gait, and endurance Z74.09 V49.89   5. Impaired mobility and ADLs Z74.09 799.89     Patient Active Problem List   Diagnosis   • Hypothyroidism   • Hypertension   • Paroxysmal atrial fibrillation (CMS/HCC)   • Paroxysmal atrial fibrillation with rapid ventricular response (CMS/HCC)   • Acute cholecystitis     Past Medical History:   Diagnosis Date   • Arthritis    • Atrial fibrillation (CMS/HCC)    • Atrial fibrillation (CMS/HCC)    • Atrial fibrillation with rapid ventricular response (CMS/HCC) 2017   • Breast cancer (CMS/HCC)     right   • Cancer (CMS/HCC)    • Disease of thyroid gland    • Hypertension      Past Surgical History:   Procedure Laterality Date   • ABLATION OF DYSRHYTHMIC FOCUS     • BREAST SURGERY     • CARDIAC ELECTROPHYSIOLOGY PROCEDURE N/A 2017    Procedure: EP/Ablation;  Surgeon: Blake Mcallister MD;  Location: [facility] INVASIVE LOCATION;  Service:    • CARDIOVERSION     • CATARACT EXTRACTION W/ INTRAOCULAR LENS IMPLANT Right 2019    Procedure: REMOVE CATARACT AND IMPLANT INTRAOCULAR LENS;  Surgeon: Lenin Dennison MD;  Location: [facility] OR;  Service: Ophthalmology   • CATARACT EXTRACTION W/ INTRAOCULAR LENS IMPLANT Left 2019    Procedure: REMOVE CATARACT AND IMPLANT INTRAOCULAR LENS;  Surgeon: Lenin Dennison MD;  Location: [facility] OR;  Service: Ophthalmology   • CHOLECYSTECTOMY WITH INTRAOPERATIVE CHOLANGIOGRAM N/A 2020    Procedure: LAPAROSCOPIC CHOLECYSTECTOMY WITH INTRAOPERATIVE CHOLANGIOGRAM;  Surgeon: Denilson Richardson  MD SYLVIA;  Location: [facility];  Service: General;  Laterality: N/A;   • INSERT / REPLACE / REMOVE PACEMAKER     • PACEMAKER IMPLANTATION     • SKIN BIOPSY     • SKIN TAG REMOVAL          PT ASSESSMENT (last 12 hours)      Physical Therapy Evaluation     Row Name 02/20/20 1321 02/20/20 0957       PT Evaluation Time/Intention    Subjective Information  --  complains of;weakness  -GB    Document Type  progress note/recertification  -GB  progress note/recertification  -GB    Mode of Treatment  individual therapy;physical therapy  -GB  --    Patient Effort  good  -GB  good  -GB    Comment  --  pt subject of code stroke earlier this am; no orders to stop PT so recheck w/ RN and pt in room; during this time she wanted to urinate & RN cleared pt for OOB to toilet but due to elevated bp at 201/95, we elected to use bedpan; after recheck of b/p post bedpan x 1, pt wanted up to EOB. We deferred this due to elevated b/p so repositioned in bed w/ HOB up to 40 deg, & pt reported feeling better able to breathe. Then needed bedpan again, so provided this, and b/p 150/100.  Assessed pt motion and sensation between bedpan to get baseline ; findings below  -GB    Row Name 02/20/20 1321 02/20/20 0957       General Information    Patient Profile Reviewed?  yes  -GB  yes  -GB    Referring Physician  --  Richardson  -GB    Patient Observations  alert;cooperative;agree to therapy  -GB  alert;agree to therapy;cooperative  -GB    Patient/Family Observations  --  pt reported she felt bad, felt weakness that is different than at eval. She was not specific but wanted upright;   -GB    General Observations of Patient  sitting up in recliner; states wants to get up/walk; declined out of room gait this pm  -GB  --    Existing Precautions/Restrictions  fall  -GB  --    Risks Reviewed  --  patient and family:;LOB;nausea/vomiting;dizziness  -GB    Benefits Reviewed  --  patient and family:;improve function;increase independence;increase strength  -GB    Row  Name 02/20/20 1321 02/20/20 0957       Cognitive Assessment/Intervention- PT/OT    Orientation Status (Cognition)  oriented to;person;place;situation  -GB  oriented to;person;place;situation  -GB    Follows Commands (Cognition)  follows two step commands  -GB  follows one step commands  -GB    Personal Safety Interventions  fall prevention program maintained;gait belt;muscle strengthening facilitated;nonskid shoes/slippers when out of bed;supervised activity  -GB  fall prevention program maintained;supervised activity  -GB    Row Name 02/20/20 1321          Safety Issues, Functional Mobility    Safety Issues Affecting Function (Mobility)  insight into deficits/self awareness;positioning of assistive device  -GB     Impairments Affecting Function (Mobility)  balance;coordination;endurance/activity tolerance  -GB     Row Name 02/20/20 0957          Bed Mobility Assessment/Treatment    Bed Mobility Assessment/Treatment  scooting/bridging  -GB     Scooting/Bridging [place] (Bed Mobility)  independent  -GB     Comment (Bed Mobility)  OOB deferred but pt bridged repeatedly for bedpan w/out c/o; bridge was even in elevation  -GB     Row Name 02/20/20 1321 02/20/20 0957       Transfer Assessment/Treatment    Bed-Chair [place] (Transfers)  contact guard;minimum assist (75% patient effort)  -GB  not tested  -GB    [place] Level (Toilet Transfer)  -- toiletting w/ SBA this pm  -GB  not tested bed pan only  -GB    Assistive Device (Toilet Transfer)  commode, bedside with drop arms;walker, front-wheeled [facility] over toilet to provide arms to stand  -GB  --    Row Name 02/20/20 1500 02/20/20 1321       Gait/Stairs Assessment/Training    Gait/Stairs Assessment/Training  --  gait/ambulation independence;gait/ambulation assistive device;distance ambulated;gait pattern  -GB    [place] Level (Gait)  --  contact guard  -GB    Assistive Device (Gait)  walker, front-wheeled  -GB  walker, front-wheeled  -GB    Distance in  Feet (Gait)  --  10,10,44,44  -GB    Pattern (Gait)  --  step-through  -    Deviations/Abnormal Patterns (Gait)  --  joan decreased;gait speed decreased;stride length decreased  -    Comment (Gait/Stairs)  --  needed cuing on turning w  FWRW and keeping feet w/in walker when using it; worked on narrow space gait also  -[facility] Name 02/20/20 0957          Gait/Stairs Assessment/Training    [place] Level (Gait)  not tested  -[facility] Name 02/20/20 0957          General ROM    GENERAL ROM COMMENTS  AROM melody UE/LEs WFL w/o c/o  -[facility] Name 02/20/20 0957          MMT (Manual Muscle Testing)    General MMT Comments  slight weaker L  and L DF noted in Active knee ext in supine w/ request for DF w/ full knee ext; moved more from toes than ankle for DF compared to R which was WFL Active DF w/ R quads/knee ext; moves melody UE/LE against gravity in bed w/out drift;   -[facility] Name 02/20/20 1321          Static Sitting Balance    Level of [place] (Unsupported Sitting, Static Balance)  independent  -GB     Row Name 02/20/20 1321          Dynamic Sitting Balance    Level of [place], Reaches Outside Midline (Sitting, Dynamic Balance)  independent  -GB     Row Name 02/20/20 1321          Static Standing Balance    Level of [place] (Supported Standing, Static Balance)  conditional independence;supervision  -GB     Row Name 02/20/20 1321          Dynamic Standing Balance    Level of [place], Reaches Outside Midline (Standing, Dynamic Balance)  contact guard assist;minimal assist, 75% patient effort  -[facility] Name 02/20/20 1321 02/20/20 0957       Sensory Assessment/Intervention    Sensory General Assessment  --  -  no sensation deficits identified  -[facility] Name 02/20/20 1321 02/20/20 0957       Pain Scale: Numbers Pre/Post-Treatment    Pain Scale: Numbers, Pretreatment  4/10  -GB  9/10  -GB    Pain Scale: Numbers, Post-Treatment  7/10  -GB  --    Pain Location  abdomen  -GB   abdomen  -GB    Pain Intervention(s)  Repositioned;Ambulation/increased activity  -GB  Repositioned;Prayers  -GB    Row Name             Wound 02/17/20 1744 abdomen Incision    Wound - Properties Group Date first assessed: 02/17/20  -TITO Time first assessed: 1744  -TITO Present on Hospital Admission: N  -TITO Location: abdomen  -TITO Primary Wound Type: Incision  -TITO    Row Name 02/20/20 1321 02/20/20 0957       Plan of Care Review    Plan of Care Reviewed With  patient;family  -GB  patient  -GB    Progress  improving  -GB  --    Outcome Summary  -- PT more active and upright this pm w/out signs of L weakness  -GB  PT reassessed and notified Dr LAITH Jaramillo who advised pt ok to wait this am and asked we re-assess this pm.    -GB    Row Name 02/20/20 1321 02/20/20 0957       Physical Therapy Clinical Impression    Rehab Potential (PT Clinical Summary)  good, to achieve stated therapy goals  -GB  --    Predicted Duration of Therapy (PT)  1 wk  -GB  --    Care Plan Review (PT)  patient/other agree to care plan  -GB  patient/other agree to care plan  -GB    Care Plan Review, Other Participant (PT Clinical Impression)  daughter  -GB  --    Patient/Family Concerns, Equipment Needs at Discharge (PT)  need BSC/Toilet extender and FWRW for home;   -GB  --    Row Name 02/20/20 1321 02/20/20 0957       Vital Signs    Pre Systolic BP Rehab  176  -GB  201  -GB    Pre Treatment Diastolic BP  79  -GB  95  -GB    Intra Systolic BP Rehab  174  -GB  200  -GB    Intra Treatment Diastolic BP  88  -GB  95  -GB    Post Systolic BP Rehab  175  -GB  132  -GB    Post Treatment Diastolic BP  79  -GB  93  -GB    Pretreatment Heart Rate (beats/min)  63  -GB  95  -GB    Intratreatment Heart Rate (beats/min)  68  -GB  87  -GB    Posttreatment Heart Rate (beats/min)  67  -GB  75  -GB    Pre SpO2 (%)  98  -GB  98  -GB    O2 Delivery Pre Treatment  nasal cannula  -GB  nasal cannula 3,5L/min  -GB    Intra SpO2 (%)  100  -GB  99  -GB    O2 Delivery Intra  Treatment  nasal cannula  -GB  nasal cannula  -GB    Post SpO2 (%)  99  -GB  99  -GB    O2 Delivery Post Treatment  nasal cannula  -GB  nasal cannula  -GB    Pre Patient Position  Sitting  -GB  Supine  -GB    Intra Patient Position  Standing  -GB  Supine  -GB    Post Patient Position  Sitting  -GB  Sitting HOB elevated to 40 deg  -GB    Recovery Time  --  150/100 aftersitting up HOB to 40 deg; pt reports feelling better headup to breathe; urinating x 2 during visit ;HR 75-87, sats upper 90s. RN aware  -    Row Name 02/20/20 1321          Bed Mobility Goal 1 (PT)    Activity/Assistive Device (Bed Mobility Goal 1, PT)  bed mobility activities, all  -GB     [place] Level/Cues Needed (Bed Mobility Goal 1, PT)  conditional independence  -GB     Time Frame (Bed Mobility Goal 1, PT)  long term goal (LTG);10 days  -GB     Progress/Outcomes (Bed Mobility Goal 1, PT)  goal not met;continuing progress toward goal  -     Row Name 02/20/20 1321          Transfer Goal 1 (PT)    Activity/Assistive Device (Transfer Goal 1, PT)  bed-to-chair/chair-to-bed;toilet  -GB     [place] Level/Cues Needed (Transfer Goal 1, PT)  conditional independence  -GB     Time Frame (Transfer Goal 1, PT)  2 weeks  -GB     Progress/Outcome (Transfer Goal 1, PT)  good progress toward goal;goal not met  -     Row Name 02/20/20 1321          Gait Training Goal 1 (PT)    Activity/Assistive Device (Gait Training Goal 1, PT)  gait (walking locomotion);assistive device use;increase endurance/gait distance;walker, rolling  -GB     [place] Level (Gait Training Goal 1, PT)  conditional independence  -GB     Distance (Gait Goal 1, PT)  300 ft or more   -GB     Time Frame (Gait Training Goal 1, PT)  long term goal (LTG);2 weeks  -GB     Progress/Outcome (Gait Training Goal 1, PT)  continuing progress toward goal;goal not met  -     Row Name 02/20/20 1321          Stairs Goal 1 (PT)    Activity/Assistive Device (Stairs Goal 1, PT)  ascending  stairs;descending stairs;assistive device use  -GB     [place] Level/Cues Needed (Stairs Goal 1, PT)  conditional independence  -GB     Number of Stairs (Stairs Goal 1, PT)  1 or more  -GB     Time Frame (Stairs Goal 1, PT)  long term goal (LTG);by discharge  -GB     Barriers (Stairs Goal 1, PT)  endurance;   -GB     Progress/Outcome (Stairs Goal 1, PT)  goal not met;continuing progress toward goal  -GB     Row Name 02/20/20 1321 02/20/20 0957       Positioning and Restraints    Pre-Treatment Position  sitting in chair/recliner  -GB  in bed  -GB    Post Treatment Position  chair  -GB  bed  -GB    In Bed  call light within reach;encouraged to call for assist;with family/caregiver;legs elevated dtr stated she will stay till she calls RN to do bed tfr  -GB  notified nsg;fowlers;call light within reach;encouraged to call for assist;exit alarm on;with family/caregiver;side rails up x2  -GB      User Key  (r) = Recorded By, (t) = Taken By, (c) = Cosigned By    Initials Name Provider Type    GB Saskia Oneal, PT Physical Therapist    Syeda Casillas, RN Registered Nurse          PT Recommendation and Plan  Anticipated Discharge Disposition (PT): anticipate therapy at next level of care  Planned Therapy Interventions (PT Eval): balance training, bed mobility training, gait training, home exercise program, patient/family education, stair training, strengthening, transfer training  Therapy Frequency (PT Clinical Impression): daily  Outcome Summary/Treatment Plan (PT)  Anticipated Equipment Needs at Discharge (PT): front wheeled walker, bedside commode  Patient/Family Concerns, Equipment Needs at Discharge (PT): need BSC/Toilet extender and FWRW for home;   Anticipated Discharge Disposition (PT): anticipate therapy at next level of care  Plan of Care Reviewed With: patient  Progress: improving  Outcome Summary: PT seen BID today for recheck after code stroke and for mobility in pm. This am she had elevated  b/p at 200 systolic so bed recheck only which she romain well. In pm she was up to chair and went to toilet w/ PT, demonstrated increased indep w/ toiletting, and then walked in room for distance.She shows no new weaknss and romain upright w/out c/o and w/ VSS. Systolic staying in 170's. Pt is not indep in gait or mobilty but can perform w/ min assist of 1 for safety.She needs capable caregiver to assist w/ mobilty and ADLs for safety. She can benefit from rehab to home therapy program till more indep.  Outcome Measures     Row Name 02/20/20 1700 02/19/20 1415 02/19/20 0841       How much help from another person do you currently need...    Turning from your back to your side while in flat bed without using bedrails?  3  -GB  3  -TW  --    Moving from lying on back to sitting on the side of a flat bed without bedrails?  3  -GB  3  -TW  --    Moving to and from a bed to a chair (including a wheelchair)?  3  -GB  3  -TW  --    Standing up from a chair using your arms (e.g., wheelchair, bedside chair)?  3  -GB  3  -TW  --    Climbing 3-5 steps with a railing?  2  -GB  2  -TW  --    To walk in hospital room?  3  -GB  3  -TW  --    AM-PAC 6 Clicks Score (PT)  17  -GB  17  -TW  --       How much help from another is currently needed...    Putting on and taking off regular lower body clothing?  --  --  2  -BH    Bathing (including washing, rinsing, and drying)  --  --  2  -BH    Toileting (which includes using toilet bed pan or urinal)  --  --  2  -BH    Putting on and taking off regular upper body clothing  --  --  3  -BH    Taking care of personal grooming (such as brushing teeth)  --  --  3  -BH    Eating meals  --  --  3  -BH    AM-PAC 6 Clicks Score (OT)  --  --  15  -BH       Functional Assessment    Outcome Measure Options  AM-PAC 6 Clicks Basic Mobility (PT)  -GB  --  AM-PAC 6 Clicks Daily Activity (OT)  -      User Key  (r) = Recorded By, (t) = Taken By, (c) = Cosigned By    Initials Name Provider Type    GB  Saskia Oneal, PT Physical Therapist    BH Milly Redding, OTR/L Occupational Therapist    TW Harry Cuenca, PTA Physical Therapy Assistant         Time Calculation:   PT Charges     Row Name 02/20/20 1711 02/20/20 1049          Time Calculation    Start Time  1321  -GB  0957  -GB     Stop Time  1400  -GB  1020  -GB     Time Calculation (min)  39 min  -GB  23 min  -GB     PT Received On  02/20/20  -GB  02/20/20  -GB     PT Goal Re-Cert Due Date  02/29/20  -GB  02/29/20  -GB        Timed Charges    19536 - Gait Training Minutes   15  -GB  --     81062 - PT Therapeutic Activity Minutes  24  -GB  --       User Key  (r) = Recorded By, (t) = Taken By, (c) = Cosigned By    Initials Name Provider Type    GB Saskia Oneal, PT Physical Therapist        Therapy Charges for Today     Code Description Service Date Service Provider Modifiers Qty    09021611347 HC PT RE-EVAL ESTABLISHED PLAN 2 2/20/2020 Saskia Oneal, PT GP 1    25891005349 HC GAIT TRAINING EA 15 MIN 2/20/2020 Saskia Oneal, PT GP 1    29646729360 HC PT THERAPEUTIC ACT EA 15 MIN 2/20/2020 Saskia Oneal, PT GP 2          PT G-Codes  Outcome Measure Options: AM-PAC 6 Clicks Basic Mobility (PT)  AM-PAC 6 Clicks Score (PT): 17  AM-PAC 6 Clicks Score (OT): 15      Saskia Oneal, PT  2/20/2020

## 2020-02-20 NOTE — PLAN OF CARE
Problem: Patient Care Overview  Goal: Plan of Care Review  Outcome: Ongoing (interventions implemented as appropriate)  Flowsheets (Taken 2/20/2020 2847)  Progress: no change  Plan of Care Reviewed With: patient  Outcome Summary: Bp elevated, c/o pain, working on pain control, pt resting between care, up to bedside commode several times this shift, resting between care, cont to monitor.

## 2020-02-20 NOTE — PROGRESS NOTES
[facility] Medicine Services  INPATIENT PROGRESS NOTE    Length of Stay: 1  Date of Admission: 2/14/2020  Primary Care Physician: Sandip Lozano MD    Subjective   Chief Complaint: left facial droop and left ue weakness  HPI:   Nursing staff this morning around 845 noticed pt had left facial droop and left ue weakness. Code stroke was called and teleneurologist was consulted for further recommendation. Pt is slow to respond to questions but is alert, oriented to person, place and time. No other focal deficits noted.    Review of Systems     All pertinent negatives and positives are as above. All other systems have been reviewed and are negative unless otherwise stated.     Objective    Temp:  [96.9 °F (36.1 °C)-97.6 °F (36.4 °C)] 97.6 °F (36.4 °C)  Heart Rate:  [] 72  Resp:  [16-20] 16  BP: (140-184)/() 178/80    Physical Exam  Constitutional: She is oriented to person, place, and time. NAD  HENT:   Head: Normocephalic and atraumatic.   Eyes: Pupils are equal, round, and reactive to light. EOM are normal. No scleral icterus.   Neck: Normal range of motion. Neck supple.   Cardiovascular: Normal rate and regular rhythm.   Pulmonary/Chest: Effort normal and breath sounds normal. No stridor. No respiratory distress.   Abdominal: Soft. She exhibits no mass. There is no tenderness. There is no guarding. No hernia.   Musculoskeletal: Normal range of motion. She exhibits no edema or tenderness. LEFT UE weakness  Neurological: She is alert and oriented to person, place, and time. She displays normal reflexes. No cranial nerve deficit. Coordination normal.   Skin: Skin is warm and dry. No rash noted.   Psychiatric: She has a normal mood and affect. Her behavior is normal.   Vitals reviewed      Results Review:  I have reviewed the labs, radiology results, and diagnostic studies.    Laboratory Data:   Results from last 7 days   Lab Units 02/18/20  0533 02/14/20  0857    SODIUM mmol/L 144 139   POTASSIUM mmol/L 3.5 3.4*   CHLORIDE mmol/L 108* 102   CO2 mmol/L 24.0 23.0   BUN mg/dL 14 16   CREATININE mg/dL 0.69 1.20*   GLUCOSE mg/dL 131* 116*   CALCIUM mg/dL 8.1* 8.8   BILIRUBIN mg/dL 0.4 1.4*   ALK PHOS U/L 66 68   ALT (SGPT) U/L 7 6   AST (SGOT) U/L 21 15   ANION GAP mmol/L 12.0 14.0     Estimated Creatinine Clearance: 62.2 mL/min (by C-G formula based on SCr of 0.69 mg/dL).  Results from last 7 days   Lab Units 02/18/20  0533   MAGNESIUM mg/dL 1.8   PHOSPHORUS mg/dL 2.5         Results from last 7 days   Lab Units 02/18/20  0533 02/14/20  0847   WBC 10*3/mm3 7.42 10.89*   HEMOGLOBIN g/dL 9.8* 11.1*   HEMATOCRIT % 30.9* 33.8*   PLATELETS 10*3/mm3 133* 119*           Culture Data:   No results found for: BLOODCX  No results found for: URINECX  No results found for: RESPCX  No results found for: WOUNDCX  No results found for: STOOLCX  No components found for: BODYFLD    Radiology Data:   Imaging Results (Last 24 Hours)     ** No results found for the last 24 hours. **          I have reviewed the patient's current medications.     Assessment/Plan     Active Hospital Problems    Diagnosis   • Hypertension       Plan:      Paced atrial rhythm on the ECG, a good LV EF on an Echo obtained few years ago. Continue with the symptomatic relief, IV fluid  -general surgery consulted   -pod #2 cholecystectomy , s/p  harley drain   -will transfer pt to step down for closer monitoring, neuro checks  -will obtain head ct without constrast  -teleneurologist consulted

## 2020-02-20 NOTE — NURSING NOTE
called to pt. Room per daughter. Was concerned  mom had left facial droop stated she was having a stroke. Upon assement  she  Had left ue weakness and was slow to respond  to question but was alert, oriented to person place and time.

## 2020-02-20 NOTE — PROGRESS NOTES
GENERAL SURGERY PROGRESS NOTE     LOS: 1 day     Chief Complaint:     Acute Cholecystitis    Interval History:     Drain removed yesterday.  Still complaining of back and abd pain.  Incisions c/d/i.  Labs unremarkable.  Ambulating some more.      Medication Review:     amiodarone 200 mg Oral BID   carvedilol 18.75 mg Oral BID With Meals   cefTRIAXone 1 g Intravenous Q24H   dilTIAZem  mg Oral Daily   enoxaparin 40 mg Subcutaneous Daily   furosemide 40 mg Oral Daily   ipratropium-albuterol 3 mL Nebulization Q6H - RT   levothyroxine 50 mcg Oral Daily   metoprolol tartrate 5 mg Intravenous Once   metroNIDAZOLE 500 mg Intravenous Q8H   mirtazapine 15 mg Oral Nightly   pantoprazole 40 mg Oral QAM   sodium chloride 10 mL Intravenous Q12H   sodium chloride 10 mL Intravenous Q12H         lactated ringers 100 mL/hr Last Rate: 100 mL/hr (02/20/20 0438)   sodium chloride 150 mL/hr Last Rate: Stopped (02/19/20 5689)   Objective     Vital Signs:  Temp:  [96.9 °F (36.1 °C)-97.6 °F (36.4 °C)] 97.6 °F (36.4 °C)  Heart Rate:  [] 64  Resp:  [16-18] 18  BP: (140-186)/() 164/62    Intake/Output Summary (Last 24 hours) at 2/20/2020 0643  Last data filed at 2/20/2020 0039  Gross per 24 hour   Intake --   Output 2140 ml   Net -2140 ml       Physical Exam   Constitutional: She is oriented to person, place, and time. She appears well-developed and well-nourished.   HENT:   Head: Normocephalic and atraumatic.   Eyes: Pupils are equal, round, and reactive to light.   Neck: Normal range of motion. Neck supple.   Cardiovascular: Normal rate and regular rhythm.   Pulmonary/Chest: Effort normal and breath sounds normal.   Abdominal: Soft. She exhibits no distension.   Musculoskeletal: Normal range of motion.   Neurological: She is alert and oriented to person, place, and time.   Skin: Skin is warm and dry.   Psychiatric: She has a normal mood and affect. Her behavior is normal.       Results Review:    Results from last 7 days    Lab Units 02/18/20  0533 02/14/20  0847   SODIUM mmol/L 144 139   POTASSIUM mmol/L 3.5 3.4*   CHLORIDE mmol/L 108* 102   CO2 mmol/L 24.0 23.0   BUN mg/dL 14 16   CREATININE mg/dL 0.69 1.20*   GLUCOSE mg/dL 131* 116*   CALCIUM mg/dL 8.1* 8.8     Results from last 7 days   Lab Units 02/18/20  0533 02/14/20  0847   WBC 10*3/mm3 7.42 10.89*   HEMOGLOBIN g/dL 9.8* 11.1*   HEMATOCRIT % 30.9* 33.8*   PLATELETS 10*3/mm3 133* 119*       Assessment:    Hypertension      POD 3 Lap caio    Plan:  --Ok for discharge from surgical standpoint.    --Recommend continuing abx another 2 days   --Follow-up with Dr. Richardson in the office 1 week after discharge      This document has been electronically signed by Aime Looney MD on February 20, 2020 6:43 AM

## 2020-02-21 VITALS
OXYGEN SATURATION: 93 % | TEMPERATURE: 97.7 F | RESPIRATION RATE: 18 BRPM | BODY MASS INDEX: 28.83 KG/M2 | HEIGHT: 67 IN | SYSTOLIC BLOOD PRESSURE: 169 MMHG | WEIGHT: 183.7 LBS | HEART RATE: 67 BPM | DIASTOLIC BLOOD PRESSURE: 80 MMHG

## 2020-02-21 PROCEDURE — 94799 UNLISTED PULMONARY SVC/PX: CPT

## 2020-02-21 PROCEDURE — 97116 GAIT TRAINING THERAPY: CPT

## 2020-02-21 PROCEDURE — 25010000002 ENOXAPARIN PER 10 MG: Performed by: SURGERY

## 2020-02-21 PROCEDURE — 63710000001 ONDANSETRON PER 8 MG: Performed by: SURGERY

## 2020-02-21 PROCEDURE — 97530 THERAPEUTIC ACTIVITIES: CPT

## 2020-02-21 PROCEDURE — 97535 SELF CARE MNGMENT TRAINING: CPT

## 2020-02-21 RX ADMIN — HYDROCODONE BITARTRATE AND ACETAMINOPHEN 1 TABLET: 10; 325 TABLET ORAL at 11:16

## 2020-02-21 RX ADMIN — SODIUM CHLORIDE, PRESERVATIVE FREE 10 ML: 5 INJECTION INTRAVENOUS at 08:39

## 2020-02-21 RX ADMIN — CARVEDILOL 18.75 MG: 12.5 TABLET, FILM COATED ORAL at 08:38

## 2020-02-21 RX ADMIN — ONDANSETRON HYDROCHLORIDE 4 MG: 4 TABLET, FILM COATED ORAL at 08:39

## 2020-02-21 RX ADMIN — AMIODARONE HYDROCHLORIDE 200 MG: 200 TABLET ORAL at 08:37

## 2020-02-21 RX ADMIN — SODIUM CHLORIDE, POTASSIUM CHLORIDE, SODIUM LACTATE AND CALCIUM CHLORIDE 100 ML/HR: 600; 310; 30; 20 INJECTION, SOLUTION INTRAVENOUS at 08:36

## 2020-02-21 RX ADMIN — LEVOTHYROXINE SODIUM 50 MCG: 50 TABLET ORAL at 08:39

## 2020-02-21 RX ADMIN — ENOXAPARIN SODIUM 40 MG: 40 INJECTION SUBCUTANEOUS at 08:36

## 2020-02-21 RX ADMIN — DILTIAZEM HYDROCHLORIDE 180 MG: 180 CAPSULE, COATED, EXTENDED RELEASE ORAL at 08:37

## 2020-02-21 RX ADMIN — HYDROCODONE BITARTRATE AND ACETAMINOPHEN 1 TABLET: 10; 325 TABLET ORAL at 03:25

## 2020-02-21 RX ADMIN — PANTOPRAZOLE SODIUM 40 MG: 40 TABLET, DELAYED RELEASE ORAL at 06:21

## 2020-02-21 RX ADMIN — IPRATROPIUM BROMIDE AND ALBUTEROL SULFATE 3 ML: 2.5; .5 SOLUTION RESPIRATORY (INHALATION) at 06:47

## 2020-02-21 RX ADMIN — METRONIDAZOLE 500 MG: 500 INJECTION, SOLUTION INTRAVENOUS at 06:21

## 2020-02-21 NOTE — THERAPY TREATMENT NOTE
Acute Care - Physical Therapy Treatment Note  [facility]     Patient Name: Sultana Lin  : 1940  MRN: [id number]  Today's Date: 2020  Onset of Illness/Injury or Date of Surgery: 20     Referring Physician: Dylan    Admit Date: 2020    Visit Dx:    ICD-10-CM ICD-9-CM   1. Acute cholecystitis K81.0 575.0   2. Cholecystitis K81.9 575.10   3. Duodenitis K29.80 535.60   4. Impaired functional mobility, balance, gait, and endurance Z74.09 V49.89   5. Impaired mobility and ADLs Z74.09 799.89     Patient Active Problem List   Diagnosis   • Hypothyroidism   • Hypertension   • Paroxysmal atrial fibrillation (CMS/HCC)   • Paroxysmal atrial fibrillation with rapid ventricular response (CMS/HCC)   • Acute cholecystitis       Therapy Treatment    Rehabilitation Treatment Summary     Row Name 20 1024             Treatment Time/Intention    Discipline  physical therapy assistant  -BRIAN      Document Type  therapy note (daily note)  -BRIAN      Mode of Treatment  physical therapy;individual therapy  -BRIAN      Therapy Frequency (PT Clinical Impression)  daily  -BRIAN      Patient Effort  good  -BRIAN      Existing Precautions/Restrictions  fall  -BRIAN      Recorded by [BRIAN] Yuniel Kimbrough PTA 20 1256      Row Name 20 1024             Vital Signs    Pre Systolic BP Rehab  173  -BRIAN      Pre Treatment Diastolic BP  75  -BRIAN      Post Systolic BP Rehab  151  -BRIAN      Post Treatment Diastolic BP  76  -BRIAN      Pretreatment Heart Rate (beats/min)  70  -BRIAN      Posttreatment Heart Rate (beats/min)  73  -BRIAN      Pre SpO2 (%)  97  -BRIAN      O2 Delivery Pre Treatment  room air  -BRIAN      Post SpO2 (%)  98  -BRIAN      O2 Delivery Post Treatment  room air  -BRIAN      Pre Patient Position  Sitting  -BRIAN      Post Patient Position  Sitting  -BRIAN      Recorded by [BRIAN] Yuniel Kimbrough PTA 20 1256      Row Name 20 1024             Cognitive Assessment/Intervention- PT/OT    Affect/Mental Status (Cognitive)  WFL   -BRIAN      Orientation Status (Cognition)  oriented to;person;place;situation  -BRIAN      Follows Commands (Cognition)  follows two step commands  -BRIAN      Personal Safety Interventions  fall prevention program maintained;gait belt;nonskid shoes/slippers when out of bed;muscle strengthening facilitated;supervised activity  -BRIAN      Recorded by [BRIAN] Yuniel Kimbrough, PTA 02/21/20 1256      Row Name 02/21/20 1024             Safety Issues, Functional Mobility    Impairments Affecting Function (Mobility)  balance;coordination;endurance/activity tolerance  -BRIAN      Recorded by [BRIAN] Yuniel Kimbroguh, PTA 02/21/20 1256      Row Name 02/21/20 1024             Transfer Assessment/Treatment    Transfer Assessment/Treatment  sit-stand transfer;stand-sit transfer  -BRIAN      Recorded by [BRIAN] Yuniel Kimbrough, PTA 02/21/20 1256      Row Name 02/21/20 1024             Sit-Stand Transfer    Sit-Stand [place] (Transfers)  stand by assist  -BRIAN      Assistive Device (Sit-Stand Transfers)  walker, front-wheeled  -BRIAN      Recorded by [BRIAN] Yuniel Kimbrough, PTA 02/21/20 1256      Row Name 02/21/20 1024             Stand-Sit Transfer    Stand-Sit [place] (Transfers)  stand by assist  -BRIAN      Assistive Device (Stand-Sit Transfers)  walker, front-wheeled  -BRIAN      Recorded by [BRIAN] Yuniel Kimbrough, PTA 02/21/20 1256      Row Name 02/21/20 1024             Gait/Stairs Assessment/Training    Gait/Stairs Assessment/Training  gait/ambulation independence;gait/ambulation assistive device;distance ambulated;gait pattern  -BRIAN      [place] Level (Gait)  supervision  -BRIAN      Assistive Device (Gait)  walker, front-wheeled  -BRIAN      Distance in Feet (Gait)  196 x2  -BRIAN      Pattern (Gait)  step-through  -BRIAN      Deviations/Abnormal Patterns (Gait)  joan decreased;gait speed decreased;stride length decreased  -BRIAN      Negotiation (Stairs)  stairs assistive device;stairs independence;handrail location;number of steps;ascending  technique;descending technique  -BRIAN      [place] Level (Stairs)  contact guard  -BRIAN      Handrail Location (Stairs)  both sides  -BRIAN      Number of Steps (Stairs)  2  -BRIAN      Ascending Technique (Stairs)  step-to-step  -BRIAN      Descending Technique (Stairs)  step-to-step  -BRIAN      Recorded by [BRIAN] Yuniel Kimbrough, PTA 02/21/20 1256      Row Name 02/21/20 1024             Positioning and Restraints    Pre-Treatment Position  sitting in chair/recliner  -BRIAN      Post Treatment Position  chair  -BRIAN      In Chair  reclined;call light within reach;encouraged to call for assist;exit alarm on all needs met  -BRIAN      Recorded by [BRIAN] Yuniel Kimbrough, PTA 02/21/20 1256      Row Name 02/21/20 1024             Pain Assessment    Additional Documentation  Pain Scale: Numbers Pre/Post-Treatment (Group)  -BRIAN      Recorded by [BRIAN] Yuniel Kimbrough, PTA 02/21/20 1256      Row Name 02/21/20 1024             Pain Scale: Numbers Pre/Post-Treatment    Pain Scale: Numbers, Pretreatment  7/10  -BRIAN      Pain Scale: Numbers, Post-Treatment  8/10  -BRIAN      Pain Location  abdomen  -BRIAN      Pain Intervention(s)  Repositioned  -BRIAN      Recorded by [BRIAN] Yuniel Kimbrough, PTA 02/21/20 1256      Row Name                Wound 02/17/20 1744 abdomen Incision    Wound - Properties Group Date first assessed: 02/17/20 [TITO] Time first assessed: 1744 [TITO] Present on Hospital Admission: N [TITO] Location: abdomen [TITO] Primary Wound Type: Incision [TITO] Recorded by:  [TITO] Syeda Campos RN 02/17/20 1744    Row Name 02/21/20 1024             Outcome Summary/Treatment Plan (PT)    Daily Summary of Progress (PT)  progress toward functional goals as expected  -BRIAN      Plan for Continued Treatment (PT)  continue  -BRIAN      Anticipated Equipment Needs at Discharge (PT)  front wheeled walker;bedside commode  -BRIAN      Anticipated Discharge Disposition (PT)  anticipate therapy at next level of care  -BRIAN      Recorded by [BRIAN] Yuniel Kimbrough, PTA 02/21/20  1256        User Key  (r) = Recorded By, (t) = Taken By, (c) = Cosigned By    Initials Name Effective Dates Discipline    BRIAN Yuniel Kimbrough, PTA 03/07/18 -  PT    TITO Syeda Campos, RN 05/09/18 -  Nurse          Wound 02/17/20 1744 abdomen Incision (Active)   Closure Adhesive closure strips 2/21/2020  8:24 AM   Drainage Amount none 2/21/2020  8:24 AM       Rehab Goal Summary     Row Name 02/21/20 1024             Bed Mobility Goal 1 (PT)    Activity/Assistive Device (Bed Mobility Goal 1, PT)  bed mobility activities, all  -BRIAN      [place] Level/Cues Needed (Bed Mobility Goal 1, PT)  conditional independence  -BRIAN      Time Frame (Bed Mobility Goal 1, PT)  long term goal (LTG);10 days  -BRIAN      Progress/Outcomes (Bed Mobility Goal 1, PT)  goal not met;continuing progress toward goal  -BRIAN         Transfer Goal 1 (PT)    Activity/Assistive Device (Transfer Goal 1, PT)  bed-to-chair/chair-to-bed;toilet  -BRIAN      [place] Level/Cues Needed (Transfer Goal 1, PT)  conditional independence  -BRIAN      Time Frame (Transfer Goal 1, PT)  2 weeks  -BRIAN      Progress/Outcome (Transfer Goal 1, PT)  good progress toward goal;goal not met  -BRIAN         Gait Training Goal 1 (PT)    Activity/Assistive Device (Gait Training Goal 1, PT)  gait (walking locomotion);assistive device use;increase endurance/gait distance;walker, rolling  -BRIAN      [place] Level (Gait Training Goal 1, PT)  conditional independence  -BRIAN      Distance (Gait Goal 1, PT)  300 ft or more   -BRIAN      Time Frame (Gait Training Goal 1, PT)  long term goal (LTG);2 weeks  -BRIAN      Progress/Outcome (Gait Training Goal 1, PT)  continuing progress toward goal;goal not met  -BRIAN         Stairs Goal 1 (PT)    Activity/Assistive Device (Stairs Goal 1, PT)  ascending stairs;descending stairs;assistive device use  -BRIAN      [place] Level/Cues Needed (Stairs Goal 1, PT)  conditional independence  -BRIAN      Number of Stairs (Stairs Goal 1, PT)  1 or more  -BRIAN       Time Frame (Stairs Goal 1, PT)  long term goal (LTG);by discharge  -BRIAN      Barriers (Stairs Goal 1, PT)  endurance;   -BRIAN      Progress/Outcome (Stairs Goal 1, PT)  goal not met;continuing progress toward goal  -BRIAN        User Key  (r) = Recorded By, (t) = Taken By, (c) = Cosigned By    Initials Name Provider Type Discipline    Yuniel Robles, PTA Physical Therapy Assistant PT              PT Recommendation and Plan  Anticipated Discharge Disposition (PT): anticipate therapy at next level of care  Therapy Frequency (PT Clinical Impression): daily  Outcome Summary/Treatment Plan (PT)  Daily Summary of Progress (PT): progress toward functional goals as expected  Plan for Continued Treatment (PT): continue  Anticipated Equipment Needs at Discharge (PT): front wheeled walker, bedside commode  Anticipated Discharge Disposition (PT): anticipate therapy at next level of care  Plan of Care Reviewed With: patient  Progress: improving  Outcome Summary: pt responded well to PT w/ increased gait to 196 ft x2 SBA w/ RW. pt requires SBA for t/fs and CGA for stair training. pt educated on HEP and home safety. pts family present for tx and education. no new goals met at this time. pt would continue to benefit from PT services.  Outcome Measures     Row Name 02/21/20 1024 02/20/20 1700 02/19/20 1415       How much help from another person do you currently need...    Turning from your back to your side while in flat bed without using bedrails?  3  -BRIAN  3  -GB  3  -TW    Moving from lying on back to sitting on the side of a flat bed without bedrails?  3  -BRIAN  3  -GB  3  -TW    Moving to and from a bed to a chair (including a wheelchair)?  3  -BRIAN  3  -GB  3  -TW    Standing up from a chair using your arms (e.g., wheelchair, bedside chair)?  3  -BRIAN  3  -GB  3  -TW    Climbing 3-5 steps with a railing?  3  -BRIAN  2  -GB  2  -TW    To walk in hospital room?  3  -BRIAN  3  -GB  3  -TW    AM-PAC 6 Clicks Score (PT)  18  -BRIAN  17  -GB  17   -       Functional Assessment    Outcome Measure Options  AM-PAC 6 Clicks Basic Mobility (PT)  -[facility]-PAC 6 Clicks Basic Mobility (PT)  -  --    Row Name 02/19/20 0841             How much help from another is currently needed...    Putting on and taking off regular lower body clothing?  2  -BH      Bathing (including washing, rinsing, and drying)  2  -BH      Toileting (which includes using toilet bed pan or urinal)  2  -BH      Putting on and taking off regular upper body clothing  3  -BH      Taking care of personal grooming (such as brushing teeth)  3  -BH      Eating meals  3  -BH      AM-PAC 6 Clicks Score (OT)  15  -         Functional Assessment    Outcome Measure Options  AM-PAC 6 Clicks Daily Activity (OT)  -        User Key  (r) = Recorded By, (t) = Taken By, (c) = Cosigned By    Initials Name Provider Type     Saskia Oneal, PT Physical Therapist     Milly Redding, OTR/L Occupational Therapist    Yuniel Robles, PTA Physical Therapy Assistant     Harry Cuenca, PTA Physical Therapy Assistant         Time Calculation:   PT Charges     Row Name 02/21/20 1258             Time Calculation    Start Time  1024  -BRIAN      Stop Time  1103  -BRIAN      Time Calculation (min)  39 min  -BRIAN         Time Calculation- PT    Total Timed Code Minutes- PT  39 minute(s)  -        User Key  (r) = Recorded By, (t) = Taken By, (c) = Cosigned By    Initials Name Provider Type    Yuniel Robles, TAL Physical Therapy Assistant        Therapy Charges for Today     Code Description Service Date Service Provider Modifiers Qty    39205678989 HC GAIT TRAINING EA 15 MIN 2/21/2020 Yuniel Kimbrough, PTA GP 1    10476947654 HC PT THERAPEUTIC ACT EA 15 MIN 2/21/2020 Yuniel Kimbrough, PTA GP 1    91277533153 HC PT SELF CARE/MGMT/TRAIN EA 15 MIN 2/21/2020 Yuniel Kimbrough, PTA GP 1          PT G-Codes  Outcome Measure Options: AM-PAC 6 Clicks Basic Mobility (PT)  AM-PAC 6 Clicks Score (PT):  18  AM-PAC 6 Clicks Score (OT): 15    Yuniel Kimbrough, PTA  2/21/2020

## 2020-02-21 NOTE — DISCHARGE PLACEMENT REQUEST
"Cristiano Martin (79 y.o. Female)     Date of Birth Social Security Number Address Home Phone MRN    1940  [address] [place] [id number] [phone] [id number]    Scientologist Marital Status          Hoahaoism        Admission Date Admission Type Admitting Provider Attending Provider Department, Room/Bed    2/14/20 Emergency Bryon Cuenca MD Williams, Kevin L, MD [address], 365/[address]    Discharge Date Discharge Disposition Discharge Destination         Home or Self Care              Attending Provider:  Bryon Cuenca MD    Allergies:  Penicillins    Isolation:  None   Infection:  None   Code Status:  CPR    Ht:  170.2 cm (67\")   Wt:  83.3 kg (183 lb 11.2 oz)    Admission Cmt:  None   Principal Problem:  Acute cholecystitis [K81.0]                 Active Insurance as of 2/14/2020     Primary Coverage     Payor Plan Insurance Group Employer/Plan Group    MEDICARE MEDICARE A & B      Payor Plan Address Payor Plan Phone Number Payor Plan Fax Number Effective Dates    [address] [phone]  10/1/2004 - None Entered    [facility] [address]       Subscriber Name Subscriber Birth Date Member ID       CRISTIANO MARTIN 1940 [id number]           Secondary Coverage     Payor Plan Insurance Group Employer/Plan Group    [facility] [id number]     Payor Plan Address Payor Plan Phone Number Payor Plan Fax Number Effective Dates    [address] [address] [phone]  11/1/2015 - None Entered    [facility] [id number]       Subscriber Name Subscriber Birth Date Member ID       ALLAN MARTIN 1940 [id number]                 Emergency Contacts      (Rel.) Home Phone Work Phone Mobile Phone    Jaki Hilario (Daughter) [phone] -- [phone]    farhana barney (Grandchild) [phone] -- [phone]          "

## 2020-02-21 NOTE — DISCHARGE SUMMARY
[facility] Medicine Services  DISCHARGE SUMMARY       Date of Admission: 2/14/2020  Date of Discharge:  2/21/2020  Primary Care Physician: Sandip Lozano MD    Presenting Problem/History of Present Illness:  Cholecystitis [K81.9]  Duodenitis [K29.80]  Cholecystitis [K81.9]       Final Discharge Diagnoses:  Active Hospital Problems    Diagnosis   • Acute cholecystitis   • Hypertension       Consults:   Consults     Date and Time Order Name Status Description    2/14/2020 1716 Inpatient General Surgery Consult Completed           Procedures Performed: Procedure(s):  LAPAROSCOPIC CHOLECYSTECTOMY WITH INTRAOPERATIVE CHOLANGIOGRAM                Pertinent Test Results:   Lab Results (most recent)     Procedure Component Value Units Date/Time    POC Glucose Once [[id number]]  (Normal) Collected:  02/20/20 0836    Specimen:  Blood Updated:  02/20/20 0941     Glucose 105 mg/dL      Comment: : [id number] LORI LUCASMeter ID: [id number]       Troponin [[id number]]  (Normal) Collected:  02/19/20 1657    Specimen:  Blood Updated:  02/19/20 1724     Troponin T <0.010 ng/mL     Narrative:       Troponin T Reference Range:  <= 0.03 ng/mL-   Negative for AMI  >0.03 ng/mL-     Abnormal for myocardial necrosis.  Clinicians would have to utilize clinical acumen, EKG, Troponin and serial changes to determine if it is an Acute Myocardial Infarction or myocardial injury due to an underlying chronic condition.       Results may be falsely decreased if patient taking Biotin.      Tissue Pathology Exam [[id number]] Collected:  02/17/20 1800    Specimen:  Tissue from Gallbladder Updated:  02/19/20 1518     Case Report --     Surgical Pathology Report                         Case: [id number]                                  Authorizing Provider:  Denilson Richardson MD           Collected:           02/17/2020 06:00 PM          Ordering Location:     [facility]             Received:             02/18/2020 06:38 AM                                 [facility]                                                              Pathologist:           Yovanny Reyes MD                                                        Specimen:    Gallbladder, PLUS CONTENTS                                                                  Final Diagnosis --     SUBACUTE CHOLECYSTITIS WITH CHOLELITHIASIS.       Gross Description --     The specimen consists of a pear-shaped gallbladder measuring 8.0 x 6.0 x 5.0 cm.  The external surface is focally fibrotic and on further examination, its wall is thickened measuring 0.8 cm in thickness.  The mucosa is smooth and numerous yellowish-brown stones measuring from 0.2-0.6 cm in dimension are obtained from the lumen.  Representative sections are embedded as 1A.      Body Fluid Culture - Body Fluid, Gallbladder [[id number]]  (Abnormal)  (Susceptibility) Collected:  02/17/20 1712    Specimen:  Body Fluid from Gallbladder Updated:  02/19/20 0794     Body Fluid Culture Light growth (2+) Escherichia coli      Scant growth (1+) Normal Skin Arielle     Gram Stain Many (4+) WBCs seen      Few (2+) Gram positive cocci in chains    Susceptibility      Escherichia coli     JANEEN     Ampicillin Resistant     Ampicillin + Sulbactam Intermediate     Cefepime Susceptible     Ceftazidime Susceptible     Ceftriaxone Susceptible     Gentamicin Susceptible     Levofloxacin Resistant     Piperacillin + Tazobactam Susceptible     Trimethoprim + Sulfamethoxazole Resistant                Susceptibility Comments     Escherichia coli    Cefazolin sensitivity will not be reported for Enterobacteriaceae in non-urine isolates. If cefazolin is preferred, please call the microbiology lab to request an E-test.  With the exception of urinary-sourced infections, aminoglycosides should not be used as monotherapy.             Comprehensive Metabolic Panel [[id number]]  (Abnormal) Collected:  02/18/20 0519    Specimen:   Blood Updated:  02/18/20 0647     Glucose 131 mg/dL      BUN 14 mg/dL      Creatinine 0.69 mg/dL      Sodium 144 mmol/L      Potassium 3.5 mmol/L      Chloride 108 mmol/L      CO2 24.0 mmol/L      Calcium 8.1 mg/dL      Total Protein 6.0 g/dL      Albumin 3.00 g/dL      ALT (SGPT) 7 U/L      AST (SGOT) 21 U/L      Alkaline Phosphatase 66 U/L      Total Bilirubin 0.4 mg/dL      eGFR Non African Amer 82 mL/min/1.73      Globulin 3.0 gm/dL      A/G Ratio 1.0 g/dL      BUN/Creatinine Ratio 20.3     Anion Gap 12.0 mmol/L     Narrative:       GFR Normal >60  Chronic Kidney Disease <60  Kidney Failure <15      Magnesium [[id number]]  (Normal) Collected:  02/18/20 0533    Specimen:  Blood Updated:  02/18/20 0647     Magnesium 1.8 mg/dL     Phosphorus [[id number]]  (Normal) Collected:  02/18/20 0533    Specimen:  Blood Updated:  02/18/20 0647     Phosphorus 2.5 mg/dL     CBC & Differential [[id number]] Collected:  02/18/20 0533    Specimen:  Blood Updated:  02/18/20 0624    Narrative:       The following orders were created for panel order CBC & Differential.  Procedure                               Abnormality         Status                     ---------                               -----------         ------                     CBC Auto Differential[[id number]]        Abnormal            Final result                 Please view results for these tests on the individual orders.    CBC Auto Differential [[id number]]  (Abnormal) Collected:  02/18/20 0533    Specimen:  Blood Updated:  02/18/20 0624     WBC 7.42 10*3/mm3      RBC 3.26 10*6/mm3      Hemoglobin 9.8 g/dL      Hematocrit 30.9 %      MCV 94.8 fL      MCH 30.1 pg      MCHC 31.7 g/dL      RDW 13.4 %      RDW-SD 46.8 fl      MPV 12.7 fL      Platelets 133 10*3/mm3      Neutrophil % 87.5 %      Lymphocyte % 4.9 %      Monocyte % 6.5 %      Eosinophil % 0.0 %      Basophil % 0.3 %      Immature Grans % 0.8 %      Neutrophils, Absolute 6.50 10*3/mm3      Lymphocytes, Absolute  0.36 10*3/mm3      Monocytes, Absolute 0.48 10*3/mm3      Eosinophils, Absolute 0.00 10*3/mm3      Basophils, Absolute 0.02 10*3/mm3      Immature Grans, Absolute 0.06 10*3/mm3      nRBC 0.0 /100 WBC     Lactic Acid, Reflex [[id number]]  (Normal) Collected:  02/14/20 1314    Specimen:  Blood Updated:  02/14/20 1333     Lactate 1.9 mmol/L     Lactic Acid, Reflex Timer (This will reflex a repeat order 3-3:15 hours after ordered.) [[id number]] Collected:  02/14/20 0847    Specimen:  Blood Updated:  02/14/20 1230     Extra Tube Hold for add-ons.     Comment: Auto resulted.       Urinalysis With Microscopic If Indicated (No Culture) - Urine, Catheter [[id number]]  (Abnormal) Collected:  02/14/20 0946    Specimen:  Urine, Catheter Updated:  02/14/20 1007     Color, UA Yellow     Appearance, UA Cloudy     pH, UA 6.5     Specific Gravity, UA 1.017     Glucose, UA Negative     Ketones, UA Negative     Bilirubin, UA Negative     Blood, UA Moderate (2+)     Protein, UA 30 mg/dL (1+)     Leuk Esterase, UA Negative     Nitrite, UA Negative     Urobilinogen, UA 1.0 E.U./dL    Urinalysis, Microscopic Only - Urine, Catheter [[id number]]  (Abnormal) Collected:  02/14/20 0946    Specimen:  Urine, Catheter Updated:  02/14/20 1007     RBC, UA 6-12 /HPF      WBC, UA 3-5 /HPF      Bacteria, UA 4+ /HPF      Squamous Epithelial Cells, UA None Seen /HPF      Hyaline Casts, UA None Seen /LPF      Methodology Automated Microscopy    [place] Draw [[id number]] Collected:  02/14/20 0847    Specimen:  Blood Updated:  02/14/20 1000    Narrative:       The following orders were created for panel order [place] Draw.  Procedure                               Abnormality         Status                     ---------                               -----------         ------                     Light Blue Top[[id number]]                                   Final result               Green Top (Gel)[[id number]]                                  Final result                Lavender Top[[id number]]                                     Final result               Gold Top - SST[[id number]]                                   Final result                 Please view results for these tests on the individual orders.    Light Blue Top [[id number]] Collected:  02/14/20 0847    Specimen:  Blood Updated:  02/14/20 1000     Extra Tube hold for add-on     Comment: Auto resulted       Green Top (Gel) [[id number]] Collected:  02/14/20 0847    Specimen:  Blood Updated:  02/14/20 1000     Extra Tube Hold for add-ons.     Comment: Auto resulted.       Lavender Top [[id number]] Collected:  02/14/20 0847    Specimen:  Blood Updated:  02/14/20 1000     Extra Tube hold for add-on     Comment: Auto resulted       Gold Top - SST [[id number]] Collected:  02/14/20 0847    Specimen:  Blood Updated:  02/14/20 1000     Extra Tube Hold for add-ons.     Comment: Auto resulted.       Lactic Acid, Plasma [[id number]]  (Abnormal) Collected:  02/14/20 0847    Specimen:  Blood Updated:  02/14/20 0922     Lactate 2.3 mmol/L     Comprehensive Metabolic Panel [[id number]]  (Abnormal) Collected:  02/14/20 0847    Specimen:  Blood Updated:  02/14/20 0919     Glucose 116 mg/dL      BUN 16 mg/dL      Creatinine 1.20 mg/dL      Sodium 139 mmol/L      Potassium 3.4 mmol/L      Chloride 102 mmol/L      CO2 23.0 mmol/L      Calcium 8.8 mg/dL      Total Protein 6.8 g/dL      Albumin 3.80 g/dL      ALT (SGPT) 6 U/L      AST (SGOT) 15 U/L      Alkaline Phosphatase 68 U/L      Total Bilirubin 1.4 mg/dL      eGFR Non African Amer 43 mL/min/1.73      Globulin 3.0 gm/dL      A/G Ratio 1.3 g/dL      BUN/Creatinine Ratio 13.3     Anion Gap 14.0 mmol/L     Narrative:       GFR Normal >60  Chronic Kidney Disease <60  Kidney Failure <15      Lipase [[id number]]  (Normal) Collected:  02/14/20 0847    Specimen:  Blood Updated:  02/14/20 0918     Lipase 16 U/L     Troponin [[id number]]  (Normal) Collected:  02/14/20 0847    Specimen:  Blood Updated:   02/14/20 0918     Troponin T <0.010 ng/mL     Narrative:       Troponin T Reference Range:  <= 0.03 ng/mL-   Negative for AMI  >0.03 ng/mL-     Abnormal for myocardial necrosis.  Clinicians would have to utilize clinical acumen, EKG, Troponin and serial changes to determine if it is an Acute Myocardial Infarction or myocardial injury due to an underlying chronic condition.       Results may be falsely decreased if patient taking Biotin.      CBC & Differential [[id number]] Collected:  02/14/20 0847    Specimen:  Blood Updated:  02/14/20 0903    Narrative:       The following orders were created for panel order CBC & Differential.  Procedure                               Abnormality         Status                     ---------                               -----------         ------                     CBC Auto Differential[[id number]]        Abnormal            Final result                 Please view results for these tests on the individual orders.    CBC Auto Differential [[id number]]  (Abnormal) Collected:  02/14/20 0847    Specimen:  Blood Updated:  02/14/20 0903     WBC 10.89 10*3/mm3      RBC 3.68 10*6/mm3      Hemoglobin 11.1 g/dL      Hematocrit 33.8 %      MCV 91.8 fL      MCH 30.2 pg      MCHC 32.8 g/dL      RDW 13.4 %      RDW-SD 45.1 fl      MPV 11.2 fL      Platelets 119 10*3/mm3      Neutrophil % 83.9 %      Lymphocyte % 6.4 %      Monocyte % 8.2 %      Eosinophil % 0.1 %      Basophil % 0.6 %      Immature Grans % 0.8 %      Neutrophils, Absolute 9.14 10*3/mm3      Lymphocytes, Absolute 0.70 10*3/mm3      Monocytes, Absolute 0.89 10*3/mm3      Eosinophils, Absolute 0.01 10*3/mm3      Basophils, Absolute 0.06 10*3/mm3      Immature Grans, Absolute 0.09 10*3/mm3      nRBC 0.0 /100 WBC         Imaging Results (Most Recent)     Procedure Component Value Units Date/Time    CT Head Without Contrast Stroke Protocol [[id number]] Collected:  02/20/20 0853     Updated:  02/20/20 0919    Narrative:        EXAMINATION:  CT SCAN OF THE HEAD WITHOUT INTRAVENOUS CONTRAST    CLINICAL INFORMATION:  R/O STROKE, K81.0 Acute cholecystitis  K81.9 Cholecystitis, unspecified K29.80 Duodenitis without  bleeding Z74.09 Other reduced mobility Z74.09 Other reduced  mobility    This exam was performed using radiation doses that are as low as  reasonably achievable (ALARA).  This exam was performed according to our departmental dose  optimization program, which includes automated exposure control,  adjustment of the mA and/or KV according to patient size and/or  use of iterative reconstruction technique.    COMPARISON: None available.    TECHNIQUE:  Axial images from skull base to vertex.        FINDINGS:      There is no evidence of intracranial hemorrhage, parenchymal  mass, midline shift, or focal mass effect.  There is no hydrocephalus or effacement of the basilar cisterns.    There is no extra-axial hemorrhage or collection identified.    The mastoid air cells and visualized paranasal sinuses appear  clear.          Impression:       No evidence of intracranial hemorrhage, mass effect or large  acute infarct.      Electronically signed by:  Christopher Calderón MD  2/20/2020 9:18 AM CST  Workstation: [id number]    IR Insert Midline Without Port Pump 5 Plus [[id number]] Resulted:  02/18/20 1513     Updated:  02/18/20 1513    Narrative:       This procedure was auto-finalized with no dictation required.    US Guided Vascular Access [[id number]] Resulted:  02/18/20 1509     Updated:  02/18/20 1509    Narrative:       This procedure was auto-finalized with no dictation required.    FL C Arm During Surgery [[id number]] Resulted:  02/18/20 0851     Updated:  02/18/20 0851    US Gallbladder [[id number]] Collected:  02/14/20 1230     Updated:  02/14/20 1318    Narrative:       Ultrasound gallbladder, right upper quadrant.    HISTORY: Cholecystitis, abdominal pain.    Prior exam: CT February 14, 2020.    FINDINGS: Normal liver. No masses or intrahepatic  biliary  dilatation.    Abnormal gallbladder. Numerous stones. Gallbladder wall  thickening. Normal common bile duct 0.45 cm. Normal pancreatic  head and body. Pancreatic tail obscured by overlying bowel gas.  Normal right kidney 10.2 x 4.9 x 4.5 cm.      Impression:       CONCLUSION: Abnormal gallbladder. Cholelithiasis and gallbladder  wall thickening. This is suggestive of acute cholecystitis.    Normal common bile duct. Normal pancreatic head and body.  Pancreatic tail obscured by overlying bowel gas. Normal liver.  Normal right kidney.    Electronically signed by:  Alon Smith MD  2/14/2020 1:17 PM [facility]  Workstation: [id number]    CT Abdomen Pelvis Without Contrast [[id number]] Collected:  02/14/20 1133     Updated:  02/14/20 1208    Narrative:         Patient Name: CRISTIANO MARTIN    ORDERING: HARPAL DIAZ     ATTENDING: HARPAL DIAZ     REFERRING: HARPAL DIAZ    -----------------------  EXAM DESCRIPTION: CT ABDOMEN PELVIS WO CONTRAST    CLINICAL HISTORY: epigastric pain    COMPARISON: None    Dose Length Product: 386.3.    This exam was performed according to our departmental dose  optimization program, which includes automated exposure control,  adjustment of the mA and/or KV according to patient size and/or  use of iterative reconstruction technique.       TECHNIQUE: Multiple contiguous noncontrast axial images are  obtained of the abdomen and pelvis with coronal and sagittal  reformat images provided. Oral contrast agent was given.    FINDINGS:     LOWER CHEST: There is fibrotic change and/or linear atelectasis  within both lung bases. No consolidation or pleural effusion.  There is cardiac enlargement and coronary vascular calcification.  No pericardial effusion.    HEPATOBILIARY: Allowing for the lack of intravenous contrast no  suspicious hepatic lesion identified. The gallbladder is mildly  distended at 6.5 cm. The wall does appear uniformly mildly  prominent. There is no calcified stones within  the lumen. There  is stranding of the surrounding fat. This extends to involve the  region of the second portion of the duodenum in the jennifer hepatis  region. The findings however favor acute cholecystitis with  secondary duodenitis.  SPLEEN: Unremarkable.  PANCREAS: Unremarkable  ADRENAL GLANDS: Unremarkable.  KIDNEYS/URETERS: There is a 7.5 mm nonobstructive mid level left  renal calculi. Mild perinephric fat stranding present  bilaterally, likely chronic. No suspicious renal mass or  obstructive uropathy identified.    GASTROINTESTINAL: The oral contrast is seen within the stomach  and distributed throughout the small bowel reaching large bowel  to the level of the rectum. There is no obstruction. The terminal  ileum and ileocecal junction are unremarkable. A normal appendix  identified. There is colonic diverticulosis without findings of  acute diverticulitis.  REPRODUCTIVE ORGANS: Unremarkable.  URINARY BLADDER: Unremarkable    VASCULAR: Vascular calcification without abdominal aortic  aneurysm.  LYMPH NODES: No pathologically enlarged nodes by size criteria.  PERITONEUM/RETROPERITONEUM: No free air or free fluid..     OSSEOUS STRUCTURES: Degenerative changes within the spine and  hips. No acute finding identified.    ADDITIONAL FINDINGS: None      Impression:       There is mild distention of the gallbladder with prominent wall  and pericholecystic inflammatory changes. No calcified stones  seen. The findings are suspect for acute cholecystitis. There  appears to be associated duodenitis. Please correlate to the  clinical exam and laboratory data. Also, recommend follow-up with  gallbladder ultrasound.    Colonic diverticulosis.    Scarring and/or atelectasis within the lung bases.    Cardiomegaly.    7.5 mm nonobstructive left side nephrolith.    Electronically signed by:  Ciro Temple MD  2/14/2020 12:07 PM  CST Workstation: [id number]    XR Abdomen 2+ VW with Chest 1 VW [[id number]] Collected:  02/14/20 0904  "    Updated:  02/14/20 0929    Narrative:       PROCEDURE: Acute abdominal series with upright chest    COMPARISON: None.    HISTORY: upper abdominal pain    TECHNIQUE: Acute abdominal series with upright chest radiograph  was obtained.     FINDINGS:   The left-sided cardiac pacer leads in the regions of the right  atrium and right ventricle.  Question small bilateral pleural effusions.  Mild pulmonary vascular congestion.  The lungs otherwise appear clear.  Cardiac silhouette is slightly enlarged. Thoracic aorta contains  atherosclerotic calcification.  Osseous structures appear unremarkable.  There is fecal debris and bowel gas in nondilated colon.  Nonobstructive small bowel gas pattern.  Surgical clips are seen in the right axilla.  The bones appear demineralized.      Impression:       CONCLUSION:    No acute cardiopulmonary disease.  Normal bowel gas pattern.    Electronically signed by:  Christopher Calderón MD  2/14/2020 9:28 AM CST  Workstation: [id number]          Chief Complaint on Day of Discharge: none    Hospital Course:  79-year-old  female with a history of atrial fibrillation, hypertension, and hypothyroidism.  Patient states that she has had abdominal pain that started on that day prior to admission.  She states it is epigastric in nature.  She has never had anything like this before.  She denies nausea or vomiting.  She states that she has had \"mild fever\".    Pt was found to have acute cholecystitis and surgery was consulted.     S/p Laparoscopic cholecystectomy     Pt tolerated procedure well.     On 2/20/20, a Code stroke , stroke team assessed, did not believe she was having a true CVA/TIA.  This am she is awake, alert and conversant.  No neuro deficits to note of.  Her abdominal pain continues to improve    Pt is back to her baseline and has no complaints today.     Pt to f/u with pcp in 3 days  Condition on Discharge:  stable    Physical Exam on Discharge:  /85 (BP Location: Left arm, " "Patient Position: Lying)   Pulse 73   Temp 98.5 °F (36.9 °C) (Oral)   Resp 18   Ht 170.2 cm (67\")   Wt 83.3 kg (183 lb 11.2 oz)   LMP  (LMP Unknown)   SpO2 94%   BMI 28.77 kg/m²   Physical Exam  Constitutional: She is oriented to person, place, and time. NAD  HENT:   Head: Normocephalic and atraumatic.   Eyes: Pupils are equal, round, and reactive to light. EOM are normal. No scleral icterus.   Neck: Normal range of motion. Neck supple.   Cardiovascular: Normal rate and regular rhythm.   Pulmonary/Chest: Effort normal and breath sounds normal. No stridor. No respiratory distress.   Abdominal: Soft. She exhibits no mass. There is no tenderness. There is no guarding. No hernia.   Musculoskeletal: Normal range of motion. She exhibits no edema or tenderness. LEFT UE weakness  Neurological: She is alert and oriented to person, place, and time. She displays normal reflexes. No cranial nerve deficit. Coordination normal.   Skin: Skin is warm and dry. No rash noted.   Psychiatric: She has a normal mood and affect. Her behavior is normal.   Vitals reviewed    Discharge Disposition:  Home or Self Care    Discharge Medications:     Discharge Medications      Continue These Medications      Instructions Start Date   ALPRAZolam 0.25 MG tablet  Commonly known as:  XANAX   0.25 mg, Oral, 2 Times Daily PRN      amiodarone 200 MG tablet  Commonly known as:  PACERONE   200 mg, Oral, 2 Times Daily      carvedilol 12.5 MG tablet  Commonly known as:  COREG   18.75 mg, Oral, 2 Times Daily With Meals      dilTIAZem  MG 24 hr capsule  Commonly known as:  CARDIZEM CD   180 mg, Oral, Daily      esomeprazole 40 MG capsule  Commonly known as:  nexIUM   40 mg, Oral, Every Morning Before Breakfast      furosemide 20 MG tablet  Commonly known as:  LASIX   40 mg, Oral, Daily      HYDROcodone-acetaminophen  MG per tablet  Commonly known as:  NORCO   1 tablet, Oral, 2 Times Daily PRN      levothyroxine 50 MCG tablet  Commonly " known as:  SYNTHROID, LEVOTHROID   50 mcg, Oral, Daily      meclizine 25 MG tablet  Commonly known as:  ANTIVERT   25 mg, Oral, 3 Times Daily PRN      mirtazapine 15 MG tablet  Commonly known as:  REMERON   15 mg, Oral, Nightly      ondansetron 4 MG tablet  Commonly known as:  ZOFRAN   4 mg, Oral, Every 8 Hours PRN      potassium chloride 10 MEQ CR tablet  Commonly known as:  K-DUR,KLOR-CON   10 mEq, Oral, Daily      rivaroxaban 15 MG tablet  Commonly known as:  XARELTO   15 mg, Oral, Nightly             Discharge Diet:   Diet Instructions     Diet: Cardiac      Discharge Diet:  Cardiac          Activity at Discharge:   Activity Instructions     Activity as Tolerated            Discharge Care Plan/Instructions: f/u with pcp as recommended above    Follow-up Appointments:   No future appointments.    Test Results Pending at Discharge:     [unfilled]    Time: discharge took over 30 minutes

## 2020-02-21 NOTE — PLAN OF CARE
Problem: Patient Care Overview  Goal: Plan of Care Review  Outcome: Ongoing (interventions implemented as appropriate)  Flowsheets (Taken 2/21/2020 0805)  Progress: improving  Plan of Care Reviewed With: patient  Outcome Summary: Vss, pain controlled with with Po meds, resting between care, cont to monitor.

## 2020-02-21 NOTE — DISCHARGE PLACEMENT REQUEST
"  [address] [address]  Dept. Phone:  [phone]  Dept. Fax:   Date Ordered: 2020         Patient:  Sultana Lin MRN:  [id number]   [address] [place] [id number] :  1940  SSN:    Phone: [phone] Sex:  F     Weight: 83.3 kg (183 lb 11.2 oz)         Ht Readings from Last 1 Encounters:   20 170.2 cm (67\")         Walker               (Order ID: [id number])    Diagnosis:  Acute cholecystitis (K81.0 [ICD-10-CM] 575.0 [ICD-9-CM])  Impaired mobility and ADLs (Z74.09 [ICD-10-CM] 799.89 [ICD-9-CM])   Quantity:  1     Equipment:  Walker Folding with Wheels  Length of Need (99 Months = Lifetime): 99 Months = Lifetime        Authorizing Provider's Phone: [phone]   Authorizing Provider:Chace Jaramillo DO  Authorizing Provider's NPI: [id number]  Order Entered By: Chaec Jaramillo DO 2020 12:43 PM     Electronically signed by: Chace Jaramillo DO 2020 12:43 PM        "

## 2020-02-21 NOTE — PLAN OF CARE
Problem: Patient Care Overview  Goal: Plan of Care Review  2/21/2020 1259 by Yuniel Kimbrough PTA  Outcome: Ongoing (interventions implemented as appropriate)  Flowsheets (Taken 2/21/2020 1259)  Progress: improving  Plan of Care Reviewed With: patient  Outcome Summary: pt responded well to PT w/ increased gait to 196 ft x2 SBA w/ RW. pt requires SBA for t/fs and CGA for stair training. pt educated on HEP and home safety. pts family present for tx and education. no new goals met at this time. pt would continue to benefit from PT services.

## 2020-02-21 NOTE — DISCHARGE PLACEMENT REQUEST
"Cristiano Martin (79 y.o. Female)     Date of Birth Social Security Number Address Home Phone MRN    1940  [address] [place] [id number] [phone] [id number]    Hindu Marital Status          Baptism        Admission Date Admission Type Admitting Provider Attending Provider Department, Room/Bed    2/14/20 Emergency Bryon Cuenca MD Williams, Kevin L, MD [address], 365/1    Discharge Date Discharge Disposition Discharge Destination         Home or Self Care              Attending Provider:  Bryon Cuenca MD    Allergies:  Penicillins    Isolation:  None   Infection:  None   Code Status:  CPR    Ht:  170.2 cm (67\")   Wt:  83.3 kg (183 lb 11.2 oz)    Admission Cmt:  None   Principal Problem:  Acute cholecystitis [K81.0]                 Active Insurance as of 2/14/2020     Primary Coverage     Payor Plan Insurance Group Employer/Plan Group    MEDICARE MEDICARE A & B      Payor Plan Address Payor Plan Phone Number Payor Plan Fax Number Effective Dates    [address] [phone]  10/1/2004 - None Entered    [facility] [id number]       Subscriber Name Subscriber Birth Date Member ID       CRISTIANO MARTIN 1940 [id number]           Secondary Coverage     Payor Plan Insurance Group Employer/Plan Group    [facility] [id number]     Payor Plan Address Payor Plan Phone Number Payor Plan Fax Number Effective Dates    [address] [address] [phone]  11/1/2015 - None Entered    [facility] [id number]       Subscriber Name Subscriber Birth Date Member ID       ALLAN MARTIN 1940 [id number]                 Emergency Contacts      (Rel.) Home Phone Work Phone Mobile Phone    Jaki Hilario (Daughter) [phone] -- [phone]    farhana barney (Grandchild) [phone] -- [phone]          Emergency Contacts      (Rel.) Home Phone Work Phone Mobile Phone    Jaki Hilario (Daughter) [phone] -- [phone]    farhana barney " "(Grandchild) [phone] -- [phone]        [address] [address]  Dept. Phone:  [phone]  Dept. Fax:   Date Ordered: 2020         Patient:  Sultana Lin MRN:  [id number]   [address] [facility] [address] :  1940  SSN:    Phone: [phone] Sex:  F     Weight: 83.3 kg (183 lb 11.2 oz)         Ht Readings from Last 1 Encounters:   20 170.2 cm (67\")         Walker               (Order ID: [id number])    Diagnosis:  Cholecystitis (K81.9 [ICD-10-CM] 575.10 [ICD-9-CM])   Quantity:  1     Equipment:  Standard Walker Folding  Length of Need (99 Months = Lifetime): 99 Months = Lifetime        Authorizing Provider's Phone: [phone]   Authorizing Provider:Chace Jaramillo DO  Authorizing Provider's NPI: [id number]  Order Entered By: Chace Jaramillo DO 2020 11:17 AM     Electronically signed by: Chace Jaramillo DO 2020 11:17 AM        [address] [address]  Dept. Phone:  [phone]  Dept. Fax:   Date Ordered: 2020         Patient:  Sultana Lin MRN:  [id number]   [address] [facility] [address] :  1940  SSN:    Phone: [phone] Sex:  F     Weight: 83.3 kg (183 lb 11.2 oz)         Ht Readings from Last 1 Encounters:   20 170.2 cm (67\")         Commode Chair          (Order ID: [id number])    Diagnosis:  Cholecystitis (K81.9 [ICD-10-CM] 575.10 [ICD-9-CM])   Quantity:  1     Equipment:  Bedside Commode Chair w/Fixed Arms  Length of Need (99 Months = Lifetime): 99 Months = Lifetime        Authorizing Provider's Phone: [phone]   Authorizing Provider:Chace Jaramillo DO  Authorizing Provider's NPI: [id number]  Order Entered By: Chace Jaramillo DO 2020 11:17 AM     Electronically signed by: Chace Jaramillo DO 2020 11:17 AM               History & Physical "      Denilson Richardson MD at 02/17/20 1519        H&P reviewed. The patient was examined and there are no changes to the H&P.      Temp:  [96.9 °F (36.1 °C)-98.6 °F (37 °C)] 97.9 °F (36.6 °C)  Heart Rate:  [62-82] 70  Resp:  [16-20] 20  BP: (121-182)/(53-86) 182/86      Electronically signed by Denilson Richardson MD at 02/17/20 1520   Source Note          GENERAL SURGERY PROGRESS NOTE     LOS: 1 day     Chief Complaint:    Sultana Lin is an 79 year old lady who presented to the ED on 2/14 with abdominal pain.    Interval History:     Ms. Lin reports being in moderate to severe discomfort throughout the night. The pain is most prominent in the epigastric region, and IV Dilaudid has not helped. She is tolerating a clear liquid diet with no nausea/vomiting. She had a bowel movement yesterday, and is urinating without issue. She has been off Xarelto for 3 days now, and remains agreeable to cholecystectomy tomorrow.    Medication Review:     amiodarone 200 mg Oral BID   carvedilol 18.75 mg Oral BID With Meals   cefTRIAXone 1 g Intravenous Q24H   dilTIAZem  mg Oral Daily   furosemide 40 mg Oral Daily   levothyroxine 50 mcg Oral Daily   metroNIDAZOLE 500 mg Intravenous Q8H   mirtazapine 15 mg Oral Nightly   pantoprazole 40 mg Oral QAM   sodium chloride 10 mL Intravenous Q12H         sodium chloride 125 mL/hr Last Rate: 125 mL/hr (02/15/20 2352)   Objective     Vital Signs:  Temp:  [96.2 °F (35.7 °C)-98.4 °F (36.9 °C)] 98.1 °F (36.7 °C)  Heart Rate:  [74-84] 78  Resp:  [16-18] 18  BP: ()/(55-80) 136/63    Intake/Output Summary (Last 24 hours) at 2/16/2020 0612  Last data filed at 2/15/2020 1844  Gross per 24 hour   Intake 1540 ml   Output --   Net 1540 ml       Physical Exam    General:  Ill-appearing lady in moderate distress  Pulmonary:   Lungs clear to auscultation bilaterally. No increased work of breathing.  Cardiovascular:  Heart regular rate and rhythm with no murmurs, gallops, rales. Radial pulses strong  bilaterally.  Abdominal: Soft and non-distended. Normal bowel sounds. Upper quadrants are tender to palpation.  Skin:  Warm and dry.  No rashes.    Results Review:    Results from last 7 days   Lab Units 02/14/20  0847   SODIUM mmol/L 139   POTASSIUM mmol/L 3.4*   CHLORIDE mmol/L 102   CO2 mmol/L 23.0   BUN mg/dL 16   CREATININE mg/dL 1.20*   GLUCOSE mg/dL 116*   CALCIUM mg/dL 8.8     Results from last 7 days   Lab Units 02/14/20  0847   WBC 10*3/mm3 10.89*   HEMOGLOBIN g/dL 11.1*   HEMATOCRIT % 33.8*   PLATELETS 10*3/mm3 119*       Assessment:    Acute cholecystitis    Hypertension    Sultana Lin is a 79 year old lady who is post-admission day 2 for acute cholecystitis. She is stable, though in significant discomfort.    Plan:    - Continue clear liquid diet, begin NPO at midnight  - Continue Dilaudid 1 mg IV q3h for pain  - Laparoscopic cholecystectomy tomorrow    Mario Gage, MS3    What are the indications that have led your doctor to the opinion that an operation is necessary?    * Symptoms and studies indicate that there is gallbladder disease causing pain the abdomen.    What, if any, alternative treatments are available for your condition?    * Watching and waiting with no surgery  * Removing the gallbladder through one large incision made in the abdomen.    What will be the likely result if you don't have the operation?    * Pain, infection, inflammation, and/or stones may continue or get worse    What are the basic procedures involved in the operation?    * The surgery may be done laparoscopically. Laparoscopic surgery is done using a scope and hollow tube(s) called ports. These are inserted through small cuts in the abdomen. A scope is a thin, lighted instrument with a camera attached. Tools are passed through the ports. Carbon dioxide gas is pumped into the abdomen to create workspace.   If the surgery cannot be performed with a scope, it may be done through a larger cut. This occurs 2% of the  time.  The gall bladder will be removed after it is  from the liver, bile duct, and surrounding arteries. An x-ray of the bile ducts is usually performed with contrast dye injected into the ducts.  If stones are found, another procedure may be required to remove them.  A drain may rarely be inserted to keep fluid from building up in the treatment area.     What are the risks?    * Exposure to radiation. Pregnant women and women of childbearing age should talk with their doctor about this.  * Pain, numbness, swelling, weakness or scarring where tissue is cut.  * The gas used in laparoscopic procedures to inflate the abdomen can become trapped in tissues. Gas in the bloodstream can dangerously affect blood flow and  heart function.  * The procedure may not cure or relieve your condition or symptoms. They may come back and even worsen.  * You may need additional tests or treatment.  * Bleeding. You may need blood transfusions or other treatments. This may be discovered during the procedure, or later.  * Embolism. An embolism is an object that moves through your body in your bloodstream. It can be an air bubble, a blood clot, a piece of fat or other material. It can block a blood vessel. This can lead to stroke, pulmonary embolism (blockage of the main artery of the lung), or injury to organs or extremities.  * Reactions to dye used for imaging. These may include hives, swelling of the face and/or throat, difficulty breathing, and kidney failure.  * Retained stones in bile ducts.  * Wound infection, poor healing or reopening. Blood or clear fluid can also collect at the wound site(s).  * Damage to the bile ducts or nearby structures. This may be discovered during the procedure, or later.  * Incisional hernia. Weak scar tissue may allow tissue to bulge through the cut.  * The instrument(s) placed in your abdomen can cause injuries to nearby structures.  * Death.    How is the operation expected to improve your  health or quality of life?    * Operation is expected to decrease pain and nausea/vomiting associated with gallstones.  * This procedure may relieve or prevent infection, inflammation, and/or pain from stones or blockage of bile ducts.    Is hospitalization necessary and, if so, how long can you expect to be hospitalized?    * Most often, the operation is performed on an outpatient basis. Occasionally hospitalization is necessary for pain or nausea control    What can you expect during your recovery period?    * The gas used in laparoscopic procedures to inflate the abdomen can become trapped    in tissues. Shoulder pain may occur for a few days after surgery.   * Nausea and pain control are obtained with oral medication.  * If you are on metformin, it will need to be held for 48 hours after surgery    When can you expect to resume normal activities?    * Normal activity can be resumed in 10-14 days if the procedure is performed laparoscopically. Open operations require no lifting or straining for 6 weeks.     Are there likely to be residual effects from the operation?    * Diarrhea often occurs, mostly temporary. Occasionally there is still minor food intolerance.    All questions were answered. The patient agrees to operation.          This document has been electronically signed by Denilson Richardson MD on February 16, 2020 10:39 AM            Electronically signed by Denilson Richardson MD at 02/16/20 1039             Denilson Richardson MD at 02/17/20 2582        H&P reviewed. The patient was examined and there are no changes to the H&P.      Temp:  [96.9 °F (36.1 °C)-98.6 °F (37 °C)] 97.9 °F (36.6 °C)  Heart Rate:  [62-82] 70  Resp:  [16-20] 20  BP: (121-182)/(53-86) 182/86      Electronically signed by Denilson Richardson MD at 02/17/20 1528   Source Note          GENERAL SURGERY PROGRESS NOTE     LOS: 1 day     Chief Complaint:    Sultana Lin is an 79 year old lady who presented to the ED on 2/14 with abdominal pain.    Interval  History:     Remains in pain.  Ready to proceed to OR.  Abdomen with RLQ ttp. No other complaints.     Medication Review:     acetaminophen 650 mg Oral Once   albuterol 2.5 mg Nebulization Once   amiodarone 200 mg Oral BID   carvedilol 18.75 mg Oral BID With Meals   cefTRIAXone 1 g Intravenous Q24H   dilTIAZem  mg Oral Daily   furosemide 40 mg Oral Daily   ipratropium-albuterol 3 mL Nebulization Q6H - RT   levothyroxine 50 mcg Oral Daily   metroNIDAZOLE 500 mg Intravenous Q8H   mirtazapine 15 mg Oral Nightly   pantoprazole 40 mg Oral QAM   sodium chloride 10 mL Intravenous Q12H   sodium chloride 3 mL Intravenous Q12H         lactated ringers 100 mL/hr    sodium chloride 125 mL/hr Last Rate: 125 mL/hr (02/17/20 0356)   Objective     Vital Signs:  Temp:  [96 °F (35.6 °C)-98.6 °F (37 °C)] 98.1 °F (36.7 °C)  Heart Rate:  [63-86] 64  Resp:  [16-24] 16  BP: (121-144)/(53-69) 144/69    Intake/Output Summary (Last 24 hours) at 2/17/2020 0552  Last data filed at 2/17/2020 0138  Gross per 24 hour   Intake 1700 ml   Output 250 ml   Net 1450 ml       Physical Exam    General: Ill-appearing lady in moderate distress  Pulmonary:  Lungs clear to auscultation bilaterally. No increased work of breathing.  Cardiovascular: Heart regular rate and rhythm with no murmurs, gallops, rales. Radial pulses strong bilaterally.  Abdominal: Soft and non-distended. Normal bowel sounds. Upper quadrants are tender to palpation.  Skin:  Warm and dry. No rashes.    Results Review:    Results from last 7 days   Lab Units 02/14/20  0847   SODIUM mmol/L 139   POTASSIUM mmol/L 3.4*   CHLORIDE mmol/L 102   CO2 mmol/L 23.0   BUN mg/dL 16   CREATININE mg/dL 1.20*   GLUCOSE mg/dL 116*   CALCIUM mg/dL 8.8     Results from last 7 days   Lab Units 02/14/20  0847   WBC 10*3/mm3 10.89*   HEMOGLOBIN g/dL 11.1*   HEMATOCRIT % 33.8*   PLATELETS 10*3/mm3 119*       Assessment:    Acute cholecystitis    Hypertension    Sultana Lin is a 79 year old lady who  presents with acute cholecystitis. She is stable, though in discomfort.    Plan:  - NPO  - PRNs pain  - Laparoscopic cholecystectomy today    This document has been electronically signed by Aime Looney MD on February 17, 2020 5:52 AM            Electronically signed by Denilson Richardson MD at 02/17/20 0900             Denilson Richardson MD at 02/17/20 0552          GENERAL SURGERY PROGRESS NOTE     LOS: 1 day     Chief Complaint:    Sultana Lin is an 79 year old lady who presented to the ED on 2/14 with abdominal pain.    Interval History:     Remains in pain.  Ready to proceed to OR.  Abdomen with RLQ ttp. No other complaints.     Medication Review:     acetaminophen 650 mg Oral Once   albuterol 2.5 mg Nebulization Once   amiodarone 200 mg Oral BID   carvedilol 18.75 mg Oral BID With Meals   cefTRIAXone 1 g Intravenous Q24H   dilTIAZem  mg Oral Daily   furosemide 40 mg Oral Daily   ipratropium-albuterol 3 mL Nebulization Q6H - RT   levothyroxine 50 mcg Oral Daily   metroNIDAZOLE 500 mg Intravenous Q8H   mirtazapine 15 mg Oral Nightly   pantoprazole 40 mg Oral QAM   sodium chloride 10 mL Intravenous Q12H   sodium chloride 3 mL Intravenous Q12H         lactated ringers 100 mL/hr    sodium chloride 125 mL/hr Last Rate: 125 mL/hr (02/17/20 0356)   Objective     Vital Signs:  Temp:  [96 °F (35.6 °C)-98.6 °F (37 °C)] 98.1 °F (36.7 °C)  Heart Rate:  [63-86] 64  Resp:  [16-24] 16  BP: (121-144)/(53-69) 144/69    Intake/Output Summary (Last 24 hours) at 2/17/2020 0552  Last data filed at 2/17/2020 0138  Gross per 24 hour   Intake 1700 ml   Output 250 ml   Net 1450 ml       Physical Exam    General: Ill-appearing lady in moderate distress  Pulmonary:  Lungs clear to auscultation bilaterally. No increased work of breathing.  Cardiovascular: Heart regular rate and rhythm with no murmurs, gallops, rales. Radial pulses strong bilaterally.  Abdominal: Soft and non-distended. Normal bowel sounds. Upper quadrants are  tender to palpation.  Skin:  Warm and dry. No rashes.    Results Review:    Results from last 7 days   Lab Units 02/14/20  0847   SODIUM mmol/L 139   POTASSIUM mmol/L 3.4*   CHLORIDE mmol/L 102   CO2 mmol/L 23.0   BUN mg/dL 16   CREATININE mg/dL 1.20*   GLUCOSE mg/dL 116*   CALCIUM mg/dL 8.8     Results from last 7 days   Lab Units 02/14/20  0847   WBC 10*3/mm3 10.89*   HEMOGLOBIN g/dL 11.1*   HEMATOCRIT % 33.8*   PLATELETS 10*3/mm3 119*       Assessment:    Acute cholecystitis    Hypertension    Sultana Lin is a 79 year old lady who presents with acute cholecystitis. She is stable, though in discomfort.    Plan:  - NPO  - PRNs pain  - Laparoscopic cholecystectomy today    This document has been electronically signed by Aime Looney MD on February 17, 2020 5:52 AM            Electronically signed by Denilson Richardson MD at 02/17/20 0900     Denilson Richardson MD at 02/16/20 0612          GENERAL SURGERY PROGRESS NOTE     LOS: 1 day     Chief Complaint:    Sultana Lin is an 79 year old lady who presented to the ED on 2/14 with abdominal pain.    Interval History:     Ms. Lin reports being in moderate to severe discomfort throughout the night. The pain is most prominent in the epigastric region, and IV Dilaudid has not helped. She is tolerating a clear liquid diet with no nausea/vomiting. She had a bowel movement yesterday, and is urinating without issue. She has been off Xarelto for 3 days now, and remains agreeable to cholecystectomy tomorrow.    Medication Review:     amiodarone 200 mg Oral BID   carvedilol 18.75 mg Oral BID With Meals   cefTRIAXone 1 g Intravenous Q24H   dilTIAZem  mg Oral Daily   furosemide 40 mg Oral Daily   levothyroxine 50 mcg Oral Daily   metroNIDAZOLE 500 mg Intravenous Q8H   mirtazapine 15 mg Oral Nightly   pantoprazole 40 mg Oral QAM   sodium chloride 10 mL Intravenous Q12H         sodium chloride 125 mL/hr Last Rate: 125 mL/hr (02/15/20 9852)   Objective     Vital  Signs:  Temp:  [96.2 °F (35.7 °C)-98.4 °F (36.9 °C)] 98.1 °F (36.7 °C)  Heart Rate:  [74-84] 78  Resp:  [16-18] 18  BP: ()/(55-80) 136/63    Intake/Output Summary (Last 24 hours) at 2/16/2020 0612  Last data filed at 2/15/2020 1844  Gross per 24 hour   Intake 1540 ml   Output --   Net 1540 ml       Physical Exam    General:  Ill-appearing lady in moderate distress  Pulmonary:   Lungs clear to auscultation bilaterally. No increased work of breathing.  Cardiovascular:  Heart regular rate and rhythm with no murmurs, gallops, rales. Radial pulses strong bilaterally.  Abdominal: Soft and non-distended. Normal bowel sounds. Upper quadrants are tender to palpation.  Skin:  Warm and dry.  No rashes.    Results Review:    Results from last 7 days   Lab Units 02/14/20  0847   SODIUM mmol/L 139   POTASSIUM mmol/L 3.4*   CHLORIDE mmol/L 102   CO2 mmol/L 23.0   BUN mg/dL 16   CREATININE mg/dL 1.20*   GLUCOSE mg/dL 116*   CALCIUM mg/dL 8.8     Results from last 7 days   Lab Units 02/14/20  0847   WBC 10*3/mm3 10.89*   HEMOGLOBIN g/dL 11.1*   HEMATOCRIT % 33.8*   PLATELETS 10*3/mm3 119*       Assessment:    Acute cholecystitis    Hypertension    Sultana Lin is a 79 year old lady who is post-admission day 2 for acute cholecystitis. She is stable, though in significant discomfort.    Plan:    - Continue clear liquid diet, begin NPO at midnight  - Continue Dilaudid 1 mg IV q3h for pain  - Laparoscopic cholecystectomy tomorrow    Mario Gage, MS3    What are the indications that have led your doctor to the opinion that an operation is necessary?    * Symptoms and studies indicate that there is gallbladder disease causing pain the abdomen.    What, if any, alternative treatments are available for your condition?    * Watching and waiting with no surgery  * Removing the gallbladder through one large incision made in the abdomen.    What will be the likely result if you don't have the operation?    * Pain, infection,  inflammation, and/or stones may continue or get worse    What are the basic procedures involved in the operation?    * The surgery may be done laparoscopically. Laparoscopic surgery is done using a scope and hollow tube(s) called ports. These are inserted through small cuts in the abdomen. A scope is a thin, lighted instrument with a camera attached. Tools are passed through the ports. Carbon dioxide gas is pumped into the abdomen to create workspace.   If the surgery cannot be performed with a scope, it may be done through a larger cut. This occurs 2% of the time.  The gall bladder will be removed after it is  from the liver, bile duct, and surrounding arteries. An x-ray of the bile ducts is usually performed with contrast dye injected into the ducts.  If stones are found, another procedure may be required to remove them.  A drain may rarely be inserted to keep fluid from building up in the treatment area.     What are the risks?    * Exposure to radiation. Pregnant women and women of childbearing age should talk with their doctor about this.  * Pain, numbness, swelling, weakness or scarring where tissue is cut.  * The gas used in laparoscopic procedures to inflate the abdomen can become trapped in tissues. Gas in the bloodstream can dangerously affect blood flow and  heart function.  * The procedure may not cure or relieve your condition or symptoms. They may come back and even worsen.  * You may need additional tests or treatment.  * Bleeding. You may need blood transfusions or other treatments. This may be discovered during the procedure, or later.  * Embolism. An embolism is an object that moves through your body in your bloodstream. It can be an air bubble, a blood clot, a piece of fat or other material. It can block a blood vessel. This can lead to stroke, pulmonary embolism (blockage of the main artery of the lung), or injury to organs or extremities.  * Reactions to dye used for imaging. These may  include hives, swelling of the face and/or throat, difficulty breathing, and kidney failure.  * Retained stones in bile ducts.  * Wound infection, poor healing or reopening. Blood or clear fluid can also collect at the wound site(s).  * Damage to the bile ducts or nearby structures. This may be discovered during the procedure, or later.  * Incisional hernia. Weak scar tissue may allow tissue to bulge through the cut.  * The instrument(s) placed in your abdomen can cause injuries to nearby structures.  * Death.    How is the operation expected to improve your health or quality of life?    * Operation is expected to decrease pain and nausea/vomiting associated with gallstones.  * This procedure may relieve or prevent infection, inflammation, and/or pain from stones or blockage of bile ducts.    Is hospitalization necessary and, if so, how long can you expect to be hospitalized?    * Most often, the operation is performed on an outpatient basis. Occasionally hospitalization is necessary for pain or nausea control    What can you expect during your recovery period?    * The gas used in laparoscopic procedures to inflate the abdomen can become trapped    in tissues. Shoulder pain may occur for a few days after surgery.   * Nausea and pain control are obtained with oral medication.  * If you are on metformin, it will need to be held for 48 hours after surgery    When can you expect to resume normal activities?    * Normal activity can be resumed in 10-14 days if the procedure is performed laparoscopically. Open operations require no lifting or straining for 6 weeks.     Are there likely to be residual effects from the operation?    * Diarrhea often occurs, mostly temporary. Occasionally there is still minor food intolerance.    All questions were answered. The patient agrees to operation.          This document has been electronically signed by Denilson Richardson MD on February 16, 2020 10:39 AM            Electronically  "signed by Denilson Richardson MD at 02/16/20 1039     Bryon Cuenca MD at 02/14/20 1811                [facility] Medicine Admission      Date of Admission: 2/14/2020      Primary Care Physician: Sandip Lozano MD      Chief Complaint: Abdominal pain    HPI: Patient is a 79-year-old  female with a history of atrial fibrillation, hypertension, and hypothyroidism.  Patient states that she has had abdominal pain that started on that day prior to admission.  She states it is epigastric in nature.  She has never had anything like this before.  She denies nausea or vomiting.  She states that she has had \"mild fever\".      Concurrent Medical History:  has a past medical history of Arthritis, Atrial fibrillation (CMS/HCC), Atrial fibrillation (CMS/HCC), Atrial fibrillation with rapid ventricular response (CMS/HCC) (1/20/2017), Breast cancer (CMS/HCC), Cancer (CMS/HCC), Disease of thyroid gland, and Hypertension.    Past Surgical History:  has a past surgical history that includes Pacemaker Implantation; Breast surgery; Skin biopsy; Cardioversion; Ablation of dysrhythmic focus; Cardiac electrophysiology procedure (N/A, 4/6/2017); Insert / replace / remove pacemaker; Skin tag removal; Cataract extraction w/ intraocular lens implant (Right, 8/9/2019); and Cataract extraction w/ intraocular lens implant (Left, 8/16/2019).    Family History: family history includes Hypertension in her daughter and son; No Known Problems in her father and mother.     Social History:  reports that she has never smoked. She has quit using smokeless tobacco.  Her smokeless tobacco use included snuff and chew. She reports that she does not drink alcohol or use drugs.    Allergies:   Allergies   Allergen Reactions   • Penicillins Rash       Medications:   Prior to Admission medications    Medication Sig Start Date End Date Taking? Authorizing Provider   ALPRAZolam (XANAX) 0.25 MG tablet Take 0.25 mg by " mouth 2 (Two) Times a Day As Needed for Anxiety.   Yes Bradly Montana MD   amiodarone (PACERONE) 200 MG tablet Take 200 mg by mouth 2 (Two) Times a Day.   Yes Bradly Montana MD   carvedilol (COREG) 12.5 MG tablet Take 1.5 tablets by mouth 2 (Two) Times a Day With Meals. 9/21/17  Yes Cheli Monsalve APRN   dilTIAZem CD (CARDIZEM CD) 180 MG 24 hr capsule Take 180 mg by mouth Daily.   Yes Bradly Montana MD   esomeprazole (nexIUM) 40 MG capsule Take 40 mg by mouth Every Morning Before Breakfast.   Yes Bradly Montana MD   furosemide (LASIX) 20 MG tablet Take 40 mg by mouth Daily.   Yes Bradly Montana MD   HYDROcodone-acetaminophen (NORCO)  MG per tablet Take 1 tablet by mouth 2 (Two) Times a Day As Needed for moderate pain (4-6).   Yes Bradly Montana MD   levothyroxine (SYNTHROID, LEVOTHROID) 50 MCG tablet Take 50 mcg by mouth Daily.   Yes Bradly Montaan MD   meclizine (ANTIVERT) 25 MG tablet Take 25 mg by mouth 3 (three) times a day as needed for dizziness.   Yes Bradly Montana MD   mirtazapine (REMERON) 15 MG tablet Take 15 mg by mouth Every Night.   Yes Bradly Montana MD   ondansetron (ZOFRAN) 4 MG tablet Take 4 mg by mouth Every 8 (Eight) Hours As Needed for Nausea or Vomiting.   Yes Bradly Montana MD   potassium chloride (K-DUR,KLOR-CON) 10 MEQ CR tablet Take 10 mEq by mouth daily.   Yes Bradly Montana MD   rivaroxaban (XARELTO) 15 MG tablet Take 15 mg by mouth Every Night.   Yes Bradly Montana MD       Review of Systems:  Review of Systems   Constitutional: Negative for appetite change, chills, fatigue, fever and unexpected weight change.   HENT: Negative for congestion, facial swelling, hearing loss, nosebleeds, rhinorrhea, sneezing, trouble swallowing and voice change.    Eyes: Negative for photophobia and visual disturbance.   Respiratory: Negative for apnea, cough, choking, chest tightness, shortness of breath, wheezing and  stridor.    Cardiovascular: Negative for chest pain, palpitations and leg swelling.   Gastrointestinal: Positive for abdominal distention and abdominal pain. Negative for blood in stool, constipation, diarrhea, nausea and vomiting.   Endocrine: Negative for cold intolerance, heat intolerance, polydipsia, polyphagia and polyuria.   Genitourinary: Negative for dysuria, flank pain and hematuria.   Musculoskeletal: Negative for arthralgias, back pain, myalgias and neck pain.   Skin: Negative for rash and wound.   Allergic/Immunologic: Negative for immunocompromised state.   Neurological: Negative for dizziness, seizures, syncope, speech difficulty, weakness, light-headedness, numbness and headaches.   Hematological: Does not bruise/bleed easily.   Psychiatric/Behavioral: Negative for agitation, behavioral problems, confusion, decreased concentration, hallucinations, self-injury and suicidal ideas. The patient is not nervous/anxious.       Otherwise complete ROS is negative except as mentioned above.    Physical Exam:   Temp:  [97.3 °F (36.3 °C)-98.3 °F (36.8 °C)] 97.3 °F (36.3 °C)  Heart Rate:  [74-83] 75  Resp:  [18] 18  BP: (115-138)/(55-72) 132/59     Physical Exam   Constitutional: She is oriented to person, place, and time. She appears well-developed and well-nourished.   HENT:   Head: Normocephalic and atraumatic.   Nose: Nose normal.   Mouth/Throat: Oropharynx is clear and moist.   Eyes: Pupils are equal, round, and reactive to light. Conjunctivae, EOM and lids are normal. No scleral icterus.   Neck: Normal range of motion. Neck supple. No JVD present. No tracheal tenderness and no spinous process tenderness present. No neck rigidity. No tracheal deviation, no edema and normal range of motion present.   Cardiovascular: Normal rate, regular rhythm, S1 normal, S2 normal, normal heart sounds and normal pulses. Exam reveals no gallop and no friction rub.   No murmur heard.  Pulmonary/Chest: Effort normal and breath  sounds normal. No accessory muscle usage. No respiratory distress. She has no decreased breath sounds. She has no wheezes. She has no rales. She exhibits no tenderness.   Abdominal: Soft. She exhibits no distension and no mass. Bowel sounds are decreased. There is generalized tenderness. There is no rebound and no guarding.   Musculoskeletal: She exhibits no edema or tenderness.   Neurological: She is alert and oriented to person, place, and time. She has normal reflexes. She displays no atrophy and normal reflexes. No cranial nerve deficit or sensory deficit. She exhibits normal muscle tone. She displays no seizure activity. Coordination normal.   Skin: Skin is warm. No rash noted. No pallor.   Psychiatric: She has a normal mood and affect. Her behavior is normal. Judgment and thought content normal.         Results Reviewed:  I have personally reviewed current lab, radiology, and data and agree with results.  Lab Results (last 24 hours)     Procedure Component Value Units Date/Time    Lactic Acid, Reflex [[id number]]  (Normal) Collected:  02/14/20 1314    Specimen:  Blood Updated:  02/14/20 1333     Lactate 1.9 mmol/L     Lactic Acid, Reflex Timer (This will reflex a repeat order 3-3:15 hours after ordered.) [[id number]] Collected:  02/14/20 0847    Specimen:  Blood Updated:  02/14/20 1230     Extra Tube Hold for add-ons.     Comment: Auto resulted.       Urinalysis With Microscopic If Indicated (No Culture) - Urine, Catheter [[id number]]  (Abnormal) Collected:  02/14/20 0946    Specimen:  Urine, Catheter Updated:  02/14/20 1007     Color, UA Yellow     Appearance, UA Cloudy     pH, UA 6.5     Specific Gravity, UA 1.017     Glucose, UA Negative     Ketones, UA Negative     Bilirubin, UA Negative     Blood, UA Moderate (2+)     Protein, UA 30 mg/dL (1+)     Leuk Esterase, UA Negative     Nitrite, UA Negative     Urobilinogen, UA 1.0 E.U./dL    Urinalysis, Microscopic Only - Urine, Catheter [[id number]]  (Abnormal)  Collected:  02/14/20 0946    Specimen:  Urine, Catheter Updated:  02/14/20 1007     RBC, UA 6-12 /HPF      WBC, UA 3-5 /HPF      Bacteria, UA 4+ /HPF      Squamous Epithelial Cells, UA None Seen /HPF      Hyaline Casts, UA None Seen /LPF      Methodology Automated Microscopy    [place] Draw [[id number]] Collected:  02/14/20 0847    Specimen:  Blood Updated:  02/14/20 1000    Narrative:       The following orders were created for panel order [place] Draw.  Procedure                               Abnormality         Status                     ---------                               -----------         ------                     Light Blue Top[[id number]]                                   Final result               Green Top (Gel)[[id number]]                                  Final result               Lavender Top[[id number]]                                     Final result               Gold Top - SST[[id number]]                                   Final result                 Please view results for these tests on the individual orders.    Light Blue Top [[id number]] Collected:  02/14/20 0847    Specimen:  Blood Updated:  02/14/20 1000     Extra Tube hold for add-on     Comment: Auto resulted       Green Top (Gel) [[id number]] Collected:  02/14/20 0847    Specimen:  Blood Updated:  02/14/20 1000     Extra Tube Hold for add-ons.     Comment: Auto resulted.       Lavender Top [[id number]] Collected:  02/14/20 0847    Specimen:  Blood Updated:  02/14/20 1000     Extra Tube hold for add-on     Comment: Auto resulted       Gold Top - SST [[id number]] Collected:  02/14/20 0847    Specimen:  Blood Updated:  02/14/20 1000     Extra Tube Hold for add-ons.     Comment: Auto resulted.       Lactic Acid, Plasma [[id number]]  (Abnormal) Collected:  02/14/20 0847    Specimen:  Blood Updated:  02/14/20 0922     Lactate 2.3 mmol/L     Comprehensive Metabolic Panel [[id number]]  (Abnormal) Collected:  02/14/20 0847    Specimen:  Blood Updated:   02/14/20 0919     Glucose 116 mg/dL      BUN 16 mg/dL      Creatinine 1.20 mg/dL      Sodium 139 mmol/L      Potassium 3.4 mmol/L      Chloride 102 mmol/L      CO2 23.0 mmol/L      Calcium 8.8 mg/dL      Total Protein 6.8 g/dL      Albumin 3.80 g/dL      ALT (SGPT) 6 U/L      AST (SGOT) 15 U/L      Alkaline Phosphatase 68 U/L      Total Bilirubin 1.4 mg/dL      eGFR Non African Amer 43 mL/min/1.73      Globulin 3.0 gm/dL      A/G Ratio 1.3 g/dL      BUN/Creatinine Ratio 13.3     Anion Gap 14.0 mmol/L     Narrative:       GFR Normal >60  Chronic Kidney Disease <60  Kidney Failure <15      Lipase [[id number]]  (Normal) Collected:  02/14/20 0847    Specimen:  Blood Updated:  02/14/20 0918     Lipase 16 U/L     Troponin [[id number]]  (Normal) Collected:  02/14/20 0847    Specimen:  Blood Updated:  02/14/20 0918     Troponin T <0.010 ng/mL     Narrative:       Troponin T Reference Range:  <= 0.03 ng/mL-   Negative for AMI  >0.03 ng/mL-     Abnormal for myocardial necrosis.  Clinicians would have to utilize clinical acumen, EKG, Troponin and serial changes to determine if it is an Acute Myocardial Infarction or myocardial injury due to an underlying chronic condition.       Results may be falsely decreased if patient taking Biotin.      CBC & Differential [[id number]] Collected:  02/14/20 0847    Specimen:  Blood Updated:  02/14/20 0903    Narrative:       The following orders were created for panel order CBC & Differential.  Procedure                               Abnormality         Status                     ---------                               -----------         ------                     CBC Auto Differential[[id number]]        Abnormal            Final result                 Please view results for these tests on the individual orders.    CBC Auto Differential [[id number]]  (Abnormal) Collected:  02/14/20 0847    Specimen:  Blood Updated:  02/14/20 0903     WBC 10.89 10*3/mm3      RBC 3.68 10*6/mm3      Hemoglobin  11.1 g/dL      Hematocrit 33.8 %      MCV 91.8 fL      MCH 30.2 pg      MCHC 32.8 g/dL      RDW 13.4 %      RDW-SD 45.1 fl      MPV 11.2 fL      Platelets 119 10*3/mm3      Neutrophil % 83.9 %      Lymphocyte % 6.4 %      Monocyte % 8.2 %      Eosinophil % 0.1 %      Basophil % 0.6 %      Immature Grans % 0.8 %      Neutrophils, Absolute 9.14 10*3/mm3      Lymphocytes, Absolute 0.70 10*3/mm3      Monocytes, Absolute 0.89 10*3/mm3      Eosinophils, Absolute 0.01 10*3/mm3      Basophils, Absolute 0.06 10*3/mm3      Immature Grans, Absolute 0.09 10*3/mm3      nRBC 0.0 /100 WBC         Imaging Results (Last 24 Hours)     Procedure Component Value Units Date/Time    US Gallbladder [[id number]] Collected:  02/14/20 1230     Updated:  02/14/20 1318    Narrative:       Ultrasound gallbladder, right upper quadrant.    HISTORY: Cholecystitis, abdominal pain.    Prior exam: CT February 14, 2020.    FINDINGS: Normal liver. No masses or intrahepatic biliary  dilatation.    Abnormal gallbladder. Numerous stones. Gallbladder wall  thickening. Normal common bile duct 0.45 cm. Normal pancreatic  head and body. Pancreatic tail obscured by overlying bowel gas.  Normal right kidney 10.2 x 4.9 x 4.5 cm.      Impression:       CONCLUSION: Abnormal gallbladder. Cholelithiasis and gallbladder  wall thickening. This is suggestive of acute cholecystitis.    Normal common bile duct. Normal pancreatic head and body.  Pancreatic tail obscured by overlying bowel gas. Normal liver.  Normal right kidney.    Electronically signed by:  Alon Smith MD  2/14/2020 1:17 PM [facility]  Workstation: [id number]    CT Abdomen Pelvis Without Contrast [[id number]] Collected:  02/14/20 1133     Updated:  02/14/20 1208    Narrative:         Patient Name: CRISTIANO MARTIN    ORDERING: HARPAL DIAZ     ATTENDING: HARPAL DIAZ     REFERRING: HARPAL DIAZ    -----------------------  EXAM DESCRIPTION: CT ABDOMEN PELVIS WO CONTRAST    CLINICAL HISTORY: epigastric  pain    COMPARISON: None    Dose Length Product: 386.3.    This exam was performed according to our departmental dose  optimization program, which includes automated exposure control,  adjustment of the mA and/or KV according to patient size and/or  use of iterative reconstruction technique.       TECHNIQUE: Multiple contiguous noncontrast axial images are  obtained of the abdomen and pelvis with coronal and sagittal  reformat images provided. Oral contrast agent was given.    FINDINGS:     LOWER CHEST: There is fibrotic change and/or linear atelectasis  within both lung bases. No consolidation or pleural effusion.  There is cardiac enlargement and coronary vascular calcification.  No pericardial effusion.    HEPATOBILIARY: Allowing for the lack of intravenous contrast no  suspicious hepatic lesion identified. The gallbladder is mildly  distended at 6.5 cm. The wall does appear uniformly mildly  prominent. There is no calcified stones within the lumen. There  is stranding of the surrounding fat. This extends to involve the  region of the second portion of the duodenum in the jennifer hepatis  region. The findings however favor acute cholecystitis with  secondary duodenitis.  SPLEEN: Unremarkable.  PANCREAS: Unremarkable  ADRENAL GLANDS: Unremarkable.  KIDNEYS/URETERS: There is a 7.5 mm nonobstructive mid level left  renal calculi. Mild perinephric fat stranding present  bilaterally, likely chronic. No suspicious renal mass or  obstructive uropathy identified.    GASTROINTESTINAL: The oral contrast is seen within the stomach  and distributed throughout the small bowel reaching large bowel  to the level of the rectum. There is no obstruction. The terminal  ileum and ileocecal junction are unremarkable. A normal appendix  identified. There is colonic diverticulosis without findings of  acute diverticulitis.  REPRODUCTIVE ORGANS: Unremarkable.  URINARY BLADDER: Unremarkable    VASCULAR: Vascular calcification without  abdominal aortic  aneurysm.  LYMPH NODES: No pathologically enlarged nodes by size criteria.  PERITONEUM/RETROPERITONEUM: No free air or free fluid..     OSSEOUS STRUCTURES: Degenerative changes within the spine and  hips. No acute finding identified.    ADDITIONAL FINDINGS: None      Impression:       There is mild distention of the gallbladder with prominent wall  and pericholecystic inflammatory changes. No calcified stones  seen. The findings are suspect for acute cholecystitis. There  appears to be associated duodenitis. Please correlate to the  clinical exam and laboratory data. Also, recommend follow-up with  gallbladder ultrasound.    Colonic diverticulosis.    Scarring and/or atelectasis within the lung bases.    Cardiomegaly.    7.5 mm nonobstructive left side nephrolith.    Electronically signed by:  Ciro Temple MD  2/14/2020 12:07 PM  [facility] Workstation: [id number]    XR Abdomen 2+ VW with Chest 1 VW [[id number]] Collected:  02/14/20 0904     Updated:  02/14/20 0929    Narrative:       PROCEDURE: Acute abdominal series with upright chest    COMPARISON: None.    HISTORY: upper abdominal pain    TECHNIQUE: Acute abdominal series with upright chest radiograph  was obtained.     FINDINGS:   The left-sided cardiac pacer leads in the regions of the right  atrium and right ventricle.  Question small bilateral pleural effusions.  Mild pulmonary vascular congestion.  The lungs otherwise appear clear.  Cardiac silhouette is slightly enlarged. Thoracic aorta contains  atherosclerotic calcification.  Osseous structures appear unremarkable.  There is fecal debris and bowel gas in nondilated colon.  Nonobstructive small bowel gas pattern.  Surgical clips are seen in the right axilla.  The bones appear demineralized.      Impression:       CONCLUSION:    No acute cardiopulmonary disease.  Normal bowel gas pattern.    Electronically signed by:  Christopher Calderón MD  2/14/2020 9:28 AM [facility]  Workstation: [id number]             Assessment:    Active Hospital Problems    Diagnosis   • Cholecystitis             Plan:  1.  Acute cholecystitis: Continue IV antibiotics, IV fluids, IV analgesics, and IV antiemetics.  Discussed the case with Dr. Richardson.  He states that he would see the patient in consultation.  2.  Possible duodenitis  3.  Atrial fibrillation: We will hold Eliquis.  Continue amiodarone.  4.  Hypothyroidism: Synthroid.  5.  Hypertension: Coreg, Lasix.  6.  DVT prophylaxis: SCDs.    I discussed the patient's findings and my recommendations with: Patient.        This document has been electronically signed by Bryon Cuenca MD on February 14, 2020 6:12 PM                    Electronically signed by Bryon Cuenca MD at 02/14/20 1933

## 2020-02-21 NOTE — NURSING NOTE
Spoke with Dr. Lopez regarding elevated BP of 174/82. He stated that he wanted her blood pressure to stay up. No new orders at this time.

## 2020-02-21 NOTE — PROGRESS NOTES
GENERAL SURGERY PROGRESS NOTE    LOS: 5 Days    Chief Complaint:     Acute Cholecystitis    Interval History:     Code stroke yesterday, stroke team assessed, did not believe she was having a true CVA/TIA.  This am she is awake, alert and conversant.  No neuro deficits to note of.  Her abdominal pain continues to slowly improve.  No other issues identified.  Asking when she can go home.     Medication Review:     amiodarone 200 mg Oral BID   carvedilol 18.75 mg Oral BID With Meals   dilTIAZem  mg Oral Daily   enoxaparin 40 mg Subcutaneous Daily   furosemide 40 mg Oral Daily   ipratropium-albuterol 3 mL Nebulization Q6H - RT   levothyroxine 50 mcg Oral Daily   metoprolol tartrate 5 mg Intravenous Once   metroNIDAZOLE 500 mg Intravenous Q8H   mirtazapine 15 mg Oral Nightly   pantoprazole 40 mg Oral QAM   sodium chloride 10 mL Intravenous Q12H   sodium chloride 10 mL Intravenous Q12H         lactated ringers 100 mL/hr Last Rate: 100 mL/hr (02/20/20 2028)   Objective     Vital Signs:  Temp:  [96.8 °F (36 °C)-98.7 °F (37.1 °C)] 98.7 °F (37.1 °C)  Heart Rate:  [] 74  Resp:  [16-20] 18  BP: (139-188)/(65-88) 174/82    Intake/Output Summary (Last 24 hours) at 2/21/2020 0639  Last data filed at 2/20/2020 0651  Gross per 24 hour   Intake --   Output 300 ml   Net -300 ml       Physical Exam   Constitutional: She is oriented to person, place, and time. She appears well-developed and well-nourished.   HENT:   Head: Normocephalic and atraumatic.   Eyes: Pupils are equal, round, and reactive to light.   Neck: Normal range of motion. Neck supple.   Cardiovascular: Normal rate and regular rhythm.   Pulmonary/Chest: Effort normal and breath sounds normal.   Abdominal: Soft. She exhibits no distension.   Musculoskeletal: Normal range of motion.   Neurological: She is alert and oriented to person, place, and time.   Skin: Skin is warm and dry.   Psychiatric: She has a normal mood and affect. Her behavior is normal.        Results Review:    Results from last 7 days   Lab Units 02/18/20  0533 02/14/20  0847   SODIUM mmol/L 144 139   POTASSIUM mmol/L 3.5 3.4*   CHLORIDE mmol/L 108* 102   CO2 mmol/L 24.0 23.0   BUN mg/dL 14 16   CREATININE mg/dL 0.69 1.20*   GLUCOSE mg/dL 131* 116*   CALCIUM mg/dL 8.1* 8.8     Results from last 7 days   Lab Units 02/18/20  0533 02/14/20  0847   WBC 10*3/mm3 7.42 10.89*   HEMOGLOBIN g/dL 9.8* 11.1*   HEMATOCRIT % 30.9* 33.8*   PLATELETS 10*3/mm3 133* 119*       Assessment:    Hypertension    Acute cholecystitis      POD 4 Lap caio    Plan:  --Ok for discharge from surgical standpoint.    --Cont Abx through tomorrow; Follow-up with Dr. Richardson in the office 1 week after discharge      This document has been electronically signed by Aime Looney MD on February 21, 2020 6:39 AM

## 2020-02-22 ENCOUNTER — READMISSION MANAGEMENT (OUTPATIENT)
Dept: CALL CENTER | Facility: HOSPITAL | Age: 80
End: 2020-02-22

## 2020-02-22 NOTE — OUTREACH NOTE
Prep Survey      Responses   Facility patient discharged from?  [facility]   Is patient eligible?  Yes   Discharge diagnosis  Acute cholecystitis   Does the patient have one of the following disease processes/diagnoses(primary or secondary)?  General Surgery   Does the patient have Home health ordered?  Yes   What is the Home health agency?   [facility]   Is there a DME ordered?  Yes   What DME was ordered?  [employer]   Prep survey completed?  Yes          Mary Sterling RN

## 2020-02-24 ENCOUNTER — READMISSION MANAGEMENT (OUTPATIENT)
Dept: CALL CENTER | Facility: HOSPITAL | Age: 80
End: 2020-02-24

## 2020-02-24 NOTE — OUTREACH NOTE
General Surgery Week 1 Survey      Responses   Facility patient discharged from?  [facility]   Does the patient have one of the following disease processes/diagnoses(primary or secondary)?  General Surgery   Is there a successful TCM telephone encounter documented?  No   Week 1 attempt successful?  No   Unsuccessful attempts  Attempt 1          Sarahi Maurice RN

## 2020-02-25 ENCOUNTER — READMISSION MANAGEMENT (OUTPATIENT)
Dept: CALL CENTER | Facility: HOSPITAL | Age: 80
End: 2020-02-25

## 2020-02-25 NOTE — OUTREACH NOTE
General Surgery Week 1 Survey      Responses   Facility patient discharged from?  [facility]   Does the patient have one of the following disease processes/diagnoses(primary or secondary)?  General Surgery   Is there a successful TCM telephone encounter documented?  No   Week 1 attempt successful?  Yes   Call start time  0924   Call end time  0927   Discharge diagnosis  Acute cholecystitis   Meds reviewed with patient/caregiver?  Yes   Is the patient having any side effects they believe may be caused by any medication additions or changes?  No   Does the patient have all medications related to this admission filled (includes all antibiotics, pain medications, etc.)  N/A   Is the patient taking all medications as directed (includes completed medication regime)?  Yes   Does the patient have a follow up appointment scheduled with their surgeon?  Yes [2/28/20]   Has the patient kept scheduled appointments due by today?  Yes   Comments  PCP appt 2/24/20   What is the Home health agency?   [facility]   Has home health visited the patient within 72 hours of discharge?  Yes   What DME was ordered?  [employer] walk[employer] and [facility]   Has all DME been delivered?  Yes   Psychosocial issues?  No   Did the patient receive a copy of their discharge instructions?  Yes   Nursing interventions  Reviewed instructions with patient   What is the patient's perception of their health status since discharge?  Improving   Nursing interventions  Nurse provided patient education   Is the patient /caregiver able to teach back basic post-op care?  Drive as instructed by MD in discharge instructions, Take showers only when approved by MD-sponge bathe until then, No tub bath, swimming, or hot tub until instructed by MD, Keep incision areas clean,dry and protected, Lifting as instructed by MD in discharge instructions, Do not remove steri-strips   Is the patient/caregiver able to teach back signs and symptoms of incisional infection?  Increased redness,  swelling or pain at the incisonal site, Increased drainage or bleeding, Fever   Is the patient/caregiver able to teach back steps to recovery at home?  Set small, achievable goals for return to baseline health, Rest and rebuild strength, gradually increase activity, Eat a well-balance diet   If the patient is a current smoker, are they able to teach back resources for cessation?  -- [Nonsmoker]   Is the patient/caregiver able to teach back the hierarchy of who to call/visit for symptoms/problems? PCP, Specialist, Home health nurse, Urgent Care, ED, 911  Yes   Week 1 call completed?  Yes          Rolanda Kent RN

## 2020-02-28 ENCOUNTER — OFFICE VISIT (OUTPATIENT)
Dept: SURGERY | Facility: CLINIC | Age: 80
End: 2020-02-28

## 2020-02-28 VITALS
WEIGHT: 178.6 LBS | HEART RATE: 68 BPM | TEMPERATURE: 97 F | BODY MASS INDEX: 28.03 KG/M2 | HEIGHT: 67 IN | DIASTOLIC BLOOD PRESSURE: 70 MMHG | SYSTOLIC BLOOD PRESSURE: 118 MMHG

## 2020-02-28 DIAGNOSIS — Z90.49 S/P CHOLECYSTECTOMY: Primary | ICD-10-CM

## 2020-02-28 PROCEDURE — 99024 POSTOP FOLLOW-UP VISIT: CPT | Performed by: NURSE PRACTITIONER

## 2020-02-28 NOTE — PATIENT INSTRUCTIONS

## 2020-02-28 NOTE — PROGRESS NOTES
CHIEF COMPLAINT:   Chief Complaint   Patient presents with   • Post-op Follow-up     Post laparoscopic Cholecystectomy 2-       HPI: This patient presents for a post-operative visit after undergoing a laparoscopic cholecystectomy as well as repair of large hernia at umbilical port site.  Patient reports no problems. Eating well without any significant nausea. Having good bowel function. No problems with constipation or diarrhea. No urinary complaints. Denies fever. Ambulating well and slowly returning to normal activities.    PATHOLOGY:   Tissue Pathology Exam: [id number]   Order: [id number]   Status: Final result   Visible to patient: No (Not Released)   Next appt: None   Dx: Acute cholecystitis   Specimen Information: Gallbladder; Tissue         Component      Case Report   Surgical Pathology Report Case: [id number]    Authorizing Provider: Denilson Richardson MD Collected: 02/17/2020 06:00 PM    Ordering Location: [facility] Received: 02/18/2020 06:38 AM    [place] [facility]    Pathologist: Yovanny Reyes MD    Specimen: Gallbladder, PLUS CONTENTS       Final Diagnosis   SUBACUTE CHOLECYSTITIS WITH CHOLELITHIASIS.    Electronically signed by Yovanny Reyes MD on 2/19/2020 at 1518   Gross Description    The specimen consists of a pear-shaped gallbladder measuring 8.0 x 6.0 x 5.0 cm. The external surface is focally fibrotic and on further examination, its wall is thickened measuring 0.8 cm in thickness. The mucosa is smooth and numerous yellowish-brown stones measuring from 0.2-0.6 cm in dimension are obtained from the lumen. Representative sections are embedded as 1A.    [facility]       Specimen Collected: 02/17/20 18:00 Last Resulted: 02/19/20 15:18 Order Details View Encounter Lab and Collection Details Routing Result History      Scans on Order [id number]     Document on 2/19/2020 1618 by Yovanny Reyes MD          PHYSICAL EXAM:    ABD: Incisions are healing well without any  erythema or signs of infection.    ASSESSMENT:    Sultana was seen today for post-op follow-up.    Diagnoses and all orders for this visit:    S/P cholecystectomy        PLAN:    1.The patient will follow-up on a prn basis unless there are any problems.  2. May shower.   3. May return to normal activity without restrictions.                      This document has been electronically signed by DOC Piedra on February 28, 2020 1:10 PM

## 2020-03-03 ENCOUNTER — READMISSION MANAGEMENT (OUTPATIENT)
Dept: CALL CENTER | Facility: HOSPITAL | Age: 80
End: 2020-03-03

## 2020-06-05 ENCOUNTER — OFFICE VISIT (OUTPATIENT)
Dept: SURGERY | Facility: CLINIC | Age: 80
End: 2020-06-05

## 2020-06-05 ENCOUNTER — OFFICE VISIT (OUTPATIENT)
Dept: GASTROENTEROLOGY | Facility: CLINIC | Age: 80
End: 2020-06-05

## 2020-06-05 VITALS
SYSTOLIC BLOOD PRESSURE: 118 MMHG | HEART RATE: 63 BPM | DIASTOLIC BLOOD PRESSURE: 70 MMHG | HEIGHT: 67 IN | TEMPERATURE: 97 F | BODY MASS INDEX: 26.56 KG/M2 | WEIGHT: 169.2 LBS

## 2020-06-05 DIAGNOSIS — Z90.49 S/P CHOLECYSTECTOMY: ICD-10-CM

## 2020-06-05 DIAGNOSIS — R11.0 NAUSEA WITHOUT VOMITING: Primary | ICD-10-CM

## 2020-06-05 DIAGNOSIS — R11.0 NAUSEA: Primary | ICD-10-CM

## 2020-06-05 PROCEDURE — 99203 OFFICE O/P NEW LOW 30 MIN: CPT | Performed by: INTERNAL MEDICINE

## 2020-06-05 PROCEDURE — 99213 OFFICE O/P EST LOW 20 MIN: CPT | Performed by: NURSE PRACTITIONER

## 2020-06-05 RX ORDER — MONTELUKAST SODIUM 4 MG/1
TABLET, CHEWABLE ORAL
Status: ON HOLD | COMMUNITY
Start: 2020-03-13 | End: 2021-10-12

## 2020-06-05 RX ORDER — DEXTROSE AND SODIUM CHLORIDE 5; .45 G/100ML; G/100ML
30 INJECTION, SOLUTION INTRAVENOUS CONTINUOUS PRN
Status: CANCELLED | OUTPATIENT
Start: 2020-06-09

## 2020-06-05 RX ORDER — GABAPENTIN 300 MG/1
300 CAPSULE ORAL 3 TIMES DAILY PRN
COMMUNITY
Start: 2020-05-15

## 2020-06-05 RX ORDER — SUCRALFATE 1 G/1
1 TABLET ORAL 4 TIMES DAILY
Qty: 120 TABLET | Refills: 5 | Status: SHIPPED | OUTPATIENT
Start: 2020-06-05

## 2020-06-05 NOTE — PROGRESS NOTES
[facility]      Chief Complaint:   Chief Complaint   Patient presents with   • Nausea       Subjective     HPI:   Ms. Lin is a 79-year-old  female with past medical history of atrial fibrillation on Xarelto, breast cancer, thyroid disorder, hypertension presenting for evaluation for nausea.  She has nausea for past several months associated with food intake.  Denies abdominal pain, vomiting, diarrhea, constipation, rectal bleeding or weight loss.  She underwent cholecystectomy in February without much improvement of symptomatology.  She denied NSAID usage.  Currently taking Nexium daily and Zofran on as-needed basis without much improvement of symptomatology.    Past Medical History:   Past Medical History:   Diagnosis Date   • Arthritis    • Atrial fibrillation (CMS/HCC)    • Atrial fibrillation (CMS/HCC)    • Atrial fibrillation with rapid ventricular response (CMS/HCC) 1/20/2017   • Breast cancer (CMS/HCC)     right   • Cancer (CMS/HCC)    • Disease of thyroid gland    • Hypertension        Past Surgical History:  Past Surgical History:   Procedure Laterality Date   • ABLATION OF DYSRHYTHMIC FOCUS     • BREAST SURGERY     • CARDIAC ELECTROPHYSIOLOGY PROCEDURE N/A 4/6/2017    Procedure: EP/Ablation;  Surgeon: Blake Mcallister MD;  Location: [facility] CATH INVASIVE LOCATION;  Service:    • CARDIOVERSION     • CATARACT EXTRACTION W/ INTRAOCULAR LENS IMPLANT Right 8/9/2019    Procedure: REMOVE CATARACT AND IMPLANT INTRAOCULAR LENS;  Surgeon: Lenin Dennison MD;  Location: [facility] OR;  Service: Ophthalmology   • CATARACT EXTRACTION W/ INTRAOCULAR LENS IMPLANT Left 8/16/2019    Procedure: REMOVE CATARACT AND IMPLANT INTRAOCULAR LENS;  Surgeon: Lenin Dennison MD;  Location: [facility] OR;  Service: Ophthalmology   • CHOLECYSTECTOMY WITH INTRAOPERATIVE CHOLANGIOGRAM N/A 2/17/2020    Procedure: LAPAROSCOPIC CHOLECYSTECTOMY WITH INTRAOPERATIVE CHOLANGIOGRAM;  Surgeon:  Denilson Richardson MD;  Location: [facility];  Service: General;  Laterality: N/A;   • INSERT / REPLACE / REMOVE PACEMAKER     • PACEMAKER IMPLANTATION     • SKIN BIOPSY     • SKIN TAG REMOVAL         Family History:  Family History   Problem Relation Age of Onset   • Hypertension Daughter    • Hypertension Son    • No Known Problems Mother    • No Known Problems Father        Social History:   reports that she has never smoked. She has quit using smokeless tobacco.  Her smokeless tobacco use included snuff and chew. She reports that she does not drink alcohol or use drugs.    Medications:   Prior to Admission medications    Medication Sig Start Date End Date Taking? Authorizing Provider   ALPRAZolam (XANAX) 0.25 MG tablet Take 0.25 mg by mouth 2 (Two) Times a Day As Needed for Anxiety.   Yes Bradly Montana MD   amiodarone (PACERONE) 200 MG tablet Take 200 mg by mouth 2 (Two) Times a Day.   Yes Bradly Montana MD   carvedilol (COREG) 12.5 MG tablet Take 1.5 tablets by mouth 2 (Two) Times a Day With Meals. 9/21/17  Yes Cheli Monsalve APRN   colestipol (COLESTID) 1 g tablet  3/13/20  Yes Bradly Montana MD   dilTIAZem CD (CARDIZEM CD) 180 MG 24 hr capsule Take 180 mg by mouth Daily.   Yes Bradly Montana MD   esomeprazole (nexIUM) 40 MG capsule Take 40 mg by mouth Every Morning Before Breakfast.   Yes Bradly Montana MD   furosemide (LASIX) 20 MG tablet Take 40 mg by mouth Daily.   Yes Bradly Montana MD   gabapentin (NEURONTIN) 300 MG capsule As Needed. 5/15/20  Yes Bradly Montana MD   HYDROcodone-acetaminophen (NORCO)  MG per tablet Take 1 tablet by mouth 2 (Two) Times a Day As Needed for moderate pain (4-6).   Yes Bradly Montana MD   levothyroxine (SYNTHROID, LEVOTHROID) 50 MCG tablet Take 50 mcg by mouth Daily.   Yes Bradly Montana MD   meclizine (ANTIVERT) 25 MG tablet Take 25 mg by mouth 3 (three) times a day as needed for dizziness.   Yes Rubén  MD Bradly   mirtazapine (REMERON) 15 MG tablet Take 15 mg by mouth Every Night.   Yes ProviderBradly MD   ondansetron (ZOFRAN) 4 MG tablet Take 4 mg by mouth Every 8 (Eight) Hours As Needed for Nausea or Vomiting.   Yes Bradly Montana MD   potassium chloride (K-DUR,KLOR-CON) 10 MEQ CR tablet Take 10 mEq by mouth daily.   Yes Bradly Montana MD   rivaroxaban (XARELTO) 15 MG tablet Take 15 mg by mouth Every Night.   Yes Bradly Montana MD   sucralfate (CARAFATE) 1 g tablet Take 1 tablet by mouth 4 (Four) Times a Day. 6/5/20   Albina Ya MD       Allergies:  Penicillins    ROS:    Review of Systems   Constitutional: Negative for chills, fatigue, fever and unexpected weight change.   HENT: Negative for congestion, ear discharge, hearing loss, nosebleeds and sore throat.    Eyes: Negative for pain, discharge and redness.   Respiratory: Negative for cough, chest tightness, shortness of breath and wheezing.    Cardiovascular: Negative for chest pain and palpitations.   Gastrointestinal: Positive for nausea. Negative for abdominal distention, abdominal pain, blood in stool, constipation, diarrhea and vomiting.   Endocrine: Negative for cold intolerance, polydipsia, polyphagia and polyuria.   Genitourinary: Negative for dysuria, flank pain, frequency, hematuria and urgency.   Musculoskeletal: Negative for arthralgias, back pain, joint swelling and myalgias.   Skin: Negative for color change, pallor and rash.   Neurological: Negative for tremors, seizures, syncope, weakness and headaches.   Hematological: Negative for adenopathy. Does not bruise/bleed easily.   Psychiatric/Behavioral: Negative for behavioral problems, confusion, dysphoric mood, hallucinations and suicidal ideas. The patient is not nervous/anxious.      Objective     There were no vitals taken for this visit.    Physical Exam   Constitutional: She is oriented to person, place, and time. She appears well-developed and  well-nourished.   HENT:   Head: Normocephalic and atraumatic.   Mouth/Throat: Oropharynx is clear and moist.   Eyes: Pupils are equal, round, and reactive to light. Conjunctivae and EOM are normal.   Neck: Normal range of motion. Neck supple. No thyromegaly present.   Cardiovascular: Normal rate, regular rhythm and normal heart sounds.   No murmur heard.  Pulmonary/Chest: Effort normal and breath sounds normal. She has no wheezes.   Abdominal: Soft. Bowel sounds are normal. She exhibits no distension and no mass. There is no tenderness. No hernia.   Genitourinary:   Genitourinary Comments: No lesions noted   Musculoskeletal: Normal range of motion. She exhibits no edema or tenderness.   Lymphadenopathy:     She has no cervical adenopathy.   Neurological: She is alert and oriented to person, place, and time. No cranial nerve deficit.   Skin: Skin is warm and dry. No rash noted.   Psychiatric: She has a normal mood and affect. Thought content normal.      Extremities: No edema, cyanosis or clubbing.    Assessment/Plan   Sultana was seen today for nausea.    Diagnoses and all orders for this visit:    Nausea  -     Case Request; Standing  -     Case Request    Other orders  -     Follow Anesthesia Guidelines / Standing Orders; Future  -     Obtain Informed Consent; Future  -     sucralfate (CARAFATE) 1 g tablet; Take 1 tablet by mouth 4 (Four) Times a Day.    1.  Persistent nausea rule out peptic ulcer disease, gastritis and gastroparesis.  Continue Nexium.  Add Carafate 1 g p.o. 4 times daily.  Continue Zofran as needed.  Schedule EGD for further evaluation.  2.  Atrial fibrillation, on Xarelto  3.  Status post cholecystectomy    ESOPHAGOGASTRODUODENOSCOPY (N/A)     Diagnosis Plan   1. Nausea  Case Request    dextrose 5 % and sodium chloride 0.45 % infusion    Case Request       Anticipated Surgical Procedure:  Orders Placed This Encounter   Procedures   • Follow Anesthesia Guidelines / Standing Orders     Standing  Status:   Future   • Obtain Informed Consent     Standing Status:   Future     Order Specific Question:   Informed Consent Given For     Answer:   ESOPHAGOGASTRODUODENOSCOPY       The risks, benefits, and alternatives of this procedure have been discussed with the patient or the responsible party- the patient understands and agrees to proceed.            This document has been electronically signed by Albina Ya MD on June 5, 2020 11:29

## 2020-06-05 NOTE — PATIENT INSTRUCTIONS

## 2020-06-05 NOTE — PROGRESS NOTES
Chief Complaint   Patient presents with   • Abdominal Pain     Abdominal pain, nausea        HPI   This patient presents for a post-operative visit after undergoing a laparoscopic cholecystectomy and repair of hernia in February 2020.  She states that she is having persistent nausea and weakness since even before having her gallbladder removed and has had no improvement.  She states the nausea is occasionally related to food or beverage intake but not consistently.  She denies vomiting.  Denies constipation or diarrhea. Having good bowel function. No urinary complaints. Denies fever. Ambulating well and slowly returning to normal activities.  Denies pain in stomach but is tender.  She is currently having to take Zofran several times per day to attempt to control the nausea.  She has had 9 pound weight loss since February.  She denies reflux but does take Nexium.    Last CT of abdomen and pelvis prior to cholecystectomy:    IMPRESSION:   There is mild distention of the gallbladder with prominent wall   and pericholecystic inflammatory changes. No calcified stones   seen. The findings are suspect for acute cholecystitis. There   appears to be associated duodenitis. Please correlate to the   clinical exam and laboratory data. Also, recommend follow-up with   gallbladder ultrasound.   Colonic diverticulosis.   Scarring and/or atelectasis within the lung bases.   Cardiomegaly.   7.5 mm nonobstructive left side nephrolith.   Electronically signed by: Ciro Temple MD 2/14/2020 12:07 PM   CST Workstation: [id number]          Past Medical History:   Diagnosis Date   • Arthritis    • Atrial fibrillation (CMS/HCC)    • Atrial fibrillation (CMS/HCC)    • Atrial fibrillation with rapid ventricular response (CMS/HCC) 1/20/2017   • Breast cancer (CMS/HCC)     right   • Cancer (CMS/HCC)    • Disease of thyroid gland    • Hypertension        Past Surgical History:   Procedure Laterality Date   • ABLATION OF DYSRHYTHMIC FOCUS     •  BREAST SURGERY     • CARDIAC ELECTROPHYSIOLOGY PROCEDURE N/A 4/6/2017    Procedure: EP/Ablation;  Surgeon: Blake Mcallister MD;  Location: [facility] CATH INVASIVE LOCATION;  Service:    • CARDIOVERSION     • CATARACT EXTRACTION W/ INTRAOCULAR LENS IMPLANT Right 8/9/2019    Procedure: REMOVE CATARACT AND IMPLANT INTRAOCULAR LENS;  Surgeon: Lenin Dennison MD;  Location: [facility] OR;  Service: Ophthalmology   • CATARACT EXTRACTION W/ INTRAOCULAR LENS IMPLANT Left 8/16/2019    Procedure: REMOVE CATARACT AND IMPLANT INTRAOCULAR LENS;  Surgeon: Lenin Dennison MD;  Location: [facility] OR;  Service: Ophthalmology   • CHOLECYSTECTOMY WITH INTRAOPERATIVE CHOLANGIOGRAM N/A 2/17/2020    Procedure: LAPAROSCOPIC CHOLECYSTECTOMY WITH INTRAOPERATIVE CHOLANGIOGRAM;  Surgeon: Denilson Richardson MD;  Location: [facility] OR;  Service: General;  Laterality: N/A;   • INSERT / REPLACE / REMOVE PACEMAKER     • PACEMAKER IMPLANTATION     • SKIN BIOPSY     • SKIN TAG REMOVAL           Current Outpatient Medications:   •  ALPRAZolam (XANAX) 0.25 MG tablet, Take 0.25 mg by mouth 2 (Two) Times a Day As Needed for Anxiety., Disp: , Rfl:   •  amiodarone (PACERONE) 200 MG tablet, Take 200 mg by mouth 2 (Two) Times a Day., Disp: , Rfl:   •  carvedilol (COREG) 12.5 MG tablet, Take 1.5 tablets by mouth 2 (Two) Times a Day With Meals., Disp: 90 tablet, Rfl: 11  •  colestipol (COLESTID) 1 g tablet, , Disp: , Rfl:   •  dilTIAZem CD (CARDIZEM CD) 180 MG 24 hr capsule, Take 180 mg by mouth Daily., Disp: , Rfl:   •  esomeprazole (nexIUM) 40 MG capsule, Take 40 mg by mouth Every Morning Before Breakfast., Disp: , Rfl:   •  furosemide (LASIX) 20 MG tablet, Take 40 mg by mouth Daily., Disp: , Rfl:   •  gabapentin (NEURONTIN) 300 MG capsule, As Needed., Disp: , Rfl:   •  HYDROcodone-acetaminophen (NORCO)  MG per tablet, Take 1 tablet by mouth 2 (Two) Times a Day As Needed for moderate pain (4-6)., Disp: , Rfl:   •  levothyroxine (SYNTHROID,  LEVOTHROID) 50 MCG tablet, Take 50 mcg by mouth Daily., Disp: , Rfl:   •  meclizine (ANTIVERT) 25 MG tablet, Take 25 mg by mouth 3 (three) times a day as needed for dizziness., Disp: , Rfl:   •  mirtazapine (REMERON) 15 MG tablet, Take 15 mg by mouth Every Night., Disp: , Rfl:   •  ondansetron (ZOFRAN) 4 MG tablet, Take 4 mg by mouth Every 8 (Eight) Hours As Needed for Nausea or Vomiting., Disp: , Rfl:   •  potassium chloride (K-DUR,KLOR-CON) 10 MEQ CR tablet, Take 10 mEq by mouth daily., Disp: , Rfl:   •  rivaroxaban (XARELTO) 15 MG tablet, Take 15 mg by mouth Every Night., Disp: , Rfl:   •  sucralfate (CARAFATE) 1 g tablet, Take 1 tablet by mouth 4 (Four) Times a Day., Disp: 120 tablet, Rfl: 5    Allergies   Allergen Reactions   • Penicillins Rash       Family History   Problem Relation Age of Onset   • Hypertension Daughter    • Hypertension Son    • No Known Problems Mother    • No Known Problems Father        Social History     Socioeconomic History   • Marital status:      Spouse name: Not on file   • Number of children: Not on file   • Years of education: Not on file   • Highest education level: Not on file   Tobacco Use   • Smoking status: Never Smoker   • Smokeless tobacco: Former User     Types: Snuff, Chew   Substance and Sexual Activity   • Alcohol use: No   • Drug use: No   • Sexual activity: Defer       Review of Systems   Constitutional: Positive for fatigue and unexpected weight change. Negative for appetite change, chills and fever.   Respiratory: Negative for shortness of breath.    Cardiovascular: Negative for chest pain and palpitations (History of A. fib, pacemaker).   Gastrointestinal: Positive for nausea. Negative for abdominal distention, abdominal pain (Tenderness), blood in stool, constipation, diarrhea and vomiting.   Genitourinary: Negative for difficulty urinating and dysuria.   Musculoskeletal: Positive for arthralgias.   Skin: Negative for color change, pallor and wound.    Neurological: Positive for weakness. Negative for syncope and light-headedness.       Physical Exam   Constitutional: She is oriented to person, place, and time. She appears well-developed and well-nourished. No distress.   HENT:   Head: Normocephalic and atraumatic.   Eyes: Pupils are equal, round, and reactive to light. Conjunctivae are normal. No scleral icterus.   Neck: Normal range of motion.   Pulmonary/Chest: Effort normal. No respiratory distress. She has no wheezes.   Abdominal: Soft. She exhibits no distension and no mass. There is generalized tenderness. There is no rigidity and no guarding. No hernia.       Neurological: She is alert and oriented to person, place, and time.   Skin: Skin is warm and dry. She is not diaphoretic. No erythema. No pallor.   Psychiatric: She has a normal mood and affect. Her behavior is normal. Judgment and thought content normal.   Nursing note and vitals reviewed.    Patient's Body mass index is 26.5 kg/m². BMI is above normal parameters. Recommendations include: educational material and nutrition counseling.      ASSESSMENT    Sultana was seen today for abdominal pain.    Diagnoses and all orders for this visit:    Nausea without vomiting  -     Ambulatory Referral to Gastroenterology    S/P cholecystectomy      PLAN  1.  Referral placed to Dr. Ya with gastroenterology  2.  Instructions given on bland diet.  3.  Follow-up as needed.              This document has been electronically signed by DOC Piedra on June 5, 2020 11:48

## 2020-06-05 NOTE — H&P (VIEW-ONLY)
[facility]      Chief Complaint:   Chief Complaint   Patient presents with   • Nausea       Subjective     HPI:   Ms. Lin is a 79-year-old  female with past medical history of atrial fibrillation on Xarelto, breast cancer, thyroid disorder, hypertension presenting for evaluation for nausea.  She has nausea for past several months associated with food intake.  Denies abdominal pain, vomiting, diarrhea, constipation, rectal bleeding or weight loss.  She underwent cholecystectomy in February without much improvement of symptomatology.  She denied NSAID usage.  Currently taking Nexium daily and Zofran on as-needed basis without much improvement of symptomatology.    Past Medical History:   Past Medical History:   Diagnosis Date   • Arthritis    • Atrial fibrillation (CMS/HCC)    • Atrial fibrillation (CMS/HCC)    • Atrial fibrillation with rapid ventricular response (CMS/HCC) 1/20/2017   • Breast cancer (CMS/HCC)     right   • Cancer (CMS/HCC)    • Disease of thyroid gland    • Hypertension        Past Surgical History:  Past Surgical History:   Procedure Laterality Date   • ABLATION OF DYSRHYTHMIC FOCUS     • BREAST SURGERY     • CARDIAC ELECTROPHYSIOLOGY PROCEDURE N/A 4/6/2017    Procedure: EP/Ablation;  Surgeon: Blake Mcallister MD;  Location: [facility] CATH INVASIVE LOCATION;  Service:    • CARDIOVERSION     • CATARACT EXTRACTION W/ INTRAOCULAR LENS IMPLANT Right 8/9/2019    Procedure: REMOVE CATARACT AND IMPLANT INTRAOCULAR LENS;  Surgeon: Lenin Dennison MD;  Location: [facility] OR;  Service: Ophthalmology   • CATARACT EXTRACTION W/ INTRAOCULAR LENS IMPLANT Left 8/16/2019    Procedure: REMOVE CATARACT AND IMPLANT INTRAOCULAR LENS;  Surgeon: Lenin Dennison MD;  Location: [facility] OR;  Service: Ophthalmology   • CHOLECYSTECTOMY WITH INTRAOPERATIVE CHOLANGIOGRAM N/A 2/17/2020    Procedure: LAPAROSCOPIC CHOLECYSTECTOMY WITH INTRAOPERATIVE CHOLANGIOGRAM;  Surgeon:  Denilson Richardson MD;  Location: [facility];  Service: General;  Laterality: N/A;   • INSERT / REPLACE / REMOVE PACEMAKER     • PACEMAKER IMPLANTATION     • SKIN BIOPSY     • SKIN TAG REMOVAL         Family History:  Family History   Problem Relation Age of Onset   • Hypertension Daughter    • Hypertension Son    • No Known Problems Mother    • No Known Problems Father        Social History:   reports that she has never smoked. She has quit using smokeless tobacco.  Her smokeless tobacco use included snuff and chew. She reports that she does not drink alcohol or use drugs.    Medications:   Prior to Admission medications    Medication Sig Start Date End Date Taking? Authorizing Provider   ALPRAZolam (XANAX) 0.25 MG tablet Take 0.25 mg by mouth 2 (Two) Times a Day As Needed for Anxiety.   Yes Bradly Montana MD   amiodarone (PACERONE) 200 MG tablet Take 200 mg by mouth 2 (Two) Times a Day.   Yes Bradly Montana MD   carvedilol (COREG) 12.5 MG tablet Take 1.5 tablets by mouth 2 (Two) Times a Day With Meals. 9/21/17  Yes Cheli Monsalve APRN   colestipol (COLESTID) 1 g tablet  3/13/20  Yes Bradly Montana MD   dilTIAZem CD (CARDIZEM CD) 180 MG 24 hr capsule Take 180 mg by mouth Daily.   Yes Bradly Montana MD   esomeprazole (nexIUM) 40 MG capsule Take 40 mg by mouth Every Morning Before Breakfast.   Yes Bradly Montana MD   furosemide (LASIX) 20 MG tablet Take 40 mg by mouth Daily.   Yes Bradly Montana MD   gabapentin (NEURONTIN) 300 MG capsule As Needed. 5/15/20  Yes Bradly Montana MD   HYDROcodone-acetaminophen (NORCO)  MG per tablet Take 1 tablet by mouth 2 (Two) Times a Day As Needed for moderate pain (4-6).   Yes Bradly Montana MD   levothyroxine (SYNTHROID, LEVOTHROID) 50 MCG tablet Take 50 mcg by mouth Daily.   Yes Bradly Montaan MD   meclizine (ANTIVERT) 25 MG tablet Take 25 mg by mouth 3 (three) times a day as needed for dizziness.   Yes Rubén  MD Bradly   mirtazapine (REMERON) 15 MG tablet Take 15 mg by mouth Every Night.   Yes ProviderBradly MD   ondansetron (ZOFRAN) 4 MG tablet Take 4 mg by mouth Every 8 (Eight) Hours As Needed for Nausea or Vomiting.   Yes Bradly Montana MD   potassium chloride (K-DUR,KLOR-CON) 10 MEQ CR tablet Take 10 mEq by mouth daily.   Yes Bradly Montana MD   rivaroxaban (XARELTO) 15 MG tablet Take 15 mg by mouth Every Night.   Yes Bradly Montana MD   sucralfate (CARAFATE) 1 g tablet Take 1 tablet by mouth 4 (Four) Times a Day. 6/5/20   Albina Ya MD       Allergies:  Penicillins    ROS:    Review of Systems   Constitutional: Negative for chills, fatigue, fever and unexpected weight change.   HENT: Negative for congestion, ear discharge, hearing loss, nosebleeds and sore throat.    Eyes: Negative for pain, discharge and redness.   Respiratory: Negative for cough, chest tightness, shortness of breath and wheezing.    Cardiovascular: Negative for chest pain and palpitations.   Gastrointestinal: Positive for nausea. Negative for abdominal distention, abdominal pain, blood in stool, constipation, diarrhea and vomiting.   Endocrine: Negative for cold intolerance, polydipsia, polyphagia and polyuria.   Genitourinary: Negative for dysuria, flank pain, frequency, hematuria and urgency.   Musculoskeletal: Negative for arthralgias, back pain, joint swelling and myalgias.   Skin: Negative for color change, pallor and rash.   Neurological: Negative for tremors, seizures, syncope, weakness and headaches.   Hematological: Negative for adenopathy. Does not bruise/bleed easily.   Psychiatric/Behavioral: Negative for behavioral problems, confusion, dysphoric mood, hallucinations and suicidal ideas. The patient is not nervous/anxious.      Objective     There were no vitals taken for this visit.    Physical Exam   Constitutional: She is oriented to person, place, and time. She appears well-developed and  well-nourished.   HENT:   Head: Normocephalic and atraumatic.   Mouth/Throat: Oropharynx is clear and moist.   Eyes: Pupils are equal, round, and reactive to light. Conjunctivae and EOM are normal.   Neck: Normal range of motion. Neck supple. No thyromegaly present.   Cardiovascular: Normal rate, regular rhythm and normal heart sounds.   No murmur heard.  Pulmonary/Chest: Effort normal and breath sounds normal. She has no wheezes.   Abdominal: Soft. Bowel sounds are normal. She exhibits no distension and no mass. There is no tenderness. No hernia.   Genitourinary:   Genitourinary Comments: No lesions noted   Musculoskeletal: Normal range of motion. She exhibits no edema or tenderness.   Lymphadenopathy:     She has no cervical adenopathy.   Neurological: She is alert and oriented to person, place, and time. No cranial nerve deficit.   Skin: Skin is warm and dry. No rash noted.   Psychiatric: She has a normal mood and affect. Thought content normal.      Extremities: No edema, cyanosis or clubbing.    Assessment/Plan   Sultana was seen today for nausea.    Diagnoses and all orders for this visit:    Nausea  -     Case Request; Standing  -     Case Request    Other orders  -     Follow Anesthesia Guidelines / Standing Orders; Future  -     Obtain Informed Consent; Future  -     sucralfate (CARAFATE) 1 g tablet; Take 1 tablet by mouth 4 (Four) Times a Day.    1.  Persistent nausea rule out peptic ulcer disease, gastritis and gastroparesis.  Continue Nexium.  Add Carafate 1 g p.o. 4 times daily.  Continue Zofran as needed.  Schedule EGD for further evaluation.  2.  Atrial fibrillation, on Xarelto  3.  Status post cholecystectomy    ESOPHAGOGASTRODUODENOSCOPY (N/A)     Diagnosis Plan   1. Nausea  Case Request    dextrose 5 % and sodium chloride 0.45 % infusion    Case Request       Anticipated Surgical Procedure:  Orders Placed This Encounter   Procedures   • Follow Anesthesia Guidelines / Standing Orders     Standing  Status:   Future   • Obtain Informed Consent     Standing Status:   Future     Order Specific Question:   Informed Consent Given For     Answer:   ESOPHAGOGASTRODUODENOSCOPY       The risks, benefits, and alternatives of this procedure have been discussed with the patient or the responsible party- the patient understands and agrees to proceed.            This document has been electronically signed by Albina Ya MD on June 5, 2020 11:29

## 2020-06-05 NOTE — PATIENT INSTRUCTIONS
"BMI for Adults    Body mass index (BMI) is a number that is calculated from a person's weight and height. BMI may help to estimate how much of a person's weight is composed of fat. BMI can help identify those who may be at higher risk for certain medical problems.  How is BMI used with adults?  BMI is used as a screening tool to identify possible weight problems. It is used to check whether a person is obese, overweight, healthy weight, or underweight.  How is BMI calculated?  BMI measures your weight and compares it to your height. This can be done either in English ([place] or metric measurements. Note that charts are available to help you find your BMI quickly and easily without having to do these calculations yourself.  To calculate your BMI in English ([place] measurements, your health care provider will:  1. Measure your weight in pounds (lb).  2. Multiply the number of pounds by 703.  ? For example, for a person who weighs 180 lb, multiply that number by 703, which equals 126,540.  3. Measure your height in inches (in). Then multiply that number by itself to get a measurement called \"inches squared.\"  ? For example, for a person who is 70 in tall, the \"inches squared\" measurement is 70 in x 70 in, which equals 4900 inches squared.  4. Divide the total from Step 2 (number of lb x 703) by the total from Step 3 (inches squared): 126,540 ÷ 4900 = 25.8. This is your BMI.  To calculate your BMI in metric measurements, your health care provider will:  1. Measure your weight in kilograms (kg).  2. Measure your height in meters (m). Then multiply that number by itself to get a measurement called \"meters squared.\"  ? For example, for a person who is 1.75 m tall, the \"meters squared\" measurement is 1.75 m x 1.75 m, which is equal to 3.1 meters squared.  3. Divide the number of kilograms (your weight) by the meters squared number. In this example: 70 ÷ 3.1 = 22.6. This is your BMI.  How is BMI interpreted?  To interpret your " results, your health care provider will use BMI charts to identify whether you are underweight, normal weight, overweight, or obese. The following guidelines will be used:  · Underweight: BMI less than 18.5.  · Normal weight: BMI between 18.5 and 24.9.  · Overweight: BMI between 25 and 29.9.  · Obese: BMI of 30 and above.  Please note:  · Weight includes both fat and muscle, so someone with a muscular build, such as an athlete, may have a BMI that is higher than 24.9. In cases like these, BMI is not an accurate measure of body fat.  · To determine if excess body fat is the cause of a BMI of 25 or higher, further assessments may need to be done by a health care provider.  · BMI is usually interpreted in the same way for men and women.  Why is BMI a useful tool?  BMI is useful in two ways:  · Identifying a weight problem that may be related to a medical condition, or that may increase the risk for medical problems.  · Promoting lifestyle and diet changes in order to reach a healthy weight.  Summary  · Body mass index (BMI) is a number that is calculated from a person's weight and height.  · BMI may help to estimate how much of a person's weight is composed of fat. BMI can help identify those who may be at higher risk for certain medical problems.  · BMI can be measured using English measurements or metric measurements.  · To interpret your results, your health care provider will use BMI charts to identify whether you are underweight, normal weight, overweight, or obese.  This information is not intended to replace advice given to you by your health care provider. Make sure you discuss any questions you have with your health care provider.  Document Released: 08/29/2005 Document Revised: 11/30/2018 Document Reviewed: 10/31/2018  [employer] Patient Education © 2020 [employer]    [place] Diet  A bland diet consists of foods that are often soft and do not have a lot of fat, fiber, or extra seasonings. Foods without fat,  fiber, or seasoning are easier for the body to digest. They are also less likely to irritate your mouth, throat, stomach, and other parts of your digestive system. A bland diet is sometimes called a BRAT diet.  What is my plan?  Your health care provider or food and nutrition specialist (dietitian) may recommend specific changes to your diet to prevent symptoms or to treat your symptoms. These changes may include:  · Eating small meals often.  · Cooking food until it is soft enough to chew easily.  · Chewing your food well.  · Drinking fluids slowly.  · Not eating foods that are very spicy, sour, or fatty.  · Not eating citrus fruits, such as oranges and grapefruit.  What do I need to know about this diet?  · Eat a variety of foods from the bland diet food list.  · Do not follow a bland diet longer than needed.  · Ask your health care provider whether you should take vitamins or supplements.  What foods can I eat?  Grains    Hot cereals, such as cream of wheat. Rice. Bread, crackers, or tortillas made from refined white flour.  Vegetables  Canned or cooked vegetables. Mashed or boiled potatoes.  Fruits    Bananas. Applesauce. Other types of cooked or canned fruit with the skin and seeds removed, such as canned peaches or pears.  Meats and other proteins    Scrambled eggs. Creamy peanut butter or other nut butters. Lean, well-cooked meats, such as chicken or fish. Tofu. Soups or broths.  Dairy  Low-fat dairy products, such as milk, cottage cheese, or yogurt.  Beverages    Water. Herbal tea. Apple juice.  Fats and oils  Mild salad dressings. Canola or olive oil.  Sweets and desserts  Pudding. Custard. Fruit gelatin. Ice cream.  The items listed above may not be a complete list of recommended foods and beverages. Contact a dietitian for more options.  What foods are not recommended?  Grains  Whole grain breads and cereals.  Vegetables  Raw vegetables.  Fruits  Raw fruits, especially citrus, berries, or dried  fruits.  Dairy  Whole fat dairy foods.  Beverages  Caffeinated drinks. Alcohol.  Seasonings and condiments  Strongly flavored seasonings or condiments. Hot sauce. Salsa.  Other foods  Spicy foods. Fried foods. Sour foods, such as pickled or fermented foods. Foods with high sugar content. Foods high in fiber.  The items listed above may not be a complete list of foods and beverages to avoid. Contact a dietitian for more information.  Summary  · A bland diet consists of foods that are often soft and do not have a lot of fat, fiber, or extra seasonings.  · Foods without fat, fiber, or seasoning are easier for the body to digest.  · Check with your health care provider to see how long you should follow this diet plan. It is not meant to be followed for long periods.  This information is not intended to replace advice given to you by your health care provider. Make sure you discuss any questions you have with your health care provider.  Document Released: 04/10/2017 Document Revised: 01/16/2019 Document Reviewed: 01/16/2019  [employer] Patient Education © 2020 [employer]

## 2020-06-06 PROCEDURE — U0003 INFECTIOUS AGENT DETECTION BY NUCLEIC ACID (DNA OR RNA); SEVERE ACUTE RESPIRATORY SYNDROME CORONAVIRUS 2 (SARS-COV-2) (CORONAVIRUS DISEASE [COVID-19]), AMPLIFIED PROBE TECHNIQUE, MAKING USE OF HIGH THROUGHPUT TECHNOLOGIES AS DESCRIBED BY CMS-2020-01-R: HCPCS | Performed by: INTERNAL MEDICINE

## 2020-06-07 LAB
COVID LABCORP PRIORITY: NORMAL
SARS-COV-2 RNA RESP QL NAA+PROBE: NOT DETECTED

## 2020-06-09 ENCOUNTER — ANESTHESIA EVENT (OUTPATIENT)
Dept: GASTROENTEROLOGY | Facility: HOSPITAL | Age: 80
End: 2020-06-09

## 2020-06-09 ENCOUNTER — ANESTHESIA (OUTPATIENT)
Dept: GASTROENTEROLOGY | Facility: HOSPITAL | Age: 80
End: 2020-06-09

## 2020-06-09 ENCOUNTER — HOSPITAL ENCOUNTER (OUTPATIENT)
Facility: HOSPITAL | Age: 80
Setting detail: HOSPITAL OUTPATIENT SURGERY
Discharge: HOME | End: 2020-06-09
Attending: INTERNAL MEDICINE | Admitting: INTERNAL MEDICINE

## 2020-06-09 VITALS
WEIGHT: 169 LBS | SYSTOLIC BLOOD PRESSURE: 137 MMHG | HEIGHT: 67 IN | BODY MASS INDEX: 26.53 KG/M2 | OXYGEN SATURATION: 99 % | TEMPERATURE: 97 F | HEART RATE: 69 BPM | RESPIRATION RATE: 17 BRPM | DIASTOLIC BLOOD PRESSURE: 67 MMHG

## 2020-06-09 DIAGNOSIS — R11.0 NAUSEA: ICD-10-CM

## 2020-06-09 PROCEDURE — 43239 EGD BIOPSY SINGLE/MULTIPLE: CPT | Performed by: INTERNAL MEDICINE

## 2020-06-09 PROCEDURE — 25010000002 PROPOFOL 10 MG/ML EMULSION: Performed by: NURSE ANESTHETIST, CERTIFIED REGISTERED

## 2020-06-09 PROCEDURE — 88305 TISSUE EXAM BY PATHOLOGIST: CPT

## 2020-06-09 RX ORDER — LIDOCAINE HYDROCHLORIDE 20 MG/ML
INJECTION, SOLUTION EPIDURAL; INFILTRATION; INTRACAUDAL; PERINEURAL AS NEEDED
Status: DISCONTINUED | OUTPATIENT
Start: 2020-06-09 | End: 2020-06-09 | Stop reason: SURG

## 2020-06-09 RX ORDER — DEXTROSE AND SODIUM CHLORIDE 5; .45 G/100ML; G/100ML
30 INJECTION, SOLUTION INTRAVENOUS CONTINUOUS PRN
Status: DISCONTINUED | OUTPATIENT
Start: 2020-06-09 | End: 2020-06-09 | Stop reason: HOSPADM

## 2020-06-09 RX ORDER — PROPOFOL 10 MG/ML
VIAL (ML) INTRAVENOUS AS NEEDED
Status: DISCONTINUED | OUTPATIENT
Start: 2020-06-09 | End: 2020-06-09 | Stop reason: SURG

## 2020-06-09 RX ADMIN — LIDOCAINE HYDROCHLORIDE 60 MG: 20 INJECTION, SOLUTION EPIDURAL; INFILTRATION; INTRACAUDAL; PERINEURAL at 09:33

## 2020-06-09 RX ADMIN — DEXTROSE AND SODIUM CHLORIDE 30 ML/HR: 5; 450 INJECTION, SOLUTION INTRAVENOUS at 08:50

## 2020-06-09 RX ADMIN — PROPOFOL 50 MG: 10 INJECTION, EMULSION INTRAVENOUS at 09:33

## 2020-06-09 NOTE — ANESTHESIA PREPROCEDURE EVALUATION
Anesthesia Evaluation     Patient summary reviewed and Nursing notes reviewed   NPO Solid Status: > 8 hours  NPO Liquid Status: > 8 hours           Airway   Mallampati: I  TM distance: >3 FB  Neck ROM: full  No difficulty expected  Dental    (+) upper dentures and lower dentures    Pulmonary - negative pulmonary ROS and normal exam   Cardiovascular - normal exam    Rhythm: regular  Rate: normal    (+) pacemaker, hypertension, dysrhythmias Atrial Fib,       Neuro/Psych- negative ROS  GI/Hepatic/Renal/Endo - negative ROS     Musculoskeletal     Abdominal  - normal exam   Substance History - negative use     OB/GYN negative ob/gyn ROS         Other   arthritis,    history of cancer remission                    Anesthesia Plan    ASA 4     MAC     intravenous induction     Anesthetic plan, all risks, benefits, and alternatives have been provided, discussed and informed consent has been obtained with: patient.

## 2020-06-09 NOTE — ANESTHESIA POSTPROCEDURE EVALUATION
Patient: Sultana Lin    Procedure Summary     Date:  06/09/20 Room / Location:  [facility] ENDOSCOPY 1 / [facility] ENDOSCOPY    Anesthesia Start:  0930 Anesthesia Stop:  0941    Procedure:  ESOPHAGOGASTRODUODENOSCOPY (N/A ) Diagnosis:       Nausea      (Nausea [R11.0])    Surgeon:  Albina Ya MD Provider:  Ramonita Tran CRNA    Anesthesia Type:  MAC ASA Status:  4          Anesthesia Type: MAC    Vitals  No vitals data found for the desired time range.          Post Anesthesia Care and Evaluation    Patient location during evaluation: bedside  Patient participation: complete - patient participated  Level of consciousness: awake  Pain score: 0  Pain management: adequate  Airway patency: patent  Anesthetic complications: No anesthetic complications  PONV Status: none  Cardiovascular status: acceptable  Respiratory status: acceptable  Hydration status: acceptable

## 2020-06-11 LAB
LAB AP CASE REPORT: NORMAL
PATH REPORT.FINAL DX SPEC: NORMAL

## 2020-06-16 ENCOUNTER — OFFICE VISIT (OUTPATIENT)
Dept: GASTROENTEROLOGY | Facility: CLINIC | Age: 80
End: 2020-06-16

## 2020-06-16 VITALS
HEIGHT: 67 IN | HEART RATE: 78 BPM | WEIGHT: 165 LBS | BODY MASS INDEX: 25.9 KG/M2 | SYSTOLIC BLOOD PRESSURE: 148 MMHG | DIASTOLIC BLOOD PRESSURE: 78 MMHG

## 2020-06-16 DIAGNOSIS — R11.0 NAUSEA: Primary | ICD-10-CM

## 2020-06-16 PROCEDURE — 99212 OFFICE O/P EST SF 10 MIN: CPT | Performed by: INTERNAL MEDICINE

## 2020-06-16 RX ORDER — LEVOFLOXACIN 500 MG/1
TABLET, FILM COATED ORAL
Status: ON HOLD | COMMUNITY
Start: 2020-06-13 | End: 2021-10-12

## 2020-06-16 RX ORDER — AMIODARONE HYDROCHLORIDE 100 MG/1
TABLET ORAL
Status: ON HOLD | COMMUNITY
Start: 2020-06-10 | End: 2021-10-12

## 2020-06-16 RX ORDER — PHENAZOPYRIDINE HYDROCHLORIDE 200 MG/1
TABLET, FILM COATED ORAL
Status: ON HOLD | COMMUNITY
Start: 2020-06-13 | End: 2021-10-12

## 2020-06-16 NOTE — PATIENT INSTRUCTIONS

## 2020-06-16 NOTE — PROGRESS NOTES
Chief Complaint   Patient presents with   • Follow-up   • Nausea       Subjective    Sultana Lin is a 79 y.o. female.    History of Present Illness    Patient presented to GI clinic for follow-up visit today.  She feels some better currently.  Nausea is improving with intermittent symptoms.  Taking Carafate 4 times daily and PPI daily.  EGD was consistent with esophagitis and gastritis.  Path was unremarkable for H. pylori infection.     The following portions of the patient's history were reviewed and updated as appropriate:   Past Medical History:   Diagnosis Date   • Arthritis    • Atrial fibrillation (CMS/HCC)    • Atrial fibrillation (CMS/HCC)    • Atrial fibrillation with rapid ventricular response (CMS/HCC) 1/20/2017   • Breast cancer (CMS/HCC)     right   • Cancer (CMS/HCC)    • Disease of thyroid gland    • Hypertension      Past Surgical History:   Procedure Laterality Date   • ABLATION OF DYSRHYTHMIC FOCUS     • BREAST SURGERY     • CARDIAC ELECTROPHYSIOLOGY PROCEDURE N/A 4/6/2017    Procedure: EP/Ablation;  Surgeon: Blake Mcallister MD;  Location: [facility] CATH INVASIVE LOCATION;  Service:    • CARDIOVERSION     • CATARACT EXTRACTION W/ INTRAOCULAR LENS IMPLANT Right 8/9/2019    Procedure: REMOVE CATARACT AND IMPLANT INTRAOCULAR LENS;  Surgeon: Lenin Dennison MD;  Location: [facility] OR;  Service: Ophthalmology   • CATARACT EXTRACTION W/ INTRAOCULAR LENS IMPLANT Left 8/16/2019    Procedure: REMOVE CATARACT AND IMPLANT INTRAOCULAR LENS;  Surgeon: Lenin Dennison MD;  Location: [facility] OR;  Service: Ophthalmology   • CHOLECYSTECTOMY WITH INTRAOPERATIVE CHOLANGIOGRAM N/A 2/17/2020    Procedure: LAPAROSCOPIC CHOLECYSTECTOMY WITH INTRAOPERATIVE CHOLANGIOGRAM;  Surgeon: Denilson Richardson MD;  Location: [facility] OR;  Service: General;  Laterality: N/A;   • ENDOSCOPY N/A 6/9/2020    Procedure: ESOPHAGOGASTRODUODENOSCOPY;  Surgeon: Albina Ya MD;  Location: [facility] ENDOSCOPY;  Service:  Gastroenterology;  Laterality: N/A;   • INSERT / REPLACE / REMOVE PACEMAKER     • PACEMAKER IMPLANTATION     • SKIN BIOPSY     • SKIN TAG REMOVAL       Family History   Problem Relation Age of Onset   • Hypertension Daughter    • Hypertension Son    • No Known Problems Mother    • No Known Problems Father      OB History    None       Prior to Admission medications    Medication Sig Start Date End Date Taking? Authorizing Provider   ALPRAZolam (XANAX) 0.25 MG tablet Take 0.25 mg by mouth 2 (Two) Times a Day As Needed for Anxiety.   Yes Bradly Montana MD   amiodarone (PACERONE) 200 MG tablet Take 200 mg by mouth 2 (Two) Times a Day.   Yes Bradly Montana MD   carvedilol (COREG) 12.5 MG tablet Take 1.5 tablets by mouth 2 (Two) Times a Day With Meals. 9/21/17  Yes Cheli Monsalve APRN   colestipol (COLESTID) 1 g tablet  3/13/20  Yes Bradly Montana MD   dilTIAZem CD (CARDIZEM CD) 180 MG 24 hr capsule Take 180 mg by mouth Daily.   Yes Bradly Montana MD   esomeprazole (nexIUM) 40 MG capsule Take 40 mg by mouth Every Morning Before Breakfast.   Yes Bradly Montana MD   furosemide (LASIX) 20 MG tablet Take 40 mg by mouth Daily.   Yes Bradly Montana MD   gabapentin (NEURONTIN) 300 MG capsule As Needed. 5/15/20  Yes Bradly Montana MD   HYDROcodone-acetaminophen (NORCO)  MG per tablet Take 1 tablet by mouth 2 (Two) Times a Day As Needed for moderate pain (4-6).   Yes Bradly Montana MD   levothyroxine (SYNTHROID, LEVOTHROID) 50 MCG tablet Take 50 mcg by mouth Daily.   Yes Bradly Montana MD   meclizine (ANTIVERT) 25 MG tablet Take 25 mg by mouth 3 (three) times a day as needed for dizziness.   Yes Bradly Montana MD   mirtazapine (REMERON) 15 MG tablet Take 15 mg by mouth Every Night.   Yes Bradly Montana MD   ondansetron (ZOFRAN) 4 MG tablet Take 4 mg by mouth Every 8 (Eight) Hours As Needed for Nausea or Vomiting.   Yes Bradly Montana MD    phenazopyridine (PYRIDIUM) 200 MG tablet  6/13/20  Yes Bradly Montana MD   potassium chloride (K-DUR,KLOR-CON) 10 MEQ CR tablet Take 10 mEq by mouth daily.   Yes Bradly Montana MD   rivaroxaban (XARELTO) 15 MG tablet Take 15 mg by mouth Every Night.   Yes Bradly Montana MD   sucralfate (CARAFATE) 1 g tablet Take 1 tablet by mouth 4 (Four) Times a Day. 6/5/20  Yes Albina Ya MD   levoFLOXacin (LEVAQUIN) 500 MG tablet  6/13/20   Bradly Montana MD   PACERONE 100 MG tablet  6/10/20   Bradly Montana MD     Allergies   Allergen Reactions   • Penicillins Rash     Social History     Socioeconomic History   • Marital status:      Spouse name: Not on file   • Number of children: Not on file   • Years of education: Not on file   • Highest education level: Not on file   Tobacco Use   • Smoking status: Never Smoker   • Smokeless tobacco: Former User     Types: Snuff, Chew   Substance and Sexual Activity   • Alcohol use: No   • Drug use: No   • Sexual activity: Defer       Review of Systems  Review of Systems   Constitutional: Negative for chills, fatigue, fever and unexpected weight change.   HENT: Negative for congestion, ear discharge, hearing loss, nosebleeds and sore throat.    Eyes: Negative for pain, discharge and redness.   Respiratory: Negative for cough, chest tightness, shortness of breath and wheezing.    Cardiovascular: Negative for chest pain and palpitations.   Gastrointestinal: Positive for nausea. Negative for abdominal distention, abdominal pain, blood in stool, constipation, diarrhea and vomiting.   Endocrine: Negative for cold intolerance, polydipsia, polyphagia and polyuria.   Genitourinary: Negative for dysuria, flank pain, frequency, hematuria and urgency.   Musculoskeletal: Negative for arthralgias, back pain, joint swelling and myalgias.   Skin: Negative for color change, pallor and rash.   Neurological: Negative for tremors, seizures, syncope, weakness  "and headaches.   Hematological: Negative for adenopathy. Does not bruise/bleed easily.   Psychiatric/Behavioral: Negative for behavioral problems, confusion, dysphoric mood, hallucinations and suicidal ideas. The patient is not nervous/anxious.         /78 (BP Location: Right arm, Patient Position: Sitting)   Pulse 78   Ht 170.2 cm (67\")   Wt 74.8 kg (165 lb)   LMP  (LMP Unknown)   BMI 25.84 kg/m²     Objective    Physical Exam   Constitutional: She is oriented to person, place, and time. She appears well-developed and well-nourished.   HENT:   Head: Normocephalic and atraumatic.   Mouth/Throat: Oropharynx is clear and moist.   Eyes: Pupils are equal, round, and reactive to light. Conjunctivae and EOM are normal.   Neck: Normal range of motion. Neck supple. No thyromegaly present.   Cardiovascular: Normal rate, regular rhythm and normal heart sounds.   No murmur heard.  Pulmonary/Chest: Effort normal and breath sounds normal. She has no wheezes.   Abdominal: Soft. Bowel sounds are normal. She exhibits no distension and no mass. There is no tenderness. No hernia.   Genitourinary:   Genitourinary Comments: No lesions noted   Musculoskeletal: Normal range of motion. She exhibits no edema or tenderness.   Lymphadenopathy:     She has no cervical adenopathy.   Neurological: She is alert and oriented to person, place, and time. No cranial nerve deficit.   Skin: Skin is warm and dry. No rash noted.   Psychiatric: She has a normal mood and affect. Thought content normal.     Admission on 06/09/2020, Discharged on 06/09/2020   Component Date Value Ref Range Status   • SARS-CoV-2, HUMERA 06/06/2020 Not Detected  Not Detected Final    This test was developed and its performance characteristics determined  by [facility]. This test has not been FDA cleared or  approved. This test has been authorized by FDA under an Emergency Use  Authorization (EUA). This test is only authorized for the duration of  time the " declaration that circumstances exist justifying the  authorization of the emergency use of in vitro diagnostic tests for  detection of SARS-CoV-2 virus and/or diagnosis of COVID-19 infection  under section 564(b)(1) of the Act, 21 U.S.C. 360bbb-3(b)(1), unless  the authorization is terminated or revoked sooner.  When diagnostic testing is negative, the possibility of a false  negative result should be considered in the context of a patient's  recent exposures and the presence of clinical signs and symptoms  consistent with COVID-19. An individual without symptoms of COVID-19  and who is not shedding SARS-CoV-2 virus would expect to have a  negative (not detected) result in this assay.   • COVID [facility] PRIORITY 06/06/2020 Comment   Final    Received   • Case Report 06/09/2020    Final                    Value:Surgical Pathology Report                         Case: [id number]                                  Authorizing Provider:  Albina Ya MD      Collected:           06/09/2020 09:39 AM          Ordering Location:     [facility]             Received:            06/09/2020 10:08 AM                                 [facility]                                                     Pathologist:           Abel Torrez MD                                                             Specimens:   1) - Gastric, Antrum, bx                                                                            2) - Esophagus, EG junction                                                               • Final Diagnosis 06/09/2020    Final                    Value:This result contains rich text formatting which cannot be displayed here.     Assessment/Plan    No diagnosis found..   1.  Nausea, likely due to gastritis.  Continue PPI and Carafate.  Obtain gastric emptying scan if persists.  2.  Atrial fibrillation on Xarelto.    Orders placed during this encounter include:  No orders of the defined types were placed in  this encounter.      * Surgery not found *    Review and/or summary of lab tests, radiology, procedures, medications. Review and summary of old records and obtaining of history. The risks and benefits of my recommendations, as well as other treatment options were discussed with the patient and family member(s) today. Questions were answered.    No orders of the defined types were placed in this encounter.      Follow-up: No follow-ups on file.               Results for orders placed or performed during the hospital encounter of 06/09/20   COVID [facility] Priority - Swab, Nasopharynx   Result Value Ref Range    COVID [facility] PRIORITY Comment    COVID-19,[facility] ROUTINE, NP/OP SWAB IN TRANSPORT MEDIA OR ESWAB 72 HR TAT - Swab, Nasopharynx   Result Value Ref Range    SARS-CoV-2, HUMERA Not Detected Not Detected   Tissue Pathology Exam   Result Value Ref Range    Case Report       Surgical Pathology Report                         Case: [id number]                                  Authorizing Provider:  Albina Ya MD      Collected:           06/09/2020 09:39 AM          Ordering Location[facility]     [facility]             Received:            06/09/2020 10:08 AM                                 [facility]                                                     Pathologist:           Abel Torrez MD                                                             Specimens:   1) - Gastric, Antrum, bx                                                                            2) - Esophagus, EG junction                                                                Final Diagnosis       See Scanned Report       Results for orders placed or performed during the hospital encounter of 02/14/20   Lactic Acid, Reflex Timer (This will reflex a repeat order 3-3:15 hours after ordered.)   Result Value Ref Range    Extra Tube Hold for add-ons.    Lactic Acid, Reflex   Result Value Ref Range    Lactate 1.9 0.5 - 2.0 mmol/L    Gold Top - SST   Result Value Ref Range    Extra Tube Hold for add-ons.    Green Top (Gel)   Result Value Ref Range    Extra Tube Hold for add-ons.    Urinalysis, Microscopic Only - Urine, Catheter   Result Value Ref Range    RBC, UA 6-12 (A) None Seen /HPF    WBC, UA 3-5 None Seen, 0-2, 3-5 /HPF    Bacteria, UA 4+ (A) None Seen /HPF    Squamous Epithelial Cells, UA None Seen None Seen, 0-2 /HPF    Hyaline Casts, UA None Seen None Seen /LPF    Methodology Automated Microscopy    Urinalysis With Microscopic If Indicated (No Culture) - Urine, Catheter   Result Value Ref Range    Color, UA Yellow Yellow, Straw, Dark Yellow, [place]    Appearance, UA Cloudy (A) Clear    pH, UA 6.5 5.0 - 9.0    Specific Gravity, UA 1.017 1.003 - 1.030    Glucose, UA Negative Negative    Ketones, UA Negative Negative    Bilirubin, UA Negative Negative    Blood, UA Moderate (2+) (A) Negative    Protein, UA 30 mg/dL (1+) (A) Negative    Leuk Esterase, UA Negative Negative    Nitrite, UA Negative Negative    Urobilinogen, UA 1.0 E.U./dL 0.2 - 1.0 E.U./dL   CBC Auto Differential   Result Value Ref Range    WBC 7.42 3.40 - 10.80 10*3/mm3    RBC 3.26 (L) 3.77 - 5.28 10*6/mm3    Hemoglobin 9.8 (L) 12.0 - 15.9 g/dL    Hematocrit 30.9 (L) 34.0 - 46.6 %    MCV 94.8 79.0 - 97.0 fL    MCH 30.1 26.6 - 33.0 pg    MCHC 31.7 31.5 - 35.7 g/dL    RDW 13.4 12.3 - 15.4 %    RDW-SD 46.8 37.0 - 54.0 fl    MPV 12.7 (H) 6.0 - 12.0 fL    Platelets 133 (L) 140 - 450 10*3/mm3    Neutrophil % 87.5 (H) 42.7 - 76.0 %    Lymphocyte % 4.9 (L) 19.6 - 45.3 %    Monocyte % 6.5 5.0 - 12.0 %    Eosinophil % 0.0 (L) 0.3 - 6.2 %    Basophil % 0.3 0.0 - 1.5 %    Immature Grans % 0.8 (H) 0.0 - 0.5 %    Neutrophils, Absolute 6.50 1.70 - 7.00 10*3/mm3    Lymphocytes, Absolute 0.36 (L) 0.70 - 3.10 10*3/mm3    Monocytes, Absolute 0.48 0.10 - 0.90 10*3/mm3    Eosinophils, Absolute 0.00 0.00 - 0.40 10*3/mm3    Basophils, Absolute 0.02 0.00 - 0.20 10*3/mm3    Immature Grans, Absolute  0.06 (H) 0.00 - 0.05 10*3/mm3    nRBC 0.0 0.0 - 0.2 /100 WBC   CBC Auto Differential   Result Value Ref Range    WBC 10.89 (H) 3.40 - 10.80 10*3/mm3    RBC 3.68 (L) 3.77 - 5.28 10*6/mm3    Hemoglobin 11.1 (L) 12.0 - 15.9 g/dL    Hematocrit 33.8 (L) 34.0 - 46.6 %    MCV 91.8 79.0 - 97.0 fL    MCH 30.2 26.6 - 33.0 pg    MCHC 32.8 31.5 - 35.7 g/dL    RDW 13.4 12.3 - 15.4 %    RDW-SD 45.1 37.0 - 54.0 fl    MPV 11.2 6.0 - 12.0 fL    Platelets 119 (L) 140 - 450 10*3/mm3    Neutrophil % 83.9 (H) 42.7 - 76.0 %    Lymphocyte % 6.4 (L) 19.6 - 45.3 %    Monocyte % 8.2 5.0 - 12.0 %    Eosinophil % 0.1 (L) 0.3 - 6.2 %    Basophil % 0.6 0.0 - 1.5 %    Immature Grans % 0.8 (H) 0.0 - 0.5 %    Neutrophils, Absolute 9.14 (H) 1.70 - 7.00 10*3/mm3    Lymphocytes, Absolute 0.70 0.70 - 3.10 10*3/mm3    Monocytes, Absolute 0.89 0.10 - 0.90 10*3/mm3    Eosinophils, Absolute 0.01 0.00 - 0.40 10*3/mm3    Basophils, Absolute 0.06 0.00 - 0.20 10*3/mm3    Immature Grans, Absolute 0.09 (H) 0.00 - 0.05 10*3/mm3    nRBC 0.0 0.0 - 0.2 /100 WBC   Lavender Top   Result Value Ref Range    Extra Tube hold for add-on    Light Blue Top   Result Value Ref Range    Extra Tube hold for add-on    Troponin   Result Value Ref Range    Troponin T <0.010 0.000 - 0.030 ng/mL   Troponin   Result Value Ref Range    Troponin T <0.010 0.000 - 0.030 ng/mL   Tissue Pathology Exam   Result Value Ref Range    Case Report       Surgical Pathology Report                         Case: [id number]                                  Authorizing Provider:  Denilson Richardson MD           Collected:           02/17/2020 06:00 PM          Ordering Location:     [facility]             Received:            02/18/2020 06:38 AM                                 [facility]                                                              Pathologist:           Yovanny Reyes MD                                                        Specimen:    Gallbladder, PLUS CONTENTS                                                                  Final Diagnosis       SUBACUTE CHOLECYSTITIS WITH CHOLELITHIASIS.      Gross Description       The specimen consists of a pear-shaped gallbladder measuring 8.0 x 6.0 x 5.0 cm.  The external surface is focally fibrotic and on further examination, its wall is thickened measuring 0.8 cm in thickness.  The mucosa is smooth and numerous yellowish-brown stones measuring from 0.2-0.6 cm in dimension are obtained from the lumen.  Representative sections are embedded as 1A.     POC Glucose Once   Result Value Ref Range    Glucose 105 70 - 130 mg/dL   Body Fluid Culture - Body Fluid, Gallbladder   Result Value Ref Range    Body Fluid Culture Light growth (2+) Escherichia coli (A)     Body Fluid Culture Scant growth (1+) Normal Skin Arielle     Gram Stain Many (4+) WBCs seen     Gram Stain Few (2+) Gram positive cocci in chains        Susceptibility    Escherichia coli - JANEEN*     Ampicillin >=32 Resistant ug/ml     Ampicillin + Sulbactam 16 Intermediate ug/ml     Cefepime <=1 Susceptible ug/ml     Ceftazidime <=1 Susceptible ug/ml     Ceftriaxone <=1 Susceptible ug/ml     Gentamicin <=1 Susceptible ug/ml     Levofloxacin >=8 Resistant ug/ml     Piperacillin + Tazobactam <=4 Susceptible ug/ml     Trimethoprim + Sulfamethoxazole >=320 Resistant ug/ml     * Cefazolin sensitivity will not be reported for Enterobacteriaceae in non-urine isolates. If cefazolin is preferred, please call the microbiology lab to request an E-test.  With the exception of urinary-sourced infections, aminoglycosides should not be used as monotherapy.   Phosphorus   Result Value Ref Range    Phosphorus 2.5 2.5 - 4.5 mg/dL   Magnesium   Result Value Ref Range    Magnesium 1.8 1.6 - 2.4 mg/dL   Lipase   Result Value Ref Range    Lipase 16 13 - 60 U/L   Lactic Acid, Plasma   Result Value Ref Range    Lactate 2.3 (C) 0.5 - 2.0 mmol/L   Comprehensive Metabolic Panel   Result Value Ref Range    Glucose 131 (H) 65 -  99 mg/dL    BUN 14 8 - 23 mg/dL    Creatinine 0.69 0.57 - 1.00 mg/dL    Sodium 144 136 - 145 mmol/L    Potassium 3.5 3.5 - 5.2 mmol/L    Chloride 108 (H) 98 - 107 mmol/L    CO2 24.0 22.0 - 29.0 mmol/L    Calcium 8.1 (L) 8.6 - 10.5 mg/dL    Total Protein 6.0 6.0 - 8.5 g/dL    Albumin 3.00 (L) 3.50 - 5.20 g/dL    ALT (SGPT) 7 1 - 33 U/L    AST (SGOT) 21 1 - 32 U/L    Alkaline Phosphatase 66 39 - 117 U/L    Total Bilirubin 0.4 0.2 - 1.2 mg/dL    eGFR Non African Amer 82 >60 mL/min/1.73    Globulin 3.0 gm/dL    A/G Ratio 1.0 g/dL    BUN/Creatinine Ratio 20.3 7.0 - 25.0    Anion Gap 12.0 5.0 - 15.0 mmol/L     *Note: Due to a large number of results and/or encounters for the requested time period, some results have not been displayed. A complete set of results can be found in Results Review.         This document has been electronically signed by Albina Ya MD on June 16, 2020 11:25

## 2020-07-24 ENCOUNTER — OFFICE VISIT (OUTPATIENT)
Dept: GASTROENTEROLOGY | Facility: CLINIC | Age: 80
End: 2020-07-24

## 2020-07-24 VITALS
WEIGHT: 164.8 LBS | HEART RATE: 89 BPM | SYSTOLIC BLOOD PRESSURE: 165 MMHG | DIASTOLIC BLOOD PRESSURE: 94 MMHG | HEIGHT: 67 IN | BODY MASS INDEX: 25.87 KG/M2

## 2020-07-24 DIAGNOSIS — R11.0 NAUSEA: Primary | ICD-10-CM

## 2020-07-24 PROCEDURE — 99212 OFFICE O/P EST SF 10 MIN: CPT | Performed by: INTERNAL MEDICINE

## 2021-10-11 ENCOUNTER — HOSPITAL ENCOUNTER (INPATIENT)
Facility: HOSPITAL | Age: 81
LOS: 4 days | Discharge: HOME | End: 2021-10-15
Attending: FAMILY MEDICINE | Admitting: FAMILY MEDICINE

## 2021-10-11 ENCOUNTER — APPOINTMENT (OUTPATIENT)
Dept: GENERAL RADIOLOGY | Facility: HOSPITAL | Age: 81
End: 2021-10-11

## 2021-10-11 DIAGNOSIS — Z86.79 HISTORY OF ATRIAL FIBRILLATION: ICD-10-CM

## 2021-10-11 DIAGNOSIS — R13.10 DYSPHAGIA, UNSPECIFIED TYPE: ICD-10-CM

## 2021-10-11 DIAGNOSIS — R00.0 TACHYCARDIA: Primary | ICD-10-CM

## 2021-10-11 DIAGNOSIS — Z78.9 DECREASED ACTIVITIES OF DAILY LIVING (ADL): ICD-10-CM

## 2021-10-11 DIAGNOSIS — Z74.09 IMPAIRED MOBILITY: ICD-10-CM

## 2021-10-11 LAB
ALBUMIN SERPL-MCNC: 4.4 G/DL (ref 3.5–5.2)
ALBUMIN/GLOB SERPL: 1.4 G/DL
ALP SERPL-CCNC: 76 U/L (ref 39–117)
ALT SERPL W P-5'-P-CCNC: 10 U/L (ref 1–33)
ANION GAP SERPL CALCULATED.3IONS-SCNC: 12 MMOL/L (ref 5–15)
APTT PPP: 31.2 SECONDS (ref 24.1–35)
AST SERPL-CCNC: 19 U/L (ref 1–32)
BASOPHILS # BLD AUTO: 0.09 10*3/MM3 (ref 0–0.2)
BASOPHILS NFR BLD AUTO: 1.2 % (ref 0–1.5)
BILIRUB SERPL-MCNC: 0.5 MG/DL (ref 0–1.2)
BUN SERPL-MCNC: 15 MG/DL (ref 8–23)
BUN/CREAT SERPL: 10.6 (ref 7–25)
CALCIUM SPEC-SCNC: 9.5 MG/DL (ref 8.6–10.5)
CHLORIDE SERPL-SCNC: 107 MMOL/L (ref 98–107)
CO2 SERPL-SCNC: 23 MMOL/L (ref 22–29)
CREAT SERPL-MCNC: 1.42 MG/DL (ref 0.57–1)
D DIMER PPP FEU-MCNC: 0.46 MG/L (FEU) (ref 0–0.5)
DEPRECATED RDW RBC AUTO: 46.8 FL (ref 37–54)
EOSINOPHIL # BLD AUTO: 0.21 10*3/MM3 (ref 0–0.4)
EOSINOPHIL NFR BLD AUTO: 2.8 % (ref 0.3–6.2)
ERYTHROCYTE [DISTWIDTH] IN BLOOD BY AUTOMATED COUNT: 14 % (ref 12.3–15.4)
GFR SERPL CREATININE-BSD FRML MDRD: 35 ML/MIN/1.73
GLOBULIN UR ELPH-MCNC: 3.1 GM/DL
GLUCOSE SERPL-MCNC: 122 MG/DL (ref 65–99)
HCT VFR BLD AUTO: 39.4 % (ref 34–46.6)
HGB BLD-MCNC: 12.3 G/DL (ref 12–15.9)
IMM GRANULOCYTES # BLD AUTO: 0.02 10*3/MM3 (ref 0–0.05)
IMM GRANULOCYTES NFR BLD AUTO: 0.3 % (ref 0–0.5)
INR PPP: 1.26 (ref 0.91–1.09)
LYMPHOCYTES # BLD AUTO: 1.98 10*3/MM3 (ref 0.7–3.1)
LYMPHOCYTES NFR BLD AUTO: 26 % (ref 19.6–45.3)
MAGNESIUM SERPL-MCNC: 1.6 MG/DL (ref 1.6–2.4)
MCH RBC QN AUTO: 28.8 PG (ref 26.6–33)
MCHC RBC AUTO-ENTMCNC: 31.2 G/DL (ref 31.5–35.7)
MCV RBC AUTO: 92.3 FL (ref 79–97)
MONOCYTES # BLD AUTO: 0.43 10*3/MM3 (ref 0.1–0.9)
MONOCYTES NFR BLD AUTO: 5.7 % (ref 5–12)
NEUTROPHILS NFR BLD AUTO: 4.88 10*3/MM3 (ref 1.7–7)
NEUTROPHILS NFR BLD AUTO: 64 % (ref 42.7–76)
NRBC BLD AUTO-RTO: 0 /100 WBC (ref 0–0.2)
PLATELET # BLD AUTO: 216 10*3/MM3 (ref 140–450)
PMV BLD AUTO: 11.6 FL (ref 6–12)
POTASSIUM SERPL-SCNC: 4.3 MMOL/L (ref 3.5–5.2)
PROT SERPL-MCNC: 7.5 G/DL (ref 6–8.5)
PROTHROMBIN TIME: 15.3 SECONDS (ref 11.9–14.6)
RBC # BLD AUTO: 4.27 10*6/MM3 (ref 3.77–5.28)
SARS-COV-2 RNA PNL SPEC NAA+PROBE: NOT DETECTED
SODIUM SERPL-SCNC: 142 MMOL/L (ref 136–145)
T4 FREE SERPL-MCNC: 1.73 NG/DL (ref 0.93–1.7)
TROPONIN T SERPL-MCNC: <0.01 NG/ML (ref 0–0.03)
TSH SERPL DL<=0.05 MIU/L-ACNC: 0.81 UIU/ML (ref 0.27–4.2)
WBC # BLD AUTO: 7.61 10*3/MM3 (ref 3.4–10.8)

## 2021-10-11 PROCEDURE — 85379 FIBRIN DEGRADATION QUANT: CPT | Performed by: NURSE PRACTITIONER

## 2021-10-11 PROCEDURE — 84443 ASSAY THYROID STIM HORMONE: CPT | Performed by: NURSE PRACTITIONER

## 2021-10-11 PROCEDURE — 85730 THROMBOPLASTIN TIME PARTIAL: CPT | Performed by: NURSE PRACTITIONER

## 2021-10-11 PROCEDURE — 83735 ASSAY OF MAGNESIUM: CPT | Performed by: NURSE PRACTITIONER

## 2021-10-11 PROCEDURE — 80053 COMPREHEN METABOLIC PANEL: CPT | Performed by: NURSE PRACTITIONER

## 2021-10-11 PROCEDURE — 93010 ELECTROCARDIOGRAM REPORT: CPT | Performed by: INTERNAL MEDICINE

## 2021-10-11 PROCEDURE — 84484 ASSAY OF TROPONIN QUANT: CPT | Performed by: NURSE PRACTITIONER

## 2021-10-11 PROCEDURE — 25010000002 ADENOSINE PER 6 MG: Performed by: EMERGENCY MEDICINE

## 2021-10-11 PROCEDURE — 93005 ELECTROCARDIOGRAM TRACING: CPT | Performed by: FAMILY MEDICINE

## 2021-10-11 PROCEDURE — 93005 ELECTROCARDIOGRAM TRACING: CPT

## 2021-10-11 PROCEDURE — 87635 SARS-COV-2 COVID-19 AMP PRB: CPT | Performed by: NURSE PRACTITIONER

## 2021-10-11 PROCEDURE — 93005 ELECTROCARDIOGRAM TRACING: CPT | Performed by: NURSE PRACTITIONER

## 2021-10-11 PROCEDURE — 93005 ELECTROCARDIOGRAM TRACING: CPT | Performed by: EMERGENCY MEDICINE

## 2021-10-11 PROCEDURE — 85610 PROTHROMBIN TIME: CPT | Performed by: NURSE PRACTITIONER

## 2021-10-11 PROCEDURE — 84439 ASSAY OF FREE THYROXINE: CPT | Performed by: NURSE PRACTITIONER

## 2021-10-11 PROCEDURE — 99284 EMERGENCY DEPT VISIT MOD MDM: CPT

## 2021-10-11 PROCEDURE — 84481 FREE ASSAY (FT-3): CPT | Performed by: NURSE PRACTITIONER

## 2021-10-11 PROCEDURE — 99222 1ST HOSP IP/OBS MODERATE 55: CPT | Performed by: INTERNAL MEDICINE

## 2021-10-11 PROCEDURE — 71045 X-RAY EXAM CHEST 1 VIEW: CPT

## 2021-10-11 PROCEDURE — 85025 COMPLETE CBC W/AUTO DIFF WBC: CPT | Performed by: NURSE PRACTITIONER

## 2021-10-11 RX ORDER — ALPRAZOLAM 0.25 MG/1
0.25 TABLET ORAL 2 TIMES DAILY PRN
Status: DISCONTINUED | OUTPATIENT
Start: 2021-10-11 | End: 2021-10-15 | Stop reason: HOSPADM

## 2021-10-11 RX ORDER — SODIUM CHLORIDE 0.9 % (FLUSH) 0.9 %
10 SYRINGE (ML) INJECTION EVERY 12 HOURS SCHEDULED
Status: DISCONTINUED | OUTPATIENT
Start: 2021-10-12 | End: 2021-10-15 | Stop reason: HOSPADM

## 2021-10-11 RX ORDER — SODIUM CHLORIDE 9 MG/ML
50 INJECTION, SOLUTION INTRAVENOUS CONTINUOUS
Status: DISCONTINUED | OUTPATIENT
Start: 2021-10-12 | End: 2021-10-12

## 2021-10-11 RX ORDER — MIRTAZAPINE 15 MG/1
15 TABLET, FILM COATED ORAL NIGHTLY
Status: DISCONTINUED | OUTPATIENT
Start: 2021-10-12 | End: 2021-10-15 | Stop reason: HOSPADM

## 2021-10-11 RX ORDER — LEVOTHYROXINE SODIUM 0.05 MG/1
50 TABLET ORAL
Status: DISCONTINUED | OUTPATIENT
Start: 2021-10-12 | End: 2021-10-15 | Stop reason: HOSPADM

## 2021-10-11 RX ORDER — PANTOPRAZOLE SODIUM 40 MG/1
40 TABLET, DELAYED RELEASE ORAL
Status: DISCONTINUED | OUTPATIENT
Start: 2021-10-12 | End: 2021-10-15 | Stop reason: HOSPADM

## 2021-10-11 RX ORDER — SODIUM CHLORIDE 0.9 % (FLUSH) 0.9 %
10 SYRINGE (ML) INJECTION AS NEEDED
Status: DISCONTINUED | OUTPATIENT
Start: 2021-10-11 | End: 2021-10-15 | Stop reason: HOSPADM

## 2021-10-11 RX ORDER — DIGOXIN 0.25 MG/ML
500 INJECTION INTRAMUSCULAR; INTRAVENOUS ONCE
Status: COMPLETED | OUTPATIENT
Start: 2021-10-11 | End: 2021-10-12

## 2021-10-11 RX ORDER — MECLIZINE HYDROCHLORIDE 25 MG/1
25 TABLET ORAL 3 TIMES DAILY PRN
Status: DISCONTINUED | OUTPATIENT
Start: 2021-10-11 | End: 2021-10-15 | Stop reason: HOSPADM

## 2021-10-11 RX ORDER — HYDROCODONE BITARTRATE AND ACETAMINOPHEN 10; 325 MG/1; MG/1
1 TABLET ORAL EVERY 6 HOURS PRN
Status: DISCONTINUED | OUTPATIENT
Start: 2021-10-11 | End: 2021-10-12 | Stop reason: DRUGHIGH

## 2021-10-11 RX ORDER — ADENOSINE 3 MG/ML
6 INJECTION, SOLUTION INTRAVENOUS ONCE
Status: COMPLETED | OUTPATIENT
Start: 2021-10-11 | End: 2021-10-11

## 2021-10-11 RX ADMIN — DILTIAZEM HYDROCHLORIDE 5 MG/HR: 5 INJECTION INTRAVENOUS at 16:07

## 2021-10-11 RX ADMIN — SODIUM CHLORIDE 50 ML/HR: 9 INJECTION, SOLUTION INTRAVENOUS at 23:46

## 2021-10-11 RX ADMIN — SODIUM CHLORIDE 500 ML: 9 INJECTION, SOLUTION INTRAVENOUS at 23:18

## 2021-10-11 RX ADMIN — Medication 10 ML: at 23:46

## 2021-10-11 RX ADMIN — DILTIAZEM HYDROCHLORIDE 15 MG/HR: 5 INJECTION INTRAVENOUS at 23:48

## 2021-10-11 RX ADMIN — ADENOSINE 6 MG: 3 INJECTION, SOLUTION INTRAVENOUS at 20:54

## 2021-10-11 RX ADMIN — SODIUM CHLORIDE 1000 ML: 9 INJECTION, SOLUTION INTRAVENOUS at 20:51

## 2021-10-11 NOTE — CONSULTS
LOS: 0 days   Patient Care Team:  Heydi Tineo APRN as PCP - General (Family Medicine)    Chief Complaint: Palpitations and shortness of breath     Subjective    Sultana Lin is a 81 y.o. female who is being seen in consultation.  Patient was noted to have rapid narrow complex tachycardia.  Denies any chest pain  Feels weak and tired  Has shortness of breath  Started yesterday  No presyncope  No syncope  No orthopnea  No paroxysmal nocturnal dyspnea  Had been on amiodarone in the past  On chronic anticoagulation for paroxysmal atrial flutter/fibrillation  No bleeding issues  No falls  Tolerating current medications well  Vaccinated fully against Covid with 2 m vaccines  Compliant otherwise with prescribed medication regimen  Continues on uninterrupted anticoagulation  Telemetry shows narrow complex tachycardia  EKG is suggestive of sinus tachycardia though I suspect this is either rapid atrial tachycardia versus atrial flutter that is not discernible  Daughter by bedside    Review of Systems   Constitutional: No chills   Has fatigue   No fever.   HENT: Negative.    Eyes: Negative.    Respiratory: Negative for cough,   No chest wall soreness,   Shortness of breath,   no wheezing, no stridor.    Cardiovascular: As above  Gastrointestinal: Negative for abdominal distention,  No abdominal pain,   No blood in stool,   No constipation,   No diarrhea,   No nausea   No vomiting.   Endocrine: Negative.    Genitourinary: Negative for difficulty urinating, dysuria, flank pain and hematuria.   Musculoskeletal: Negative.    Skin: Negative for rash and wound.   Allergic/Immunologic: Negative.    Neurological: Negative for dizziness, syncope, weakness,   No light-headedness  No  headaches.   Hematological: Does not bruise/bleed easily.   Psychiatric/Behavioral: Negative for agitation or behavioral problems,   No confusion,   the patient is  nervous/anxious.       History:   Past Medical History:   Diagnosis Date    • Arthritis    • Atrial fibrillation (CMS/HCC)    • Atrial fibrillation (CMS/HCC)    • Atrial fibrillation with rapid ventricular response (CMS/HCC) 1/20/2017   • Breast cancer (CMS/HCC)     right   • Cancer (CMS/HCC)    • Disease of thyroid gland    • Hypertension      Past Surgical History:   Procedure Laterality Date   • ABLATION OF DYSRHYTHMIC FOCUS     • BREAST SURGERY     • CARDIAC ELECTROPHYSIOLOGY PROCEDURE N/A 4/6/2017    Procedure: EP/Ablation;  Surgeon: Blake Mcallister MD;  Location: [facility] CATH INVASIVE LOCATION;  Service:    • CARDIOVERSION     • CATARACT EXTRACTION W/ INTRAOCULAR LENS IMPLANT Right 8/9/2019    Procedure: REMOVE CATARACT AND IMPLANT INTRAOCULAR LENS;  Surgeon: Lenin Dennison MD;  Location: [facility] OR;  Service: Ophthalmology   • CATARACT EXTRACTION W/ INTRAOCULAR LENS IMPLANT Left 8/16/2019    Procedure: REMOVE CATARACT AND IMPLANT INTRAOCULAR LENS;  Surgeon: Lenin Dennison MD;  Location: [facility] OR;  Service: Ophthalmology   • CHOLECYSTECTOMY WITH INTRAOPERATIVE CHOLANGIOGRAM N/A 2/17/2020    Procedure: LAPAROSCOPIC CHOLECYSTECTOMY WITH INTRAOPERATIVE CHOLANGIOGRAM;  Surgeon: Denilson Richardson MD;  Location: [facility] OR;  Service: General;  Laterality: N/A;   • ENDOSCOPY N/A 6/9/2020    Procedure: ESOPHAGOGASTRODUODENOSCOPY;  Surgeon: Albina Ya MD;  Location: [facility] ENDOSCOPY;  Service: Gastroenterology;  Laterality: N/A;   • INSERT / REPLACE / REMOVE PACEMAKER     • PACEMAKER IMPLANTATION     • SKIN BIOPSY     • SKIN TAG REMOVAL     • UPPER GASTROINTESTINAL ENDOSCOPY  06/09/2020     Social History     Socioeconomic History   • Marital status:    Tobacco Use   • Smoking status: Never Smoker   • Smokeless tobacco: Former User     Types: Snuff, Chew   Substance and Sexual Activity   • Alcohol use: No   • Drug use: No   • Sexual activity: Defer     Family History   Problem Relation Age of Onset   • Hypertension Daughter    • Hypertension Son    • No Known  Problems Mother    • No Known Problems Father        Labs:  WBC WBC   Date Value Ref Range Status   10/11/2021 7.61 3.40 - 10.80 10*3/mm3 Final      HGB Hemoglobin   Date Value Ref Range Status   10/11/2021 12.3 12.0 - 15.9 g/dL Final      HCT Hematocrit   Date Value Ref Range Status   10/11/2021 39.4 34.0 - 46.6 % Final      Platelets Platelets   Date Value Ref Range Status   10/11/2021 216 140 - 450 10*3/mm3 Final      MCV MCV   Date Value Ref Range Status   10/11/2021 92.3 79.0 - 97.0 fL Final        Results from last 7 days   Lab Units 10/11/21  1602   SODIUM mmol/L 142   POTASSIUM mmol/L 4.3   CHLORIDE mmol/L 107   CO2 mmol/L 23.0   BUN mg/dL 15   CREATININE mg/dL 1.42*   CALCIUM mg/dL 9.5   BILIRUBIN mg/dL 0.5   ALK PHOS U/L 76   ALT (SGPT) U/L 10   AST (SGOT) U/L 19   GLUCOSE mg/dL 122*     Lab Results   Component Value Date    CKMB 0.82 12/13/2016    TROPONINI <0.012 12/11/2018    TROPONINT <0.010 10/11/2021     PT/INR:    Protime   Date Value Ref Range Status   10/11/2021 15.3 (H) 11.9 - 14.6 Seconds Final   /  INR   Date Value Ref Range Status   10/11/2021 1.26 (H) 0.91 - 1.09 Final       Imaging Results (Last 72 Hours)     Procedure Component Value Units Date/Time    XR Chest 1 View [[id number]] Collected: 10/11/21 1620     Updated: 10/11/21 1624    Narrative:      EXAMINATION: XR CHEST 1 VW-     10/11/2021 4:18 PM CDT     HISTORY: palpitations     1 view chest x-ray.     Comparison is made with December 10, 2018.     Unchanged left cardiac pacer.  Probable mildly enlarged heart.  The mediastinum is within normal limits.      The lungs are mildly hyperexpanded with no pneumonia or pneumothorax.  Mild chronic appearing interstitial disease with a few calcified  granulomas.  No congestive failure changes.                                                                       Impression:      1. Stable chronic lung changes.  2. No acute disease.        This report was finalized on 10/11/2021 16:21 by Dr. Ramesh  MD Marcelino.          Objective     Allergies   Allergen Reactions   • Penicillins Rash       Medication Review: Performed  Current Facility-Administered Medications   Medication Dose Route Frequency Provider Last Rate Last Admin   • dilTIAZem (CARDIZEM) 125 mg in 125 mL NS infusion  5-15 mg/hr Intravenous Continuous Sue Ahumada APRN 10 mL/hr at 10/11/21 1635 10 mg/hr at 10/11/21 1635   • sodium chloride 0.9 % flush 10 mL  10 mL Intravenous PRN Sue Ahumada APRN         Current Outpatient Medications   Medication Sig Dispense Refill   • ALPRAZolam (XANAX) 0.25 MG tablet Take 0.25 mg by mouth 2 (Two) Times a Day As Needed for Anxiety.     • amiodarone (PACERONE) 200 MG tablet Take 200 mg by mouth 2 (Two) Times a Day.     • carvedilol (COREG) 12.5 MG tablet Take 1.5 tablets by mouth 2 (Two) Times a Day With Meals. 90 tablet 11   • colestipol (COLESTID) 1 g tablet      • dilTIAZem CD (CARDIZEM CD) 180 MG 24 hr capsule Take 180 mg by mouth Daily.     • esomeprazole (nexIUM) 40 MG capsule Take 40 mg by mouth Every Morning Before Breakfast.     • furosemide (LASIX) 20 MG tablet Take 40 mg by mouth Daily.     • gabapentin (NEURONTIN) 300 MG capsule As Needed.     • HYDROcodone-acetaminophen (NORCO)  MG per tablet Take 1 tablet by mouth 2 (Two) Times a Day As Needed for moderate pain (4-6).     • levoFLOXacin (LEVAQUIN) 500 MG tablet      • levothyroxine (SYNTHROID, LEVOTHROID) 50 MCG tablet Take 50 mcg by mouth Daily.     • meclizine (ANTIVERT) 25 MG tablet Take 25 mg by mouth 3 (three) times a day as needed for dizziness.     • mirtazapine (REMERON) 15 MG tablet Take 15 mg by mouth Every Night.     • ondansetron (ZOFRAN) 4 MG tablet Take 4 mg by mouth Every 8 (Eight) Hours As Needed for Nausea or Vomiting.     • PACERONE 100 MG tablet      • phenazopyridine (PYRIDIUM) 200 MG tablet      • potassium chloride (K-DUR,KLOR-CON) 10 MEQ CR tablet Take 10 mEq by mouth daily.     • rivaroxaban (XARELTO) 15  "MG tablet Take 15 mg by mouth Every Night.     • sucralfate (CARAFATE) 1 g tablet Take 1 tablet by mouth 4 (Four) Times a Day. 120 tablet 5       Vital Sign Min/Max for last 24 hours  Temp  Min: 98 °F (36.7 °C)  Max: 98 °F (36.7 °C)   BP  Min: 135/77  Max: 142/84   Pulse  Min: 158  Max: 162   Resp  Min: 16  Max: 16   SpO2  Min: 100 %  Max: 100 %   No data recorded   Weight  Min: 78 kg (172 lb)  Max: 78 kg (172 lb)     Flowsheet Rows      First Filed Value   Admission Height 170.2 cm (67\") Documented at 10/11/2021 1515   Admission Weight 78 kg (172 lb) Documented at 10/11/2021 1515          Results for orders placed during the hospital encounter of 12/13/16    Adult Transthoracic Echo Complete    Interpretation Summary  · All left ventricular wall segments contract normally.  · Left ventricular function is normal. Estimated EF = 55%.  · Left ventricular diastolic dysfunction (grade I) consistent with impaired relaxation.  · No pulmonary hypertension      Physical Exam:    General Appearance: Awake, alert, in no acute distress  Eyes: Pupils equal and reactive    Ears: Appear intact with no abnormalities noted  Nose: Nares normal, no drainage  Neck: supple, trachea midline, no carotid bruit and no JVD  Back: no kyphosis present,    Lungs: respirations regular, respirations even and respirations unlabored  Heart: normal S1, S2, no significant murmurs   No gallops or rubs  no rub and no click  Abdomen: normal bowel sounds, no tenderness   Skin: no bleeding, bruising or rash  Extremities: no cyanosis  Psychiatric/Behavioral: Negative for agitation, behavioral problems, confusion, the patient does  appear to be nervous/anxious.       Results Review:   I reviewed the patient's new clinical results.  I reviewed the patient's new imaging results and agree with the interpretation.  I reviewed the patient's other test results and agree with the interpretation  I personally viewed and interpreted the patient's EKG/Telemetry " data    Discussed with patient  Updated patient regarding any new or relevant abnormalities on review of records or any new findings on physical exam.   Mentioned to patient about purpose of visit and desirable health short and long term goals and objectives.     Reviewed available prior notes, consults, prior visits, laboratory findings, radiology and cardiology relevant reports.   Updated chart as applicable.   I have reviewed the patient's medical history in detail and updated the computerized patient record as relevant.          Assessment/Plan     Narrow complex tachycardia  History of paroxysmal atrial flutter  History of paroxysmal atrial fibrillation  On chronic anticoagulation      Plan    Agree with giving bolus and infusion of Cardizem  Currently EKG is more suggestive of sinus tachycardia but possible this is atrial tachycardia/atrial flutter  Repeat EKG once ventricular rates slowed down  Continue on anticoagulation  If persistent arrhythmia may require cardioversion  Discussed with patient as well as daughter  Complete transthoracic echocardiogram tomorrow once ventricular rates are slower  Other comorbidities being addressed by primary attending    Telemetry  Deep vein thrombosis prophylaxis/precautions [on anticoagulation]  Appropriate diet, fluid, sodium, caffeine, stimulants intake   Questions were encouraged, asked and answered to the patient's  understanding and satisfaction.  Compliance to diet and medications       Flash Lopez MD  10/11/21  16:53 CDT    EMR Dragon/Transcription was used to dictate part of this note

## 2021-10-11 NOTE — ED PROVIDER NOTES
Subjective   Patient is an 81-year-old female with history significant for arthritis, atrial fibrillation, breast cancer, thyroid disease, hypertension.  She presents to the ER with complaints of palpitations.  Patient states around 11 PM last night she began developing heart racing symptoms.  She denies any chest pain, reported shortness of breath last night however none currently.  She denies any nausea, vomiting, or diarrhea.  She has had no cough or congestion.  She reports mild weakness secondary to the palpitations.  She is followed by Dr. Lopez with cardiology for her atrial fibrillation.  She has a pacemaker.  Most of her care seems to be at [facility] under Dr. Lopez other clinic.  Patient's heart rate is in the 160s on exam either sinus tach or flutter noted.  We spoke with cardiology who is having a difficult time noting as well.  We have ordered Cardizem bolus and drip.          Review of Systems   Constitutional: Negative for fever.   HENT: Negative.  Negative for congestion.    Respiratory: Positive for shortness of breath. Negative for cough.    Cardiovascular: Positive for palpitations. Negative for chest pain.   Gastrointestinal: Negative.  Negative for abdominal pain, diarrhea, nausea and vomiting.   Genitourinary: Negative.    Musculoskeletal: Negative.  Negative for back pain.   Skin: Negative.    Neurological: Positive for weakness. Negative for syncope.   All other systems reviewed and are negative.      Past Medical History:   Diagnosis Date   • Arthritis    • Atrial fibrillation (CMS/HCC)    • Atrial fibrillation (CMS/HCC)    • Atrial fibrillation with rapid ventricular response (CMS/HCC) 1/20/2017   • Breast cancer (CMS/HCC)     right   • Cancer (CMS/HCC)    • Disease of thyroid gland    • Hypertension        Allergies   Allergen Reactions   • Penicillins Rash       Past Surgical History:   Procedure Laterality Date   • ABLATION OF DYSRHYTHMIC FOCUS     • BREAST SURGERY     • CARDIAC  ELECTROPHYSIOLOGY PROCEDURE N/A 4/6/2017    Procedure: EP/Ablation;  Surgeon: Blake Mcallister MD;  Location: [facility] CATH INVASIVE LOCATION;  Service:    • CARDIOVERSION     • CATARACT EXTRACTION W/ INTRAOCULAR LENS IMPLANT Right 8/9/2019    Procedure: REMOVE CATARACT AND IMPLANT INTRAOCULAR LENS;  Surgeon: Lenin Dennison MD;  Location: [facility] OR;  Service: Ophthalmology   • CATARACT EXTRACTION W/ INTRAOCULAR LENS IMPLANT Left 8/16/2019    Procedure: REMOVE CATARACT AND IMPLANT INTRAOCULAR LENS;  Surgeon: Lenin Dennison MD;  Location: [facility] OR;  Service: Ophthalmology   • CHOLECYSTECTOMY WITH INTRAOPERATIVE CHOLANGIOGRAM N/A 2/17/2020    Procedure: LAPAROSCOPIC CHOLECYSTECTOMY WITH INTRAOPERATIVE CHOLANGIOGRAM;  Surgeon: Denilson Richardson MD;  Location: [facility] OR;  Service: General;  Laterality: N/A;   • ENDOSCOPY N/A 6/9/2020    Procedure: ESOPHAGOGASTRODUODENOSCOPY;  Surgeon: Albina Ya MD;  Location: [facility] ENDOSCOPY;  Service: Gastroenterology;  Laterality: N/A;   • INSERT / REPLACE / REMOVE PACEMAKER     • PACEMAKER IMPLANTATION     • SKIN BIOPSY     • SKIN TAG REMOVAL     • UPPER GASTROINTESTINAL ENDOSCOPY  06/09/2020       Family History   Problem Relation Age of Onset   • Hypertension Daughter    • Hypertension Son    • No Known Problems Mother    • No Known Problems Father        Social History     Socioeconomic History   • Marital status:    Tobacco Use   • Smoking status: Never Smoker   • Smokeless tobacco: Former User     Types: Snuff, Chew   Substance and Sexual Activity   • Alcohol use: No   • Drug use: No   • Sexual activity: Defer           Objective   Physical Exam  Vitals and nursing note reviewed.   Constitutional:       Appearance: She is well-developed.   HENT:      Head: Normocephalic and atraumatic.      Right Ear: External ear normal.      Left Ear: External ear normal.      Nose: Nose normal.      Mouth/Throat:      Mouth: Mucous membranes are moist.       Pharynx: Oropharynx is clear.   Eyes:      Extraocular Movements: Extraocular movements intact.      Conjunctiva/sclera: Conjunctivae normal.      Pupils: Pupils are equal, round, and reactive to light.   Cardiovascular:      Rate and Rhythm: Regular rhythm. Tachycardia present.      Heart sounds: Normal heart sounds.   Pulmonary:      Effort: Pulmonary effort is normal.      Breath sounds: Normal breath sounds.   Abdominal:      General: Bowel sounds are normal.      Palpations: Abdomen is soft.   Musculoskeletal:         General: Normal range of motion.      Cervical back: Normal range of motion and neck supple.   Skin:     General: Skin is warm and dry.      Capillary Refill: Capillary refill takes less than 2 seconds.   Neurological:      General: No focal deficit present.      Mental Status: She is alert and oriented to person, place, and time.   Psychiatric:         Mood and Affect: Mood normal.         Behavior: Behavior normal.         Thought Content: Thought content normal.         Judgment: Judgment normal.         Procedures           ED Course  ED Course as of 10/12/21 0823   Mon Oct 11, 2021   1646 Dr. Lopez has come to the ER to evaluate patient. [TW]   1646 Dr. Lopez attempted Valsalva maneuver without results. [TW]   1806 Pacemaker was interrogated and noted some runs of svt [TW]   1816 Labs are essentially unremarkable.  White count was within normal limits, H&H within normal limits.  CMP BUN is 15 creatinine 1.42.  Potassium is 4.3.  Liver enzymes within normal limits.  Troponin less than 0.010.  Magnesium 1.6.  D-dimer is within normal at 0.46.  TSH is 0.815, free T4 is 1.73. [TW]   1820 Patient has received a Cardizem bolus and remains on a Cardizem drip. [TW]   1820 Dr. Lopez has assessed the patient.  He is uncertain if patient is in a sinus tach or flutter.  He plans to do an echocardiogram tomorrow and likely cardiovert tomorrow.  He is asked for the hospitalist to admit.  Please see their  notes for details.  Covid remains pending. [TW]      ED Course User Index  [TW] Sue Ahumada, APRN                                           MDM  Number of Diagnoses or Management Options  History of atrial fibrillation: new and requires workup  Tachycardia: new and requires workup     Amount and/or Complexity of Data Reviewed  Clinical lab tests: ordered and reviewed  Tests in the radiology section of CPT®: ordered and reviewed  Discuss the patient with other providers: yes    Risk of Complications, Morbidity, and/or Mortality  Presenting problems: moderate  Diagnostic procedures: moderate  Management options: moderate    Patient Progress  Patient progress: improved      Final diagnoses:   Tachycardia   History of atrial fibrillation       ED Disposition  ED Disposition     ED Disposition Condition Comment    Decision to Admit  Level of Care: Telemetry [5]   Diagnosis: Tachycardia [[id number]]   Admitting Physician: LUISA ECKERT [[id number]]   Certification: I Certify That Inpatient Hospital Services Are Medically Necessary For Greater Than 2 Midnights            No follow-up provider specified.       Medication List      No changes were made to your prescriptions during this visit.          Sue Ahumada, APRN  10/12/21 [id number]

## 2021-10-11 NOTE — H&P
[facility] Medicine Services  HISTORY AND PHYSICAL    Date of Admission: 10/11/2021  Primary Care Physician: Heydi Tineo APRN    Subjective     Chief Complaint: Palpitations    History of Present Illness  81 year old female with PMH of PAF on Xarelto, status post cardioversion, HTN, that presents with fast palpitations since last night. She denies recent illness, oral intake has been normal. No recent changes to her medications. Amiodarone was stopped in April. Received Cardizem bolus and drip in ER and remains in a supraventricular tachycardia rhythm.     Review of Systems   Otherwise complete ROS reviewed and negative except as mentioned in the HPI.    Past Medical History:   Past Medical History:   Diagnosis Date   • Arthritis    • Atrial fibrillation (CMS/HCC)    • Atrial fibrillation (CMS/HCC)    • Atrial fibrillation with rapid ventricular response (CMS/HCC) 1/20/2017   • Breast cancer (CMS/HCC)     right   • Cancer (CMS/HCC)    • Disease of thyroid gland    • Hypertension      Past Surgical History:  Past Surgical History:   Procedure Laterality Date   • ABLATION OF DYSRHYTHMIC FOCUS     • BREAST SURGERY     • CARDIAC ELECTROPHYSIOLOGY PROCEDURE N/A 4/6/2017    Procedure: EP/Ablation;  Surgeon: Blake Mcallister MD;  Location: [facility] INVASIVE LOCATION;  Service:    • CARDIOVERSION     • CATARACT EXTRACTION W/ INTRAOCULAR LENS IMPLANT Right 8/9/2019    Procedure: REMOVE CATARACT AND IMPLANT INTRAOCULAR LENS;  Surgeon: Lenin Dennison MD;  Location: [facility] OR;  Service: Ophthalmology   • CATARACT EXTRACTION W/ INTRAOCULAR LENS IMPLANT Left 8/16/2019    Procedure: REMOVE CATARACT AND IMPLANT INTRAOCULAR LENS;  Surgeon: Lenin Dennison MD;  Location: [facility] OR;  Service: Ophthalmology   • CHOLECYSTECTOMY WITH INTRAOPERATIVE CHOLANGIOGRAM N/A 2/17/2020    Procedure: LAPAROSCOPIC CHOLECYSTECTOMY WITH INTRAOPERATIVE CHOLANGIOGRAM;  Surgeon: Dylan  Denilson WARD MD;  Location: [facility] OR;  Service: General;  Laterality: N/A;   • ENDOSCOPY N/A 6/9/2020    Procedure: ESOPHAGOGASTRODUODENOSCOPY;  Surgeon: Albina Ya MD;  Location: [facility] ENDOSCOPY;  Service: Gastroenterology;  Laterality: N/A;   • INSERT / REPLACE / REMOVE PACEMAKER     • PACEMAKER IMPLANTATION     • SKIN BIOPSY     • SKIN TAG REMOVAL     • UPPER GASTROINTESTINAL ENDOSCOPY  06/09/2020     Social History:  reports that she has never smoked. She has quit using smokeless tobacco.  Her smokeless tobacco use included snuff and chew. She reports that she does not drink alcohol and does not use drugs.    Family History: family history includes Hypertension in her daughter and son; No Known Problems in her father and mother.       Allergies:  Allergies   Allergen Reactions   • Penicillins Rash       Medications:  Prior to Admission medications    Medication Sig Start Date End Date Taking? Authorizing Provider   ALPRAZolam (XANAX) 0.25 MG tablet Take 0.25 mg by mouth 2 (Two) Times a Day As Needed for Anxiety.    Bradly Montana MD   amiodarone (PACERONE) 200 MG tablet Take 200 mg by mouth 2 (Two) Times a Day.    Bradly Montana MD   carvedilol (COREG) 12.5 MG tablet Take 1.5 tablets by mouth 2 (Two) Times a Day With Meals. 9/21/17   Cheli Monsalve APRN   colestipol (COLESTID) 1 g tablet  3/13/20   Bradly Montana MD   dilTIAZem CD (CARDIZEM CD) 180 MG 24 hr capsule Take 180 mg by mouth Daily.    Bradly Montana MD   esomeprazole (nexIUM) 40 MG capsule Take 40 mg by mouth Every Morning Before Breakfast.    Bradly Montana MD   furosemide (LASIX) 20 MG tablet Take 40 mg by mouth Daily.    Bradly Montana MD   gabapentin (NEURONTIN) 300 MG capsule As Needed. 5/15/20   Bradly Montana MD   HYDROcodone-acetaminophen (NORCO)  MG per tablet Take 1 tablet by mouth 2 (Two) Times a Day As Needed for moderate pain (4-6).    Bradly Montana MD   levoFLOXacin  "(LEVAQUIN) 500 MG tablet  6/13/20   Bradly Montana MD   levothyroxine (SYNTHROID, LEVOTHROID) 50 MCG tablet Take 50 mcg by mouth Daily.    Bradly Montana MD   meclizine (ANTIVERT) 25 MG tablet Take 25 mg by mouth 3 (three) times a day as needed for dizziness.    Bradly Montana MD   mirtazapine (REMERON) 15 MG tablet Take 15 mg by mouth Every Night.    Bradly Montana MD   ondansetron (ZOFRAN) 4 MG tablet Take 4 mg by mouth Every 8 (Eight) Hours As Needed for Nausea or Vomiting.    Bradly Montana MD   PACERONE 100 MG tablet  6/10/20   Bradly Montana MD   phenazopyridine (PYRIDIUM) 200 MG tablet  6/13/20   Bradly Montana MD   potassium chloride (K-DUR,KLOR-CON) 10 MEQ CR tablet Take 10 mEq by mouth daily.    Bradly Montana MD   rivaroxaban (XARELTO) 15 MG tablet Take 15 mg by mouth Every Night.    Bradly Montana MD   sucralfate (CARAFATE) 1 g tablet Take 1 tablet by mouth 4 (Four) Times a Day. 6/5/20   Albina Ya MD     I have utilized all available immediate resources to obtain, update, and review the patient's current medications.    Objective     Vital Signs: /76   Pulse (!) 158   Temp 98 °F (36.7 °C) (Oral)   Resp 17   Ht 170.2 cm (67\")   Wt 78 kg (172 lb)   LMP  (LMP Unknown)   SpO2 98%   BMI 26.94 kg/m²   Physical Exam  Vitals reviewed.   Constitutional:       General: She is not in acute distress.     Appearance: She is well-developed. She is not toxic-appearing.   HENT:      Head: Normocephalic and atraumatic.      Right Ear: External ear normal.      Left Ear: External ear normal.      Mouth/Throat:      Mouth: Mucous membranes are dry.      Pharynx: Oropharynx is clear.   Eyes:      General:         Right eye: No discharge.         Left eye: No discharge.      Extraocular Movements: Extraocular movements intact.      Conjunctiva/sclera: Conjunctivae normal.      Pupils: Pupils are equal, round, and reactive to light.   Neck: "      Vascular: No JVD.   Cardiovascular:      Rate and Rhythm: Tachycardia present. Rhythm irregular.      Pulses: Normal pulses.      Heart sounds: Normal heart sounds. No murmur heard.  No friction rub. No gallop.    Pulmonary:      Effort: Pulmonary effort is normal. No respiratory distress.      Breath sounds: No stridor. No wheezing, rhonchi or rales.   Chest:      Chest wall: No tenderness.   Abdominal:      General: Bowel sounds are normal. There is no distension.      Palpations: Abdomen is soft.      Tenderness: There is no abdominal tenderness. There is no guarding or rebound.      Hernia: No hernia is present.   Musculoskeletal:         General: No swelling, tenderness or deformity. Normal range of motion.      Cervical back: Normal range of motion and neck supple. No rigidity or tenderness. No muscular tenderness.      Right lower leg: No edema.      Left lower leg: No edema.   Skin:     General: Skin is warm and dry.      Findings: No erythema or rash.   Neurological:      General: No focal deficit present.      Mental Status: She is alert and oriented to person, place, and time.      Cranial Nerves: No cranial nerve deficit.      Sensory: No sensory deficit.      Motor: No weakness or abnormal muscle tone.      Deep Tendon Reflexes: Reflexes normal.   Psychiatric:         Mood and Affect: Mood normal.         Behavior: Behavior normal.     Results Reviewed:  Lab Results (last 24 hours)     Procedure Component Value Units Date/Time    TSH [[id number]]  (Normal) Collected: 10/11/21 1602    Specimen: Blood Updated: 10/11/21 1656     TSH 0.815 uIU/mL     T4, Free [[id number]]  (Abnormal) Collected: 10/11/21 1602    Specimen: Blood Updated: 10/11/21 1654     Free T4 1.73 ng/dL     Narrative:      Results may be falsely increased if patient taking Biotin.      Comprehensive Metabolic Panel [[id number]]  (Abnormal) Collected: 10/11/21 1602    Specimen: Blood Updated: 10/11/21 1650     Glucose 122 mg/dL      BUN  15 mg/dL      Creatinine 1.42 mg/dL      Sodium 142 mmol/L      Potassium 4.3 mmol/L      Chloride 107 mmol/L      CO2 23.0 mmol/L      Calcium 9.5 mg/dL      Total Protein 7.5 g/dL      Albumin 4.40 g/dL      ALT (SGPT) 10 U/L      AST (SGOT) 19 U/L      Alkaline Phosphatase 76 U/L      Total Bilirubin 0.5 mg/dL      eGFR Non African Amer 35 mL/min/1.73      Globulin 3.1 gm/dL      A/G Ratio 1.4 g/dL      BUN/Creatinine Ratio 10.6     Anion Gap 12.0 mmol/L     Narrative:      GFR Normal >60  Chronic Kidney Disease <60  Kidney Failure <15      Protime-INR [[id number]]  (Abnormal) Collected: 10/11/21 1557    Specimen: Blood Updated: 10/11/21 1645     Protime 15.3 Seconds      INR 1.26    D-dimer, Quantitative [[id number]]  (Normal) Collected: 10/11/21 1557    Specimen: Blood Updated: 10/11/21 1645     D-Dimer, Quantitative 0.46 mg/L (FEU)     Narrative:      Reference Range is 0-0.50 mg/L FEU. However, results <0.50 mg/L FEU tends to rule out DVT or PE. Results >0.50 mg/L FEU are not useful in predicting absence or presence of DVT or PE.      aPTT [[id number]]  (Normal) Collected: 10/11/21 1557    Specimen: Blood Updated: 10/11/21 1645     PTT 31.2 seconds     Troponin [[id number]]  (Normal) Collected: 10/11/21 1602    Specimen: Blood Updated: 10/11/21 1645     Troponin T <0.010 ng/mL     Narrative:      Troponin T Reference Range:  <= 0.03 ng/mL-   Negative for AMI  >0.03 ng/mL-     Abnormal for myocardial necrosis.  Clinicians would have to utilize clinical acumen, EKG, Troponin and serial changes to determine if it is an Acute Myocardial Infarction or myocardial injury due to an underlying chronic condition.       Results may be falsely decreased if patient taking Biotin.      Magnesium [[id number]]  (Normal) Collected: 10/11/21 1602    Specimen: Blood Updated: 10/11/21 1644     Magnesium 1.6 mg/dL     CBC & Differential [[id number]]  (Abnormal) Collected: 10/11/21 1558    Specimen: Blood Updated: 10/11/21 6785     Narrative:      The following orders were created for panel order CBC & Differential.  Procedure                               Abnormality         Status                     ---------                               -----------         ------                     CBC Auto Differential[[id number]]        Abnormal            Final result                 Please view results for these tests on the individual orders.    CBC Auto Differential [[id number]]  (Abnormal) Collected: 10/11/21 1557    Specimen: Blood Updated: 10/11/21 1629     WBC 7.61 10*3/mm3      RBC 4.27 10*6/mm3      Hemoglobin 12.3 g/dL      Hematocrit 39.4 %      MCV 92.3 fL      MCH 28.8 pg      MCHC 31.2 g/dL      RDW 14.0 %      RDW-SD 46.8 fl      MPV 11.6 fL      Platelets 216 10*3/mm3      Neutrophil % 64.0 %      Lymphocyte % 26.0 %      Monocyte % 5.7 %      Eosinophil % 2.8 %      Basophil % 1.2 %      Immature Grans % 0.3 %      Neutrophils, Absolute 4.88 10*3/mm3      Lymphocytes, Absolute 1.98 10*3/mm3      Monocytes, Absolute 0.43 10*3/mm3      Eosinophils, Absolute 0.21 10*3/mm3      Basophils, Absolute 0.09 10*3/mm3      Immature Grans, Absolute 0.02 10*3/mm3      nRBC 0.0 /100 WBC     T3, Free [[id number]] Collected: 10/11/21 1557    Specimen: Blood Updated: 10/11/21 1612        Imaging Results (Last 24 Hours)     Procedure Component Value Units Date/Time    XR Chest 1 View [[id number]] Collected: 10/11/21 1620     Updated: 10/11/21 1624    Narrative:      EXAMINATION: XR CHEST 1 VW-     10/11/2021 4:18 PM CDT     HISTORY: palpitations     1 view chest x-ray.     Comparison is made with December 10, 2018.     Unchanged left cardiac pacer.  Probable mildly enlarged heart.  The mediastinum is within normal limits.      The lungs are mildly hyperexpanded with no pneumonia or pneumothorax.  Mild chronic appearing interstitial disease with a few calcified  granulomas.  No congestive failure changes.                                                                        Impression:      1. Stable chronic lung changes.  2. No acute disease.        This report was finalized on 10/11/2021 16:21 by Dr. Gadiel Benson MD.        I have personally reviewed and interpreted the radiology studies and ECG obtained at time of admission.     Assessment / Plan     Assessment:   Active Hospital Problems    Diagnosis    • **Paroxysmal atrial fibrillation with rapid ventricular response (HCC)    • Tachycardia    • Paroxysmal atrial fibrillation (HCC)    • Hypertension    • Hypothyroidism      Plan:   Admit to critical care unless blood pressure stabilizes  Vitals per protocol  Bed rest  Npo diet  Continue Cardizem drip  NS bolus 500 and 50 cc/hour drip afterwards  Cardiology input noted  Hold Xarelto for possible cardioversion    Home medications reconciled    DVT prophylaxis > Patient anticoagulated    Code Status/Advanced Care Plan: Full    The patient's surrogate decision maker is see records.     I discussed my findings and recommendations with the patient and daughter at bedside.    Estimated length of stay is over 2 midnights.     The patient was seen and examined by me on 10/11/2021 at 1835.    Electronically signed by Juan Luis Black MD, 10/11/21, 19:02 CDT.

## 2021-10-11 NOTE — ED NOTES
Patient reports history of afib/aflutter and states that she is a patient of Dr. Lopez. Dr. Lopez at bedside during nurse assessment. Patient reports last night she started feeling over all weakness with soa on simple exertion and was concerned that she may be back in Afib. Patient reports that due to her birthday being today she was waiting to seek medical treatment. Daughter at bedside insisted that patient come to ER today to be seen. Dr. Lopez attempted Shanda Herrera, RN  10/11/21 [id number]

## 2021-10-12 ENCOUNTER — APPOINTMENT (OUTPATIENT)
Dept: CARDIOLOGY | Facility: HOSPITAL | Age: 81
End: 2021-10-12

## 2021-10-12 LAB
ANION GAP SERPL CALCULATED.3IONS-SCNC: 11 MMOL/L (ref 5–15)
BASOPHILS # BLD AUTO: 0.07 10*3/MM3 (ref 0–0.2)
BASOPHILS NFR BLD AUTO: 0.9 % (ref 0–1.5)
BH CV ECHO MEAS - AO MAX PG (FULL): 6.2 MMHG
BH CV ECHO MEAS - AO MAX PG: 10 MMHG
BH CV ECHO MEAS - AO MEAN PG (FULL): 4 MMHG
BH CV ECHO MEAS - AO MEAN PG: 6 MMHG
BH CV ECHO MEAS - AO ROOT AREA (BSA CORRECTED): 1.5
BH CV ECHO MEAS - AO ROOT AREA: 6.6 CM^2
BH CV ECHO MEAS - AO ROOT DIAM: 2.9 CM
BH CV ECHO MEAS - AO V2 MAX: 158 CM/SEC
BH CV ECHO MEAS - AO V2 MEAN: 113 CM/SEC
BH CV ECHO MEAS - AO V2 VTI: 26.1 CM
BH CV ECHO MEAS - AVA(I,A): 1.7 CM^2
BH CV ECHO MEAS - AVA(I,D): 1.7 CM^2
BH CV ECHO MEAS - AVA(V,A): 1.8 CM^2
BH CV ECHO MEAS - AVA(V,D): 1.8 CM^2
BH CV ECHO MEAS - BSA(HAYCOCK): 2 M^2
BH CV ECHO MEAS - BSA: 1.9 M^2
BH CV ECHO MEAS - BZI_BMI: 27.9 KILOGRAMS/M^2
BH CV ECHO MEAS - BZI_METRIC_HEIGHT: 170.2 CM
BH CV ECHO MEAS - BZI_METRIC_WEIGHT: 80.7 KG
BH CV ECHO MEAS - EDV(CUBED): 76.2 ML
BH CV ECHO MEAS - EDV(MOD-SP2): 43.7 ML
BH CV ECHO MEAS - EDV(MOD-SP4): 55 ML
BH CV ECHO MEAS - EDV(TEICH): 80.4 ML
BH CV ECHO MEAS - EF(CUBED): 35.7 %
BH CV ECHO MEAS - EF(MOD-SP2): 76.9 %
BH CV ECHO MEAS - EF(MOD-SP4): 62.9 %
BH CV ECHO MEAS - EF(TEICH): 29.5 %
BH CV ECHO MEAS - ESV(CUBED): 49 ML
BH CV ECHO MEAS - ESV(MOD-SP2): 10.1 ML
BH CV ECHO MEAS - ESV(MOD-SP4): 20.4 ML
BH CV ECHO MEAS - ESV(TEICH): 56.6 ML
BH CV ECHO MEAS - FS: 13.7 %
BH CV ECHO MEAS - IVS/LVPW: 1.5
BH CV ECHO MEAS - IVSD: 1.6 CM
BH CV ECHO MEAS - LA DIMENSION: 3.7 CM
BH CV ECHO MEAS - LA/AO: 1.3
BH CV ECHO MEAS - LAT PEAK E' VEL: 4.7 CM/SEC
BH CV ECHO MEAS - LV DIASTOLIC VOL/BSA (35-75): 28.6 ML/M^2
BH CV ECHO MEAS - LV MASS(C)D: 201.1 GRAMS
BH CV ECHO MEAS - LV MASS(C)DI: 104.5 GRAMS/M^2
BH CV ECHO MEAS - LV MAX PG: 3.8 MMHG
BH CV ECHO MEAS - LV MEAN PG: 2 MMHG
BH CV ECHO MEAS - LV SYSTOLIC VOL/BSA (12-30): 10.6 ML/M^2
BH CV ECHO MEAS - LV V1 MAX: 97.9 CM/SEC
BH CV ECHO MEAS - LV V1 MEAN: 60.1 CM/SEC
BH CV ECHO MEAS - LV V1 VTI: 15.5 CM
BH CV ECHO MEAS - LVIDD: 4.2 CM
BH CV ECHO MEAS - LVIDS: 3.7 CM
BH CV ECHO MEAS - LVLD AP2: 5.7 CM
BH CV ECHO MEAS - LVLD AP4: 5.6 CM
BH CV ECHO MEAS - LVLS AP2: 4.7 CM
BH CV ECHO MEAS - LVLS AP4: 5.1 CM
BH CV ECHO MEAS - LVOT AREA (M): 2.8 CM^2
BH CV ECHO MEAS - LVOT AREA: 2.8 CM^2
BH CV ECHO MEAS - LVOT DIAM: 1.9 CM
BH CV ECHO MEAS - LVPWD: 1 CM
BH CV ECHO MEAS - MED PEAK E' VEL: 5.87 CM/SEC
BH CV ECHO MEAS - MV DEC TIME: 0.11 SEC
BH CV ECHO MEAS - MV E MAX VEL: 151 CM/SEC
BH CV ECHO MEAS - PI END-D VEL: 90.9 CM/SEC
BH CV ECHO MEAS - SI(AO): 89.6 ML/M^2
BH CV ECHO MEAS - SI(CUBED): 14.1 ML/M^2
BH CV ECHO MEAS - SI(LVOT): 22.8 ML/M^2
BH CV ECHO MEAS - SI(MOD-SP2): 17.5 ML/M^2
BH CV ECHO MEAS - SI(MOD-SP4): 18 ML/M^2
BH CV ECHO MEAS - SI(TEICH): 12.3 ML/M^2
BH CV ECHO MEAS - SV(AO): 172.4 ML
BH CV ECHO MEAS - SV(CUBED): 27.2 ML
BH CV ECHO MEAS - SV(LVOT): 43.9 ML
BH CV ECHO MEAS - SV(MOD-SP2): 33.6 ML
BH CV ECHO MEAS - SV(MOD-SP4): 34.6 ML
BH CV ECHO MEAS - SV(TEICH): 23.7 ML
BH CV ECHO MEAS - TR MAX VEL: 279 CM/SEC
BH CV ECHO MEASUREMENTS AVERAGE E/E' RATIO: 28.57
BUN SERPL-MCNC: 11 MG/DL (ref 8–23)
BUN/CREAT SERPL: 10.6 (ref 7–25)
CALCIUM SPEC-SCNC: 9 MG/DL (ref 8.6–10.5)
CHLORIDE SERPL-SCNC: 110 MMOL/L (ref 98–107)
CO2 SERPL-SCNC: 23 MMOL/L (ref 22–29)
CREAT SERPL-MCNC: 1.04 MG/DL (ref 0.57–1)
DEPRECATED RDW RBC AUTO: 46.5 FL (ref 37–54)
EOSINOPHIL # BLD AUTO: 0.1 10*3/MM3 (ref 0–0.4)
EOSINOPHIL NFR BLD AUTO: 1.3 % (ref 0.3–6.2)
ERYTHROCYTE [DISTWIDTH] IN BLOOD BY AUTOMATED COUNT: 14 % (ref 12.3–15.4)
GFR SERPL CREATININE-BSD FRML MDRD: 51 ML/MIN/1.73
GLUCOSE SERPL-MCNC: 123 MG/DL (ref 65–99)
HCT VFR BLD AUTO: 35.2 % (ref 34–46.6)
HGB BLD-MCNC: 11.3 G/DL (ref 12–15.9)
IMM GRANULOCYTES # BLD AUTO: 0.05 10*3/MM3 (ref 0–0.05)
IMM GRANULOCYTES NFR BLD AUTO: 0.6 % (ref 0–0.5)
LEFT ATRIUM VOLUME INDEX: 56.8 ML/M2
LEFT ATRIUM VOLUME: 109 CM3
LYMPHOCYTES # BLD AUTO: 1.53 10*3/MM3 (ref 0.7–3.1)
LYMPHOCYTES NFR BLD AUTO: 19.5 % (ref 19.6–45.3)
MAXIMAL PREDICTED HEART RATE: 138 BPM
MCH RBC QN AUTO: 29.4 PG (ref 26.6–33)
MCHC RBC AUTO-ENTMCNC: 32.1 G/DL (ref 31.5–35.7)
MCV RBC AUTO: 91.4 FL (ref 79–97)
MONOCYTES # BLD AUTO: 0.62 10*3/MM3 (ref 0.1–0.9)
MONOCYTES NFR BLD AUTO: 7.9 % (ref 5–12)
NEUTROPHILS NFR BLD AUTO: 5.49 10*3/MM3 (ref 1.7–7)
NEUTROPHILS NFR BLD AUTO: 69.8 % (ref 42.7–76)
NRBC BLD AUTO-RTO: 0 /100 WBC (ref 0–0.2)
PLATELET # BLD AUTO: 191 10*3/MM3 (ref 140–450)
PMV BLD AUTO: 11.2 FL (ref 6–12)
POTASSIUM SERPL-SCNC: 3.6 MMOL/L (ref 3.5–5.2)
RBC # BLD AUTO: 3.85 10*6/MM3 (ref 3.77–5.28)
SODIUM SERPL-SCNC: 144 MMOL/L (ref 136–145)
STRESS TARGET HR: 117 BPM
T3FREE SERPL-MCNC: 2.94 PG/ML (ref 2–4.4)
WBC # BLD AUTO: 7.86 10*3/MM3 (ref 3.4–10.8)

## 2021-10-12 PROCEDURE — 25010000002 PERFLUTREN 6.52 MG/ML SUSPENSION: Performed by: FAMILY MEDICINE

## 2021-10-12 PROCEDURE — 97162 PT EVAL MOD COMPLEX 30 MIN: CPT

## 2021-10-12 PROCEDURE — 25010000002 AMIODARONE IN DEXTROSE 5% 150-4.21 MG/100ML-% SOLUTION: Performed by: INTERNAL MEDICINE

## 2021-10-12 PROCEDURE — 25010000002 AMIODARONE IN DEXTROSE 5% 360-4.14 MG/200ML-% SOLUTION: Performed by: INTERNAL MEDICINE

## 2021-10-12 PROCEDURE — 80048 BASIC METABOLIC PNL TOTAL CA: CPT | Performed by: FAMILY MEDICINE

## 2021-10-12 PROCEDURE — 93306 TTE W/DOPPLER COMPLETE: CPT

## 2021-10-12 PROCEDURE — 93306 TTE W/DOPPLER COMPLETE: CPT | Performed by: INTERNAL MEDICINE

## 2021-10-12 PROCEDURE — 25010000002 DIGOXIN PER 500 MCG: Performed by: INTERNAL MEDICINE

## 2021-10-12 PROCEDURE — 93010 ELECTROCARDIOGRAM REPORT: CPT | Performed by: INTERNAL MEDICINE

## 2021-10-12 PROCEDURE — 93005 ELECTROCARDIOGRAM TRACING: CPT | Performed by: FAMILY MEDICINE

## 2021-10-12 PROCEDURE — 85025 COMPLETE CBC W/AUTO DIFF WBC: CPT | Performed by: FAMILY MEDICINE

## 2021-10-12 PROCEDURE — 99233 SBSQ HOSP IP/OBS HIGH 50: CPT | Performed by: INTERNAL MEDICINE

## 2021-10-12 RX ORDER — MECLIZINE HYDROCHLORIDE 25 MG/1
25 TABLET ORAL 3 TIMES DAILY PRN
Status: DISCONTINUED | OUTPATIENT
Start: 2021-10-12 | End: 2021-10-12 | Stop reason: SDUPTHER

## 2021-10-12 RX ORDER — HYDROCODONE BITARTRATE AND ACETAMINOPHEN 10; 325 MG/1; MG/1
1 TABLET ORAL 2 TIMES DAILY PRN
Status: DISCONTINUED | OUTPATIENT
Start: 2021-10-12 | End: 2021-10-15 | Stop reason: HOSPADM

## 2021-10-12 RX ORDER — DILTIAZEM HYDROCHLORIDE 180 MG/1
180 CAPSULE, COATED, EXTENDED RELEASE ORAL
Status: DISCONTINUED | OUTPATIENT
Start: 2021-10-12 | End: 2021-10-15 | Stop reason: HOSPADM

## 2021-10-12 RX ORDER — SUCRALFATE 1 G/1
1 TABLET ORAL
Status: DISCONTINUED | OUTPATIENT
Start: 2021-10-12 | End: 2021-10-15 | Stop reason: HOSPADM

## 2021-10-12 RX ORDER — CARVEDILOL 6.25 MG/1
18.75 TABLET ORAL 2 TIMES DAILY WITH MEALS
Status: DISCONTINUED | OUTPATIENT
Start: 2021-10-12 | End: 2021-10-13

## 2021-10-12 RX ORDER — ROSUVASTATIN CALCIUM 10 MG/1
10 TABLET, COATED ORAL DAILY
COMMUNITY

## 2021-10-12 RX ORDER — FUROSEMIDE 40 MG/1
40 TABLET ORAL DAILY
Status: DISCONTINUED | OUTPATIENT
Start: 2021-10-12 | End: 2021-10-15 | Stop reason: HOSPADM

## 2021-10-12 RX ADMIN — AMIODARONE HYDROCHLORIDE 150 MG: 1.5 INJECTION, SOLUTION INTRAVENOUS at 18:38

## 2021-10-12 RX ADMIN — SUCRALFATE 1 G: 1 TABLET ORAL at 20:40

## 2021-10-12 RX ADMIN — PERFLUTREN 1.17 MG: 6.52 INJECTION, SUSPENSION INTRAVENOUS at 08:25

## 2021-10-12 RX ADMIN — CARVEDILOL 18.75 MG: 6.25 TABLET, FILM COATED ORAL at 08:42

## 2021-10-12 RX ADMIN — ALPRAZOLAM 0.25 MG: 0.25 TABLET ORAL at 03:20

## 2021-10-12 RX ADMIN — RIVAROXABAN 15 MG: 15 TABLET, FILM COATED ORAL at 17:13

## 2021-10-12 RX ADMIN — DILTIAZEM HYDROCHLORIDE 15 MG/HR: 5 INJECTION INTRAVENOUS at 22:03

## 2021-10-12 RX ADMIN — SUCRALFATE 1 G: 1 TABLET ORAL at 17:13

## 2021-10-12 RX ADMIN — PANTOPRAZOLE SODIUM 40 MG: 40 TABLET, DELAYED RELEASE ORAL at 05:03

## 2021-10-12 RX ADMIN — MIRTAZAPINE 15 MG: 15 TABLET, FILM COATED ORAL at 20:40

## 2021-10-12 RX ADMIN — LEVOTHYROXINE SODIUM 50 MCG: 50 TABLET ORAL at 05:03

## 2021-10-12 RX ADMIN — CARVEDILOL 18.75 MG: 6.25 TABLET, FILM COATED ORAL at 17:13

## 2021-10-12 RX ADMIN — DILTIAZEM HYDROCHLORIDE 10 MG/HR: 5 INJECTION INTRAVENOUS at 10:43

## 2021-10-12 RX ADMIN — DIGOXIN 500 MCG: 0.25 INJECTION INTRAMUSCULAR; INTRAVENOUS at 02:55

## 2021-10-12 RX ADMIN — AMIODARONE HYDROCHLORIDE 1 MG/MIN: 1.8 INJECTION, SOLUTION INTRAVENOUS at 18:39

## 2021-10-12 RX ADMIN — DILTIAZEM HYDROCHLORIDE 180 MG: 180 CAPSULE, COATED, EXTENDED RELEASE ORAL at 08:42

## 2021-10-12 RX ADMIN — SUCRALFATE 1 G: 1 TABLET ORAL at 10:45

## 2021-10-12 RX ADMIN — FUROSEMIDE 40 MG: 40 TABLET ORAL at 08:42

## 2021-10-12 NOTE — PLAN OF CARE
Goal Outcome Evaluation:              Outcome Summary: AFIB with rate between 100-150 for most the night. More controlled after digoxin administration staying between 100-120. -130 systolic with adequate MAP. BM x1. Afebrile. Cardizem currently infusing at 10mg/hr. N/S at 50mL/hr. No compliants of pain or nausea. Little to no sleep. Anxious. Continue to monitor.

## 2021-10-12 NOTE — NURSING NOTE
Patient transferred from critical care unit earlier this shift, very anxious, doesn't seem to like being in her room alone, insistent that the door stay open, suffers from anxiety when she can't see out. Door left open and note placed on door to make sure all staff know to keep door open. Have updated x 2 family members via phone, one daughter lives in [place] and the other is in [facility], she will be able to visit tomorrow. Notified of visiting hours. Daughter agreeable. Call if any changes. HR remains elevated, despite PO Cardizem CD given today, and Cardizem drip increased to 15 mg with no improvement in rate. MD aware and Amiodarone bolus and drip ordered. Will initiate.

## 2021-10-12 NOTE — PROGRESS NOTES
LOS: 1 day   Patient Care Team:  Heydi Tineo APRN as PCP - General (Family Medicine)    Chief Complaint: Shortness of breath and rapid atrial fibrillation     Subjective    Sultana Lin is a 81 y.o. female who is being seen in follow-up.  Patient overall feeling better  Ventricular rates are much better controlled  Rates mostly between 100-110 bpm and up to 220 bpm  Nursing by bedside  Lyly Schmidt RN available  For some reason patient was not given Xarelto yesterday  Dr. Black note mentions to hold Xarelto for cardioversion  No chest pain  No palpitation  No presyncope  No syncope  No orthopnea  Resting comfortably  Latest test results reviewed  Hemodynamically stable  No bleeding issues    Telemetry: no malignant arrhythmia. No significant pauses.  Atrial fibrillation with rapid ventricular response as described above    Review of Systems   Constitutional: No chills   Has fatigue   No fever.   HENT: Negative.    Eyes: Negative.    Respiratory: Negative for cough,   No chest wall soreness,   Shortness of breath,   no wheezing, no stridor.    Cardiovascular: As above  Gastrointestinal: Negative for abdominal distention,  No abdominal pain,   No blood in stool,   No constipation,   No diarrhea,   No nausea   No vomiting.   Endocrine: Negative.    Genitourinary: Negative for difficulty urinating, dysuria, flank pain and hematuria.   Musculoskeletal: Negative.    Skin: Negative for rash and wound.   Allergic/Immunologic: Negative.    Neurological: Negative for dizziness, syncope, weakness,   No light-headedness  No  headaches.   Hematological: Does not bruise/bleed easily.   Psychiatric/Behavioral: Negative for agitation or behavioral problems,   No confusion,   the patient is  nervous/anxious.       History:   Past Medical History:   Diagnosis Date   • Arthritis    • Atrial fibrillation (CMS/HCC)    • Atrial fibrillation (CMS/HCC)    • Atrial fibrillation with rapid ventricular response  (CMS/HCC) 1/20/2017   • Breast cancer (CMS/HCC)     right   • Cancer (CMS/HCC)    • Disease of thyroid gland    • Hypertension      Past Surgical History:   Procedure Laterality Date   • ABLATION OF DYSRHYTHMIC FOCUS     • BREAST SURGERY     • CARDIAC ELECTROPHYSIOLOGY PROCEDURE N/A 4/6/2017    Procedure: EP/Ablation;  Surgeon: Blake Mcallister MD;  Location: [facility] CATH INVASIVE LOCATION;  Service:    • CARDIOVERSION     • CATARACT EXTRACTION W/ INTRAOCULAR LENS IMPLANT Right 8/9/2019    Procedure: REMOVE CATARACT AND IMPLANT INTRAOCULAR LENS;  Surgeon: Lenin Dennison MD;  Location: [facility] OR;  Service: Ophthalmology   • CATARACT EXTRACTION W/ INTRAOCULAR LENS IMPLANT Left 8/16/2019    Procedure: REMOVE CATARACT AND IMPLANT INTRAOCULAR LENS;  Surgeon: Lenin Dennison MD;  Location: [facility] OR;  Service: Ophthalmology   • CHOLECYSTECTOMY WITH INTRAOPERATIVE CHOLANGIOGRAM N/A 2/17/2020    Procedure: LAPAROSCOPIC CHOLECYSTECTOMY WITH INTRAOPERATIVE CHOLANGIOGRAM;  Surgeon: Denilson Richardson MD;  Location: [facility] OR;  Service: General;  Laterality: N/A;   • ENDOSCOPY N/A 6/9/2020    Procedure: ESOPHAGOGASTRODUODENOSCOPY;  Surgeon: Albina Ya MD;  Location: [facility] ENDOSCOPY;  Service: Gastroenterology;  Laterality: N/A;   • INSERT / REPLACE / REMOVE PACEMAKER     • PACEMAKER IMPLANTATION     • SKIN BIOPSY     • SKIN TAG REMOVAL     • UPPER GASTROINTESTINAL ENDOSCOPY  06/09/2020     Social History     Socioeconomic History   • Marital status:    Tobacco Use   • Smoking status: Never Smoker   • Smokeless tobacco: Former User     Types: Snuff, Chew   Substance and Sexual Activity   • Alcohol use: No   • Drug use: No   • Sexual activity: Defer     Family History   Problem Relation Age of Onset   • Hypertension Daughter    • Hypertension Son    • No Known Problems Mother    • No Known Problems Father        Labs:  WBC WBC   Date Value Ref Range Status   10/12/2021 7.86 3.40 - 10.80 10*3/mm3  Final   10/11/2021 7.61 3.40 - 10.80 10*3/mm3 Final      HGB Hemoglobin   Date Value Ref Range Status   10/12/2021 11.3 (L) 12.0 - 15.9 g/dL Final   10/11/2021 12.3 12.0 - 15.9 g/dL Final      HCT Hematocrit   Date Value Ref Range Status   10/12/2021 35.2 34.0 - 46.6 % Final   10/11/2021 39.4 34.0 - 46.6 % Final      Platelets Platelets   Date Value Ref Range Status   10/12/2021 191 140 - 450 10*3/mm3 Final   10/11/2021 216 140 - 450 10*3/mm3 Final      MCV MCV   Date Value Ref Range Status   10/12/2021 91.4 79.0 - 97.0 fL Final   10/11/2021 92.3 79.0 - 97.0 fL Final        Results from last 7 days   Lab Units 10/12/21  1155 10/11/21  1602   SODIUM mmol/L 144 142   POTASSIUM mmol/L 3.6 4.3   CHLORIDE mmol/L 110* 107   CO2 mmol/L 23.0 23.0   BUN mg/dL 11 15   CREATININE mg/dL 1.04* 1.42*   CALCIUM mg/dL 9.0 9.5   BILIRUBIN mg/dL  --  0.5   ALK PHOS U/L  --  76   ALT (SGPT) U/L  --  10   AST (SGOT) U/L  --  19   GLUCOSE mg/dL 123* 122*     Lab Results   Component Value Date    CKMB 0.82 12/13/2016    TROPONINI <0.012 12/11/2018    TROPONINT <0.010 10/11/2021     PT/INR:    Protime   Date Value Ref Range Status   10/11/2021 15.3 (H) 11.9 - 14.6 Seconds Final   /  INR   Date Value Ref Range Status   10/11/2021 1.26 (H) 0.91 - 1.09 Final       Imaging Results (Last 72 Hours)     Procedure Component Value Units Date/Time    XR Chest 1 View [[id number]] Collected: 10/11/21 1620     Updated: 10/11/21 1624    Narrative:      EXAMINATION: XR CHEST 1 VW-     10/11/2021 4:18 PM CDT     HISTORY: palpitations     1 view chest x-ray.     Comparison is made with December 10, 2018.     Unchanged left cardiac pacer.  Probable mildly enlarged heart.  The mediastinum is within normal limits.      The lungs are mildly hyperexpanded with no pneumonia or pneumothorax.  Mild chronic appearing interstitial disease with a few calcified  granulomas.  No congestive failure changes.                                                                        Impression:      1. Stable chronic lung changes.  2. No acute disease.        This report was finalized on 10/11/2021 16:21 by Dr. Gadiel Benson MD.          Objective     Allergies   Allergen Reactions   • Perflutren Lipid Microsphere Other (See Comments)     Patient experienced leg and back pain on a scale of 10/10. Patient symptoms resolved with a few minutes.    • Penicillins Rash       Medication Review: Performed  Current Facility-Administered Medications   Medication Dose Route Frequency Provider Last Rate Last Admin   • ALPRAZolam (XANAX) tablet 0.25 mg  0.25 mg Oral BID PRN Juan Luis Black MD   0.25 mg at 10/12/21 0320   • carvedilol (COREG) tablet 18.75 mg  18.75 mg Oral BID With Meals Juan Luis Black MD   18.75 mg at 10/12/21 0842   • dilTIAZem (CARDIZEM) 125 mg in 125 mL NS infusion  5-15 mg/hr Intravenous Continuous Juan Luis Black MD 10 mL/hr at 10/12/21 1043 10 mg/hr at 10/12/21 1043   • dilTIAZem CD (CARDIZEM CD) 24 hr capsule 180 mg  180 mg Oral Q24H Juan Luis Black MD   180 mg at 10/12/21 0842   • furosemide (LASIX) tablet 40 mg  40 mg Oral Daily Juan Luis Black MD   40 mg at 10/12/21 0842   • HYDROcodone-acetaminophen (NORCO)  MG per tablet 1 tablet  1 tablet Oral BID PRN Juan Luis Black MD       • levothyroxine (SYNTHROID, LEVOTHROID) tablet 50 mcg  50 mcg Oral Q AM Juan Luis Black MD   50 mcg at 10/12/21 0503   • meclizine (ANTIVERT) tablet 25 mg  25 mg Oral TID PRN Juan Luis Black MD       • mirtazapine (REMERON) tablet 15 mg  15 mg Oral Nightly Juan Luis Black MD       • pantoprazole (PROTONIX) EC tablet 40 mg  40 mg Oral Q AM Juan Luis Black MD   40 mg at 10/12/21 0503   • rivaroxaban (XARELTO) tablet 15 mg  15 mg Oral Daily With Dinner Juan Luis Black MD       • sodium chloride 0.9 % bolus 500 mL  500 mL Intravenous Once Juan Luis Black MD       • sodium chloride 0.9 %  "flush 10 mL  10 mL Intravenous PRN Juan Luis Black MD       • sodium chloride 0.9 % flush 10 mL  10 mL Intravenous Q12H Juan Luis Black MD   10 mL at 10/11/21 1426   • sodium chloride 0.9 % flush 10 mL  10 mL Intravenous PRN Juan Luis Black MD       • sucralfate (CARAFATE) tablet 1 g  1 g Oral 4x Daily AC & at Bedtime Juan Luis Black MD   1 g at 10/12/21 1045       Vital Sign Min/Max for last 24 hours  Temp  Min: 97.6 °F (36.4 °C)  Max: 98.5 °F (36.9 °C)   BP  Min: 91/55  Max: 136/81   Pulse  Min: 93  Max: 167   Resp  Min: 16  Max: 20   SpO2  Min: 94 %  Max: 99 %   No data recorded   Weight  Min: 80.4 kg (177 lb 3.2 oz)  Max: 80.9 kg (178 lb 4.8 oz)     Flowsheet Rows      First Filed Value   Admission Height 170.2 cm (67\") Documented at 10/11/2021 1515   Admission Weight 78 kg (172 lb) Documented at 10/11/2021 1515          Results for orders placed during the hospital encounter of 12/13/16    Adult Transthoracic Echo Complete    Interpretation Summary  · All left ventricular wall segments contract normally.  · Left ventricular function is normal. Estimated EF = 55%.  · Left ventricular diastolic dysfunction (grade I) consistent with impaired relaxation.  · No pulmonary hypertension      Physical Exam:    General Appearance: Awake, alert, in no acute distress  Eyes: Pupils equal and reactive    Ears: Appear intact with no abnormalities noted  Nose: Nares normal, no drainage  Neck: supple, trachea midline, no carotid bruit and no JVD  Back: no kyphosis present,    Lungs: respirations regular, respirations even and respirations unlabored  Heart: normal S1, S2, irregular, 2/6 systolic murmur left sternal border  Abdomen: normal bowel sounds, no tenderness   Skin: no bleeding, bruising or rash  Extremities: no cyanosis  Psychiatric/Behavioral: Negative for agitation, behavioral problems, confusion, the patient does  appear to be nervous/anxious.       Results Review:   I reviewed the " patient's new clinical results.  I reviewed the patient's new imaging results and agree with the interpretation.  I reviewed the patient's other test results and agree with the interpretation  I personally viewed and interpreted the patient's EKG/Telemetry data    Discussed with patient  Updated patient regarding any new or relevant abnormalities on review of records or any new findings on physical exam.   Mentioned to patient about purpose of visit and desirable health short and long term goals and objectives.     Reviewed available prior notes, consults, prior visits, laboratory findings, radiology and cardiology relevant reports.   Updated chart as applicable.   I have reviewed the patient's medical history in detail and updated the computerized patient record as relevant.          Assessment/Plan       Paroxysmal atrial fibrillation with rapid ventricular response (HCC)    Hypothyroidism    Hypertension    Paroxysmal atrial fibrillation (HCC)    Tachycardia      Plan    For some reason patient was not given Xarelto yesterday  Dr. Black's note says hold Xarelto for cardioversion likely this is not what he meant  In any case will not perform cardioversion as last Xarelto was at least 36 to 48 hours ago  Currently her ventricular rates are significantly improved  I do not feel it is necessary to perform a JUDI guided cardioversion in a hemodynamically stable patient elderly lady with multiple comorbidities  Okay to move to telemetry floor  Rate controlled  Resume Xarelto  Discussed with Lyly Parham RN  Monitor for any signs of bleeding  Follow-up with cardiology 2 to 4 weeks after discharge  Echocardiogram has been ordered and will follow the results    Telemetry  Deep vein thrombosis prophylaxis/precautions [on anticoagulation]  Appropriate diet, fluid, sodium, caffeine, stimulants intake   Questions were encouraged, asked and answered to the patient's  understanding and satisfaction.  Compliance to diet and  medications       Flash Lopez MD  10/12/21  16:24 CDT    EMR Dragon/Transcription was used to dictate part of this note

## 2021-10-12 NOTE — PLAN OF CARE
Goal Outcome Evaluation:  Plan of Care Reviewed With: (P) patient        Progress: (P) no change  Outcome Summary: (P) Pt is an 82 y/o female admitted on 10/11 for weakness, SOA, and heart palpitations. Pt has a history of PAF, HTN, and breast cancer. Pt found to be in paroxysmal AFib with RVR. PT evaluation complete in room. Pt lives with  with one step to enter home. Pt reports no pain but dizziness while lying. Pt was indepenent in mobility prior to this admission. The pt demos 5/5 strength in the ankles/knees and 4+/5 hip flexor strength with intact LE sensation. Pt performs bed mobility sit/supine with SBA. She comes to stand with CGA/SBA but she notices increased dizziness and has to lie back down. Pt's HR was 130 in supine, 174 in sitting, and 180 in standing. Pt did not walk today due to dizziness and tachycardia. Nursing notified of HR numbers. Pt will require skilled PT to increase mobility to PLOF once her HR is stable. PT to continue to monitor to decide assistance needs once disharged.

## 2021-10-12 NOTE — THERAPY EVALUATION
Patient Name: Sultana Lin  : 1940    MRN: [id number]                              Today's Date: 10/12/2021       Admit Date: 10/11/2021    Visit Dx:     ICD-10-CM ICD-9-CM   1. Tachycardia  R00.0 785.0   2. History of atrial fibrillation  Z86.79 V12.59   3. Impaired mobility  Z74.09 799.89     Patient Active Problem List   Diagnosis   • Hypothyroidism   • Hypertension   • Paroxysmal atrial fibrillation (HCC)   • Paroxysmal atrial fibrillation with rapid ventricular response (HCC)   • Acute cholecystitis   • Nausea   • Tachycardia     Past Medical History:   Diagnosis Date   • Arthritis    • Atrial fibrillation (CMS/HCC)    • Atrial fibrillation (CMS/HCC)    • Atrial fibrillation with rapid ventricular response (CMS/HCC) 2017   • Breast cancer (CMS/HCC)     right   • Cancer (CMS/HCC)    • Disease of thyroid gland    • Hypertension      Past Surgical History:   Procedure Laterality Date   • ABLATION OF DYSRHYTHMIC FOCUS     • BREAST SURGERY     • CARDIAC ELECTROPHYSIOLOGY PROCEDURE N/A 2017    Procedure: EP/Ablation;  Surgeon: Blake Mcallister MD;  Location: [facility] INVASIVE LOCATION;  Service:    • CARDIOVERSION     • CATARACT EXTRACTION W/ INTRAOCULAR LENS IMPLANT Right 2019    Procedure: REMOVE CATARACT AND IMPLANT INTRAOCULAR LENS;  Surgeon: Lenin Dennison MD;  Location: [facility] OR;  Service: Ophthalmology   • CATARACT EXTRACTION W/ INTRAOCULAR LENS IMPLANT Left 2019    Procedure: REMOVE CATARACT AND IMPLANT INTRAOCULAR LENS;  Surgeon: Lenin Dennison MD;  Location: [facility] OR;  Service: Ophthalmology   • CHOLECYSTECTOMY WITH INTRAOPERATIVE CHOLANGIOGRAM N/A 2020    Procedure: LAPAROSCOPIC CHOLECYSTECTOMY WITH INTRAOPERATIVE CHOLANGIOGRAM;  Surgeon: Denilson Richardson MD;  Location: [facility] OR;  Service: General;  Laterality: N/A;   • ENDOSCOPY N/A 2020    Procedure: ESOPHAGOGASTRODUODENOSCOPY;  Surgeon: Albina aY MD;  Location: [facility]  ENDOSCOPY;  Service: Gastroenterology;  Laterality: N/A;   • INSERT / REPLACE / REMOVE PACEMAKER     • PACEMAKER IMPLANTATION     • SKIN BIOPSY     • SKIN TAG REMOVAL     • UPPER GASTROINTESTINAL ENDOSCOPY  06/09/2020      General Information     Row Name 10/12/21 [facility]          Physical Therapy Time and Intention    Document Type evaluation  CC: Weakness, SOA, Palpitations History: Paroxysmal AFib, HTN, Breast Cancer HPI: Paroxysmal Afib with RVR, Tachycardia  -LAKSHMI (r) PH (t) LAKSHMI (c)     Mode of Treatment physical therapy  -LAKSHMI (r) PH (t) LKASHMI (c)     Row Name 10/12/21 [facility]          General Information    Patient Profile Reviewed yes  -LAKSHMI (r) PH (t) LAKSHMI (c)     Prior Level of Function independent:; all household mobility; community mobility; driving; gait; transfer; bed mobility; ADL's; using stairs; shopping; cleaning; cooking; home management  -LAKSHMI (r) PH (t) LAKSHMI (c)     Existing Precautions/Restrictions fall  Pt's HR elevated, tachycardia with positional changes  -LAKSHMI     Barriers to Rehab medically complex  -LAKSHMI     Row Name 10/12/21 [facility]          Living Environment    Lives With spouse  -LAKSHMI (r) PH (t) LAKSHMI (c)     Row Name 10/12/21 [facility]          Home Main Entrance    Number of Stairs, Main Entrance one  -LAKSHMI (r) PH (t) LAKSHMI (c)     Stair Railings, Main Entrance railings on both sides of stairs  -LAKSHMI (r) PH (t) LAKSHMI (c)     Row Name 10/12/21 [facility]          Stairs Within Home, Primary    Number of Stairs, Within Home, Primary none  -LAKSHMI (r) PH (t) LAKSHMI (c)     Stair Railings, Within Home, Primary none  -LAKSHMI (r) PH (t) LAKSHMI (c)     Row Name 10/12/21 [facility]          Cognition    Orientation Status (Cognition) oriented x 4  -LAKSHMI (r) PH (t) LAKSHMI (c)     Row Name 10/12/21 [facility]          Safety Issues, Functional Mobility    Safety Issues Affecting Function (Mobility) other (see comments)  none notified  -LAKSHMI (r) PH (t) LAKSHMI (c)     Impairments Affecting Function (Mobility) endurance/activity tolerance  -LAKSHMI (r) PH (t) LAKSHMI (c)           User Key  (r) =  Recorded By, (t) = Taken By, (c) = Cosigned By    Initials Name Provider Type    Geovanny Desai PT DPT Physical Therapist    Genet Han, PT Student PT Student               Mobility     Row Name 10/12/21 1421          Bed Mobility    Bed Mobility supine-sit; sit-supine  -LAKSHMI (r) PH (t) LAKSHMI (c)     Supine-Sit [place] (Bed Mobility) standby assist  -LAKSHMI (r) PH (t) LAKSHMI (c)     Sit-Supine [place] (Bed Mobility) standby assist  -LAKSHMI (r) PH (t) LAKSHMI (c)     Assistive Device (Bed Mobility) head of bed elevated; bed rails  -LAKSHMI (r) PH (t) LAKSHMI (c)     Row Name 10/12/21 1421          Sit-Stand Transfer    Sit-Stand [place] (Transfers) contact guard; standby assist  pt with elevated HR during standing  -LAKSHMI (r) PH (t) LAKSHMI (c)     Row Name 10/12/21 1421          Gait/Stairs (Locomotion)    Comment (Gait/Stairs) Pt did not perform gait due to dizziness and  bpm in standing   -LAKSHMI (r)  LAKSHMI (c)           User Key  (r) = Recorded By, (t) = Taken By, (c) = Cosigned By    Initials Name Provider Type    Geovanny Desai PT DPT Physical Therapist    Genet Han, PT Student PT Student               Obj/Interventions     Row Name 10/12/21 1421          Range of Motion Comprehensive    General Range of Motion bilateral lower extremity ROM WFL  -LAKSHMI (r) PH (t) LAKSHMI (c)     Row Name 10/12/21 1421          Strength Comprehensive (MMT)    Comment, General Manual Muscle Testing (MMT) Assessment B ankles/Knees 5/5 B Hip flexion 4+/5  -LAKSHMI (r) PH (t) LAKSHMI (c)     Row Name 10/12/21 1421          Balance    Balance Assessment sitting static balance; standing static balance; standing dynamic balance  -LAKSHMI (r) PH (t) LAKSHMI (c)     Static Sitting Balance WFL; unsupported; sitting, edge of bed  -LAKSHMI (r) PH (t) LAKSHMI (c)     Static Standing Balance WFL; unsupported; standing  -LAKSHMI (r) PH (t) LAKSHMI (c)     Dynamic Standing Balance WFL; standing; supported  -LAKSHMI (r) PH (t) LAKSHMI (c)     Comment, Balance Pt demos good dynamic standing balance;  pt did not walk today due to tachycardia  -LAKSHMI (r) PH (t) LAKSHMI (c)     Row Name 10/12/21 1421          Sensory Assessment (Somatosensory)    Sensory Assessment (Somatosensory) LE sensation intact  -LAKSHMI (r) PH (t) LAKSHMI (c)           User Key  (r) = Recorded By, (t) = Taken By, (c) = Cosigned By    Initials Name Provider Type    Geovanny Desai, PT DPT Physical Therapist    PH Genet Dewitt, PT Student PT Student               Goals/Plan     Row Name 10/12/21 1421          Bed Mobility Goal 1 (PT)    Activity/Assistive Device (Bed Mobility Goal 1, PT) rolling to left; rolling to right; sit to supine/supine to sit; bridging  -LAKSHMI (r) PH (t) LAKSHMI (c)     [place] Level/Cues Needed (Bed Mobility Goal 1, PT) independent  -LAKSHMI (r) PH (t) LAKSHMI (c)     Time Frame (Bed Mobility Goal 1, PT) long term goal (LTG); 10 days  -LAKSHMI (r) PH (t) LAKSHMI (c)     Strategies/Barriers (Bed Mobility Goal 1, PT) with flat bed  -LAKSHMI (r) PH (t) LAKSHMI (c)     Progress/Outcomes (Bed Mobility Goal 1, PT) goal ongoing  -LAKSHMI (r) PH (t) LAKSHMI (c)     Row Name 10/12/21 1421          Transfer Goal 1 (PT)    Activity/Assistive Device (Transfer Goal 1, PT) sit-to-stand/stand-to-sit; bed-to-chair/chair-to-bed  -LAKSHMI (r) PH (t) LAKSHMI (c)     [place] Level/Cues Needed (Transfer Goal 1, PT) independent  -LAKSHMI (r) PH (t) LAKSHMI (c)     Time Frame (Transfer Goal 1, PT) long term goal (LTG); 10 days  -LAKSHMI (r) PH (t) LAKSHMI (c)     Strategies/Barriers (Transfers Goal 1, PT) monitor HR  -LAKSHMI (r) PH (t) LAKSHMI (c)     Progress/Outcome (Transfer Goal 1, PT) goal ongoing  -LAKSHMI (r) PH (t) LAKSHMI (c)     Row Name 10/12/21 1421          Gait Training Goal 1 (PT)    Activity/Assistive Device (Gait Training Goal 1, PT) gait (walking locomotion); decrease fall risk; improve balance and speed  -LAKSHMI (r) PH (t) LAKSHMI (c)     [place] Level (Gait Training Goal 1, PT) standby assist  -LAKSHMI (r) PH (t) LAKSHMI (c)     Distance (Gait Training Goal 1, PT) 250'  -LAKSHMI (r) PH (t) LAKSHMI (c)     Time Frame (Gait Training Goal 1,  PT) long term goal (LTG); 10 days  -LAKSHMI (r) PH (t) LAKSHMI (c)     Strategies/Barriers (Gait Training Goal 1, PT) monitor Pt's HR during ativity  -LAKSHMI (r) PH (t) LAKSHMI (c)     Progress/Outcome (Gait Training Goal 1, PT) goal ongoing  -LAKSHMI     Row Name 10/12/21 1421          Stairs Goal 1 (PT)    Activity/Assistive Device (Stairs Goal 1, PT) stairs, all skills; improve balance and speed; decrease fall risk; using handrail, right; using handrail, left  -LAKSHMI (r) PH (t) LAKSHMI (c)     [place] Level/Cues Needed (Stairs Goal 1, PT) contact guard assist  -LAKSHMI (r) PH (t) LAKSHMI (c)     Number of Stairs (Stairs Goal 1, PT) 3  -LAKSHMI (r) PH (t) LAKSHMI (c)     Time Frame (Stairs Goal 1, PT) long term goal (LTG); 10 days  -LAKSHMI (r) PH (t) LAKSHMI (c)     Progress/Outcome (Stairs Goal 1, PT) goal ongoing  -LAKSHMI (r) PH (t) LAKSHMI (c)           User Key  (r) = Recorded By, (t) = Taken By, (c) = Cosigned By    Initials Name Provider Type    Geovanny Desai, PT DPT Physical Therapist    Genet Han, PT Student PT Student               Clinical Impression     Row Name 10/12/21 1421          Pain    Additional Documentation Pain Scale: FACES Pre/Post-Treatment (Group)  -LAKSHMI (r) PH (t) LAKSHMI (c)     Row Name 10/12/21 1421          Pain Scale: Numbers Pre/Post-Treatment    Pain Intervention(s) Medication (See MAR); Repositioned; Ambulation/increased activity; Rest  -LAKSHMI (r) PH (t) LAKSHMI (c)     Row Name 10/12/21 1421          Pain Scale: FACES Pre/Post-Treatment    Pain: FACES Scale, Pretreatment 0-->no hurt  -LAKSHMI (r) PH (t) LAKSHMI (c)     Posttreatment Pain Rating 0-->no hurt  -LAKSHMI (r) PH (t) LAKSHMI (c)     Pre/Posttreatment Pain Comment No pain during eval  -LAKSHMI (r) PH (t) LAKSHMI (c)     Row Name 10/12/21 9605          Plan of Care Review    Plan of Care Reviewed With patient  -LAKSHMI (r) PH (t) LAKSHMI (c)     Progress no change  -LAKSHMI (r) PH (t) LAKSHMI (c)     Outcome Summary Pt is an 80 y/o female admitted on 10/11 for weakness, SOA, and heart palpitations. Pt has a history of PAF, HTN, and  breast cancer. Pt found to be in paroxysmal AFib with RVR. PT evaluation complete in room. Pt lives with  with one step to enter home. Pt reports no pain but dizziness while lying. Pt was indepenent in mobility prior to this admission. The pt demos 5/5 strength in the ankles/knees and 4+/5 hip flexor strength with intact LE sensation. Pt performs bed mobility sit/supine with SBA. She comes to stand with CGA/SBA but she notices increased dizziness and has to lie back down. Pt's HR was 130 in supine, 174 in sitting, and 180 in standing. Pt did not walk today due to dizziness and tachycardia. Nursing notified of HR numbers. Pt will require skilled PT to increase mobility to PLOF once her HR is stable. PT to continue to monitor to decide assistance needs once disharged.  -LAKSHMI (r) PH (t) LAKSHMI (c)     Row Name 10/12/21 1421          Therapy Assessment/Plan (PT)    Patient/Family Therapy Goals Statement (PT) return to home  -LAKSHMI (r) PH (t) LAKSHMI (c)     Rehab Potential (PT) good, to achieve stated therapy goals  -LAKSHMI (r) PH (t) LAKSHMI (c)     Criteria for Skilled Interventions Met (PT) yes; meets criteria; skilled treatment is necessary  -LAKSHMI (r) PH (t) LAKSHMI (c)     Predicted Duration of Therapy Intervention (PT) until d.c.  -LAKSHMI (r) PH (t) LAKSHMI (c)     Row Name 10/12/21 1421          Vital Signs    Pretreatment Heart Rate (beats/min) 130   -LAKSHMI (r)  LAKSHMI (c)     Intratreatment Heart Rate (beats/min) 180   -LAKSHMI (r)  LAKSHMI (c)     Posttreatment Heart Rate (beats/min) 134   -LAKSHMI (r)  LAKSHMI (c)     Pre Patient Position Supine  -LAKSHMI (r) PH (t) LAKSHMI (c)     Intra Patient Position Sitting  -LAKSHMI (r) PH (t) LAKSHMI (c)     Post Patient Position Supine  -LAKSHMI (r) PH (t) LAKSHMI (c)     Row Name 10/12/21 1421          Positioning and Restraints    Pre-Treatment Position in bed  -LAKSHMI (r) PH (t) LAKSHMI (c)     Post Treatment Position bed  -LAKSHMI (r) PH (t) LAKSHMI (c)     In Bed notified nsg; side lying left; call light within reach; encouraged to call for assist; side rails up x2   -LAKSHMI (r) PH (t) LAKSHMI (c)           User Key  (r) = Recorded By, (t) = Taken By, (c) = Cosigned By    Initials Name Provider Type    Geovanny Desai PT DPT Physical Therapist    Genet Han, PT Student PT Student               Outcome Measures     Row Name 10/12/21 1421          How much help from another person do you currently need...    Turning from your back to your side while in flat bed without using bedrails? 4  -LAKSHMI (r) PH (t) LAKSHMI (c)     Moving from lying on back to sitting on the side of a flat bed without bedrails? 4  -LAKSHMI (r) PH (t) LAKSHMI (c)     Moving to and from a bed to a chair (including a wheelchair)? 3  -LAKSHMI (r) PH (t) LAKSHMI (c)     Standing up from a chair using your arms (e.g., wheelchair, bedside chair)? 3  -LAKSHMI (r) PH (t) LAKSHMI (c)     Climbing 3-5 steps with a railing? 3  -LAKSHMI (r) PH (t) LAKSHMI (c)     To walk in hospital room? 3  -LAKSHMI (r) PH (t) LAKSHMI (c)     AM-PAC 6 Clicks Score (PT) 20  -LAKSHMI (r) PH (t)     Row Name 10/12/21 1421          Functional Assessment    Outcome Measure Options AM-PAC 6 Clicks Basic Mobility (PT)  -LAKSHMI (r) PH (t) LAKSHMI (c)           User Key  (r) = Recorded By, (t) = Taken By, (c) = Cosigned By    Initials Name Provider Type    Geovanny Desai PT DPT Physical Therapist    Genet Han, PT Student PT Student                             Physical Therapy Education                 Title: PT OT SLP Therapies (In Progress)     Topic: Physical Therapy (In Progress)     Point: Mobility training (Not Started)     Learner Progress:  Not documented in this visit.          Point: Home exercise program (Not Started)     Learner Progress:  Not documented in this visit.          Point: Body mechanics (Not Started)     Learner Progress:  Not documented in this visit.          Point: Precautions (Done)     Learning Progress Summary           Patient Acceptance, E, VU,Bed HQ by  at 10/12/2021 8690    Comment: Pt education on safety while home with dizziness for safe mobility.                                User Key     Initials Effective Dates Name Provider Type Discipline     08/04/21 -  Genet Dewitt, PT Student PT Student PT              PT Recommendation and Plan  Planned Therapy Interventions (PT): bed mobility training, gait training, balance training, home exercise program, patient/family education, transfer training, stair training, strengthening  Plan of Care Reviewed With: patient  Progress: no change  Outcome Summary: Pt is an 82 y/o female admitted on 10/11 for weakness, SOA, and heart palpitations. Pt has a history of PAF, HTN, and breast cancer. Pt found to be in paroxysmal AFib with RVR. PT evaluation complete in room. Pt lives with  with one step to enter home. Pt reports no pain but dizziness while lying. Pt was indepenent in mobility prior to this admission. The pt demos 5/5 strength in the ankles/knees and 4+/5 hip flexor strength with intact LE sensation. Pt performs bed mobility sit/supine with SBA. She comes to stand with CGA/SBA but she notices increased dizziness and has to lie back down. Pt's HR was 130 in supine, 174 in sitting, and 180 in standing. Pt did not walk today due to dizziness and tachycardia. Nursing notified of HR numbers. Pt will require skilled PT to increase mobility to PLOF once her HR is stable. PT to continue to monitor to decide assistance needs once disharged.     Time Calculation:    PT Charges     Row Name 10/12/21 1421             Time Calculation    Start Time 1421  -LAKSHMI (r) PH (t) LAKSHMI (c)      Stop Time 1503  -LAKSHMI (r) PH (t) LAKSHMI (c)      Time Calculation (min) 42 min  -LAKSHMI (r) PH (t)      PT Received On 10/12/21  -LAKSHMI (r) PH (t) LAKSHMI (c)      PT Goal Re-Cert Due Date 10/22/21  -LAKSHMI (r) PH (t) LAKSHMI (c)            User Key  (r) = Recorded By, (t) = Taken By, (c) = Cosigned By    Initials Name Provider Type    Geovanny Desai, PT DPT Physical Therapist    PH Genet Dewitt, PT Student PT Student                  PT G-Codes  Outcome Measure Options:  AM-PAC 6 Clicks Basic Mobility (PT)  -[facility] 6 Clicks Score (PT): 20    Genet Dewitt PT Student  10/12/2021

## 2021-10-12 NOTE — CASE MANAGEMENT/SOCIAL WORK
Discharge Planning Assessment   [place]     Patient Name: Sultana Lin  MRN: [id number]  Today's Date: 10/12/2021    Admit Date: 10/11/2021     Discharge Needs Assessment     Row Name 10/12/21 0907       Living Environment    Lives With spouse    Current Living Arrangements home/apartment/condo    Primary Care Provided by self    Provides Primary Care For no one    Family Caregiver if Needed child(nieves), adult    Family Caregiver Names Daughter - Jaki Hilario    Quality of Family Relationships supportive; helpful; involved    Able to Return to Prior Arrangements yes       Resource/Environmental Concerns    Resource/Environmental Concerns none       Transition Planning    Patient/Family Anticipates Transition to home with family    Patient/Family Anticipated Services at Transition none    Transportation Anticipated family or friend will provide       Discharge Needs Assessment    Readmission Within the Last 30 Days no previous admission in last 30 days    Equipment Currently Used at Home walker, rolling    Concerns to be Addressed no discharge needs identified; denies needs/concerns at this time    Anticipated Changes Related to Illness none    Equipment Needed After Discharge none    Current Discharge Risk chronically ill    Discharge Coordination/Progress SW spoke with patient's daughter, Jaki Hilario, regarding discharge plan/needs.  Patient resides at home with her spouse.  Daughter states patient functions independently, has a PCP and RX coverage.  Daughter also lives next door to patient.  Plan for patient to return home upon discharge with no anticipated needs.               Discharge Plan    No documentation.               Continued Care and Services - Admitted Since 10/11/2021    Coordination has not been started for this encounter.          Demographic Summary    No documentation.                Functional Status    No documentation.                Psychosocial    No documentation.                 Abuse/Neglect    No documentation.                Legal    No documentation.                Substance Abuse    No documentation.                Patient Forms    No documentation.                   JH ChungW

## 2021-10-12 NOTE — PROGRESS NOTES
[facility] Medicine Services  INPATIENT PROGRESS NOTE    Patient Name: Sultana Lin  Date of Admission: 10/11/2021  Today's Date: 10/12/21  Length of Stay: 1  Primary Care Physician: Heydi Tineo APRN    Subjective   Chief Complaint: Tachcyardia  HPI   Feeling a little better this AM. -120. No cardioversion planned.     Review of Systems   All pertinent negatives and positives are as above. All other systems have been reviewed and are negative unless otherwise stated.     Objective    Temp:  [98 °F (36.7 °C)-98.5 °F (36.9 °C)] 98.5 °F (36.9 °C)  Heart Rate:  [] 108  Resp:  [16-18] 18  BP: ()/(55-98) 121/61  Physical Exam  Vitals reviewed.   Constitutional:       General: She is not in acute distress.     Appearance: She is well-developed. She is not toxic-appearing.   HENT:      Head: Normocephalic and atraumatic.      Right Ear: External ear normal.      Left Ear: External ear normal.      Mouth/Throat:      Mouth: Mucous membranes are dry.      Pharynx: Oropharynx is clear.   Eyes:      General:         Right eye: No discharge.         Left eye: No discharge.      Extraocular Movements: Extraocular movements intact.      Conjunctiva/sclera: Conjunctivae normal.      Pupils: Pupils are equal, round, and reactive to light.   Neck:      Vascular: No JVD.   Cardiovascular:      Rate and Rhythm: Tachycardia present. Rhythm irregular.      Pulses: Normal pulses.      Heart sounds: Normal heart sounds. No murmur heard.  No friction rub. No gallop.    Pulmonary:      Effort: Pulmonary effort is normal. No respiratory distress.      Breath sounds: No stridor. No wheezing, rhonchi or rales.   Chest:      Chest wall: No tenderness.   Abdominal:      General: Bowel sounds are normal. There is no distension.      Palpations: Abdomen is soft.      Tenderness: There is no abdominal tenderness. There is no guarding or rebound.      Hernia: No hernia is  present.   Musculoskeletal:         General: No swelling, tenderness or deformity. Normal range of motion.      Cervical back: Normal range of motion and neck supple. No rigidity or tenderness. No muscular tenderness.      Right lower leg: Edema present.      Left lower leg: Edema present.   Skin:     General: Skin is warm and dry.      Findings: No erythema or rash.   Neurological:      General: No focal deficit present.      Mental Status: She is alert and oriented to person, place, and time.      Cranial Nerves: No cranial nerve deficit.      Sensory: No sensory deficit.      Motor: No weakness or abnormal muscle tone.      Deep Tendon Reflexes: Reflexes normal.   Psychiatric:         Mood and Affect: Mood normal.         Behavior: Behavior normal.     Results Review:  I have reviewed the labs, radiology results, and diagnostic studies.    Laboratory Data:   Results from last 7 days   Lab Units 10/11/21  1557   WBC 10*3/mm3 7.61   HEMOGLOBIN g/dL 12.3   HEMATOCRIT % 39.4   PLATELETS 10*3/mm3 216        Results from last 7 days   Lab Units 10/11/21  1602   SODIUM mmol/L 142   POTASSIUM mmol/L 4.3   CHLORIDE mmol/L 107   CO2 mmol/L 23.0   BUN mg/dL 15   CREATININE mg/dL 1.42*   CALCIUM mg/dL 9.5   BILIRUBIN mg/dL 0.5   ALK PHOS U/L 76   ALT (SGPT) U/L 10   AST (SGOT) U/L 19   GLUCOSE mg/dL 122*       Culture Data:   No results found for: BLOODCX, URINECX, WOUNDCX, MRSACX, RESPCX, STOOLCX    Radiology Data:   Imaging Results (Last 24 Hours)     Procedure Component Value Units Date/Time    XR Chest 1 View [[id number]] Collected: 10/11/21 1620     Updated: 10/11/21 1624    Narrative:      EXAMINATION: XR CHEST 1 VW-     10/11/2021 4:18 PM CDT     HISTORY: palpitations     1 view chest x-ray.     Comparison is made with December 10, 2018.     Unchanged left cardiac pacer.  Probable mildly enlarged heart.  The mediastinum is within normal limits.      The lungs are mildly hyperexpanded with no pneumonia or  pneumothorax.  Mild chronic appearing interstitial disease with a few calcified  granulomas.  No congestive failure changes.                                                                       Impression:      1. Stable chronic lung changes.  2. No acute disease.        This report was finalized on 10/11/2021 16:21 by Dr. Gadiel Benson MD.          I have reviewed the patient's current medications.     Assessment/Plan     Active Hospital Problems    Diagnosis    • **Paroxysmal atrial fibrillation with rapid ventricular response (HCC)    • Tachycardia    • Paroxysmal atrial fibrillation (HCC)    • Hypertension    • Hypothyroidism        Transfer to telemetry  Continue Cardizem drip to Cardizem oral/BB  Home medications restarted  Anticoagulation > Xarelto  IVF to saline lock  Check CBC/BMP  Cardiology input noted    Echo 2D    DVT prophylaxis > Patient fully anticoagulated      Discharge Planning: I expect the patient to be discharged to home in 1-2 days.    Electronically signed by Juan Luis Black MD, 10/12/21, 11:07 CDT.

## 2021-10-12 NOTE — ED NOTES
Report given to OBI Ortiz at bedside CCU5. Reviewed HPI and admitting dx. Reviewed all labs and diagnostic results. Reviewed IV access and meds given in the ED. Reviewed all VS. Reviewed pertinent medical history and home meds.        Meredith Regalado RN  10/11/21 8647

## 2021-10-13 LAB
QT INTERVAL: 210 MS
QT INTERVAL: 282 MS
QT INTERVAL: 304 MS
QT INTERVAL: 310 MS
QTC INTERVAL: 346 MS
QTC INTERVAL: 360 MS
QTC INTERVAL: 494 MS
QTC INTERVAL: 507 MS

## 2021-10-13 PROCEDURE — 97166 OT EVAL MOD COMPLEX 45 MIN: CPT | Performed by: OCCUPATIONAL THERAPIST

## 2021-10-13 PROCEDURE — P9612 CATHETERIZE FOR URINE SPEC: HCPCS

## 2021-10-13 PROCEDURE — 25010000002 AMIODARONE IN DEXTROSE 5% 360-4.14 MG/200ML-% SOLUTION: Performed by: INTERNAL MEDICINE

## 2021-10-13 PROCEDURE — 99232 SBSQ HOSP IP/OBS MODERATE 35: CPT | Performed by: NURSE PRACTITIONER

## 2021-10-13 PROCEDURE — 97110 THERAPEUTIC EXERCISES: CPT

## 2021-10-13 RX ORDER — DIGOXIN 125 MCG
125 TABLET ORAL
Status: DISCONTINUED | OUTPATIENT
Start: 2021-10-13 | End: 2021-10-15 | Stop reason: HOSPADM

## 2021-10-13 RX ORDER — CARVEDILOL 25 MG/1
25 TABLET ORAL 2 TIMES DAILY WITH MEALS
Status: DISCONTINUED | OUTPATIENT
Start: 2021-10-13 | End: 2021-10-15 | Stop reason: HOSPADM

## 2021-10-13 RX ADMIN — SUCRALFATE 1 G: 1 TABLET ORAL at 17:14

## 2021-10-13 RX ADMIN — MIRTAZAPINE 15 MG: 15 TABLET, FILM COATED ORAL at 20:39

## 2021-10-13 RX ADMIN — Medication 10 ML: at 20:39

## 2021-10-13 RX ADMIN — RIVAROXABAN 15 MG: 15 TABLET, FILM COATED ORAL at 17:14

## 2021-10-13 RX ADMIN — SUCRALFATE 1 G: 1 TABLET ORAL at 12:23

## 2021-10-13 RX ADMIN — SUCRALFATE 1 G: 1 TABLET ORAL at 20:38

## 2021-10-13 RX ADMIN — DIGOXIN 125 MCG: 125 TABLET ORAL at 13:24

## 2021-10-13 RX ADMIN — LEVOTHYROXINE SODIUM 50 MCG: 50 TABLET ORAL at 05:35

## 2021-10-13 RX ADMIN — CARVEDILOL 25 MG: 25 TABLET, FILM COATED ORAL at 17:14

## 2021-10-13 RX ADMIN — AMIODARONE HYDROCHLORIDE 0.5 MG/MIN: 1.8 INJECTION, SOLUTION INTRAVENOUS at 13:19

## 2021-10-13 RX ADMIN — PANTOPRAZOLE SODIUM 40 MG: 40 TABLET, DELAYED RELEASE ORAL at 05:35

## 2021-10-13 RX ADMIN — Medication 10 ML: at 08:25

## 2021-10-13 RX ADMIN — DILTIAZEM HYDROCHLORIDE 180 MG: 180 CAPSULE, COATED, EXTENDED RELEASE ORAL at 08:23

## 2021-10-13 RX ADMIN — CARVEDILOL 18.75 MG: 6.25 TABLET, FILM COATED ORAL at 08:23

## 2021-10-13 RX ADMIN — DILTIAZEM HYDROCHLORIDE 15 MG/HR: 5 INJECTION INTRAVENOUS at 16:29

## 2021-10-13 RX ADMIN — AMIODARONE HYDROCHLORIDE 0.5 MG/MIN: 1.8 INJECTION, SOLUTION INTRAVENOUS at 00:28

## 2021-10-13 RX ADMIN — SUCRALFATE 1 G: 1 TABLET ORAL at 08:24

## 2021-10-13 RX ADMIN — ALPRAZOLAM 0.25 MG: 0.25 TABLET ORAL at 12:23

## 2021-10-13 RX ADMIN — FUROSEMIDE 40 MG: 40 TABLET ORAL at 08:23

## 2021-10-13 NOTE — PLAN OF CARE
Goal Outcome Evaluation:  Plan of Care Reviewed With: patient           Outcome Summary: Pt is possible best rest per nsg and performed only AROM BLE exercises with rest for HR.  Limited therapy due to elevated HR.

## 2021-10-13 NOTE — PROGRESS NOTES
[facility] -  Progress Note     LOS: 2 days   Patient Care Team:  Heydi Tineo APRN as PCP - General (Family Medicine)    Chief Complaint: Atrial Fibrillation     Subjective     Interval History:     Patient Complaints: Patient is lying in bed today. She reports that she is feeling ok. She states that she still has heart racing. She denies any chest pain. Rates today have been 150-170's on Telemetry. Patient denies any dizziness, lightheadedness or near syncope. Patient denies any leg swelling, orthopnea or PND.   Echo was done 10/12/2021 that revealed LVEF 56-60%, left atrial volume is severely increased, RSVP <35 mmHg and small <1 cm pericardial effusion with no evidence of tamponade.    Review of Systems:     Review of Systems   Respiratory: Negative for chest tightness and shortness of breath.    Cardiovascular: Positive for palpitations. Negative for chest pain and leg swelling.        Heart racing   All other systems reviewed and are negative.    Objective     Vital Sign Min/Max for last 24 hours  Temp  Min: 97.6 °F (36.4 °C)  Max: 98.3 °F (36.8 °C)   BP  Min: 116/78  Max: 129/93   Pulse  Min: 77  Max: 172   Resp  Min: 16  Max: 20   SpO2  Min: 95 %  Max: 97 %   No data recorded   Weight  Min: 80.4 kg (177 lb 3.2 oz)  Max: 80.4 kg (177 lb 3.2 oz)         10/12/21  1415   Weight: 80.4 kg (177 lb 3.2 oz)       Telemetry: Atrial Fibrillation rates 150-170's      Physical Exam:    Vitals reviewed.   Constitutional:       General: Not in acute distress.     Appearance: Normal appearance. Well-developed.   Eyes:      Pupils: Pupils are equal, round, and reactive to light.   HENT:      Head: Normocephalic and atraumatic.      Nose: Nose normal.   Neck:      Vascular: No carotid bruit.   Pulmonary:      Effort: Pulmonary effort is normal. No respiratory distress.      Breath sounds: Normal breath sounds. No wheezing. No rales.   Cardiovascular:      Tachycardia present. Irregularly  irregular rhythm.      Murmurs: There is a grade 2/6 systolic murmur.   Edema:     Peripheral edema absent.   Abdominal:      General: There is no distension.      Palpations: Abdomen is soft.   Musculoskeletal: Normal range of motion.      Cervical back: Normal range of motion and neck supple. Skin:     General: Skin is warm.      Findings: No erythema or rash.   Neurological:      General: No focal deficit present.      Mental Status: Alert and oriented to person, place, and time.   Psychiatric:         Attention and Perception: Attention normal.         Mood and Affect: Mood normal.         Speech: Speech normal.         Behavior: Behavior normal.         Thought Content: Thought content normal.         Judgment: Judgment normal.       Results Review:   Lab Results (last 72 hours)     Procedure Component Value Units Date/Time    Basic Metabolic Panel [[id number]]  (Abnormal) Collected: 10/12/21 1155    Specimen: Blood Updated: 10/12/21 1238     Glucose 123 mg/dL      BUN 11 mg/dL      Creatinine 1.04 mg/dL      Sodium 144 mmol/L      Potassium 3.6 mmol/L      Chloride 110 mmol/L      CO2 23.0 mmol/L      Calcium 9.0 mg/dL      eGFR Non African Amer 51 mL/min/1.73      BUN/Creatinine Ratio 10.6     Anion Gap 11.0 mmol/L     Narrative:      GFR Normal >60  Chronic Kidney Disease <60  Kidney Failure <15      CBC & Differential [[id number]]  (Abnormal) Collected: 10/12/21 1154    Specimen: Blood Updated: 10/12/21 1221    Narrative:      The following orders were created for panel order CBC & Differential.  Procedure                               Abnormality         Status                     ---------                               -----------         ------                     CBC Auto Differential[[id number]]        Abnormal            Final result                 Please view results for these tests on the individual orders.    CBC Auto Differential [[id number]]  (Abnormal) Collected: 10/12/21 1154    Specimen: Blood  Updated: 10/12/21 1221     WBC 7.86 10*3/mm3      RBC 3.85 10*6/mm3      Hemoglobin 11.3 g/dL      Hematocrit 35.2 %      MCV 91.4 fL      MCH 29.4 pg      MCHC 32.1 g/dL      RDW 14.0 %      RDW-SD 46.5 fl      MPV 11.2 fL      Platelets 191 10*3/mm3      Neutrophil % 69.8 %      Lymphocyte % 19.5 %      Monocyte % 7.9 %      Eosinophil % 1.3 %      Basophil % 0.9 %      Immature Grans % 0.6 %      Neutrophils, Absolute 5.49 10*3/mm3      Lymphocytes, Absolute 1.53 10*3/mm3      Monocytes, Absolute 0.62 10*3/mm3      Eosinophils, Absolute 0.10 10*3/mm3      Basophils, Absolute 0.07 10*3/mm3      Immature Grans, Absolute 0.05 10*3/mm3      nRBC 0.0 /100 WBC     T3, Free [[id number]]  (Normal) Collected: 10/11/21 1557    Specimen: Blood Updated: 10/12/21 0116     T3, Free 2.94 pg/mL     Narrative:      Results may be falsely increased if patient taking Biotin.      COVID-19,[facility] IN-HOUSE,Nasal Swab No Transport Media 3-4 HR TAT - Swab, Nasal Cavity [[id number]]  (Normal) Collected: 10/11/21 2100    Specimen: Swab from Nasal Cavity Updated: 10/11/21 2148     COVID19 Not Detected    Narrative:      Fact sheet for providers: https://www.fda.gov/media/949263/download     Fact sheet for patients: https://www.fda.gov/media/186461/download    Test performed by PCR.    Consider negative results in combination with clinical observations, patient history, and epidemiological information.    TSH [[id number]]  (Normal) Collected: 10/11/21 1602    Specimen: Blood Updated: 10/11/21 1656     TSH 0.815 uIU/mL     T4, Free [[id number]]  (Abnormal) Collected: 10/11/21 1602    Specimen: Blood Updated: 10/11/21 1654     Free T4 1.73 ng/dL     Narrative:      Results may be falsely increased if patient taking Biotin.      Comprehensive Metabolic Panel [[id number]]  (Abnormal) Collected: 10/11/21 1602    Specimen: Blood Updated: 10/11/21 1650     Glucose 122 mg/dL      BUN 15 mg/dL      Creatinine 1.42 mg/dL      Sodium 142 mmol/L       Potassium 4.3 mmol/L      Chloride 107 mmol/L      CO2 23.0 mmol/L      Calcium 9.5 mg/dL      Total Protein 7.5 g/dL      Albumin 4.40 g/dL      ALT (SGPT) 10 U/L      AST (SGOT) 19 U/L      Alkaline Phosphatase 76 U/L      Total Bilirubin 0.5 mg/dL      eGFR Non African Amer 35 mL/min/1.73      Globulin 3.1 gm/dL      A/G Ratio 1.4 g/dL      BUN/Creatinine Ratio 10.6     Anion Gap 12.0 mmol/L     Narrative:      GFR Normal >60  Chronic Kidney Disease <60  Kidney Failure <15      Protime-INR [[id number]]  (Abnormal) Collected: 10/11/21 1557    Specimen: Blood Updated: 10/11/21 1645     Protime 15.3 Seconds      INR 1.26    D-dimer, Quantitative [[id number]]  (Normal) Collected: 10/11/21 1557    Specimen: Blood Updated: 10/11/21 1645     D-Dimer, Quantitative 0.46 mg/L (FEU)     Narrative:      Reference Range is 0-0.50 mg/L FEU. However, results <0.50 mg/L FEU tends to rule out DVT or PE. Results >0.50 mg/L FEU are not useful in predicting absence or presence of DVT or PE.      aPTT [[id number]]  (Normal) Collected: 10/11/21 1557    Specimen: Blood Updated: 10/11/21 1645     PTT 31.2 seconds     Troponin [[id number]]  (Normal) Collected: 10/11/21 1602    Specimen: Blood Updated: 10/11/21 1645     Troponin T <0.010 ng/mL     Narrative:      Troponin T Reference Range:  <= 0.03 ng/mL-   Negative for AMI  >0.03 ng/mL-     Abnormal for myocardial necrosis.  Clinicians would have to utilize clinical acumen, EKG, Troponin and serial changes to determine if it is an Acute Myocardial Infarction or myocardial injury due to an underlying chronic condition.       Results may be falsely decreased if patient taking Biotin.      Magnesium [[id number]]  (Normal) Collected: 10/11/21 1602    Specimen: Blood Updated: 10/11/21 1644     Magnesium 1.6 mg/dL     CBC & Differential [[id number]]  (Abnormal) Collected: 10/11/21 1557    Specimen: Blood Updated: 10/11/21 1629    Narrative:      The following orders were created for panel order CBC  & Differential.  Procedure                               Abnormality         Status                     ---------                               -----------         ------                     CBC Auto Differential[[id number]]        Abnormal            Final result                 Please view results for these tests on the individual orders.    CBC Auto Differential [[id number]]  (Abnormal) Collected: 10/11/21 1557    Specimen: Blood Updated: 10/11/21 1629     WBC 7.61 10*3/mm3      RBC 4.27 10*6/mm3      Hemoglobin 12.3 g/dL      Hematocrit 39.4 %      MCV 92.3 fL      MCH 28.8 pg      MCHC 31.2 g/dL      RDW 14.0 %      RDW-SD 46.8 fl      MPV 11.6 fL      Platelets 216 10*3/mm3      Neutrophil % 64.0 %      Lymphocyte % 26.0 %      Monocyte % 5.7 %      Eosinophil % 2.8 %      Basophil % 1.2 %      Immature Grans % 0.3 %      Neutrophils, Absolute 4.88 10*3/mm3      Lymphocytes, Absolute 1.98 10*3/mm3      Monocytes, Absolute 0.43 10*3/mm3      Eosinophils, Absolute 0.21 10*3/mm3      Basophils, Absolute 0.09 10*3/mm3      Immature Grans, Absolute 0.02 10*3/mm3      nRBC 0.0 /100 WBC            Echo EF Estimated  Lab Results   Component Value Date    ECHOEFEST 55 12/14/2016       Cath Ejection Fraction Quantitative  No results found for: CATHEF    Medication Review: yes  Current Facility-Administered Medications   Medication Dose Route Frequency Provider Last Rate Last Admin   • ALPRAZolam (XANAX) tablet 0.25 mg  0.25 mg Oral BID PRN Juan Luis Black MD   0.25 mg at 10/13/21 1223   • amiodarone 360 mg in 200 mL D5W infusion  0.5 mg/min Intravenous Continuous Flash Lopez MD 16.67 mL/hr at 10/13/21 0028 0.5 mg/min at 10/13/21 0028   • carvedilol (COREG) tablet 18.75 mg  18.75 mg Oral BID With Meals Juan Luis Black MD   18.75 mg at 10/13/21 0823   • dilTIAZem (CARDIZEM) 125 mg in 125 mL NS infusion  5-15 mg/hr Intravenous Continuous Juan Luis Black MD 15 mL/hr at 10/12/21 2203 15 mg/hr at  10/12/21 2203   • dilTIAZem CD (CARDIZEM CD) 24 hr capsule 180 mg  180 mg Oral Q24H Juan Luis Black MD   180 mg at 10/13/21 0823   • furosemide (LASIX) tablet 40 mg  40 mg Oral Daily Juan Luis Black MD   40 mg at 10/13/21 0823   • HYDROcodone-acetaminophen (NORCO)  MG per tablet 1 tablet  1 tablet Oral BID PRN Juan Luis Black MD       • levothyroxine (SYNTHROID, LEVOTHROID) tablet 50 mcg  50 mcg Oral Q AM Juan Luis Black MD   50 mcg at 10/13/21 0535   • meclizine (ANTIVERT) tablet 25 mg  25 mg Oral TID PRN Juan Luis Black MD       • mirtazapine (REMERON) tablet 15 mg  15 mg Oral Nightly Juan Luis Black MD   15 mg at 10/12/21 2040   • pantoprazole (PROTONIX) EC tablet 40 mg  40 mg Oral Q AM Juan Luis Black MD   40 mg at 10/13/21 0535   • rivaroxaban (XARELTO) tablet 15 mg  15 mg Oral Daily With Dinner Juan Luis Black MD   15 mg at 10/12/21 1713   • sodium chloride 0.9 % bolus 500 mL  500 mL Intravenous Once Juan Luis Black MD       • sodium chloride 0.9 % flush 10 mL  10 mL Intravenous PRN Juan Luis Black MD       • sodium chloride 0.9 % flush 10 mL  10 mL Intravenous Q12H Juan Luis Black MD   10 mL at 10/13/21 0825   • sodium chloride 0.9 % flush 10 mL  10 mL Intravenous PRN Juan Luis Black MD       • sucralfate (CARAFATE) tablet 1 g  1 g Oral 4x Daily AC & at Bedtime Juan Luis Black MD   1 g at 10/13/21 1223     Assessment/Plan     Atrial Fibrillation: Rates 150-170's today. Patient has eaten today. Will increase Carvedilol, add oral digoxin. Continue Amiodarone and Cardizem for heart rate control. Continue Xarelto. Patient NPO after midnight for potential cardioversion in the am if no improvement in heart rates    Hypertension    hypothyroidism    Plan:   -increase carvedilol to 25 mg  -start digoxin 125 mcg  -continue xarelto  -continue amiodarone and cardizem  -NPO after midnight for  potential cardioversion in the am if no improvement in heart rates          Electronically signed by DOC Coronel, 10/13/21, 12:32 PM CDT.

## 2021-10-13 NOTE — THERAPY TREATMENT NOTE
Acute Care - Physical Therapy Treatment Note  [facility]     Patient Name: Sultana Lin  : 1940  MRN: [id number]  Today's Date: 10/13/2021      Visit Dx:     ICD-10-CM ICD-9-CM   1. Tachycardia  R00.0 785.0   2. History of atrial fibrillation  Z86.79 V12.59   3. Impaired mobility  Z74.09 799.89     Patient Active Problem List   Diagnosis   • Hypothyroidism   • Hypertension   • Paroxysmal atrial fibrillation (HCC)   • Paroxysmal atrial fibrillation with rapid ventricular response (HCC)   • Acute cholecystitis   • Nausea   • Tachycardia     Past Medical History:   Diagnosis Date   • Arthritis    • Atrial fibrillation (CMS/HCC)    • Atrial fibrillation (CMS/HCC)    • Atrial fibrillation with rapid ventricular response (CMS/HCC) 2017   • Breast cancer (CMS/HCC)     right   • Cancer (CMS/HCC)    • Disease of thyroid gland    • Hypertension      Past Surgical History:   Procedure Laterality Date   • ABLATION OF DYSRHYTHMIC FOCUS     • BREAST SURGERY     • CARDIAC ELECTROPHYSIOLOGY PROCEDURE N/A 2017    Procedure: EP/Ablation;  Surgeon: Blake Mcallister MD;  Location: [facility] LOCATION;  Service:    • CARDIOVERSION     • CATARACT EXTRACTION W/ INTRAOCULAR LENS IMPLANT Right 2019    Procedure: REMOVE CATARACT AND IMPLANT INTRAOCULAR LENS;  Surgeon: Lenin Dennison MD;  Location: [facility] OR;  Service: Ophthalmology   • CATARACT EXTRACTION W/ INTRAOCULAR LENS IMPLANT Left 2019    Procedure: REMOVE CATARACT AND IMPLANT INTRAOCULAR LENS;  Surgeon: Lenin Dennison MD;  Location: [facility] OR;  Service: Ophthalmology   • CHOLECYSTECTOMY WITH INTRAOPERATIVE CHOLANGIOGRAM N/A 2020    Procedure: LAPAROSCOPIC CHOLECYSTECTOMY WITH INTRAOPERATIVE CHOLANGIOGRAM;  Surgeon: Denilson Richardson MD;  Location: [facility] OR;  Service: General;  Laterality: N/A;   • ENDOSCOPY N/A 2020    Procedure: ESOPHAGOGASTRODUODENOSCOPY;  Surgeon: Albina Ya MD;  Location: [facility]  ENDOSCOPY;  Service: Gastroenterology;  Laterality: N/A;   • INSERT / REPLACE / REMOVE PACEMAKER     • PACEMAKER IMPLANTATION     • SKIN BIOPSY     • SKIN TAG REMOVAL     • UPPER GASTROINTESTINAL ENDOSCOPY  06/09/2020     PT Assessment (last 12 hours)     PT Evaluation and Treatment     Row Name 10/13/21 0810          Physical Therapy Time and Intention    Subjective Information no complaints  -WK     Document Type therapy note (daily note)  -WK     Mode of Treatment physical therapy  -WK     Comment check HR before movement, bed rest noted in night nsg note, asked day nurse and he was unsure.  -WK     Row Name 10/13/21 0810          General Information    Existing Precautions/Restrictions fall  elevated HR, tachycardia with movement  -WK     Row Name 10/13/21 0810          Pain Scale: FACES Pre/Post-Treatment    Pain: FACES Scale, Pretreatment 0-->no hurt  -WK     Posttreatment Pain Rating 0-->no hurt  -WK     Row Name 10/13/21 0810          Bed Mobility    Comment (Bed Mobility) unable to perform due to elevated HR  -WK     Row Name 10/13/21 0810          Motor Skills    Therapeutic Exercise aerobic  -WK     Row Name 10/13/21 0810          Aerobic Exercise    Comment, Aerobic Exercise (Therapeutic Exercise) AROM BLE 10 reps x 2: heel slides, hip abd, SLR, AP and quad sets.  performed rest to attempt to keep HR lower  -WK     Row Name 10/13/21 0810          Plan of Care Review    Plan of Care Reviewed With patient  -WK     Outcome Summary Pt is possible best rest per nsg and performed only AROM BLE exercises with rest for HR.  Limited therapy due to elevated HR.  -WK     Row Name 10/13/21 0810          Vital Signs    Pretreatment Heart Rate (beats/min) 140  afib  -WK     Intratreatment Heart Rate (beats/min) 175  -WK     Posttreatment Heart Rate (beats/min) 155  -WK     Row Name 10/13/21 0810          Positioning and Restraints    Pre-Treatment Position in bed  -WK     Post Treatment Position bed  -WK     In Bed  fowlers; call light within reach; encouraged to call for assist  -WK           User Key  (r) = Recorded By, (t) = Taken By, (c) = Cosigned By    Initials Name Provider Type    WK Belén Steel PTA Physical Therapy Assistant              Physical Therapy Education                 Title: PT OT SLP Therapies (In Progress)     Topic: Physical Therapy (In Progress)     Point: Mobility training (Done)     Learning Progress Summary           Patient Acceptance, E, VU by WK at 10/13/2021 0852    Comment: safety                   Point: Home exercise program (Not Started)     Learner Progress:  Not documented in this visit.          Point: Body mechanics (Not Started)     Learner Progress:  Not documented in this visit.          Point: Precautions (Done)     Learning Progress Summary           Patient Acceptance, E, VU,Bed HQ by  at 10/12/2021 1519    Comment: Pt education on safety while home with dizziness for safe mobility.                               User Key     Initials Effective Dates Name Provider Type Discipline    WK 06/16/21 -  Belén Steel PTA Physical Therapy Assistant PT    PH 08/04/21 -  Genet Dewitt PT Student PT Student PT              PT Recommendation and Plan     Plan of Care Reviewed With: patient  Outcome Summary: Pt is possible best rest per nsg and performed only AROM BLE exercises with rest for HR.  Limited therapy due to elevated HR.       Time Calculation:    PT Charges     Row Name 10/13/21 0853             Time Calculation    Start Time 0810  -WK      Stop Time 0837  -WK      Time Calculation (min) 27 min  -WK      PT Received On 10/13/21  -WK              Time Calculation- PT    Total Timed Code Minutes- PT 27 minute(s)  -WK            User Key  (r) = Recorded By, (t) = Taken By, (c) = Cosigned By    Initials Name Provider Type    WK Belén Steel PTA Physical Therapy Assistant              Therapy Charges for Today     Code Description Service Date Service Provider Modifiers  Qty    [id number]  PT THER PROC EA 15 MIN 10/13/2021 Belén Steel, PTA GP 2          PT G-Codes  Outcome Measure Options: (P) AM-PAC 6 Clicks Daily Activity (OT)  AM-PAC 6 Clicks Score (PT): 20  AM-PAC 6 Clicks Score (OT): (P) 21    Belén Steel PTA  10/13/2021

## 2021-10-13 NOTE — PLAN OF CARE
Goal Outcome Evaluation:  Plan of Care Reviewed With: patient        Progress: no change  Outcome Summary: OT eval completed today. A&Ox4. Diagnosis: Tachycardia, paroxysmal Afib with rapid ventricular response. Pt c/o dizziness and 0/10 pain. Pt was sitting EOB when OTS entered the room. Pt HR was 140 pre-treatment. After completing MMT, pt HR increased to 185. She started c/o increased dizziness. Pt was immediately laid down. pt HR was 155 post-eval. Pt was unable to complete transfers and mobility due to increased HR and dizziness. pt demonstrated good static/dynamic sitting balance. Pt stated that she believes that she would be fine if her HR would return to normal and her dizziness would reside. BUE sensation intact. BUE ROM WFL. BUE MMT 4/5. Pt would benefit from skilled OT services to address these deficits. OT recommends home with assistance upon d/c.

## 2021-10-13 NOTE — PROGRESS NOTES
[facility] Medicine Services  INPATIENT PROGRESS NOTE    Patient Name: Sultana Lin  Date of Admission: 10/11/2021  Today's Date: 10/13/21  Length of Stay: 2  Primary Care Physician: Heydi Tineo APRN    Subjective   Chief Complaint: Tachcyardia  HPI   Feeling a little better this AM. -120. No cardioversion planned.   10/13 Patient tired and resting.  at rest. Cardizem and Amiodarone infusing.     Review of Systems   All pertinent negatives and positives are as above. All other systems have been reviewed and are negative unless otherwise stated.     Objective    Temp:  [97.6 °F (36.4 °C)-98.3 °F (36.8 °C)] 98.2 °F (36.8 °C)  Heart Rate:  [] 89  Resp:  [16-20] 16  BP: (116-129)/(56-93) 125/66  Physical Exam  Vitals reviewed.   Constitutional:       Appearance: She is well-developed. She is ill-appearing. She is not toxic-appearing.   HENT:      Head: Normocephalic and atraumatic.      Right Ear: External ear normal.      Left Ear: External ear normal.      Mouth/Throat:      Mouth: Mucous membranes are dry.      Pharynx: Oropharynx is clear.   Eyes:      General:         Right eye: No discharge.         Left eye: No discharge.      Extraocular Movements: Extraocular movements intact.      Conjunctiva/sclera: Conjunctivae normal.      Pupils: Pupils are equal, round, and reactive to light.   Neck:      Vascular: No JVD.   Cardiovascular:      Rate and Rhythm: Tachycardia present. Rhythm irregular.      Pulses: Normal pulses.      Heart sounds: Normal heart sounds. No murmur heard.  No friction rub. No gallop.    Pulmonary:      Effort: Pulmonary effort is normal. No respiratory distress.      Breath sounds: No stridor. No wheezing, rhonchi or rales.   Chest:      Chest wall: No tenderness.   Abdominal:      General: Bowel sounds are normal. There is no distension.      Palpations: Abdomen is soft.      Tenderness: There is no abdominal tenderness.  There is no guarding or rebound.      Hernia: No hernia is present.   Musculoskeletal:         General: No swelling, tenderness or deformity. Normal range of motion.      Cervical back: Normal range of motion and neck supple. No rigidity or tenderness. No muscular tenderness.      Right lower leg: Edema present.      Left lower leg: Edema present.   Skin:     General: Skin is warm and dry.      Findings: No erythema or rash.   Neurological:      General: No focal deficit present.      Mental Status: She is alert and oriented to person, place, and time.      Cranial Nerves: No cranial nerve deficit.      Sensory: No sensory deficit.      Motor: No weakness or abnormal muscle tone.      Deep Tendon Reflexes: Reflexes normal.   Psychiatric:         Mood and Affect: Mood normal.         Behavior: Behavior normal.     Results Review:  I have reviewed the labs, radiology results, and diagnostic studies.    Laboratory Data:   Results from last 7 days   Lab Units 10/12/21  1154 10/11/21  1557   WBC 10*3/mm3 7.86 7.61   HEMOGLOBIN g/dL 11.3* 12.3   HEMATOCRIT % 35.2 39.4   PLATELETS 10*3/mm3 191 216        Results from last 7 days   Lab Units 10/12/21  1155 10/11/21  1602   SODIUM mmol/L 144 142   POTASSIUM mmol/L 3.6 4.3   CHLORIDE mmol/L 110* 107   CO2 mmol/L 23.0 23.0   BUN mg/dL 11 15   CREATININE mg/dL 1.04* 1.42*   CALCIUM mg/dL 9.0 9.5   BILIRUBIN mg/dL  --  0.5   ALK PHOS U/L  --  76   ALT (SGPT) U/L  --  10   AST (SGOT) U/L  --  19   GLUCOSE mg/dL 123* 122*       Culture Data:   No results found for: BLOODCX, URINECX, WOUNDCX, MRSACX, RESPCX, STOOLCX    Radiology Data:   Imaging Results (Last 24 Hours)     ** No results found for the last 24 hours. **          I have reviewed the patient's current medications.     Echocardiogram 10/11/2021  · Left ventricular ejection fraction appears to be 56 - 60%. Left ventricular systolic function is normal.  · Left atrial volume is severely increased.  · Estimated right  ventricular systolic pressure from tricuspid regurgitation is normal (<35 mmHg).  · There is a small (<1cm) pericardial effusion. The effusion is fluid filled. There is no evidence of cardiac tamponade.    Assessment/Plan     Active Hospital Problems    Diagnosis    • **Paroxysmal atrial fibrillation with rapid ventricular response (HCC)    • Tachycardia    • Paroxysmal atrial fibrillation (HCC)    • Hypertension    • Hypothyroidism        Continue care in telemetry  Continue Cardizem drip to Cardizem oral/BB  Amiodarone drip ongoing    Home medications restarted    Anticoagulation > Xarelto    IVF to saline lock    Check CBC/BMP in AM  Cardiology input noted  Patient may require cardioversion ? Watchman candidate    Echo 2D    DVT prophylaxis > Patient fully anticoagulated      Discharge Planning: I expect the patient to be discharged to home in 1-2 days.    Electronically signed by Juan Luis Black MD, 10/13/21, 14:12 CDT.

## 2021-10-13 NOTE — PLAN OF CARE
Goal Outcome Evaluation:           Progress: no change   VSS. No C/O pain. AAOX4. Aflutter  with PVC on tele. Continues on room air with O2 saturation in the 90's. Continues on amiodarone and cardizem drip. Bedrest maintained due to heart rate increasing with activity. Medication education provided. Weight shift encouraged. Safety maintained.

## 2021-10-13 NOTE — PLAN OF CARE
Goal Outcome Evaluation:      Pt resting in bed. No complaints of pain. No signs of distress noted. Pt on room air. Pt HR Afluter 115-171.

## 2021-10-13 NOTE — THERAPY EVALUATION
Patient Name: Sultana Lin  : 1940    MRN: [id number]                              Today's Date: 10/13/2021       Admit Date: 10/11/2021    Visit Dx:     ICD-10-CM ICD-9-CM   1. Tachycardia  R00.0 785.0   2. History of atrial fibrillation  Z86.79 V12.59   3. Impaired mobility  Z74.09 799.89   4. Decreased activities of daily living (ADL)  Z78.9 V49.89     Patient Active Problem List   Diagnosis   • Hypothyroidism   • Hypertension   • Paroxysmal atrial fibrillation (HCC)   • Paroxysmal atrial fibrillation with rapid ventricular response (HCC)   • Acute cholecystitis   • Nausea   • Tachycardia     Past Medical History:   Diagnosis Date   • Arthritis    • Atrial fibrillation (CMS/HCC)    • Atrial fibrillation (CMS/HCC)    • Atrial fibrillation with rapid ventricular response (CMS/HCC) 2017   • Breast cancer (CMS/HCC)     right   • Cancer (CMS/HCC)    • Disease of thyroid gland    • Hypertension      Past Surgical History:   Procedure Laterality Date   • ABLATION OF DYSRHYTHMIC FOCUS     • BREAST SURGERY     • CARDIAC ELECTROPHYSIOLOGY PROCEDURE N/A 2017    Procedure: EP/Ablation;  Surgeon: Blake Mcallister MD;  Location: [facility] INVASIVE LOCATION;  Service:    • CARDIOVERSION     • CATARACT EXTRACTION W/ INTRAOCULAR LENS IMPLANT Right 2019    Procedure: REMOVE CATARACT AND IMPLANT INTRAOCULAR LENS;  Surgeon: Lenin Dennison MD;  Location: [facility];  Service: Ophthalmology   • CATARACT EXTRACTION W/ INTRAOCULAR LENS IMPLANT Left 2019    Procedure: REMOVE CATARACT AND IMPLANT INTRAOCULAR LENS;  Surgeon: Lenin Dennison MD;  Location: [facility] OR;  Service: Ophthalmology   • CHOLECYSTECTOMY WITH INTRAOPERATIVE CHOLANGIOGRAM N/A 2020    Procedure: LAPAROSCOPIC CHOLECYSTECTOMY WITH INTRAOPERATIVE CHOLANGIOGRAM;  Surgeon: Denilson Richardson MD;  Location: [facility];  Service: General;  Laterality: N/A;   • ENDOSCOPY N/A 2020    Procedure:  ESOPHAGOGASTRODUODENOSCOPY;  Surgeon: Albina Ya MD;  Location: [facility] ENDOSCOPY;  Service: Gastroenterology;  Laterality: N/A;   • INSERT / REPLACE / REMOVE PACEMAKER     • PACEMAKER IMPLANTATION     • SKIN BIOPSY     • SKIN TAG REMOVAL     • UPPER GASTROINTESTINAL ENDOSCOPY  06/09/2020      General Information     Row Name 10/13/21 0749          OT Time and Intention    Document Type evaluation  Pt is here due to fast palpitations. Diagnosis: Tachycardia, paroxysmal Afib with rapid ventricular response. She lives at home with her spouse. PMH: Afib, breast cancer, hypertension.  -JJ (r) BRIAN (t) JJ (c)     Mode of Treatment occupational therapy  -JJ (r) BRIAN (t) JJ (c)     Row Name 10/13/21 0749          General Information    Patient Profile Reviewed yes  -JJ (r) BRIAN (t) JJ (c)     Prior Level of Function independent:; all household mobility; community mobility; gait; transfer; bed mobility; ADL's; home management; cooking; cleaning; driving  -JJ (r) BRIAN (t) JJ (c)     Existing Precautions/Restrictions fall  pt has elevated HR when she changes postion.  -JJ (r) BRIAN (t) JJ (c)     Barriers to Rehab medically complex  -JJ (r) BRIAN (t) JJ (c)     Row Name 10/13/21 0749          Occupational Profile    Environmental Supports and Barriers (Occupational Profile) tub shower  -JJ (r) BRIAN (t) JJ (c)     Row Name 10/13/21 0749          Living Environment    Lives With spouse  -JJ (r) BRIAN (t) JJ (c)     Row Name 10/13/21 0749          Home Main Entrance    Number of Stairs, Main Entrance one  -JJ (r) BRIAN (t) JJ (c)     Stair Railings, Main Entrance railings on both sides of stairs  -JJ (r) BRIAN (t) JJ (c)     Row Name 10/13/21 0749          Stairs Within Home, Primary    Number of Stairs, Within Home, Primary none  -JJ (r) BRIAN (t) JJ (c)     Row Name 10/13/21 0749          Cognition    Orientation Status (Cognition) oriented x 4  -JJ (r) BRIAN (t) JJ (c)     Row Name 10/13/21 0749          Safety Issues, Functional Mobility    Safety  Issues Affecting Function (Mobility) insight into deficits/self-awareness  -JJ (r) BRIAN (t) JJ (c)     Impairments Affecting Function (Mobility) endurance/activity tolerance  -JJ (r) BRIAN (t) JJ (c)           User Key  (r) = Recorded By, (t) = Taken By, (c) = Cosigned By    Initials Name Provider Type    Heydi Wallace, OTR/L Occupational Therapist    Devan Jaquez, OT Student OT Student                 Mobility/ADL's     Row Name 10/13/21 0749          Bed Mobility    Bed Mobility sit-supine  -JJ (r) BRIAN (t) JJ (c)     Sit-Supine [place] (Bed Mobility) standby assist; 1 person assist  -JJ (r) BRIAN (t) JJ (c)     Assistive Device (Bed Mobility) head of bed elevated; bed rails  -JJ (r) BRIAN (t) JJ (c)     Comment (Bed Mobility) pt was sitting EOB when OTS entered the room  -JJ (r) BRIAN (t) JJ (c)     Row Name 10/13/21 0749          Transfers    Comment (Transfers) unable to assess due to increased HR  -JJ (r) BRIAN (t) JJ (c)     Row Name 10/13/21 0749          Functional Mobility    Functional Mobility- Comment unable to assess due to increased HR  -JJ (r) BRIAN (t) JJ (c)     Row Name 10/13/21 0749          Activities of Daily Living    BADL Assessment/Intervention lower body dressing  -JJ (r) BRIAN (t) JJ (c)     Row Name 10/13/21 0749          Lower Body Dressing Assessment/Training    [place] Level (Lower Body Dressing) doff; don; socks  -JJ (r) BRIAN (t) JJ (c)     Comment (Lower Body Dressing) Anticipated LOF: CGA due to safety  -JJ (r) BRIAN (t) JJ (c)           User Key  (r) = Recorded By, (t) = Taken By, (c) = Cosigned By    Initials Name Provider Type    Heydi Wallace, OTR/L Occupational Therapist    Devan Jaquez, OT Student OT Student               Obj/Interventions     Row Name 10/13/21 0749          Sensory Assessment (Somatosensory)    Sensory Assessment (Somatosensory) UE sensation intact  -JJ (r) BRIAN (t) JJ (c)     Row Name 10/13/21 0749          Vision Assessment/Intervention    Visual  Impairment/Limitations WFL  -JJ (r) BRIAN (t) JJ (c)     Row Name 10/13/21 0749          Range of Motion Comprehensive    General Range of Motion bilateral upper extremity ROM WFL  -JJ (r) BRIAN (t) JJ (c)     Comment, General Range of Motion BUE ROM WFL  -JJ (r) BRIAN (t) JJ (c)     Row Name 10/13/21 0749          Strength Comprehensive (MMT)    Comment, General Manual Muscle Testing (MMT) Assessment BUE MMT 4/5  -JJ (r) BRIAN (t) JJ (c)     Row Name 10/13/21 0749          Balance    Balance Assessment sitting static balance; sitting dynamic balance  -JJ (r) BRIAN (t) JJ (c)     Static Sitting Balance WFL; unsupported; sitting, edge of bed  -JJ (r) BRIAN (t) JJ (c)     Dynamic Sitting Balance WFL; unsupported; sitting, edge of bed  -JJ (r) BRIAN (t) JJ (c)     Comment, Balance pt demonstrated good static/dynamic sitting balance. She c/o dizziness and HR increased to 185, so pt was immediately laid down.  -JJ (r) BRIAN (t) JJ (c)           User Key  (r) = Recorded By, (t) = Taken By, (c) = Cosigned By    Initials Name Provider Type    Heydi Wallace, OTR/L Occupational Therapist    Devan Jaquez OT Student OT Student               Goals/Plan     Row Name 10/13/21 0749          Transfer Goal 1 (OT)    Activity/Assistive Device (Transfer Goal 1, OT) toilet; shower chair  -JJ (r) BRIAN (t) JJ (c)     [place] Level/Cues Needed (Transfer Goal 1, OT) supervision required  -JJ (r) BRIAN (t) JJ (c)     Time Frame (Transfer Goal 1, OT) long term goal (LTG); by discharge  -JJ (r) BRIAN (t) JJ (c)     Progress/Outcome (Transfer Goal 1, OT) goal ongoing  -JJ (r) BRIAN (t) JJ (c)     Row Name 10/13/21 0749          Bathing Goal 1 (OT)    Activity/Device (Bathing Goal 1, OT) bathing skills, all  -JJ (r) BRIAN (t) JJ (c)     [place] Level/Cues Needed (Bathing Goal 1, OT) supervision required  -PEPPER veliz) BRIAN (saurabh) PEPPER (adryan)     Time Frame (Bathing Goal 1, OT) long term goal (LTG); by discharge  -PEPPER veliz) BRIAN (saurabh) PEPPER (adryan)     Progress/Outcomes (Bathing Goal 1,  OT) goal ongoing  -JJ (r) BRIAN (t) JJ (c)     Row Name 10/13/21 0749          Toileting Goal 1 (OT)    Activity/Device (Toileting Goal 1, OT) toileting skills, all  -JJ (r) BRIAN (t) JJ (c)     [place] Level/Cues Needed (Toileting Goal 1, OT) supervision required  -JJ (r) BRIAN (t) JJ (c)     Time Frame (Toileting Goal 1, OT) long term goal (LTG); by discharge  -JJ (r) BRIAN (t) JJ (c)     Progress/Outcome (Toileting Goal 1, OT) goal ongoing  -JJ (r) BRIAN (t) JJ (c)     Row Name 10/13/21 0749          Therapy Assessment/Plan (OT)    Planned Therapy Interventions (OT) activity tolerance training; adaptive equipment training; BADL retraining; occupation/activity based interventions; patient/caregiver education/training; ROM/therapeutic exercise; strengthening exercise; transfer/mobility retraining; functional balance retraining  -PEPPER (r) BRIAN (t) JJ (c)           User Key  (r) = Recorded By, (t) = Taken By, (c) = Cosigned By    Initials Name Provider Type    Heydi Wallace, OTR/L Occupational Therapist    Devan Jaquez, ANGIE Student OT Student               Clinical Impression     Row Name 10/13/21 0749          Pain Assessment    Additional Documentation Pain Scale: Numbers Pre/Post-Treatment (Group)  -PEPPER (r) BRIAN (t) JJ (c)     Row Name 10/13/21 0749          Pain Scale: Numbers Pre/Post-Treatment    Pretreatment Pain Rating 0/10 - no pain  -JJ (r) BRIAN (t) JJ (c)     Posttreatment Pain Rating 0/10 - no pain  -JJ (r) BRIAN (t) JJ (c)     Row Name 10/13/21 0749          Plan of Care Review    Plan of Care Reviewed With patient  -PEPPER (r) BRIAN (t) JJ (c)     Progress no change  -JJ (r) BRIAN (t) JJ (c)     Outcome Summary OT eval completed today. A&Ox4. Diagnosis: Tachycardia, paroxysmal Afib with rapid ventricular response. Pt c/o dizziness and 0/10 pain. Pt was sitting EOB when OTS entered the room. Pt HR was 140 pre-treatment. After completing MMT, pt HR increased to 185. She started c/o increased dizziness. Pt was immediately  laid down. pt HR was 155 post-eval. Pt was unable to complete transfers and mobility due to increased HR and dizziness. pt demonstrated good static/dynamic sitting balance. Pt stated that she believes that she would be fine if her HR would return to normal and her dizziness would reside. BUE sensation intact. BUE ROM WFL. BUE MMT 4/5. Pt would benefit from skilled OT services to address these deficits. OT recommends home with assistance upon d/c.  -JJ (r) BRIAN (t) JJ (c)     Row Name 10/13/21 0749          Therapy Assessment/Plan (OT)    Rehab Potential (OT) good, to achieve stated therapy goals  -JJ (r) BRIAN (t) JJ (c)     Criteria for Skilled Therapeutic Interventions Met (OT) yes; meets criteria; skilled treatment is necessary  -JJ (r) BRIAN (t) JJ (c)     Therapy Frequency (OT) 3 times/wk  -JJ (r) BRIAN (t) JJ (c)     Predicted Duration of Therapy Intervention (OT) until d/c  -JJ (r) BRIAN (t) JJ (c)     Row Name 10/13/21 0749          Therapy Plan Review/Discharge Plan (OT)    Anticipated Discharge Disposition (OT) home with assist  -JJ (r) BRIAN (t) JJ (c)     Row Name 10/13/21 0749          Vital Signs    Pretreatment Heart Rate (beats/min) 140   -JJ (r) BRIAN (t) JJ (c)     Intratreatment Heart Rate (beats/min) 185   -JJ (r) BRIAN (t) JJ (c)     Posttreatment Heart Rate (beats/min) 155   -JJ (r) BRIAN (t) JJ (c)     O2 Delivery Pre Treatment room air  -JJ (r) BRIAN (t) JJ (c)     O2 Delivery Intra Treatment room air  -JJ (r) BRIAN (t) JJ (c)     O2 Delivery Post Treatment room air  -JJ (r) BRIAN (t) JJ (c)     Pre Patient Position Supine  -JJ (r) BRIAN (t) JJ (c)     Intra Patient Position Sitting  -JJ (r) BRIAN (t) JJ (c)     Post Patient Position Supine  -JJ (r) BRIAN (t) JJ (c)     Row Name 10/13/21 0749          Positioning and Restraints    Pre-Treatment Position in bed  -JJ (r) BRIAN (t) JJ (c)     Post Treatment Position bed  -JJ (r) BRIAN (t) JJ (c)     In Bed notified nsg; supine; call light within reach; encouraged to call for assist; side rails  up x2  -JJ (r) BRIAN (t) JJ (c)           User Key  (r) = Recorded By, (t) = Taken By, (c) = Cosigned By    Initials Name Provider Type    Heydi Wallace OTR/L Occupational Therapist    Devan Jaquez, OT Student OT Student               Outcome Measures     Row Name 10/13/21 0749          How much help from another is currently needed...    Putting on and taking off regular lower body clothing? 3  -JJ (r) BRIAN (t) JJ (c)     Bathing (including washing, rinsing, and drying) 3  -JJ (r) BRIAN (t) JJ (c)     Toileting (which includes using toilet bed pan or urinal) 3  -JJ (r) BRIAN (t) JJ (c)     Putting on and taking off regular upper body clothing 4  -JJ (r) BRIAN (t) JJ (c)     Taking care of personal grooming (such as brushing teeth) 4  -JJ (r) BRIAN (t) JJ (c)     Eating meals 4  -JJ (r) BRIAN (t) JJ (c)     AM-PAC 6 Clicks Score (OT) 21  -JJ (r) BRIAN (t)     Row Name 10/13/21 0749          Functional Assessment    Outcome Measure Options AM-PAC 6 Clicks Daily Activity (OT)  -JJ (r) BRIAN (t) JJ (c)           User Key  (r) = Recorded By, (t) = Taken By, (c) = Cosigned By    Initials Name Provider Type    Heydi Wallace OTR/L Occupational Therapist    Devan Jaquez, OT Student OT Student                Occupational Therapy Education                 Title: PT OT SLP Therapies (In Progress)     Topic: Occupational Therapy (In Progress)     Point: ADL training (Done)     Description:   Instruct learner(s) on proper safety adaptation and remediation techniques during self care or transfers.   Instruct in proper use of assistive devices.              Learning Progress Summary           Patient Acceptance, E, VU by BRIAN at 10/13/2021 0830                   Point: Home exercise program (Not Started)     Description:   Instruct learner(s) on appropriate technique for monitoring, assisting and/or progressing therapeutic exercises/activities.              Learner Progress:  Not documented in this visit.          Point:  Precautions (Done)     Description:   Instruct learner(s) on prescribed precautions during self-care and functional transfers.              Learning Progress Summary           Patient Acceptance, E, VU by BRIAN at 10/13/2021 0830                   Point: Body mechanics (Not Started)     Description:   Instruct learner(s) on proper positioning and spine alignment during self-care, functional mobility activities and/or exercises.              Learner Progress:  Not documented in this visit.                      User Key     Initials Effective Dates Name Provider Type Discipline    BRIAN 08/04/21 -  Devan Anne OT Student OT Student OT              OT Recommendation and Plan  Planned Therapy Interventions (OT): activity tolerance training, adaptive equipment training, BADL retraining, occupation/activity based interventions, patient/caregiver education/training, ROM/therapeutic exercise, strengthening exercise, transfer/mobility retraining, functional balance retraining  Therapy Frequency (OT): 3 times/wk  Plan of Care Review  Plan of Care Reviewed With: patient  Progress: no change  Outcome Summary: OT eval completed today. A&Ox4. Diagnosis: Tachycardia, paroxysmal Afib with rapid ventricular response. Pt c/o dizziness and 0/10 pain. Pt was sitting EOB when OTS entered the room. Pt HR was 140 pre-treatment. After completing MMT, pt HR increased to 185. She started c/o increased dizziness. Pt was immediately laid down. pt HR was 155 post-eval. Pt was unable to complete transfers and mobility due to increased HR and dizziness. pt demonstrated good static/dynamic sitting balance. Pt stated that she believes that she would be fine if her HR would return to normal and her dizziness would reside. BUE sensation intact. BUE ROM WFL. BUE MMT 4/5. Pt would benefit from skilled OT services to address these deficits. OT recommends home with assistance upon d/c.     Time Calculation:    Time Calculation- OT     Row Name 10/13/21  0749             Time Calculation- OT    OT Start Time 0749  8 min chart review  -PEPPER (r) BRIAN (t) PEPPER (c)      OT Stop Time 0812  -PEPPER (r) BRIAN (t) PEPPER (c)      OT Time Calculation (min) 23 min  -PEPPER (aaliyah) BRIAN (t)      OT Received On 10/13/21  -PEPPER (aaliyah) BRIAN (saurabh) PEPPER (c)      OT Goal Re-Cert Due Date 10/23/21  -PEPPER (aaliyah) BRIAN (t) PEPPER (c)            User Key  (r) = Recorded By, (t) = Taken By, (c) = Cosigned By    Initials Name Provider Type    Heydi Wallace, OTR/L Occupational Therapist    Devan Jaquez, OT Student OT Student                       Devan Anne, ANGIE Student  10/13/2021

## 2021-10-14 PROBLEM — E87.6 HYPOKALEMIA: Status: ACTIVE | Noted: 2021-10-14

## 2021-10-14 LAB
ANION GAP SERPL CALCULATED.3IONS-SCNC: 15 MMOL/L (ref 5–15)
BASOPHILS # BLD AUTO: 0.05 10*3/MM3 (ref 0–0.2)
BASOPHILS NFR BLD AUTO: 0.5 % (ref 0–1.5)
BUN SERPL-MCNC: 14 MG/DL (ref 8–23)
BUN/CREAT SERPL: 11.3 (ref 7–25)
CALCIUM SPEC-SCNC: 8.7 MG/DL (ref 8.6–10.5)
CHLORIDE SERPL-SCNC: 104 MMOL/L (ref 98–107)
CO2 SERPL-SCNC: 23 MMOL/L (ref 22–29)
CREAT SERPL-MCNC: 1.24 MG/DL (ref 0.57–1)
DEPRECATED RDW RBC AUTO: 46.3 FL (ref 37–54)
EOSINOPHIL # BLD AUTO: 0.04 10*3/MM3 (ref 0–0.4)
EOSINOPHIL NFR BLD AUTO: 0.4 % (ref 0.3–6.2)
ERYTHROCYTE [DISTWIDTH] IN BLOOD BY AUTOMATED COUNT: 14.2 % (ref 12.3–15.4)
GFR SERPL CREATININE-BSD FRML MDRD: 41 ML/MIN/1.73
GLUCOSE SERPL-MCNC: 135 MG/DL (ref 65–99)
HCT VFR BLD AUTO: 37.6 % (ref 34–46.6)
HGB BLD-MCNC: 12 G/DL (ref 12–15.9)
IMM GRANULOCYTES # BLD AUTO: 0.05 10*3/MM3 (ref 0–0.05)
IMM GRANULOCYTES NFR BLD AUTO: 0.5 % (ref 0–0.5)
LYMPHOCYTES # BLD AUTO: 1.61 10*3/MM3 (ref 0.7–3.1)
LYMPHOCYTES NFR BLD AUTO: 14.8 % (ref 19.6–45.3)
MAGNESIUM SERPL-MCNC: 1.4 MG/DL (ref 1.6–2.4)
MCH RBC QN AUTO: 28.9 PG (ref 26.6–33)
MCHC RBC AUTO-ENTMCNC: 31.9 G/DL (ref 31.5–35.7)
MCV RBC AUTO: 90.6 FL (ref 79–97)
MONOCYTES # BLD AUTO: 0.77 10*3/MM3 (ref 0.1–0.9)
MONOCYTES NFR BLD AUTO: 7.1 % (ref 5–12)
NEUTROPHILS NFR BLD AUTO: 76.7 % (ref 42.7–76)
NEUTROPHILS NFR BLD AUTO: 8.34 10*3/MM3 (ref 1.7–7)
NRBC BLD AUTO-RTO: 0 /100 WBC (ref 0–0.2)
PLATELET # BLD AUTO: 208 10*3/MM3 (ref 140–450)
PMV BLD AUTO: 11.4 FL (ref 6–12)
POTASSIUM SERPL-SCNC: 3 MMOL/L (ref 3.5–5.2)
RBC # BLD AUTO: 4.15 10*6/MM3 (ref 3.77–5.28)
SODIUM SERPL-SCNC: 142 MMOL/L (ref 136–145)
WBC # BLD AUTO: 10.86 10*3/MM3 (ref 3.4–10.8)

## 2021-10-14 PROCEDURE — 85025 COMPLETE CBC W/AUTO DIFF WBC: CPT | Performed by: FAMILY MEDICINE

## 2021-10-14 PROCEDURE — 97110 THERAPEUTIC EXERCISES: CPT

## 2021-10-14 PROCEDURE — 25010000002 PROCHLORPERAZINE 10 MG/2ML SOLUTION: Performed by: FAMILY MEDICINE

## 2021-10-14 PROCEDURE — 92610 EVALUATE SWALLOWING FUNCTION: CPT

## 2021-10-14 PROCEDURE — 84132 ASSAY OF SERUM POTASSIUM: CPT | Performed by: FAMILY MEDICINE

## 2021-10-14 PROCEDURE — 97535 SELF CARE MNGMENT TRAINING: CPT

## 2021-10-14 PROCEDURE — 99232 SBSQ HOSP IP/OBS MODERATE 35: CPT | Performed by: NURSE PRACTITIONER

## 2021-10-14 PROCEDURE — 25010000002 AMIODARONE IN DEXTROSE 5% 360-4.14 MG/200ML-% SOLUTION: Performed by: INTERNAL MEDICINE

## 2021-10-14 PROCEDURE — 97116 GAIT TRAINING THERAPY: CPT

## 2021-10-14 PROCEDURE — 80048 BASIC METABOLIC PNL TOTAL CA: CPT | Performed by: FAMILY MEDICINE

## 2021-10-14 PROCEDURE — 83735 ASSAY OF MAGNESIUM: CPT | Performed by: FAMILY MEDICINE

## 2021-10-14 PROCEDURE — 25010000002 MAGNESIUM SULFATE 2 GM/50ML SOLUTION: Performed by: FAMILY MEDICINE

## 2021-10-14 RX ORDER — AMIODARONE HYDROCHLORIDE 200 MG/1
200 TABLET ORAL EVERY 12 HOURS SCHEDULED
Status: DISCONTINUED | OUTPATIENT
Start: 2021-10-14 | End: 2021-10-15 | Stop reason: HOSPADM

## 2021-10-14 RX ORDER — MAGNESIUM SULFATE HEPTAHYDRATE 40 MG/ML
4 INJECTION, SOLUTION INTRAVENOUS AS NEEDED
Status: DISCONTINUED | OUTPATIENT
Start: 2021-10-14 | End: 2021-10-15 | Stop reason: HOSPADM

## 2021-10-14 RX ORDER — POTASSIUM CHLORIDE 7.45 MG/ML
10 INJECTION INTRAVENOUS
Status: DISCONTINUED | OUTPATIENT
Start: 2021-10-14 | End: 2021-10-15 | Stop reason: HOSPADM

## 2021-10-14 RX ORDER — AMOXICILLIN 250 MG
1 CAPSULE ORAL 2 TIMES DAILY PRN
Status: DISCONTINUED | OUTPATIENT
Start: 2021-10-14 | End: 2021-10-15 | Stop reason: HOSPADM

## 2021-10-14 RX ORDER — PROCHLORPERAZINE EDISYLATE 5 MG/ML
5 INJECTION INTRAMUSCULAR; INTRAVENOUS EVERY 6 HOURS PRN
Status: DISCONTINUED | OUTPATIENT
Start: 2021-10-14 | End: 2021-10-15 | Stop reason: HOSPADM

## 2021-10-14 RX ORDER — MAGNESIUM SULFATE HEPTAHYDRATE 40 MG/ML
2 INJECTION, SOLUTION INTRAVENOUS AS NEEDED
Status: DISCONTINUED | OUTPATIENT
Start: 2021-10-14 | End: 2021-10-15 | Stop reason: HOSPADM

## 2021-10-14 RX ORDER — POTASSIUM CHLORIDE 750 MG/1
40 CAPSULE, EXTENDED RELEASE ORAL AS NEEDED
Status: DISCONTINUED | OUTPATIENT
Start: 2021-10-14 | End: 2021-10-15 | Stop reason: HOSPADM

## 2021-10-14 RX ORDER — POTASSIUM CHLORIDE 1.5 G/1.77G
40 POWDER, FOR SOLUTION ORAL AS NEEDED
Status: DISCONTINUED | OUTPATIENT
Start: 2021-10-14 | End: 2021-10-15 | Stop reason: HOSPADM

## 2021-10-14 RX ADMIN — CARVEDILOL 25 MG: 25 TABLET, FILM COATED ORAL at 17:07

## 2021-10-14 RX ADMIN — DILTIAZEM HYDROCHLORIDE 180 MG: 180 CAPSULE, COATED, EXTENDED RELEASE ORAL at 09:23

## 2021-10-14 RX ADMIN — SUCRALFATE 1 G: 1 TABLET ORAL at 09:23

## 2021-10-14 RX ADMIN — PANTOPRAZOLE SODIUM 40 MG: 40 TABLET, DELAYED RELEASE ORAL at 05:36

## 2021-10-14 RX ADMIN — FUROSEMIDE 40 MG: 40 TABLET ORAL at 09:23

## 2021-10-14 RX ADMIN — DILTIAZEM HYDROCHLORIDE 15 MG/HR: 5 INJECTION INTRAVENOUS at 20:04

## 2021-10-14 RX ADMIN — SUCRALFATE 1 G: 1 TABLET ORAL at 17:07

## 2021-10-14 RX ADMIN — SUCRALFATE 1 G: 1 TABLET ORAL at 11:08

## 2021-10-14 RX ADMIN — MAGNESIUM SULFATE HEPTAHYDRATE 2 G: 2 INJECTION, SOLUTION INTRAVENOUS at 11:34

## 2021-10-14 RX ADMIN — Medication 10 ML: at 20:06

## 2021-10-14 RX ADMIN — POTASSIUM CHLORIDE 40 MEQ: 1.5 POWDER, FOR SOLUTION ORAL at 18:51

## 2021-10-14 RX ADMIN — DIGOXIN 125 MCG: 125 TABLET ORAL at 11:08

## 2021-10-14 RX ADMIN — RIVAROXABAN 15 MG: 15 TABLET, FILM COATED ORAL at 17:07

## 2021-10-14 RX ADMIN — MAGNESIUM SULFATE HEPTAHYDRATE 2 G: 2 INJECTION, SOLUTION INTRAVENOUS at 15:15

## 2021-10-14 RX ADMIN — PROCHLORPERAZINE EDISYLATE 5 MG: 5 INJECTION INTRAMUSCULAR; INTRAVENOUS at 09:21

## 2021-10-14 RX ADMIN — DILTIAZEM HYDROCHLORIDE 15 MG/HR: 5 INJECTION INTRAVENOUS at 01:11

## 2021-10-14 RX ADMIN — AMIODARONE HYDROCHLORIDE 200 MG: 200 TABLET ORAL at 20:03

## 2021-10-14 RX ADMIN — AMIODARONE HYDROCHLORIDE 200 MG: 200 TABLET ORAL at 09:59

## 2021-10-14 RX ADMIN — POTASSIUM CHLORIDE 40 MEQ: 10 CAPSULE, COATED, EXTENDED RELEASE ORAL at 11:08

## 2021-10-14 RX ADMIN — Medication 10 ML: at 09:25

## 2021-10-14 RX ADMIN — CARVEDILOL 25 MG: 25 TABLET, FILM COATED ORAL at 09:23

## 2021-10-14 RX ADMIN — AMIODARONE HYDROCHLORIDE 0.5 MG/MIN: 1.8 INJECTION, SOLUTION INTRAVENOUS at 01:22

## 2021-10-14 RX ADMIN — MAGNESIUM SULFATE HEPTAHYDRATE 2 G: 2 INJECTION, SOLUTION INTRAVENOUS at 13:19

## 2021-10-14 RX ADMIN — DILTIAZEM HYDROCHLORIDE 15 MG/HR: 5 INJECTION INTRAVENOUS at 11:08

## 2021-10-14 RX ADMIN — MIRTAZAPINE 15 MG: 15 TABLET, FILM COATED ORAL at 20:03

## 2021-10-14 RX ADMIN — POTASSIUM CHLORIDE 40 MEQ: 1.5 POWDER, FOR SOLUTION ORAL at 15:19

## 2021-10-14 RX ADMIN — SUCRALFATE 1 G: 1 TABLET ORAL at 20:03

## 2021-10-14 RX ADMIN — LEVOTHYROXINE SODIUM 50 MCG: 50 TABLET ORAL at 05:36

## 2021-10-14 NOTE — PLAN OF CARE
Goal Outcome Evaluation:  Plan of Care Reviewed With: patient        Progress: no change  Outcome Summary: Pt A&Ox4. Pt denies pain. Pt is running AFL/ AV/AP HR:  on tele. Up x 1 to BSC. IV gtts infusing as ordered. Safety maintained. Bed alarm on.

## 2021-10-14 NOTE — THERAPY TREATMENT NOTE
Acute Care - Occupational Therapy Treatment Note  [facility]     Patient Name: Sultana Lin  : 1940  MRN: [id number]  Today's Date: 10/14/2021             Admit Date: 10/11/2021       ICD-10-CM ICD-9-CM   1. Tachycardia  R00.0 785.0   2. History of atrial fibrillation  Z86.79 V12.59   3. Impaired mobility  Z74.09 799.89   4. Decreased activities of daily living (ADL)  Z78.9 V49.89     Patient Active Problem List   Diagnosis   • Hypothyroidism   • Hypertension   • Paroxysmal atrial fibrillation (HCC)   • Paroxysmal atrial fibrillation with rapid ventricular response (HCC)   • Acute cholecystitis   • Nausea   • Tachycardia   • Hypokalemia     Past Medical History:   Diagnosis Date   • Arthritis    • Atrial fibrillation (CMS/HCC)    • Atrial fibrillation (CMS/HCC)    • Atrial fibrillation with rapid ventricular response (CMS/HCC) 2017   • Breast cancer (CMS/HCC)     right   • Cancer (CMS/HCC)    • Disease of thyroid gland    • Hypertension      Past Surgical History:   Procedure Laterality Date   • ABLATION OF DYSRHYTHMIC FOCUS     • BREAST SURGERY     • CARDIAC ELECTROPHYSIOLOGY PROCEDURE N/A 2017    Procedure: EP/Ablation;  Surgeon: Blake Mcallister MD;  Location: [facility] INVASIVE LOCATION;  Service:    • CARDIOVERSION     • CATARACT EXTRACTION W/ INTRAOCULAR LENS IMPLANT Right 2019    Procedure: REMOVE CATARACT AND IMPLANT INTRAOCULAR LENS;  Surgeon: Lenin Dennison MD;  Location: [facility] OR;  Service: Ophthalmology   • CATARACT EXTRACTION W/ INTRAOCULAR LENS IMPLANT Left 2019    Procedure: REMOVE CATARACT AND IMPLANT INTRAOCULAR LENS;  Surgeon: Lenin Dennison MD;  Location: [facility] OR;  Service: Ophthalmology   • CHOLECYSTECTOMY WITH INTRAOPERATIVE CHOLANGIOGRAM N/A 2020    Procedure: LAPAROSCOPIC CHOLECYSTECTOMY WITH INTRAOPERATIVE CHOLANGIOGRAM;  Surgeon: Denilson Richardson MD;  Location: [facility] OR;  Service: General;  Laterality: N/A;   • ENDOSCOPY N/A  6/9/2020    Procedure: ESOPHAGOGASTRODUODENOSCOPY;  Surgeon: Albina Ya MD;  Location: [facility] ENDOSCOPY;  Service: Gastroenterology;  Laterality: N/A;   • INSERT / REPLACE / REMOVE PACEMAKER     • PACEMAKER IMPLANTATION     • SKIN BIOPSY     • SKIN TAG REMOVAL     • UPPER GASTROINTESTINAL ENDOSCOPY  06/09/2020         OT ASSESSMENT FLOWSHEET (last 12 hours)     OT Evaluation and Treatment     Row Name 10/14/21 1329 10/14/21 1141                OT Time and Intention    Document Type therapy note (daily note)  -LS --       Mode of Treatment occupational therapy  -LS --       Patient Effort good  -LS --                General Information    Patient Profile Reviewed yes  -LS --       Existing Precautions/Restrictions fall  -LS --                Cognition    Orientation Status (Cognition) oriented x 4  -LS --                Pain Assessment    Additional Documentation Pain Scale: Numbers Pre/Post-Treatment (Group)  -LS --                Pain Scale: Numbers Pre/Post-Treatment    Pretreatment Pain Rating 0/10 - no pain  -LS --       Posttreatment Pain Rating 0/10 - no pain  -LS --                Bed Mobility    Bed Mobility supine-sit  -LS --       Supine-Sit [place] (Bed Mobility) standby assist  -LS --                Functional Mobility    Functional Mobility- Ind. Level supervision required  -LS --                Transfer Assessment/Treatment    Transfers sit-stand transfer; stand-sit transfer  -LS --                Transfers    Sit-Stand [place] (Transfers) standby assist  -LS --       Stand-Sit [place] (Transfers) supervision  -LS --                Safety Issues, Functional Mobility    Safety Issues Affecting Function (Mobility) insight into deficits/self-awareness  -LS --       Impairments Affecting Function (Mobility) endurance/activity tolerance  -LS --                Balance    Balance Assessment sitting static balance; sitting dynamic balance; sit to stand dynamic balance; standing static  balance; standing dynamic balance  -LS --       Static Sitting Balance WFL; unsupported  -LS --       Dynamic Sitting Balance WFL; unsupported  -LS --       Sit to Stand Dynamic Balance WFL  -LS --       Static Standing Balance WFL; unsupported  -LS --       Dynamic Standing Balance unsupported  -LS --                Activities of Daily Living    BADL Assessment/Intervention toileting; grooming  -LS --                Grooming Assessment/Training    [place] Level (Grooming) hair care, combing/brushing; wash face, hands  -LS --       Position (Grooming) sink side; supported standing  -LS --                Toileting Assessment/Training    [place] Level (Toileting) standby assist  -LS --       Assistive Devices (Toileting) commode; grab bar/safety frame  -LS --       Position (Toileting) supported sitting  -LS --                Coping    Observed Emotional State calm; cooperative  -LS --       Family/Support Persons daughter  -LS --                Plan of Care Review    Plan of Care Reviewed With patient; daughter  -LS --       Progress improving  -LS --       Outcome Summary OT tx completed on this date. Daughter present in her room during tx.  Pt completed supine to EOB requiring SBA. Pt completed EOB sitting with functional reaching and midline crossing with shifting of support with BUEs while sitting requiring SBA. Pt completed multiple intervals of functional mobility in her room requiring CGA. Pt completed toileting tasks in her bathroom utilizing grab bar with SBA for all aspects. Pt completed grooming tasks of washing hands and face/brushing hair requiring CGA at ambulatory level tolerating 3-4 minutes of unsupported standing during tasks with functional reaching for task completion. Pt agreed to sit in recliner, pt left with her daughter in the room. Pt educated to get out of bed with assistance and sit on EOB or in recliner for all meals. Pt/daughter were agreeable. HR remained within normal range  during tx. OT POC to continue.  -LS --                Vital Signs    Post Treatment Diastolic BP -- --  -LS       Pretreatment Heart Rate (beats/min) 81  -LS --       Posttreatment Heart Rate (beats/min) 81  -LS --       O2 Delivery Pre Treatment room air  -LS room air  -LS       O2 Delivery Intra Treatment room air  -LS room air  -LS       O2 Delivery Post Treatment room air  -LS room air  -LS       Pre Patient Position Supine  -LS Supine  -LS       Intra Patient Position Sitting  -LS Sitting  -LS       Post Patient Position Sitting  -LS Sitting  -LS                Positioning and Restraints    Pre-Treatment Position in bed  -LS --       Post Treatment Position chair  -LS --       In Chair sitting; call light within reach; encouraged to call for assist; with family/caregiver  -LS --             User Key  (r) = Recorded By, (t) = Taken By, (c) = Cosigned By    Initials Name Effective Dates    ARVIN Lucia Roe COTA 09/15/21 -                  Occupational Therapy Education                 Title: PT OT SLP Therapies (In Progress)     Topic: Occupational Therapy (In Progress)     Point: ADL training (Done)     Description:   Instruct learner(s) on proper safety adaptation and remediation techniques during self care or transfers.   Instruct in proper use of assistive devices.              Learning Progress Summary           Patient Acceptance, E, VU by ARVIN at 10/14/2021 1441    Comment: Pt educated to sit on side of bed or in recliner for all meals.    Acceptance, E, VU by BRIAN at 10/13/2021 0830                   Point: Home exercise program (Not Started)     Description:   Instruct learner(s) on appropriate technique for monitoring, assisting and/or progressing therapeutic exercises/activities.              Learner Progress:  Not documented in this visit.          Point: Precautions (Done)     Description:   Instruct learner(s) on prescribed precautions during self-care and functional transfers.              Learning  Progress Summary           Patient Acceptance, E, VU by BRIAN at 10/13/2021 0830                   Point: Body mechanics (Not Started)     Description:   Instruct learner(s) on proper positioning and spine alignment during self-care, functional mobility activities and/or exercises.              Learner Progress:  Not documented in this visit.                      User Key     Initials Effective Dates Name Provider Type Discipline    BRIAN 08/04/21 -  Devan Anne OT Student OT Student OT    ARVIN 09/15/21 -  Lucia Roe COTA Occupational Therapy Assistant THERAPIES                  OT Recommendation and Plan     Plan of Care Review  Plan of Care Reviewed With: patient, daughter  Progress: improving  Outcome Summary: OT tx completed on this date. Daughter present in her room during tx.  Pt completed supine to EOB requiring SBA. Pt completed EOB sitting with functional reaching and midline crossing with shifting of support with BUEs while sitting requiring SBA. Pt completed multiple intervals of functional mobility in her room requiring CGA. Pt completed toileting tasks in her bathroom utilizing grab bar with SBA for all aspects. Pt completed grooming tasks of washing hands and face/brushing hair requiring CGA at ambulatory level tolerating 3-4 minutes of unsupported standing during tasks with functional reaching for task completion. Pt agreed to sit in recliner, pt left with her daughter in the room. Pt educated to get out of bed with assistance and sit on EOB or in recliner for all meals. Pt/daughter were agreeable. HR remained within normal range during tx. OT POC to continue.  Plan of Care Reviewed With: patient, daughter  Outcome Summary: OT tx completed on this date. Daughter present in her room during tx.  Pt completed supine to EOB requiring SBA. Pt completed EOB sitting with functional reaching and midline crossing with shifting of support with BUEs while sitting requiring SBA. Pt completed multiple intervals  of functional mobility in her room requiring CGA. Pt completed toileting tasks in her bathroom utilizing grab bar with SBA for all aspects. Pt completed grooming tasks of washing hands and face/brushing hair requiring CGA at ambulatory level tolerating 3-4 minutes of unsupported standing during tasks with functional reaching for task completion. Pt agreed to sit in recliner, pt left with her daughter in the room. Pt educated to get out of bed with assistance and sit on EOB or in recliner for all meals. Pt/daughter were agreeable. HR remained within normal range during tx. OT POC to continue.     Outcome Measures     Row Name 10/14/21 1400             How much help from another is currently needed...    Putting on and taking off regular lower body clothing? 3  -LS      Bathing (including washing, rinsing, and drying) 3  -LS      Toileting (which includes using toilet bed pan or urinal) 3  -LS      Putting on and taking off regular upper body clothing 4  -LS      Taking care of personal grooming (such as brushing teeth) 4  -LS      Eating meals 4  -LS      AM-PAC 6 Clicks Score (OT) 21  -LS              Functional Assessment    Outcome Measure Options AM-PAC 6 Clicks Daily Activity (OT)  -LS            User Key  (r) = Recorded By, (t) = Taken By, (c) = Cosigned By    Initials Name Provider Type    Lucia Pepper COTA Occupational Therapy Assistant                Time Calculation:    Time Calculation- OT     Row Name 10/14/21 1441 10/14/21 1141          Time Calculation- OT    OT Start Time 1328  -LS --     OT Stop Time 1424  -LS --     OT Time Calculation (min) 56 min  - --     Total Timed Code Minutes- OT 56 minute(s)  - --     OT Received On 10/14/21  - --            Timed Charges    67658 - Gait Training Minutes  -- 13  -NAKIA            Total Minutes    Timed Charges Total Minutes -- 13  -NAIKA      Total Minutes -- 13  -NAKIA           User Key  (r) = Recorded By, (t) = Taken By, (c) = Cosigned By    Initials Name  Provider Type    NAKIA Galen Toledo, PTA Physical Therapy Assistant    Lucia Pepper COTA Occupational Therapy Assistant              Therapy Charges for Today     Code Description Service Date Service Provider Modifiers Qty    79959229171 HC OT SELF CARE/MGMT/TRAIN EA 15 MIN 10/14/2021 Lucia Roe COTA GO 4               EBONY PENA  10/14/2021

## 2021-10-14 NOTE — THERAPY TREATMENT NOTE
Acute Care - Physical Therapy Treatment Note  [facility]     Patient Name: Sultana Lin  : 1940  MRN: [id number]  Today's Date: 10/14/2021      Visit Dx:     ICD-10-CM ICD-9-CM   1. Tachycardia  R00.0 785.0   2. History of atrial fibrillation  Z86.79 V12.59   3. Impaired mobility  Z74.09 799.89   4. Decreased activities of daily living (ADL)  Z78.9 V49.89     Patient Active Problem List   Diagnosis   • Hypothyroidism   • Hypertension   • Paroxysmal atrial fibrillation (HCC)   • Paroxysmal atrial fibrillation with rapid ventricular response (HCC)   • Acute cholecystitis   • Nausea   • Tachycardia   • Hypokalemia     Past Medical History:   Diagnosis Date   • Arthritis    • Atrial fibrillation (CMS/HCC)    • Atrial fibrillation (CMS/HCC)    • Atrial fibrillation with rapid ventricular response (CMS/HCC) 2017   • Breast cancer (CMS/HCC)     right   • Cancer (CMS/HCC)    • Disease of thyroid gland    • Hypertension      Past Surgical History:   Procedure Laterality Date   • ABLATION OF DYSRHYTHMIC FOCUS     • BREAST SURGERY     • CARDIAC ELECTROPHYSIOLOGY PROCEDURE N/A 2017    Procedure: EP/Ablation;  Surgeon: Blake Mcallister MD;  Location: [facility] LOCATION;  Service:    • CARDIOVERSION     • CATARACT EXTRACTION W/ INTRAOCULAR LENS IMPLANT Right 2019    Procedure: REMOVE CATARACT AND IMPLANT INTRAOCULAR LENS;  Surgeon: Lenin Dennison MD;  Location: [facility] OR;  Service: Ophthalmology   • CATARACT EXTRACTION W/ INTRAOCULAR LENS IMPLANT Left 2019    Procedure: REMOVE CATARACT AND IMPLANT INTRAOCULAR LENS;  Surgeon: Lenin Dennison MD;  Location: [facility] OR;  Service: Ophthalmology   • CHOLECYSTECTOMY WITH INTRAOPERATIVE CHOLANGIOGRAM N/A 2020    Procedure: LAPAROSCOPIC CHOLECYSTECTOMY WITH INTRAOPERATIVE CHOLANGIOGRAM;  Surgeon: Denilson Richardson MD;  Location: [facility] OR;  Service: General;  Laterality: N/A;   • ENDOSCOPY N/A 2020    Procedure:  ESOPHAGOGASTRODUODENOSCOPY;  Surgeon: Albina Ya MD;  Location: [facility] ENDOSCOPY;  Service: Gastroenterology;  Laterality: N/A;   • INSERT / REPLACE / REMOVE PACEMAKER     • PACEMAKER IMPLANTATION     • SKIN BIOPSY     • SKIN TAG REMOVAL     • UPPER GASTROINTESTINAL ENDOSCOPY  06/09/2020     PT Assessment (last 12 hours)     PT Evaluation and Treatment     Row Name 10/14/21 1141          Physical Therapy Time and Intention    Subjective Information no complaints  -NAKIA     Document Type therapy note (daily note)  -NAKIA     Mode of Treatment physical therapy  -NAKIA     Row Name 10/14/21 1141          General Information    Existing Precautions/Restrictions fall  -NAKIA     Row Name 10/14/21 1141          Pain Scale: Numbers Pre/Post-Treatment    Pretreatment Pain Rating 0/10 - no pain  -NAKIA     Posttreatment Pain Rating 0/10 - no pain  -NAKIA     Row Name 10/14/21 1141          Bed Mobility    Supine-Sit [place] (Bed Mobility) standby assist  -NAKIA     Sit-Supine [place] (Bed Mobility) standby assist  -NAKIA     Row Name 10/14/21 1141          Transfers    Sit-Stand [place] (Transfers) verbal cues; contact guard  -NAKIA     Row Name 10/14/21 1141          Gait/Stairs (Locomotion)    [place] Level (Gait) verbal cues; contact guard; minimum assist (75% patient effort)  -NAKIA     Distance in Feet (Gait) 120  -NAKIA     Deviations/Abnormal Patterns (Gait) gait speed decreased; stride length decreased  -NAKIA     Bilateral Gait Deviations forward flexed posture  -NAKIA     Comment (Gait/Stairs) cues to look up and relax UE's.  Pt had 2 slight LOB  -NAKIA     Row Name 10/14/21 1141          Aerobic Exercise    Comment, Aerobic Exercise (Therapeutic Exercise) AROM BLE X 20  -NAKIA     Row Name 10/14/21 1141          Positioning and Restraints    Pre-Treatment Position in bed  -NAKIA     Post Treatment Position bed  -NAKIA     In Bed sitting EOB; call light within reach; encouraged to call for assist; side rails up x2  -NAKIA           User  Key  (r) = Recorded By, (t) = Taken By, (c) = Cosigned By    Initials Name Provider Type    NAKIA Galen Toledo PTA Physical Therapy Assistant              Physical Therapy Education                 Title: PT OT SLP Therapies (In Progress)     Topic: Physical Therapy (In Progress)     Point: Mobility training (Done)     Learning Progress Summary           Patient Acceptance, E, VU by WK at 10/13/2021 0852    Comment: safety                   Point: Home exercise program (Not Started)     Learner Progress:  Not documented in this visit.          Point: Body mechanics (Not Started)     Learner Progress:  Not documented in this visit.          Point: Precautions (Done)     Learning Progress Summary           Patient Acceptance, E, VU,Bed HQ by PH at 10/12/2021 1519    Comment: Pt education on safety while home with dizziness for safe mobility.                               User Key     Initials Effective Dates Name Provider Type Discipline    WK 06/16/21 -  Belén Steel PTA Physical Therapy Assistant PT    PH 08/04/21 -  Genet Dewitt PT Student PT Student PT              PT Recommendation and Plan     Plan of Care Reviewed With: patient  Progress: improving  Outcome Summary: Pt was able to perform bed mobility with SBA.  Transfered sit to stand with CGA.  Amb 120' with CGA, cues for posture, pt had a couple slight LOB, but was able to self correct.  HR stayed between 70-80 during treatment.  Will continue to work with pt to increase strength and progress amb.       Time Calculation:    PT Charges     Row Name 10/14/21 1141             Time Calculation    Start Time 1141  -NAKIA      Stop Time 1206  -NAKIA      Time Calculation (min) 25 min  -NAKIA      PT Received On 10/14/21  -NAKIA              Time Calculation- PT    Total Timed Code Minutes- PT 25 minute(s)  -NAKIA              Timed Charges    45651 - PT Therapeutic Exercise Minutes 12  -NAKIA      60033 - Gait Training Minutes  13  -NAKIA              Total Minutes    Timed  Charges Total Minutes 25  -NAKIA       Total Minutes 25  -NAKIA            User Key  (r) = Recorded By, (t) = Taken By, (c) = Cosigned By    Initials Name Provider Type    Galen Quiroz PTA Physical Therapy Assistant              Therapy Charges for Today     Code Description Service Date Service Provider Modifiers Qty    40304633419 HC GAIT TRAINING EA 15 MIN 10/14/2021 Galen Toledo, TAL GP 1    41948112772 HC PT THER PROC EA 15 MIN 10/14/2021 Galen Toledo, TAL GP 1          PT G-Codes  Outcome Measure Options: AM-PAC 6 Clicks Daily Activity (OT)  AM-PAC 6 Clicks Score (PT): 20  AM-PAC 6 Clicks Score (OT): 21    Galen Toledo PTA  10/14/2021

## 2021-10-14 NOTE — PROGRESS NOTES
[facility] Medicine Services  INPATIENT PROGRESS NOTE    Patient Name: Sultana Lin  Date of Admission: 10/11/2021  Today's Date: 10/14/21  Length of Stay: 3  Primary Care Physician: Heydi Tineo APRN    Subjective   Chief Complaint: Tachcyardia  Weakness - Generalized       Feeling a little better this AM. -120. No cardioversion planned.   10/13 Patient tired and resting.  at rest. Cardizem and Amiodarone infusing.   10/14 HR controlled converted last night. Complained of nausea this AM, not sure if she has moved bowels. No abdominal pain.     Review of Systems   All pertinent negatives and positives are as above. All other systems have been reviewed and are negative unless otherwise stated.     Objective    Temp:  [98.1 °F (36.7 °C)-99.3 °F (37.4 °C)] 99.2 °F (37.3 °C)  Heart Rate:  [62-88] 71  Resp:  [16-18] 18  BP: (119-137)/(50-78) 119/50  Physical Exam  Vitals reviewed.   Constitutional:       Appearance: She is well-developed. She is not ill-appearing or toxic-appearing.      Comments: Appears tired   HENT:      Head: Normocephalic and atraumatic.      Right Ear: External ear normal.      Left Ear: External ear normal.      Mouth/Throat:      Mouth: Mucous membranes are dry.      Pharynx: Oropharynx is clear.   Eyes:      General:         Right eye: No discharge.         Left eye: No discharge.      Extraocular Movements: Extraocular movements intact.      Conjunctiva/sclera: Conjunctivae normal.      Pupils: Pupils are equal, round, and reactive to light.   Neck:      Vascular: No JVD.   Cardiovascular:      Rate and Rhythm: Normal rate. Rhythm irregular.      Pulses: Normal pulses.      Heart sounds: Normal heart sounds. No murmur heard.  No friction rub. No gallop.    Pulmonary:      Effort: Pulmonary effort is normal. No respiratory distress.      Breath sounds: No stridor. No wheezing, rhonchi or rales.   Chest:      Chest wall: No  tenderness.   Abdominal:      General: Bowel sounds are normal. There is no distension.      Palpations: Abdomen is soft.      Tenderness: There is no abdominal tenderness. There is no guarding or rebound.      Hernia: No hernia is present.   Musculoskeletal:         General: No swelling, tenderness or deformity. Normal range of motion.      Cervical back: Normal range of motion and neck supple. No rigidity or tenderness. No muscular tenderness.      Right lower leg: Edema present.      Left lower leg: Edema present.   Skin:     General: Skin is warm and dry.      Findings: No erythema or rash.   Neurological:      General: No focal deficit present.      Mental Status: She is alert and oriented to person, place, and time.      Cranial Nerves: No cranial nerve deficit.      Sensory: No sensory deficit.      Motor: No weakness or abnormal muscle tone.      Deep Tendon Reflexes: Reflexes normal.   Psychiatric:         Mood and Affect: Mood normal.         Behavior: Behavior normal.     Results Review:  I have reviewed the labs, radiology results, and diagnostic studies.    Laboratory Data:   Results from last 7 days   Lab Units 10/14/21  0404 10/12/21  1154 10/11/21  1557   WBC 10*3/mm3 10.86* 7.86 7.61   HEMOGLOBIN g/dL 12.0 11.3* 12.3   HEMATOCRIT % 37.6 35.2 39.4   PLATELETS 10*3/mm3 208 191 216        Results from last 7 days   Lab Units 10/14/21  0404 10/12/21  1155 10/11/21  1602   SODIUM mmol/L 142 144 142   POTASSIUM mmol/L 3.0* 3.6 4.3   CHLORIDE mmol/L 104 110* 107   CO2 mmol/L 23.0 23.0 23.0   BUN mg/dL 14 11 15   CREATININE mg/dL 1.24* 1.04* 1.42*   CALCIUM mg/dL 8.7 9.0 9.5   BILIRUBIN mg/dL  --   --  0.5   ALK PHOS U/L  --   --  76   ALT (SGPT) U/L  --   --  10   AST (SGOT) U/L  --   --  19   GLUCOSE mg/dL 135* 123* 122*       Culture Data:   No results found for: BLOODCX, URINECX, WOUNDCX, MRSACX, RESPCX, STOOLCX    Radiology Data:   Imaging Results (Last 24 Hours)     ** No results found for the last 24  hours. **          I have reviewed the patient's current medications.     Echocardiogram 10/11/2021  · Left ventricular ejection fraction appears to be 56 - 60%. Left ventricular systolic function is normal.  · Left atrial volume is severely increased.  · Estimated right ventricular systolic pressure from tricuspid regurgitation is normal (<35 mmHg).  · There is a small (<1cm) pericardial effusion. The effusion is fluid filled. There is no evidence of cardiac tamponade.    Assessment/Plan     Active Hospital Problems    Diagnosis    • **Paroxysmal atrial fibrillation with rapid ventricular response (HCC)    • Hypokalemia    • Tachycardia    • Paroxysmal atrial fibrillation (HCC)    • Hypertension    • Hypothyroidism      Continue care in telemetry  Continue Cardizem drip to Cardizem oral/BB  Amiodarone drip ongoing to oral    Home medications restarted > Xanax resumed per patient request    Anticoagulation > Xarelto    IVF to saline lock 10/13    Check CBC/BMP in AM  Cardiology input noted  Echo 2D noted    Hypokalemia > Potassium and Magnesium replacement protocols    Add Senna 1 tab BID prn constipation  Compazine prn nausea  Increase activity    DVT prophylaxis > Patient fully anticoagulated      Discharge Planning: I expect the patient to be discharged to home in 1-2 days.    Electronically signed by Juan Luis Black MD, 10/14/21, 10:55 CDT.

## 2021-10-14 NOTE — PLAN OF CARE
Goal Outcome Evaluation:  Plan of Care Reviewed With: patient, daughter        Progress: improving  Outcome Summary: OT tx completed on this date. Daughter present in her room during tx.  Pt completed supine to EOB requiring SBA. Pt completed EOB sitting with functional reaching and midline crossing with shifting of support with BUEs while sitting requiring SBA. Pt completed multiple intervals of functional mobility in her room requiring CGA. Pt completed toileting tasks in her bathroom utilizing grab bar with SBA for all aspects. Pt completed grooming tasks of washing hands and face/brushing hair requiring CGA at ambulatory level tolerating 3-4 minutes of unsupported standing during tasks with functional reaching for task completion. Pt agreed to sit in recliner, pt left with her daughter in the room. Pt educated to get out of bed with assistance and sit on EOB or in recliner for all meals. Pt/daughter were agreeable. HR remained within normal range during tx. OT POC to continue.

## 2021-10-14 NOTE — PROGRESS NOTES
[facility] -  Progress Note     LOS: 3 days   Patient Care Team:  Heydi Tineo APRN as PCP - General (Family Medicine)    Chief Complaint: Atrial Fibrillation    Subjective     Interval History:     Patient Complaints: patient is up getting bed changed this am. She reports that she has been nauseated this am. She denies any chest pain. She denies any heart racing. She reports that she is more fatigued this am. Daughter is in room with patient this am. She reports that patient had some difficulty swallowing her supper last night and was almost choking on it. Patient denies any leg swelling, orthopnea or PND.     Review of Systems:     Review of Systems   Constitutional: Positive for fatigue.   HENT: Positive for trouble swallowing.    Respiratory: Negative for chest tightness and shortness of breath.    Cardiovascular: Negative for chest pain, palpitations and leg swelling.   Gastrointestinal: Positive for nausea.   All other systems reviewed and are negative.    Objective     Vital Sign Min/Max for last 24 hours  Temp  Min: 98.1 °F (36.7 °C)  Max: 99.3 °F (37.4 °C)   BP  Min: 119/50  Max: 137/73   Pulse  Min: 62  Max: 88   Resp  Min: 16  Max: 18   SpO2  Min: 95 %  Max: 97 %   No data recorded   Weight  Min: 76.2 kg (168 lb)  Max: 76.2 kg (168 lb)         10/13/21  2110   Weight: 76.2 kg (168 lb)       Telemetry: AV pacing rate is 67 currently. Rates were 67-71 since 2300 last night      Physical Exam:    Vitals reviewed.   Constitutional:       General: Not in acute distress.     Appearance: Normal appearance. Well-developed.   Eyes:      Pupils: Pupils are equal, round, and reactive to light.   HENT:      Head: Normocephalic and atraumatic.      Nose: Nose normal.   Neck:      Vascular: No carotid bruit.   Pulmonary:      Effort: Pulmonary effort is normal. No respiratory distress.      Breath sounds: Normal breath sounds. No wheezing. No rales.   Cardiovascular:      Normal rate.  Regular rhythm.      Murmurs: There is a grade 2/6 systolic murmur.   Edema:     Peripheral edema absent.   Abdominal:      General: There is no distension.      Palpations: Abdomen is soft.   Musculoskeletal: Normal range of motion.      Cervical back: Normal range of motion and neck supple. Skin:     General: Skin is warm.      Findings: No erythema or rash.   Neurological:      Mental Status: Alert and oriented to person, place, and time.   Psychiatric:         Speech: Speech normal.         Behavior: Behavior normal.         Thought Content: Thought content normal.         Judgment: Judgment normal.       Results Review:   Lab Results (last 72 hours)     Procedure Component Value Units Date/Time    Basic Metabolic Panel [[id number]]  (Abnormal) Collected: 10/14/21 0404    Specimen: Blood Updated: 10/14/21 0517     Glucose 135 mg/dL      BUN 14 mg/dL      Creatinine 1.24 mg/dL      Sodium 142 mmol/L      Potassium 3.0 mmol/L      Chloride 104 mmol/L      CO2 23.0 mmol/L      Calcium 8.7 mg/dL      eGFR Non African Amer 41 mL/min/1.73      BUN/Creatinine Ratio 11.3     Anion Gap 15.0 mmol/L     Narrative:      GFR Normal >60  Chronic Kidney Disease <60  Kidney Failure <15      CBC & Differential [[id number]]  (Abnormal) Collected: 10/14/21 0404    Specimen: Blood Updated: 10/14/21 0457    Narrative:      The following orders were created for panel order CBC & Differential.  Procedure                               Abnormality         Status                     ---------                               -----------         ------                     CBC Auto Differential[[id number]]        Abnormal            Final result                 Please view results for these tests on the individual orders.    CBC Auto Differential [[id number]]  (Abnormal) Collected: 10/14/21 0404    Specimen: Blood Updated: 10/14/21 0457     WBC 10.86 10*3/mm3      RBC 4.15 10*6/mm3      Hemoglobin 12.0 g/dL      Hematocrit 37.6 %      MCV 90.6 fL       MCH 28.9 pg      MCHC 31.9 g/dL      RDW 14.2 %      RDW-SD 46.3 fl      MPV 11.4 fL      Platelets 208 10*3/mm3      Neutrophil % 76.7 %      Lymphocyte % 14.8 %      Monocyte % 7.1 %      Eosinophil % 0.4 %      Basophil % 0.5 %      Immature Grans % 0.5 %      Neutrophils, Absolute 8.34 10*3/mm3      Lymphocytes, Absolute 1.61 10*3/mm3      Monocytes, Absolute 0.77 10*3/mm3      Eosinophils, Absolute 0.04 10*3/mm3      Basophils, Absolute 0.05 10*3/mm3      Immature Grans, Absolute 0.05 10*3/mm3      nRBC 0.0 /100 WBC     Basic Metabolic Panel [[id number]]  (Abnormal) Collected: 10/12/21 1155    Specimen: Blood Updated: 10/12/21 1238     Glucose 123 mg/dL      BUN 11 mg/dL      Creatinine 1.04 mg/dL      Sodium 144 mmol/L      Potassium 3.6 mmol/L      Chloride 110 mmol/L      CO2 23.0 mmol/L      Calcium 9.0 mg/dL      eGFR Non African Amer 51 mL/min/1.73      BUN/Creatinine Ratio 10.6     Anion Gap 11.0 mmol/L     Narrative:      GFR Normal >60  Chronic Kidney Disease <60  Kidney Failure <15      CBC & Differential [[id number]]  (Abnormal) Collected: 10/12/21 1154    Specimen: Blood Updated: 10/12/21 1221    Narrative:      The following orders were created for panel order CBC & Differential.  Procedure                               Abnormality         Status                     ---------                               -----------         ------                     CBC Auto Differential[[id number]]        Abnormal            Final result                 Please view results for these tests on the individual orders.    CBC Auto Differential [[id number]]  (Abnormal) Collected: 10/12/21 1154    Specimen: Blood Updated: 10/12/21 1221     WBC 7.86 10*3/mm3      RBC 3.85 10*6/mm3      Hemoglobin 11.3 g/dL      Hematocrit 35.2 %      MCV 91.4 fL      MCH 29.4 pg      MCHC 32.1 g/dL      RDW 14.0 %      RDW-SD 46.5 fl      MPV 11.2 fL      Platelets 191 10*3/mm3      Neutrophil % 69.8 %      Lymphocyte % 19.5 %       Monocyte % 7.9 %      Eosinophil % 1.3 %      Basophil % 0.9 %      Immature Grans % 0.6 %      Neutrophils, Absolute 5.49 10*3/mm3      Lymphocytes, Absolute 1.53 10*3/mm3      Monocytes, Absolute 0.62 10*3/mm3      Eosinophils, Absolute 0.10 10*3/mm3      Basophils, Absolute 0.07 10*3/mm3      Immature Grans, Absolute 0.05 10*3/mm3      nRBC 0.0 /100 WBC     T3, Free [[id number]]  (Normal) Collected: 10/11/21 1557    Specimen: Blood Updated: 10/12/21 0116     T3, Free 2.94 pg/mL     Narrative:      Results may be falsely increased if patient taking Biotin.      COVID-19,[facility] IN-HOUSE,Nasal Swab No Transport Media 3-4 HR TAT - Swab, Nasal Cavity [[id number]]  (Normal) Collected: 10/11/21 2100    Specimen: Swab from Nasal Cavity Updated: 10/11/21 2148     COVID19 Not Detected    Narrative:      Fact sheet for providers: https://www.fda.gov/media/207365/download     Fact sheet for patients: https://www.fda.gov/media/871853/download    Test performed by PCR.    Consider negative results in combination with clinical observations, patient history, and epidemiological information.    TSH [[id number]]  (Normal) Collected: 10/11/21 1602    Specimen: Blood Updated: 10/11/21 1656     TSH 0.815 uIU/mL     T4, Free [[id number]]  (Abnormal) Collected: 10/11/21 1602    Specimen: Blood Updated: 10/11/21 1654     Free T4 1.73 ng/dL     Narrative:      Results may be falsely increased if patient taking Biotin.      Comprehensive Metabolic Panel [[id number]]  (Abnormal) Collected: 10/11/21 1602    Specimen: Blood Updated: 10/11/21 1650     Glucose 122 mg/dL      BUN 15 mg/dL      Creatinine 1.42 mg/dL      Sodium 142 mmol/L      Potassium 4.3 mmol/L      Chloride 107 mmol/L      CO2 23.0 mmol/L      Calcium 9.5 mg/dL      Total Protein 7.5 g/dL      Albumin 4.40 g/dL      ALT (SGPT) 10 U/L      AST (SGOT) 19 U/L      Alkaline Phosphatase 76 U/L      Total Bilirubin 0.5 mg/dL      eGFR Non African Amer 35 mL/min/1.73      Globulin 3.1  gm/dL      A/G Ratio 1.4 g/dL      BUN/Creatinine Ratio 10.6     Anion Gap 12.0 mmol/L     Narrative:      GFR Normal >60  Chronic Kidney Disease <60  Kidney Failure <15      Protime-INR [[id number]]  (Abnormal) Collected: 10/11/21 1557    Specimen: Blood Updated: 10/11/21 1645     Protime 15.3 Seconds      INR 1.26    D-dimer, Quantitative [[id number]]  (Normal) Collected: 10/11/21 1557    Specimen: Blood Updated: 10/11/21 1645     D-Dimer, Quantitative 0.46 mg/L (FEU)     Narrative:      Reference Range is 0-0.50 mg/L FEU. However, results <0.50 mg/L FEU tends to rule out DVT or PE. Results >0.50 mg/L FEU are not useful in predicting absence or presence of DVT or PE.      aPTT [[id number]]  (Normal) Collected: 10/11/21 1557    Specimen: Blood Updated: 10/11/21 1645     PTT 31.2 seconds     Troponin [[id number]]  (Normal) Collected: 10/11/21 1602    Specimen: Blood Updated: 10/11/21 1645     Troponin T <0.010 ng/mL     Narrative:      Troponin T Reference Range:  <= 0.03 ng/mL-   Negative for AMI  >0.03 ng/mL-     Abnormal for myocardial necrosis.  Clinicians would have to utilize clinical acumen, EKG, Troponin and serial changes to determine if it is an Acute Myocardial Infarction or myocardial injury due to an underlying chronic condition.       Results may be falsely decreased if patient taking Biotin.      Magnesium [[id number]]  (Normal) Collected: 10/11/21 1602    Specimen: Blood Updated: 10/11/21 1644     Magnesium 1.6 mg/dL     CBC & Differential [[id number]]  (Abnormal) Collected: 10/11/21 1557    Specimen: Blood Updated: 10/11/21 1629    Narrative:      The following orders were created for panel order CBC & Differential.  Procedure                               Abnormality         Status                     ---------                               -----------         ------                     CBC Auto Differential[[id number]]        Abnormal            Final result                 Please view results for these  tests on the individual orders.    CBC Auto Differential [[id number]]  (Abnormal) Collected: 10/11/21 1557    Specimen: Blood Updated: 10/11/21 1629     WBC 7.61 10*3/mm3      RBC 4.27 10*6/mm3      Hemoglobin 12.3 g/dL      Hematocrit 39.4 %      MCV 92.3 fL      MCH 28.8 pg      MCHC 31.2 g/dL      RDW 14.0 %      RDW-SD 46.8 fl      MPV 11.6 fL      Platelets 216 10*3/mm3      Neutrophil % 64.0 %      Lymphocyte % 26.0 %      Monocyte % 5.7 %      Eosinophil % 2.8 %      Basophil % 1.2 %      Immature Grans % 0.3 %      Neutrophils, Absolute 4.88 10*3/mm3      Lymphocytes, Absolute 1.98 10*3/mm3      Monocytes, Absolute 0.43 10*3/mm3      Eosinophils, Absolute 0.21 10*3/mm3      Basophils, Absolute 0.09 10*3/mm3      Immature Grans, Absolute 0.02 10*3/mm3      nRBC 0.0 /100 WBC               Echo EF Estimated  Lab Results   Component Value Date    ECHOEFEST 55 12/14/2016         Cath Ejection Fraction Quantitative  No results found for: CATHEF        Medication Review: yes  Current Facility-Administered Medications   Medication Dose Route Frequency Provider Last Rate Last Admin   • ALPRAZolam (XANAX) tablet 0.25 mg  0.25 mg Oral BID PRN Juan Luis Black MD   0.25 mg at 10/13/21 1223   • amiodarone 360 mg in 200 mL D5W infusion  0.5 mg/min Intravenous Continuous Flash Lopez MD 16.67 mL/hr at 10/14/21 0122 0.5 mg/min at 10/14/21 0122   • carvedilol (COREG) tablet 25 mg  25 mg Oral BID With Meals Omar Post APRN   25 mg at 10/13/21 1714   • digoxin (LANOXIN) tablet 125 mcg  125 mcg Oral Daily Omar Post APRN   125 mcg at 10/13/21 1324   • dilTIAZem (CARDIZEM) 125 mg in 125 mL NS infusion  5-15 mg/hr Intravenous Continuous Juan Luis Black MD 15 mL/hr at 10/14/21 0111 15 mg/hr at 10/14/21 0111   • dilTIAZem CD (CARDIZEM CD) 24 hr capsule 180 mg  180 mg Oral Q24H Juan Luis Black MD   180 mg at 10/13/21 0823   • furosemide (LASIX) tablet 40 mg  40 mg Oral Daily Juan Luis Black  MD Dyllan   40 mg at 10/13/21 0823   • HYDROcodone-acetaminophen (NORCO)  MG per tablet 1 tablet  1 tablet Oral BID PRN Juan Luis Black MD       • levothyroxine (SYNTHROID, LEVOTHROID) tablet 50 mcg  50 mcg Oral Q AM Juan Luis Black MD   50 mcg at 10/14/21 0536   • meclizine (ANTIVERT) tablet 25 mg  25 mg Oral TID PRN Juan Luis Black MD       • mirtazapine (REMERON) tablet 15 mg  15 mg Oral Nightly Juan Luis Black MD   15 mg at 10/13/21 2039   • pantoprazole (PROTONIX) EC tablet 40 mg  40 mg Oral Q AM Juan Luis Black MD   40 mg at 10/14/21 0536   • rivaroxaban (XARELTO) tablet 15 mg  15 mg Oral Daily With Dinner Juan Luis Black MD   15 mg at 10/13/21 1714   • sodium chloride 0.9 % bolus 500 mL  500 mL Intravenous Once Juan Luis Black MD       • sodium chloride 0.9 % flush 10 mL  10 mL Intravenous PRN Juan Luis Black MD       • sodium chloride 0.9 % flush 10 mL  10 mL Intravenous Q12H Juan Luis Black MD   10 mL at 10/13/21 2039   • sodium chloride 0.9 % flush 10 mL  10 mL Intravenous PRN Juan Luis Black MD       • sucralfate (CARAFATE) tablet 1 g  1 g Oral 4x Daily AC & at Bedtime Juan Luis Black MD   1 g at 10/13/21 2038       Assessment/Plan     Atrial Fibrillation: converted aprroximately 2300 last night. AV pacing 67-71 today on Telemetry. discontinue amiodarone drip; start oral amiodarone. Continue carvedilol, digoxin and Xarelto.     Bradycardia: s/p pacemaker     Hypertension: controlled. Continue current medications     hypothyroidism     Plan:   - continue carvedilol, digoxin, xarelto   - discontinue amiodarone drip; start oral amiodarone  - patient has a follow up appt with Dr Lopez in [facility] for 10/29/2021; instructed daughter to keep.   -Cardiology will sign off. Thank you for allowing us to participate in the care of your patient.  Please do not hesitate to contact us if we can be of any further  assistance.      Electronically signed by DOC Coronel, 10/14/21, 8:30 AM CDT.

## 2021-10-14 NOTE — THERAPY EVALUATION
Acute Care - Speech Language Pathology   Swallow Initial Evaluation [facility]     Patient Name: Sultana Lin  : 1940  MRN: [id number]  Today's Date: 10/14/2021               Admit Date: 10/11/2021     Clinical bedside swallowing evaluation completed per SLP. Pt was asleep in chair at time of arrival, required mod verbal and mild to mod tactile stim to awake, provided per SLP and daughter. Daughter and pt reported new onset dysphagia with solids, as well as pills and reported that food items have came back up, requiring expectoration at times. They denied dysphagia with liquids. Oral [facility] exam WFL. Pt was presented a full range of consistencies excluding mechanical soft. Adequate labial seal on spoon. No noted concerns with oral transit or laryngeal elevation. Mildly prolonged mastication with regular solid with mild oral residue post swallow, though this was felt to be impacted by generally dried oral mucosa. 1x delayed cough following thin. Educated pt and daughter on general overt s/s of aspiration, as well as strategies to improve toleration with medication consumption. Pt was given option to change to [facility] soft given acute dysphagia with solids, yet pt verbalized desire to continue with regular solid diet consistency, while more consistently following general feeding/aspiration precautions. RN, Navneet, was present during latter portion of eval and is aware of SLP recs.   RECOMMENDATIONS:   • Continue current diet of regular solid diet consistency with regular/thin liquid consistency  • meds ok to be attempted whole with thin liquids, yet larger pills should be provided in pudding/applesauce, pt may utilize a thin liquid wash to assist in transit as needed  • RN to monitor for increased lung congestion  • Encourage fully upright position during all PO intake given acute report per BECK and nursing of poor positioning during PO. ST to monitor diet toleration PRN. Thanks!  Jen Cho, CCC-SLP  10/14/2021 15:52 CDT    Visit Dx:     ICD-10-CM ICD-9-CM   1. Tachycardia  R00.0 785.0   2. History of atrial fibrillation  Z86.79 V12.59   3. Impaired mobility  Z74.09 799.89   4. Decreased activities of daily living (ADL)  Z78.9 V49.89   5. Dysphagia, unspecified type  R13.10 787.20     Patient Active Problem List   Diagnosis   • Hypothyroidism   • Hypertension   • Paroxysmal atrial fibrillation (HCC)   • Paroxysmal atrial fibrillation with rapid ventricular response (HCC)   • Acute cholecystitis   • Nausea   • Tachycardia   • Hypokalemia     Past Medical History:   Diagnosis Date   • Arthritis    • Atrial fibrillation (HCC)    • Atrial fibrillation (HCC)    • Atrial fibrillation with rapid ventricular response (HCC) 1/20/2017   • Breast cancer (HCC)     right   • Cancer (HCC)    • Disease of thyroid gland    • Hypertension      Past Surgical History:   Procedure Laterality Date   • ABLATION OF DYSRHYTHMIC FOCUS     • BREAST SURGERY     • CARDIAC ELECTROPHYSIOLOGY PROCEDURE N/A 4/6/2017    Procedure: EP/Ablation;  Surgeon: Blake Mcallister MD;  Location: [facility] INVASIVE LOCATION;  Service:    • CARDIOVERSION     • CATARACT EXTRACTION W/ INTRAOCULAR LENS IMPLANT Right 8/9/2019    Procedure: REMOVE CATARACT AND IMPLANT INTRAOCULAR LENS;  Surgeon: Lenin Dennison MD;  Location: [facility] OR;  Service: Ophthalmology   • CATARACT EXTRACTION W/ INTRAOCULAR LENS IMPLANT Left 8/16/2019    Procedure: REMOVE CATARACT AND IMPLANT INTRAOCULAR LENS;  Surgeon: Lenin Dennison MD;  Location: [facility] OR;  Service: Ophthalmology   • CHOLECYSTECTOMY WITH INTRAOPERATIVE CHOLANGIOGRAM N/A 2/17/2020    Procedure: LAPAROSCOPIC CHOLECYSTECTOMY WITH INTRAOPERATIVE CHOLANGIOGRAM;  Surgeon: Denilson Richardson MD;  Location: [facility] OR;  Service: General;  Laterality: N/A;   • ENDOSCOPY N/A 6/9/2020    Procedure: ESOPHAGOGASTRODUODENOSCOPY;  Surgeon: Albina Ya MD;  Location: [facility] ENDOSCOPY;  Service:  Gastroenterology;  Laterality: N/A;   • INSERT / REPLACE / REMOVE PACEMAKER     • PACEMAKER IMPLANTATION     • SKIN BIOPSY     • SKIN TAG REMOVAL     • UPPER GASTROINTESTINAL ENDOSCOPY  06/09/2020       SLP Recommendation and Plan  SLP Swallowing Diagnosis: functional oral phase, R/O pharyngeal dysphagia (10/14/21 1450)  SLP Diet Recommendation: regular textures, thin liquids (10/14/21 1450)  Recommended Precautions and Strategies: upright posture during/after eating, small bites of food and sips of liquid (10/14/21 1450)  SLP Rec. for Method of Medication Administration: meds whole, with pudding or applesauce, with thin liquids (10/14/21 1450)     Monitor for Signs of Aspiration: yes, notify SLP if any concerns, cough, gurgly voice, throat clearing, pneumonia, right lower lobe infiltrates (10/14/21 1450)     Swallow Criteria for Skilled Therapeutic Interventions Met: demonstrates skilled criteria (10/14/21 1450)  Anticipated Discharge Disposition (SLP): home (10/14/21 1450)  Rehab Potential/Prognosis, Swallowing: good, to achieve stated therapy goals (10/14/21 1450)  Therapy Frequency (Swallow): PRN (10/14/21 1450)  Predicted Duration Therapy Intervention (Days): until discharge (10/14/21 1450)                         Plan of Care Reviewed With: patient, daughter, other (see comments) (RNNavneet)  Progress:  (Eval)  Outcome Summary: Clinical bedside swallowing evaluation completed per SLP. Pt was asleep in chair at time of arrival, required mod verbal and mild to mod tactile stim to awake, provided per SLP and daughter. Daughter and pt reported new onset dysphagia with solids, as well as pills and reported that food items have came back up, requiring expectoration at times. They denied dysphagia with liquids. Oral [facility] exam WFL. Pt was presented a full range of consistencies excluding mechanical soft. Adequate labial seal on spoon. No noted concerns with oral transit or laryngeal elevation. Mildly prolonged  mastication with regular solid with mild oral residue post swallow, though this was felt to be impacted by generally dried oral mucosa. 1x delayed cough following thin. Educated pt and daughter on general overt s/s of aspiration, as well as strategies to improve toleration with medication consumption. Pt was given option to change to mech soft given acute dysphagia with solids, yet pt verbalized desire to continue with regular solid diet consistency, while more consistently following general feeding/aspiration precautions. Navneet CROCKER, was present during latter portion of eval and is aware of SLP recs. RECOMMENDATIONS: Continue current diet of regular solid diet consistency with regular/thin liquid consistency; meds ok to be attempted whole with thin liquids, yet larger pills should be provided in pudding/applesauce, pt may utilize a thin liquid wash to assist in transit as needed; RN to monitor for increased lung congestion; Encourage fully upright position during all PO intake given acute report per BECK and nursing of poor positioning during PO. ST to monitor diet toleration PRN. Thanks!      SWALLOW EVALUATION (last 72 hours)     SLP Adult Swallow Evaluation     Row Name 10/14/21 7399                   Rehab Evaluation    Document Type evaluation  -TM        Subjective Information fatigue  -TM        Patient Observations cooperative  -TM        Patient/Family/Caregiver Comments/Observations Daughter present.  -TM        Care Plan Review evaluation/treatment results reviewed; care plan/treatment goals reviewed; risks/benefits reviewed; patient/other agree to care plan; current/potential barriers reviewed  -TM        Care Plan Review, Other Participant(s) daughter; other (see comments)  Navneet CROCKER  -TM        Patient Effort adequate  -TM                  General Information    Patient Profile Reviewed yes  -TM        Pertinent History Of Current Problem Acute palpitations, SVT. Hx of PAF s/p cardioversion, HTN,  arthritis, breast CA, thyroid gland disease. CXR 10/11/21 showed no acute disease.  -TM        Current Method of Nutrition regular textures; thin liquids  -TM        Precautions/Limitations, Vision WFL  -TM        Precautions/Limitations, Hearing hearing impairment, bilaterally; hearing aid, bilaterally  -TM        Prior Level of Function-Communication WFL  -TM        Prior Level of Function-Swallowing no diet consistency restrictions  -TM        Plans/Goals Discussed with patient; family; other (see comments)  RNNavneet  -        Barriers to Rehab none identified  -TM        Patient's Goals for Discharge patient did not state  -TM                  Pain    Additional Documentation Pain Scale: Numbers Pre/Post-Treatment (Group)  -TM                  Pain Scale: Numbers Pre/Post-Treatment    Pretreatment Pain Rating 0/10 - no pain  -TM        Posttreatment Pain Rating 0/10 - no pain  -TM                  Oral Motor Structure and Function    Dentition Assessment upper dentures/partial in place; lower dentures/partial in place  -TM        Secretion Management WNL/WFL  -TM        Mucosal Quality moist, healthy  -TM        Volitional Swallow WFL  -TM        Volitional Cough WFL  -TM                  Oral Musculature and Cranial Nerve Assessment    Oral Motor General Assessment WFL  -TM                  General Eating/Swallowing Observations    Respiratory Support Currently in Use room air  -TM        Eating/Swallowing Skills fed by SLP; self-fed  -TM        Positioning During Eating upright in chair  -TM        Utensils Used spoon; straw  -TM        Consistencies Trialed pudding thick; honey-thick liquids; nectar/syrup-thick liquids; thin liquids; regular textures  -TM                  Respiratory    Respiratory Status WFL  -TM                  Clinical Swallow Eval    Oral Prep Phase WFL  -TM        Oral Transit WFL  -TM        Oral Residue WFL  -TM        Pharyngeal Phase --  r/o pharyngeal impairment  -TM         Esophageal Phase unremarkable  -TM        Clinical Swallow Evaluation Summary See note.  -TM                  Clinical Impression    SLP Swallowing Diagnosis functional oral phase; R/O pharyngeal dysphagia  -TM        Functional Impact risk of aspiration/pneumonia; risk of malnutrition; risk of dehydration  -TM        Rehab Potential/Prognosis, Swallowing good, to achieve stated therapy goals  -TM        Swallow Criteria for Skilled Therapeutic Interventions Met demonstrates skilled criteria  -TM                  Recommendations    Therapy Frequency (Swallow) PRN  -TM        Predicted Duration Therapy Intervention (Days) until discharge  -TM        SLP Diet Recommendation regular textures; thin liquids  -TM        Recommended Precautions and Strategies upright posture during/after eating; small bites of food and sips of liquid  -TM        Oral Care Recommendations Oral Care BID/PRN  -TM        SLP Rec. for Method of Medication Administration meds whole; with pudding or applesauce; with thin liquids  -TM        Monitor for Signs of Aspiration yes; notify SLP if any concerns; cough; gurgly voice; throat clearing; pneumonia; right lower lobe infiltrates  -TM        Anticipated Discharge Disposition (SLP) home  -TM                  Swallow Goals (SLP)    Oral Nutrition/Hydration Goal Selection (SLP) oral nutrition/hydration, SLP goal 1  -TM                  Oral Nutrition/Hydration Goal 1 (SLP)    Oral Nutrition/Hydration Goal 1, SLP Pt will tolerate LRD without overt s/s of aspiration.  -TM        Time Frame (Oral Nutrition/Hydration Goal 1, SLP) by discharge  -TM        Barriers (Oral Nutrition/Hydration Goal 1, SLP) n/a  -TM        Progress/Outcomes (Oral Nutrition/Hydration Goal 1, SLP) goal ongoing  -TM              User Key  (r) = Recorded By, (t) = Taken By, (c) = Cosigned By    Initials Name Effective Dates    TM Jen Cho CCC-SLP 06/16/21 -                 EDUCATION  The patient has been educated in the  following areas:   Dysphagia (Swallowing Impairment).        SLP GOALS     Row Name 10/14/21 1450             Oral Nutrition/Hydration Goal 1 (SLP)    Oral Nutrition/Hydration Goal 1, SLP Pt will tolerate LRD without overt s/s of aspiration.  -TM      Time Frame (Oral Nutrition/Hydration Goal 1, SLP) by discharge  -TM      Barriers (Oral Nutrition/Hydration Goal 1, SLP) n/a  -TM      Progress/Outcomes (Oral Nutrition/Hydration Goal 1, SLP) goal ongoing  -TM            User Key  (r) = Recorded By, (t) = Taken By, (c) = Cosigned By    Initials Name Provider Type    TM Jen Cho CCC-SLP Speech and Language Pathologist                   Time Calculation:    Time Calculation- SLP     Row Name 10/14/21 1551             Time Calculation- SLP    SLP Start Time 1450  -TM      SLP Stop Time 1555  -TM      SLP Time Calculation (min) 65 min  -TM      SLP Received On 10/14/21  -TM      SLP Goal Re-Cert Due Date 10/24/21  -TM              Untimed Charges    SLP Eval/Re-eval  ST Eval Oral Pharyng Swallow - 61366  -TM      [id number] Eval Oral Pharyng Swallow Minutes 65  -TM              Total Minutes    Untimed Charges Total Minutes 65  -TM       Total Minutes 65  -TM            User Key  (r) = Recorded By, (t) = Taken By, (c) = Cosigned By    Initials Name Provider Type     Jen Cho CCC-SLP Speech and Language Pathologist                Therapy Charges for Today     Code Description Service Date Service Provider Modifiers Qty    [id number] HC ST EVAL ORAL PHARYNG SWALLOW 4 10/14/2021 Jen Cho CCC-SLP GN 1               JEVON Wren  10/14/2021

## 2021-10-14 NOTE — PLAN OF CARE
Goal Outcome Evaluation:      Pt resting in bed. No complaints of pain. No signs of distress noted. Pt on room air. Pt HR NSR 60's to 70's

## 2021-10-14 NOTE — PLAN OF CARE
Goal Outcome Evaluation:  Plan of Care Reviewed With: patient        Progress: improving  Outcome Summary: Pt was able to perform bed mobility with SBA.  Transfered sit to stand with CGA.  Amb 120' with CGA, cues for posture, pt had a couple slight LOB, but was able to self correct.  HR stayed between 70-80 during treatment.  Will continue to work with pt to increase strength and progress amb.

## 2021-10-14 NOTE — PLAN OF CARE
Problem: Adult Inpatient Plan of Care  Goal: Plan of Care Review  Outcome: Ongoing, Progressing  Flowsheets (Taken 10/14/2021 1450)  Progress: (Eval) --  Plan of Care Reviewed With: (Navneet CROCKER)   patient   daughter   other (see comments)  Outcome Summary:  Clinical bedside swallowing evaluation completed per SLP. Pt was asleep in chair at time of arrival, required mod verbal and mild to mod tactile stim to awake, provided per SLP and daughter. Daughter and pt reported new onset dysphagia with solids, as well as pills and reported that food items have came back up, requiring expectoration at times. They denied dysphagia with liquids. Oral [facility] exam WFL. Pt was presented a full range of consistencies excluding mechanical soft. Adequate labial seal on spoon. No noted concerns with oral transit or laryngeal elevation. Mildly prolonged mastication with regular solid with mild oral residue post swallow, though this was felt to be impacted by generally dried oral mucosa. 1x delayed cough following thin. Educated pt and daughter on general overt s/s of aspiration, as well as strategies to improve toleration with medication consumption. Pt was given option to change to [facility] soft given acute dysphagia with solids, yet pt verbalized desire to continue with regular solid diet consistency, while more consistently following general feeding/aspiration precautions. Navneet CROCKER, was present during latter portion of eval and is aware of SLP recs. RECOMMENDATIONS:    Continue current diet of regular solid diet consistency with regular/thin liquid consistency   meds ok to be attempted whole with thin liquids, yet larger pills should be provided in pudding/applesauce, pt may utilize a thin liquid wash to assist in transit as needed   RN to monitor for increased lung congestion   Encourage fully upright position during all PO intake given acute report per BECK and nursing of poor positioning during PO. ST to monitor diet toleration PRN.  Thanks!

## 2021-10-15 VITALS
HEART RATE: 64 BPM | TEMPERATURE: 98 F | SYSTOLIC BLOOD PRESSURE: 136 MMHG | HEIGHT: 67 IN | RESPIRATION RATE: 18 BRPM | WEIGHT: 168 LBS | BODY MASS INDEX: 26.37 KG/M2 | DIASTOLIC BLOOD PRESSURE: 53 MMHG | OXYGEN SATURATION: 98 %

## 2021-10-15 LAB
MAGNESIUM SERPL-MCNC: 2.3 MG/DL (ref 1.6–2.4)
POTASSIUM SERPL-SCNC: 3.6 MMOL/L (ref 3.5–5.2)
POTASSIUM SERPL-SCNC: 4.6 MMOL/L (ref 3.5–5.2)

## 2021-10-15 PROCEDURE — 83735 ASSAY OF MAGNESIUM: CPT | Performed by: FAMILY MEDICINE

## 2021-10-15 PROCEDURE — 84132 ASSAY OF SERUM POTASSIUM: CPT | Performed by: INTERNAL MEDICINE

## 2021-10-15 PROCEDURE — 25010000002 PROCHLORPERAZINE 10 MG/2ML SOLUTION: Performed by: FAMILY MEDICINE

## 2021-10-15 PROCEDURE — 97535 SELF CARE MNGMENT TRAINING: CPT

## 2021-10-15 PROCEDURE — 92526 ORAL FUNCTION THERAPY: CPT | Performed by: SPEECH-LANGUAGE PATHOLOGIST

## 2021-10-15 RX ORDER — AMIODARONE HYDROCHLORIDE 200 MG/1
200 TABLET ORAL 2 TIMES DAILY
Qty: 60 TABLET | Refills: 1 | Status: SHIPPED | OUTPATIENT
Start: 2021-10-15

## 2021-10-15 RX ORDER — DIGOXIN 125 MCG
125 TABLET ORAL
Qty: 30 TABLET | Refills: 1 | Status: SHIPPED | OUTPATIENT
Start: 2021-10-15

## 2021-10-15 RX ORDER — DILTIAZEM HYDROCHLORIDE 180 MG/1
180 CAPSULE, COATED, EXTENDED RELEASE ORAL DAILY
Qty: 30 CAPSULE | Refills: 2 | Status: SHIPPED | OUTPATIENT
Start: 2021-10-15 | End: 2021-10-15 | Stop reason: HOSPADM

## 2021-10-15 RX ORDER — DILTIAZEM HYDROCHLORIDE 180 MG/1
180 CAPSULE, COATED, EXTENDED RELEASE ORAL
Qty: 30 CAPSULE | Refills: 2 | Status: SHIPPED | OUTPATIENT
Start: 2021-10-16

## 2021-10-15 RX ORDER — CARVEDILOL 25 MG/1
25 TABLET ORAL 2 TIMES DAILY WITH MEALS
Qty: 60 TABLET | Refills: 2 | Status: SHIPPED | OUTPATIENT
Start: 2021-10-15

## 2021-10-15 RX ADMIN — SUCRALFATE 1 G: 1 TABLET ORAL at 08:37

## 2021-10-15 RX ADMIN — POTASSIUM CHLORIDE 40 MEQ: 1.5 POWDER, FOR SOLUTION ORAL at 10:04

## 2021-10-15 RX ADMIN — POTASSIUM CHLORIDE 40 MEQ: 1.5 POWDER, FOR SOLUTION ORAL at 06:04

## 2021-10-15 RX ADMIN — SUCRALFATE 1 G: 1 TABLET ORAL at 11:47

## 2021-10-15 RX ADMIN — FUROSEMIDE 40 MG: 40 TABLET ORAL at 08:37

## 2021-10-15 RX ADMIN — PANTOPRAZOLE SODIUM 40 MG: 40 TABLET, DELAYED RELEASE ORAL at 05:07

## 2021-10-15 RX ADMIN — CARVEDILOL 25 MG: 25 TABLET, FILM COATED ORAL at 08:37

## 2021-10-15 RX ADMIN — Medication 10 ML: at 08:39

## 2021-10-15 RX ADMIN — DILTIAZEM HYDROCHLORIDE 15 MG/HR: 5 INJECTION INTRAVENOUS at 06:04

## 2021-10-15 RX ADMIN — AMIODARONE HYDROCHLORIDE 200 MG: 200 TABLET ORAL at 08:37

## 2021-10-15 RX ADMIN — DILTIAZEM HYDROCHLORIDE 180 MG: 180 CAPSULE, COATED, EXTENDED RELEASE ORAL at 08:37

## 2021-10-15 RX ADMIN — LEVOTHYROXINE SODIUM 50 MCG: 50 TABLET ORAL at 05:07

## 2021-10-15 RX ADMIN — PROCHLORPERAZINE EDISYLATE 5 MG: 5 INJECTION INTRAMUSCULAR; INTRAVENOUS at 11:50

## 2021-10-15 RX ADMIN — DIGOXIN 125 MCG: 125 TABLET ORAL at 11:47

## 2021-10-15 NOTE — PLAN OF CARE
Problem: Adult Inpatient Plan of Care  Goal: Plan of Care Review  Outcome: Ongoing, Progressing  Flowsheets (Taken 10/15/2021 4828)  Progress: no change  Plan of Care Reviewed With: patient  Outcome Summary: Pt running A paced HR: 60-71 on tele. Pt denies pain. IV Cardizem gtt infusing as ordered. Pt on room air. Safety maintained.

## 2021-10-15 NOTE — THERAPY TREATMENT NOTE
Acute Care - Occupational Therapy Treatment Note  [facility]     Patient Name: Sultana Lin  : 1940  MRN: [id number]  Today's Date: 10/15/2021             Admit Date: 10/11/2021       ICD-10-CM ICD-9-CM   1. Tachycardia  R00.0 785.0   2. History of atrial fibrillation  Z86.79 V12.59   3. Impaired mobility  Z74.09 799.89   4. Decreased activities of daily living (ADL)  Z78.9 V49.89   5. Dysphagia, unspecified type  R13.10 787.20     Patient Active Problem List   Diagnosis   • Hypothyroidism   • Hypertension   • Paroxysmal atrial fibrillation (HCC)   • Paroxysmal atrial fibrillation with rapid ventricular response (HCC)   • Acute cholecystitis   • Nausea   • Tachycardia   • Hypokalemia     Past Medical History:   Diagnosis Date   • Arthritis    • Atrial fibrillation (HCC)    • Atrial fibrillation (HCC)    • Atrial fibrillation with rapid ventricular response (HCC) 2017   • Breast cancer (HCC)     right   • Cancer (HCC)    • Disease of thyroid gland    • Hypertension      Past Surgical History:   Procedure Laterality Date   • ABLATION OF DYSRHYTHMIC FOCUS     • BREAST SURGERY     • CARDIAC ELECTROPHYSIOLOGY PROCEDURE N/A 2017    Procedure: EP/Ablation;  Surgeon: Blake Mcallister MD;  Location: [facility] LOCATION;  Service:    • CARDIOVERSION     • CATARACT EXTRACTION W/ INTRAOCULAR LENS IMPLANT Right 2019    Procedure: REMOVE CATARACT AND IMPLANT INTRAOCULAR LENS;  Surgeon: Lenin Dennison MD;  Location: [facility] OR;  Service: Ophthalmology   • CATARACT EXTRACTION W/ INTRAOCULAR LENS IMPLANT Left 2019    Procedure: REMOVE CATARACT AND IMPLANT INTRAOCULAR LENS;  Surgeon: Lenin Dennison MD;  Location: [facility] OR;  Service: Ophthalmology   • CHOLECYSTECTOMY WITH INTRAOPERATIVE CHOLANGIOGRAM N/A 2020    Procedure: LAPAROSCOPIC CHOLECYSTECTOMY WITH INTRAOPERATIVE CHOLANGIOGRAM;  Surgeon: Denilson Richardson MD;  Location: [facility] OR;  Service: General;   Laterality: N/A;   • ENDOSCOPY N/A 6/9/2020    Procedure: ESOPHAGOGASTRODUODENOSCOPY;  Surgeon: Albina Ya MD;  Location: [facility] ENDOSCOPY;  Service: Gastroenterology;  Laterality: N/A;   • INSERT / REPLACE / REMOVE PACEMAKER     • PACEMAKER IMPLANTATION     • SKIN BIOPSY     • SKIN TAG REMOVAL     • UPPER GASTROINTESTINAL ENDOSCOPY  06/09/2020         OT ASSESSMENT FLOWSHEET (last 12 hours)     OT Evaluation and Treatment     Row Name 10/15/21 0951                   OT Time and Intention    Subjective Information no complaints  -LS        Document Type therapy note (daily note)  -LS        Mode of Treatment occupational therapy  -LS        Patient Effort good  -LS                  General Information    Patient Profile Reviewed yes  -LS        Existing Precautions/Restrictions fall  -LS        Barriers to Rehab medically complex  -LS                  Cognition    Orientation Status (Cognition) oriented x 4  -LS                  Pain Assessment    Additional Documentation Pain Scale: Numbers Pre/Post-Treatment (Group)  -LS                  Pain Scale: Numbers Pre/Post-Treatment    Pretreatment Pain Rating 0/10 - no pain  -LS        Posttreatment Pain Rating 0/10 - no pain  -LS                  Bed Mobility    Bed Mobility supine-sit  -LS        Supine-Sit [place] (Bed Mobility) standby assist  -LS                  Functional Mobility    Functional Mobility- Ind. Level supervision required  -LS                  Transfer Assessment/Treatment    Transfers sit-stand transfer; stand-sit transfer  -LS                  Transfers    Sit-Stand [place] (Transfers) standby assist  -LS        Stand-Sit [place] (Transfers) supervision  -LS                  Balance    Balance Assessment sitting static balance  -LS        Static Sitting Balance WFL; unsupported  -LS        Dynamic Sitting Balance WFL; unsupported  -LS        Sit to Stand Dynamic Balance WFL  -LS        Static Standing Balance WFL;  unsupported  -LS        Dynamic Standing Balance unsupported  -LS                  Activities of Daily Living    BADL Assessment/Intervention grooming; toileting  -LS                  Grooming Assessment/Training    [place] Level (Grooming) hair care, combing/brushing; wash face, hands  -LS        Position (Grooming) sink side; supported standing  -LS                  Toileting Assessment/Training    [place] Level (Toileting) standby assist  -LS        Assistive Devices (Toileting) commode; grab bar/safety frame  -LS        Position (Toileting) supported sitting  -LS                  Coping    Observed Emotional State calm; cooperative  -LS        Verbalized Emotional State acceptance  -LS                  Plan of Care Review    Plan of Care Reviewed With patient  -LS        Progress improving  -LS        Outcome Summary OT tx completed on this date. Pt completed bed mobility with SBA. Pt completed EOB sitting with good balance, static/dynamic standing requiring SBA, toileting  SBA for all aspects, pt completed multiple intervals of functional mobility in her room with no device with SBA and no LOB with transitional movements, pt completed grooming tasks at ambulatory level with set up only with good endurance noted, no SOA, HR within normal range during all aspects of tx in sitting, standing and while mobile. Pt reports that she is discharging today and nursing confirmed the possibility, recommended HH to pt but she declined and doesn’t see the benefit. OT POC to continue if pt does not discharge.  -LS                  Vital Signs    Pretreatment Heart Rate (beats/min) 72  -LS        Intratreatment Heart Rate (beats/min) 75  -LS        Posttreatment Heart Rate (beats/min) 78  -LS        O2 Delivery Pre Treatment room air  -LS        O2 Delivery Intra Treatment room air  -LS        O2 Delivery Post Treatment room air  -LS        Pre Patient Position Supine  -LS        Intra Patient Position Standing  -LS         Post Patient Position Sitting  -LS                  Positioning and Restraints    Pre-Treatment Position in bed  -LS        Post Treatment Position chair  -LS        In Chair sitting; call light within reach; encouraged to call for assist  -LS              User Key  (r) = Recorded By, (t) = Taken By, (c) = Cosigned By    Initials Name Effective Dates    ARVIN Lucia Roe COTA 09/15/21 -                  Occupational Therapy Education                 Title: PT OT SLP Therapies (In Progress)     Topic: Occupational Therapy (In Progress)     Point: ADL training (Done)     Description:   Instruct learner(s) on proper safety adaptation and remediation techniques during self care or transfers.   Instruct in proper use of assistive devices.              Learning Progress Summary           Patient Acceptance, E, VU,NR by ARVIN at 10/15/2021 1033    Comment: Pt re educated on posture strategies during self feeding for increased safety.    Acceptance, E, VU by ARVIN at 10/14/2021 1441    Comment: Pt educated to sit on side of bed or in recliner for all meals.    Acceptance, E, VU by BRIAN at 10/13/2021 0830                   Point: Home exercise program (Not Started)     Description:   Instruct learner(s) on appropriate technique for monitoring, assisting and/or progressing therapeutic exercises/activities.              Learner Progress:  Not documented in this visit.          Point: Precautions (Done)     Description:   Instruct learner(s) on prescribed precautions during self-care and functional transfers.              Learning Progress Summary           Patient Acceptance, E, VU by BRIAN at 10/13/2021 0830                   Point: Body mechanics (Not Started)     Description:   Instruct learner(s) on proper positioning and spine alignment during self-care, functional mobility activities and/or exercises.              Learner Progress:  Not documented in this visit.                      User Key     Initials Effective Dates Name  Provider Type Discipline     08/04/21 -  Devan Anne OT Student OT Student OT    LS 09/15/21 -  Lucia Roe COTA Occupational Therapy Assistant THERAPIES                  OT Recommendation and Plan     Plan of Care Review  Plan of Care Reviewed With: patient  Progress: improving  Outcome Summary: OT tx completed on this date. Pt completed bed mobility with SBA. Pt completed EOB sitting with good balance, static/dynamic standing requiring SBA, toileting  SBA for all aspects, pt completed multiple intervals of functional mobility in her room with no device with SBA and no LOB with transitional movements, pt completed grooming tasks at ambulatory level with set up only with good endurance noted, no SOA, HR within normal range during all aspects of tx in sitting, standing and while mobile. Pt reports that she is discharging today and nursing confirmed the possibility, recommended HH to pt but she declined and doesn’t see the benefit. OT POC to continue if pt does not discharge.  Plan of Care Reviewed With: patient  Outcome Summary: OT tx completed on this date. Pt completed bed mobility with SBA. Pt completed EOB sitting with good balance, static/dynamic standing requiring SBA, toileting  SBA for all aspects, pt completed multiple intervals of functional mobility in her room with no device with SBA and no LOB with transitional movements, pt completed grooming tasks at ambulatory level with set up only with good endurance noted, no SOA, HR within normal range during all aspects of tx in sitting, standing and while mobile. Pt reports that she is discharging today and nursing confirmed the possibility, recommended HH to pt but she declined and doesn’t see the benefit. OT POC to continue if pt does not discharge.     Outcome Measures     Row Name 10/15/21 1000 10/14/21 1400          How much help from another is currently needed...    Putting on and taking off regular lower body clothing? 4  -LS 3  -LS     Bathing  (including washing, rinsing, and drying) 3  -LS 3  -LS     Toileting (which includes using toilet bed pan or urinal) 4  -LS 3  -LS     Putting on and taking off regular upper body clothing 4  -LS 4  -LS     Taking care of personal grooming (such as brushing teeth) 4  -LS 4  -LS     Eating meals 4  -LS 4  -LS     AM-PAC 6 Clicks Score (OT) 23  -LS 21  -LS            Functional Assessment    Outcome Measure Options AM-PAC 6 Clicks Daily Activity (OT)  -LS AM-PAC 6 Clicks Daily Activity (OT)  -LS           User Key  (r) = Recorded By, (t) = Taken By, (c) = Cosigned By    Initials Name Provider Type    Lucia Pepper COTA Occupational Therapy Assistant                Time Calculation:    Time Calculation- OT     Row Name 10/15/21 1036             Time Calculation- OT    OT Start Time 0951  -      OT Stop Time 1031  -      OT Time Calculation (min) 40 min  -      Total Timed Code Minutes- OT 40 minute(s)  -      OT Received On 10/15/21  -            User Key  (r) = Recorded By, (t) = Taken By, (c) = Cosigned By    Initials Name Provider Type    Lucia Pepper COTA Occupational Therapy Assistant              Therapy Charges for Today     Code Description Service Date Service Provider Modifiers Qty    27780987130 HC OT SELF CARE/MGMT/TRAIN EA 15 MIN 10/14/2021 Lucia Roe COTA GO 4    09836082102 HC OT SELF CARE/MGMT/TRAIN EA 15 MIN 10/15/2021 Lucia Roe COTA GO 3               EBONY PENA  10/15/2021

## 2021-10-15 NOTE — CASE MANAGEMENT/SOCIAL WORK
Continued Stay Note   [facility]     Patient Name: Sultana Lin  MRN: [id number]  Today's Date: 10/15/2021    Admit Date: 10/11/2021     Discharge Plan     Row Name 10/15/21 1103       Plan    Plan Home with spouse    Patient/Family in Agreement with Plan yes    Final Discharge Disposition Code 01 - home or self-care    Final Note Chart reviewed; events noted.  Pt plans to return home with spouse and denies any discharge needs.  OT is recommending home health however, pt does not want this service.               Discharge Codes    No documentation.                     PILY Hackett

## 2021-10-15 NOTE — THERAPY DISCHARGE NOTE
Acute Care - Speech Language Pathology   Swallow Treatment Note/Discharge  [facility]     Patient Name: Sultana Lin  : 1940  MRN: [id number]  Today's Date: 10/15/2021               Admit Date: 10/11/2021  Swallow treatment completed. The patient was alert and cooperative. She was sitting up in chair when SLP arrived. She reports no concerns with toleration of current diet. She completed trials of thin liquids and regular solids with SLP present. No overt s/s of aspiration observed. Functional chew and clearance of oral cavity post regular solid trial. SLP educated patient on compensatory strategies for esophageal complaint of material sticking. She was encouraged to inform her PCP if any increased difficulty occurs or if it gets in the way of nutritional support or if un intended weight loss occurs. Patient OK to continue regular diet with thin liquids. SLP signing off at this time.   Radha Hughes, MS CCC-SLP 10/15/2021 10:47 CDT    Visit Dx:    ICD-10-CM ICD-9-CM   1. Tachycardia  R00.0 785.0   2. History of atrial fibrillation  Z86.79 V12.59   3. Impaired mobility  Z74.09 799.89   4. Decreased activities of daily living (ADL)  Z78.9 V49.89   5. Dysphagia, unspecified type  R13.10 787.20     Patient Active Problem List   Diagnosis   • Hypothyroidism   • Hypertension   • Paroxysmal atrial fibrillation (HCC)   • Paroxysmal atrial fibrillation with rapid ventricular response (HCC)   • Acute cholecystitis   • Nausea   • Tachycardia   • Hypokalemia     Past Medical History:   Diagnosis Date   • Arthritis    • Atrial fibrillation (HCC)    • Atrial fibrillation (HCC)    • Atrial fibrillation with rapid ventricular response (HCC) 2017   • Breast cancer (HCC)     right   • Cancer (HCC)    • Disease of thyroid gland    • Hypertension      Past Surgical History:   Procedure Laterality Date   • ABLATION OF DYSRHYTHMIC FOCUS     • BREAST SURGERY     • CARDIAC ELECTROPHYSIOLOGY PROCEDURE N/A 2017     Procedure: EP/Ablation;  Surgeon: Blake Mcallister MD;  Location: [facility] CATH INVASIVE LOCATION;  Service:    • CARDIOVERSION     • CATARACT EXTRACTION W/ INTRAOCULAR LENS IMPLANT Right 8/9/2019    Procedure: REMOVE CATARACT AND IMPLANT INTRAOCULAR LENS;  Surgeon: Lenin Dennison MD;  Location: [facility] OR;  Service: Ophthalmology   • CATARACT EXTRACTION W/ INTRAOCULAR LENS IMPLANT Left 8/16/2019    Procedure: REMOVE CATARACT AND IMPLANT INTRAOCULAR LENS;  Surgeon: Lenin Dennison MD;  Location: [facility] OR;  Service: Ophthalmology   • CHOLECYSTECTOMY WITH INTRAOPERATIVE CHOLANGIOGRAM N/A 2/17/2020    Procedure: LAPAROSCOPIC CHOLECYSTECTOMY WITH INTRAOPERATIVE CHOLANGIOGRAM;  Surgeon: Denilson Richardson MD;  Location: [facility] OR;  Service: General;  Laterality: N/A;   • ENDOSCOPY N/A 6/9/2020    Procedure: ESOPHAGOGASTRODUODENOSCOPY;  Surgeon: Albina Ya MD;  Location: [facility] ENDOSCOPY;  Service: Gastroenterology;  Laterality: N/A;   • INSERT / REPLACE / REMOVE PACEMAKER     • PACEMAKER IMPLANTATION     • SKIN BIOPSY     • SKIN TAG REMOVAL     • UPPER GASTROINTESTINAL ENDOSCOPY  06/09/2020       SLP Recommendation and Plan                    Anticipated Discharge Disposition (SLP): home (10/15/21 1045)              Daily Summary of Progress (SLP): progress toward functional goals as expected (10/15/21 1028)  Plan for Continued Treatment (SLP): D/c (10/15/21 1028)  Anticipated Discharge Disposition (SLP): home (10/15/21 1045)        Reason for Discharge: all goals and outcomes met, no further needs identified (10/15/21 1045)    Plan of Care Reviewed With: patient (10/15/21 1043)  Progress: improving (10/15/21 1043)    SWALLOW EVALUATION (last 72 hours)     SLP Adult Swallow Evaluation     Row Name 10/15/21 1028 10/14/21 1450                Rehab Evaluation    Document Type therapy note (daily note)  -MM evaluation  -TM       Subjective Information no complaints  -MM fatigue  -TM       Patient  Observations alert; cooperative; agree to therapy  - cooperative  -       Patient/Family/Caregiver Comments/Observations No family present  - Daughter present.  -TM       Care Plan Review care plan/treatment goals reviewed  - evaluation/treatment results reviewed; care plan/treatment goals reviewed; risks/benefits reviewed; patient/other agree to care plan; current/potential barriers reviewed  -       Care Plan Review, Other Participant(s) -- daughter; other (see comments)  Navneet CROCKER  -       Patient Effort good  -MM adequate  -                General Information    Patient Profile Reviewed -- yes  -TM       Pertinent History Of Current Problem -- Acute palpitations, SVT. Hx of PAF s/p cardioversion, HTN, arthritis, breast CA, thyroid gland disease. CXR 10/11/21 showed no acute disease.  -       Current Method of Nutrition -- regular textures; thin liquids  -TM       Precautions/Limitations, Vision -- WFL  -TM       Precautions/Limitations, Hearing -- hearing impairment, bilaterally; hearing aid, bilaterally  -TM       Prior Level of Function-Communication -- WFL  -       Prior Level of Function-Swallowing -- no diet consistency restrictions  -       Plans/Goals Discussed with -- patient; family; other (see comments)  Navneet CROCKER  -       Barriers to Rehab -- none identified  -       Patient's Goals for Discharge -- patient did not state  -                Pain    Additional Documentation Pain Scale: FACES Pre/Post-Treatment (Group)  -MM Pain Scale: Numbers Pre/Post-Treatment (Group)  -TM                Pain Scale: Numbers Pre/Post-Treatment    Pretreatment Pain Rating -- 0/10 - no pain  -TM       Posttreatment Pain Rating -- 0/10 - no pain  -TM                Pain Scale: FACES Pre/Post-Treatment    Pain: FACES Scale, Pretreatment 0-->no hurt  -MM --       Posttreatment Pain Rating 0-->no hurt  -MM --                Oral Motor Structure and Function    Dentition Assessment -- upper  dentures/partial in place; lower dentures/partial in place  -       Secretion Management -- WNL/WFL  -TM       Mucosal Quality -- moist, healthy  -       Volitional Swallow -- WFL  -TM       Volitional Cough -- WFL  -                Oral Musculature and Cranial Nerve Assessment    Oral Motor General Assessment -- WFL  -                General Eating/Swallowing Observations    Respiratory Support Currently in Use -- room air  -       Eating/Swallowing Skills -- fed by SLP; self-fed  -       Positioning During Eating -- upright in chair  -       Utensils Used -- spoon; straw  -       Consistencies Trialed -- pudding thick; honey-thick liquids; nectar/syrup-thick liquids; thin liquids; regular textures  -                Respiratory    Respiratory Status -- WFL  -                Clinical Swallow Eval    Oral Prep Phase -- WFL  -       Oral Transit -- WFL  -TM       Oral Residue -- WFL  -TM       Pharyngeal Phase -- --  r/o pharyngeal impairment  -TM       Esophageal Phase -- unremarkable  -       Clinical Swallow Evaluation Summary -- See note.  -                Clinical Impression    Daily Summary of Progress (SLP) progress toward functional goals as expected  -MM --       Barriers to Overall Progress (SLP) None  -MM --       SLP Swallowing Diagnosis -- functional oral phase; R/O pharyngeal dysphagia  -       Functional Impact -- risk of aspiration/pneumonia; risk of malnutrition; risk of dehydration  -       Rehab Potential/Prognosis, Swallowing -- good, to achieve stated therapy goals  -       Swallow Criteria for Skilled Therapeutic Interventions Met -- demonstrates skilled criteria  -       Plan for Continued Treatment (SLP) D/c  -MM --                Recommendations    Therapy Frequency (Swallow) -- PRN  -TM       Predicted Duration Therapy Intervention (Days) -- until discharge  -       SLP Diet Recommendation -- regular textures; thin liquids  -       Recommended Precautions  and Strategies -- upright posture during/after eating; small bites of food and sips of liquid  -TM       Oral Care Recommendations -- Oral Care BID/PRN  -TM       SLP Rec. for Method of Medication Administration -- meds whole; with pudding or applesauce; with thin liquids  -TM       Monitor for Signs of Aspiration -- yes; notify SLP if any concerns; cough; gurgly voice; throat clearing; pneumonia; right lower lobe infiltrates  -TM       Anticipated Discharge Disposition (SLP) -- home  -TM                Swallow Goals (SLP)    Oral Nutrition/Hydration Goal Selection (SLP) oral nutrition/hydration, SLP goal 1  -MM oral nutrition/hydration, SLP goal 1  -TM                Oral Nutrition/Hydration Goal 1 (SLP)    Oral Nutrition/Hydration Goal 1, SLP Pt will tolerate LRD without overt s/s of aspiration.  -MM Pt will tolerate LRD without overt s/s of aspiration.  -TM       Time Frame (Oral Nutrition/Hydration Goal 1, SLP) by discharge  -MM by discharge  -TM       Barriers (Oral Nutrition/Hydration Goal 1, SLP) n/a  -MM n/a  -TM       Progress/Outcomes (Oral Nutrition/Hydration Goal 1, SLP) goal met  -MM goal ongoing  -TM             User Key  (r) = Recorded By, (t) = Taken By, (c) = Cosigned By    Initials Name Effective Dates    TM Jen Cho, CCC-SLP 06/16/21 -     MM Radha Hughes, MS CCC-SLP 06/16/21 -                 EDUCATION  The patient has been educated in the following areas:   Dysphagia (Swallowing Impairment) Oral Care/Hydration.         SLP GOALS     Row Name 10/15/21 1028 10/14/21 2600          Oral Nutrition/Hydration Goal 1 (SLP)    Oral Nutrition/Hydration Goal 1, SLP Pt will tolerate LRD without overt s/s of aspiration.  -MM Pt will tolerate LRD without overt s/s of aspiration.  -TM     Time Frame (Oral Nutrition/Hydration Goal 1, SLP) by discharge  -MM by discharge  -TM     Barriers (Oral Nutrition/Hydration Goal 1, SLP) n/a  -MM n/a  -TM     Progress/Outcomes (Oral Nutrition/Hydration Goal 1,  SLP) goal met  -MM goal ongoing  -TM           User Key  (r) = Recorded By, (t) = Taken By, (c) = Cosigned By    Initials Name Provider Type    Jen Miller CCC-SLP Speech and Language Pathologist    Radha Chan MS CCC-SLP Speech and Language Pathologist                     Time Calculation:    Time Calculation- SLP     Row Name 10/15/21 1046             Time Calculation- SLP    SLP Start Time 1028  -MM      SLP Stop Time 1046  -MM      SLP Time Calculation (min) 18 min  -MM      SLP Received On 10/15/21  -MM              Untimed Charges    [id number]-RI Treatment Swallow Minutes 18  -MM              Total Minutes    Untimed Charges Total Minutes 18  -MM       Total Minutes 18  -MM            User Key  (r) = Recorded By, (t) = Taken By, (c) = Cosigned By    Initials Name Provider Type    Radha Chan MS CCC-SLP Speech and Language Pathologist                Therapy Charges for Today     Code Description Service Date Service Provider Modifiers Qty    15709559336 HC ST TREATMENT SWALLOW 1 10/15/2021 Radha Hughes MS CCC-SLP GN 1               SLP Discharge Summary  Anticipated Discharge Disposition (SLP): home  Reason for Discharge: all goals and outcomes met, no further needs identified  Progress Toward Achieving Short/long Term Goals: all goals met within established timelines  Discharge Destination: other (see comments) (Remains in acute care)    Radha Hughes MS CCC-SLP  10/15/2021

## 2021-10-15 NOTE — THERAPY DISCHARGE NOTE
Acute Care - Physical Therapy Discharge Summary  [facility]       Patient Name: Sultana Lin  : 1940  MRN: [id number]    Today's Date: 10/15/2021                 Admit Date: 10/11/2021      PT Recommendation and Plan    Visit Dx:    ICD-10-CM ICD-9-CM   1. Tachycardia  R00.0 785.0   2. History of atrial fibrillation  Z86.79 V12.59   3. Impaired mobility  Z74.09 799.89   4. Decreased activities of daily living (ADL)  Z78.9 V49.89   5. Dysphagia, unspecified type  R13.10 787.20        Outcome Measures     Row Name 10/15/21 1000 10/14/21 1400          How much help from another is currently needed...    Putting on and taking off regular lower body clothing? 4  -LS 3  -LS     Bathing (including washing, rinsing, and drying) 3  -LS 3  -LS     Toileting (which includes using toilet bed pan or urinal) 4  -LS 3  -LS     Putting on and taking off regular upper body clothing 4  -LS 4  -LS     Taking care of personal grooming (such as brushing teeth) 4  -LS 4  -LS     Eating meals 4  -LS 4  -LS     AM-PAC 6 Clicks Score (OT) 23  -LS 21  -LS            Functional Assessment    Outcome Measure Options AM-PAC 6 Clicks Daily Activity (OT)  -LS AM-PAC 6 Clicks Daily Activity (OT)  -LS           User Key  (r) = Recorded By, (t) = Taken By, (c) = Cosigned By    Initials Name Provider Type    LS Lucia Roe COTA Occupational Therapy Assistant                     PT Rehab Goals     Row Name 10/15/21 1508             Bed Mobility Goal 1 (PT)    Activity/Assistive Device (Bed Mobility Goal 1, PT) rolling to left; rolling to right; sit to supine/supine to sit; bridging  -AB      [place] Level/Cues Needed (Bed Mobility Goal 1, PT) independent  -AB      Time Frame (Bed Mobility Goal 1, PT) long term goal (LTG); 10 days  -AB      Strategies/Barriers (Bed Mobility Goal 1, PT) with flat bed  -AB      Progress/Outcomes (Bed Mobility Goal 1, PT) goal not met  -AB              Transfer Goal 1 (PT)    Activity/Assistive  Device (Transfer Goal 1, PT) sit-to-stand/stand-to-sit; bed-to-chair/chair-to-bed  -AB      [place] Level/Cues Needed (Transfer Goal 1, PT) independent  -AB      Time Frame (Transfer Goal 1, PT) long term goal (LTG); 10 days  -AB      Strategies/Barriers (Transfers Goal 1, PT) monitor HR  -AB      Progress/Outcome (Transfer Goal 1, PT) goal not met  -AB              Gait Training Goal 1 (PT)    Activity/Assistive Device (Gait Training Goal 1, PT) gait (walking locomotion); decrease fall risk; improve balance and speed  -AB      [place] Level (Gait Training Goal 1, PT) standby assist  -AB      Distance (Gait Training Goal 1, PT) 250'  -AB      Time Frame (Gait Training Goal 1, PT) long term goal (LTG); 10 days  -AB      Strategies/Barriers (Gait Training Goal 1, PT) monitor Pt's HR during ativity  -AB      Progress/Outcome (Gait Training Goal 1, PT) goal not met  -AB              Stairs Goal 1 (PT)    Activity/Assistive Device (Stairs Goal 1, PT) stairs, all skills; improve balance and speed; decrease fall risk; using handrail, right; using handrail, left  -AB      [place] Level/Cues Needed (Stairs Goal 1, PT) contact guard assist  -AB      Number of Stairs (Stairs Goal 1, PT) 3  -AB      Time Frame (Stairs Goal 1, PT) long term goal (LTG); 10 days  -AB      Progress/Outcome (Stairs Goal 1, PT) goal not met  -AB            User Key  (r) = Recorded By, (t) = Taken By, (c) = Cosigned By    Initials Name Provider Type Discipline    Colette Olvera PTA Physical Therapy Assistant PT                    PT Discharge Summary  Anticipated Discharge Disposition (PT): home with home health, home with assist  Reason for Discharge: Discharge from facility  Outcomes Achieved: Refer to plan of care for updates on goals achieved  Discharge Destination: Home with assist      Colette Nieves PTA   10/15/2021

## 2021-10-15 NOTE — DISCHARGE SUMMARY
[facility] Medicine Services  DISCHARGE SUMMARY       Date of Admission: 10/11/2021  Date of Discharge:  10/15/2021  Primary Care Physician: Heydi Tineo APRN    Presenting Problem/History of Present Illness:  Tachycardia [R00.0]     Final Discharge Diagnoses:  Active Hospital Problems    Diagnosis    • **Paroxysmal atrial fibrillation with rapid ventricular response (HCC)    • Hypokalemia    • Tachycardia    • Paroxysmal atrial fibrillation (HCC)    • Hypertension    • Hypothyroidism        Consults: Cardiology    Procedures Performed:   Imaging Results (Last 7 Days)     Procedure Component Value Units Date/Time    XR Chest 1 View [[id number]] Collected: 10/11/21 1620     Updated: 10/11/21 1624    Narrative:      EXAMINATION: XR CHEST 1 VW-     10/11/2021 4:18 PM CDT     HISTORY: palpitations     1 view chest x-ray.     Comparison is made with December 10, 2018.     Unchanged left cardiac pacer.  Probable mildly enlarged heart.  The mediastinum is within normal limits.      The lungs are mildly hyperexpanded with no pneumonia or pneumothorax.  Mild chronic appearing interstitial disease with a few calcified  granulomas.  No congestive failure changes.                                                                       Impression:      1. Stable chronic lung changes.  2. No acute disease.        This report was finalized on 10/11/2021 16:21 by Dr. Gadiel Benson MD.          Pertinent Test Results:   Lab Results (last 72 hours)     Procedure Component Value Units Date/Time    Magnesium [[id number]]  (Normal) Collected: 10/15/21 0453    Specimen: Blood Updated: 10/15/21 0636     Magnesium 2.3 mg/dL     Potassium [[id number]]  (Normal) Collected: 10/14/21 2333    Specimen: Blood Updated: 10/15/21 0046     Potassium 3.6 mmol/L     Magnesium [[id number]]  (Abnormal) Collected: 10/14/21 0404    Specimen: Blood Updated: 10/14/21 1104     Magnesium 1.4 mg/dL     Basic Metabolic  Panel [[id number]]  (Abnormal) Collected: 10/14/21 0404    Specimen: Blood Updated: 10/14/21 0517     Glucose 135 mg/dL      BUN 14 mg/dL      Creatinine 1.24 mg/dL      Sodium 142 mmol/L      Potassium 3.0 mmol/L      Chloride 104 mmol/L      CO2 23.0 mmol/L      Calcium 8.7 mg/dL      eGFR Non African Amer 41 mL/min/1.73      BUN/Creatinine Ratio 11.3     Anion Gap 15.0 mmol/L     Narrative:      GFR Normal >60  Chronic Kidney Disease <60  Kidney Failure <15      CBC & Differential [[id number]]  (Abnormal) Collected: 10/14/21 0404    Specimen: Blood Updated: 10/14/21 0457    Narrative:      The following orders were created for panel order CBC & Differential.  Procedure                               Abnormality         Status                     ---------                               -----------         ------                     CBC Auto Differential[[id number]]        Abnormal            Final result                 Please view results for these tests on the individual orders.    CBC Auto Differential [[id number]]  (Abnormal) Collected: 10/14/21 0404    Specimen: Blood Updated: 10/14/21 0457     WBC 10.86 10*3/mm3      RBC 4.15 10*6/mm3      Hemoglobin 12.0 g/dL      Hematocrit 37.6 %      MCV 90.6 fL      MCH 28.9 pg      MCHC 31.9 g/dL      RDW 14.2 %      RDW-SD 46.3 fl      MPV 11.4 fL      Platelets 208 10*3/mm3      Neutrophil % 76.7 %      Lymphocyte % 14.8 %      Monocyte % 7.1 %      Eosinophil % 0.4 %      Basophil % 0.5 %      Immature Grans % 0.5 %      Neutrophils, Absolute 8.34 10*3/mm3      Lymphocytes, Absolute 1.61 10*3/mm3      Monocytes, Absolute 0.77 10*3/mm3      Eosinophils, Absolute 0.04 10*3/mm3      Basophils, Absolute 0.05 10*3/mm3      Immature Grans, Absolute 0.05 10*3/mm3      nRBC 0.0 /100 WBC     Basic Metabolic Panel [[id number]]  (Abnormal) Collected: 10/12/21 1155    Specimen: Blood Updated: 10/12/21 1238     Glucose 123 mg/dL      BUN 11 mg/dL      Creatinine 1.04 mg/dL      Sodium  144 mmol/L      Potassium 3.6 mmol/L      Chloride 110 mmol/L      CO2 23.0 mmol/L      Calcium 9.0 mg/dL      eGFR Non African Amer 51 mL/min/1.73      BUN/Creatinine Ratio 10.6     Anion Gap 11.0 mmol/L     Narrative:      GFR Normal >60  Chronic Kidney Disease <60  Kidney Failure <15      CBC & Differential [[id number]]  (Abnormal) Collected: 10/12/21 1154    Specimen: Blood Updated: 10/12/21 1221    Narrative:      The following orders were created for panel order CBC & Differential.  Procedure                               Abnormality         Status                     ---------                               -----------         ------                     CBC Auto Differential[[id number]]        Abnormal            Final result                 Please view results for these tests on the individual orders.    CBC Auto Differential [[id number]]  (Abnormal) Collected: 10/12/21 1154    Specimen: Blood Updated: 10/12/21 1221     WBC 7.86 10*3/mm3      RBC 3.85 10*6/mm3      Hemoglobin 11.3 g/dL      Hematocrit 35.2 %      MCV 91.4 fL      MCH 29.4 pg      MCHC 32.1 g/dL      RDW 14.0 %      RDW-SD 46.5 fl      MPV 11.2 fL      Platelets 191 10*3/mm3      Neutrophil % 69.8 %      Lymphocyte % 19.5 %      Monocyte % 7.9 %      Eosinophil % 1.3 %      Basophil % 0.9 %      Immature Grans % 0.6 %      Neutrophils, Absolute 5.49 10*3/mm3      Lymphocytes, Absolute 1.53 10*3/mm3      Monocytes, Absolute 0.62 10*3/mm3      Eosinophils, Absolute 0.10 10*3/mm3      Basophils, Absolute 0.07 10*3/mm3      Immature Grans, Absolute 0.05 10*3/mm3      nRBC 0.0 /100 WBC         Results for orders placed during the hospital encounter of 10/11/21    Adult Transthoracic Echo Complete W/ Cont if Necessary Per Protocol    Interpretation Summary  · Left ventricular ejection fraction appears to be 56 - 60%. Left ventricular systolic function is normal.  · Left atrial volume is severely increased.  · Estimated right ventricular systolic  "pressure from tricuspid regurgitation is normal (<35 mmHg).  · There is a small (<1cm) pericardial effusion. The effusion is fluid filled. There is no evidence of cardiac tamponade.      Chief Complaint on Day of Discharge: \"I'm ready to go home\"    Hospital Course:  The patient is a 81 y.o. female who presented to [facility] with palpitations in addition to shortness of breath.  Patient was found to have a rapid narrow complex tachycardia, and was seen in consultation by Dr. Lopez and Dr. Ruelas with Cardiology.  She did require initiation of a Cardizem IV drip.  There were difficulties during this hospitalization with achieving adequate rate control while on Cardizem IV gtt, and patient was subsequently started on amiodarone drip.  Please note that she remained on anticoagulation with Xarelto.  There were plans for consideration of cardioversion if there were no improvement in heart rates, but fortunately she converted around 2300 on the evening of 10/13.  She has been AV pacing and has been rate controlled.  Echocardiogram was completed with results as outlined above.    Her amiodarone drip has been discontinued and she is back on oral amiodarone.  Her Coreg has been titrated, she remains on Cardizem, and digoxin was also added.  She feels much better today.  Denies any chest pain, palpitations, or shortness of breath.  A follow-up appointment with Dr. Lopez in the [place], [place] clinic has been arranged for 10/29.  Cardiology has subsequently signed off and patient has been cleared for discharge with close outpatient follow-up in the cardiology clinic.    Condition on Discharge:  Medically stable    Physical Exam on Discharge:  /53 (BP Location: Right arm, Patient Position: Lying)   Pulse 64   Temp 98 °F (36.7 °C) (Oral)   Resp 18   Ht 170.2 cm (67.01\")   Wt 76.2 kg (168 lb)   LMP  (LMP Unknown)   SpO2 98%   BMI 26.31 kg/m²   Physical Exam  Vitals reviewed.   HENT:      Head: " Normocephalic.      Mouth/Throat:      Pharynx: No oropharyngeal exudate.   Cardiovascular:      Rate and Rhythm: Normal rate.   Pulmonary:      Effort: Pulmonary effort is normal. No respiratory distress.   Neurological:      General: No focal deficit present.      Mental Status: She is alert and oriented to person, place, and time.   Psychiatric:         Mood and Affect: Mood normal.       Discharge Disposition:  Home or Self Care    Discharge Medications:     Discharge Medications      New Medications      Instructions Start Date   digoxin 125 MCG tablet  Commonly known as: LANOXIN   125 mcg, Oral, Daily Digoxin         Changes to Medications      Instructions Start Date   carvedilol 25 MG tablet  Commonly known as: COREG  What changed:   · medication strength  · how much to take   25 mg, Oral, 2 Times Daily With Meals      dilTIAZem  MG 24 hr capsule  Commonly known as: CARDIZEM CD  What changed: when to take this   180 mg, Oral, Every 24 Hours Scheduled   Start Date: October 16, 2021        Continue These Medications      Instructions Start Date   ALPRAZolam 0.25 MG tablet  Commonly known as: XANAX   0.25 mg, Oral, 2 Times Daily PRN      amiodarone 200 MG tablet  Commonly known as: PACERONE   200 mg, Oral, 2 Times Daily      Diclofenac Sodium 1 % gel gel  Commonly known as: VOLTAREN   4 g, Topical, 4 Times Daily PRN      esomeprazole 40 MG capsule  Commonly known as: nexIUM   40 mg, Oral, Every Morning Before Breakfast      gabapentin 300 MG capsule  Commonly known as: NEURONTIN   300 mg, Oral, 3 Times Daily PRN      HYDROcodone-acetaminophen 7.5-325 MG per tablet  Commonly known as: NORCO   1 tablet, Oral, Every 8 Hours PRN      levothyroxine 50 MCG tablet  Commonly known as: SYNTHROID, LEVOTHROID   50 mcg, Oral, Daily      meclizine 25 MG tablet  Commonly known as: ANTIVERT   25 mg, Oral, 3 Times Daily PRN      mirtazapine 15 MG tablet  Commonly known as: REMERON   15 mg, Oral, Nightly      ondansetron  4 MG tablet  Commonly known as: ZOFRAN   4 mg, Oral, Every 8 Hours PRN      potassium chloride 10 MEQ CR tablet  Commonly known as: K-DUR,KLOR-CON   10 mEq, Oral, Daily      rivaroxaban 15 MG tablet  Commonly known as: XARELTO   15 mg, Oral, Daily With Dinner      rosuvastatin 10 MG tablet  Commonly known as: CRESTOR   10 mg, Oral, Daily      sucralfate 1 g tablet  Commonly known as: CARAFATE   1 g, Oral, 4 Times Daily         Stop These Medications    furosemide 20 MG tablet  Commonly known as: LASIX            Discharge Diet:   Diet Instructions    Heart Healthy         heart healthy diet    Activity at Discharge:   Activity Instructions    Gradually resume your usual activities       as tolerated      Follow-up Appointments:   Follow-up in the cardiology clinic in [place], [place] with Dr. Lopez on 10/29    Test Results Pending at Discharge: none    Electronically signed by Vin Hernandez MD, 10/15/21, 14:06 CDT.    Time: 25 min

## 2021-10-15 NOTE — PLAN OF CARE
Goal Outcome Evaluation:  Plan of Care Reviewed With: patient        Progress: improving  Outcome Summary: OT tx completed on this date. Pt completed bed mobility with SBA. Pt completed EOB sitting with good balance, static/dynamic standing requiring SBA, toileting  SBA for all aspects, pt completed multiple intervals of functional mobility in her room with no device with SBA and no LOB with transitional movements, pt completed grooming tasks at ambulatory level with set up only with good endurance noted, no SOA, HR within normal range during all aspects of tx in sitting, standing and while mobile. Pt reports that she is discharging today and nursing confirmed the possibility, recommended HH to pt but she declined and doesn’t see the benefit. OT POC to continue if pt does not discharge.

## 2021-10-15 NOTE — PLAN OF CARE
Goal Outcome Evaluation:  Plan of Care Reviewed With: patient        Progress: improving     Swallow treatment completed. The patient was alert and cooperative. She was sitting up in chair when SLP arrived. She reports no concerns with toleration of current diet. She completed trials of thin liquids and regular solids with SLP present. No overt s/s of aspiration observed. Functional chew and clearance of oral cavity post regular solid trial. SLP educated patient on compensatory strategies for esophageal complaint of material sticking. She was encouraged to inform her PCP if any increased difficulty occurs or if it gets in the way of nutritional support or if un intended weight loss occurs. Patient OK to continue regular diet with thin liquids. SLP signing off at this time.     Radha Hughes, MS CCC-SLP 10/15/2021 10:45 CDT

## 2021-10-16 ENCOUNTER — READMISSION MANAGEMENT (OUTPATIENT)
Dept: CALL CENTER | Facility: HOSPITAL | Age: 81
End: 2021-10-16

## 2021-10-16 NOTE — OUTREACH NOTE
Prep Survey      Responses   [facility] patient discharged from? [facility]   Is LACE score < 7 ? No   Emergency Room discharge w/ pulse ox? No   Eligibility Readm Mgmt   Discharge diagnosis Paroxysmal atrial fibrillation with rapid ventricular response    Does the patient have one of the following disease processes/diagnoses(primary or secondary)? Other   Does the patient have Home health ordered? No   Is there a DME ordered? No   Prep survey completed? Yes          Mary Sterling RN

## 2021-10-17 NOTE — THERAPY DISCHARGE NOTE
Acute Care - Occupational Therapy Discharge Summary  [facility]     Patient Name: Sultana Lin  : 1940  MRN: [id number]    Today's Date: 10/17/2021                 Admit Date: 10/11/2021        OT Recommendation and Plan    Visit Dx:    ICD-10-CM ICD-9-CM   1. Tachycardia  R00.0 785.0   2. History of atrial fibrillation  Z86.79 V12.59   3. Impaired mobility  Z74.09 799.89   4. Decreased activities of daily living (ADL)  Z78.9 V49.89   5. Dysphagia, unspecified type  R13.10 787.20                OT Rehab Goals     Row Name 10/17/21 0800             Transfer Goal 1 (OT)    Activity/Assistive Device (Transfer Goal 1, OT) toilet; shower chair  -MM      [place] Level/Cues Needed (Transfer Goal 1, OT) supervision required  -MM      Time Frame (Transfer Goal 1, OT) long term goal (LTG); by discharge  -MM      Progress/Outcome (Transfer Goal 1, OT) goal not met  -MM              Bathing Goal 1 (OT)    Activity/Device (Bathing Goal 1, OT) bathing skills, all  -MM      [place] Level/Cues Needed (Bathing Goal 1, OT) supervision required  -MM      Time Frame (Bathing Goal 1, OT) long term goal (LTG); by discharge  -MM      Progress/Outcomes (Bathing Goal 1, OT) goal not met  -MM              Toileting Goal 1 (OT)    Activity/Device (Toileting Goal 1, OT) toileting skills, all  -MM      [place] Level/Cues Needed (Toileting Goal 1, OT) supervision required  -MM      Time Frame (Toileting Goal 1, OT) long term goal (LTG); by discharge  -MM      Progress/Outcome (Toileting Goal 1, OT) goal not met  -MM            User Key  (r) = Recorded By, (t) = Taken By, (c) = Cosigned By    Initials Name Provider Type Discipline    Edward Alves, OTR/L Occupational Therapist OT                 Outcome Measures     Row Name 10/15/21 1000 10/14/21 1400          How much help from another is currently needed...    Putting on and taking off regular lower body clothing? 4  -LS 3  -LS     Bathing (including  washing, rinsing, and drying) 3  -LS 3  -LS     Toileting (which includes using toilet bed pan or urinal) 4  -LS 3  -LS     Putting on and taking off regular upper body clothing 4  -LS 4  -LS     Taking care of personal grooming (such as brushing teeth) 4  -LS 4  -LS     Eating meals 4  -LS 4  -LS     AM-PAC 6 Clicks Score (OT) 23  -LS 21  -LS            Functional Assessment    Outcome Measure Options AM-PAC 6 Clicks Daily Activity (OT)  -LS AM-PAC 6 Clicks Daily Activity (OT)  -LS           User Key  (r) = Recorded By, (t) = Taken By, (c) = Cosigned By    Initials Name Provider Type    Lucia Pepper COTA Occupational Therapy Assistant                        OT Discharge Summary  Reason for Discharge: Discharge from facility  Outcomes Achieved: Refer to plan of care for updates on goals achieved  Discharge Destination: Home      Edward Thompson OTR/ANTHONY  10/17/2021

## 2021-10-20 ENCOUNTER — READMISSION MANAGEMENT (OUTPATIENT)
Dept: CALL CENTER | Facility: HOSPITAL | Age: 81
End: 2021-10-20

## 2021-10-20 NOTE — OUTREACH NOTE
Medical Week 1 Survey      Responses   [facility] patient discharged from? [facility]   Does the patient have one of the following disease processes/diagnoses(primary or secondary)? Other   Week 1 attempt successful? Yes   Call start time 1620   Call end time 1625   Discharge diagnosis Paroxysmal atrial fibrillation with rapid ventricular response    Person spoke with today (if not patient) and relationship Jaki-daughter   Meds reviewed with patient/caregiver? Yes   Is the patient having any side effects they believe may be caused by any medication additions or changes? No   Does the patient have all medications ordered at discharge? Yes   Is the patient taking all medications as directed (includes completed medication regime)? Yes   Comments regarding appointments cards appt on 10/29/21   Does the patient have a primary care provider?  Yes   Does the patient have an appointment with their PCP within 7 days of discharge? Yes   Comments regarding PCP saw PCP on 10/20/21   Has the patient kept scheduled appointments due by today? Yes   Has home health visited the patient within 72 hours of discharge? N/A   Psychosocial issues? No   Did the patient receive a copy of their discharge instructions? Yes   Nursing interventions Reviewed instructions with patient   What is the patient's perception of their health status since discharge? Improving  [weak and lack of appetite]   Is the patient/caregiver able to teach back the hierarchy of who to call/visit for symptoms/problems? PCP, Specialist, Home health nurse, Urgent Care, ED, 911 Yes   If the patient is a current smoker, are they able to teach back resources for cessation? Not a smoker   Week 1 call completed? Yes          JAMISON ENNIS RN

## 2021-10-26 ENCOUNTER — READMISSION MANAGEMENT (OUTPATIENT)
Dept: CALL CENTER | Facility: HOSPITAL | Age: 81
End: 2021-10-26

## 2021-10-26 NOTE — OUTREACH NOTE
Medical Week 2 Survey      Responses   [facility] patient discharged from? [place]   Does the patient have one of the following disease processes/diagnoses(primary or secondary)? Other   Week 2 attempt successful? Yes   Call start time 1421   Call end time 1441   Is patient permission given to speak with other caregiver? Yes   Person spoke with today (if not patient) and relationship Jaki-daughter   Meds reviewed with patient/caregiver? Yes   Is the patient having any side effects they believe may be caused by any medication additions or changes? No   Does the patient have all medications ordered at discharge? Yes   Is the patient taking all medications as directed (includes completed medication regime)? Yes   Medication comments Daughter states is now taking antibiotic for UTI.   Does the patient have a primary care provider?  Yes   Does the patient have an appointment with their PCP within 7 days of discharge? Yes   Has the patient kept scheduled appointments due by today? Yes   Has home health visited the patient within 72 hours of discharge? N/A   Psychosocial issues? No   Did the patient receive a copy of their discharge instructions? Yes   Nursing interventions Reviewed instructions with patient   What is the patient's perception of their health status since discharge? Improving   Is the patient/caregiver able to teach back signs and symptoms related to disease process for when to call PCP? Yes   Is the patient/caregiver able to teach back signs and symptoms related to disease process for when to call 911? Yes   Is the patient/caregiver able to teach back the hierarchy of who to call/visit for symptoms/problems? PCP, Specialist, Home health nurse, Urgent Care, ED, 911 Yes   Week 2 Call Completed? Yes   Wrap up additional comments eva for Patient Relations-daughter states will call them at her convenience. States patient is still weak with decreased appetite-using Boost/Ensure to supplement. [facility]  will discuss with PCP. Reports HR 60-70. Denies any needs today.          Melissa Maldonado RN

## 2021-11-04 ENCOUNTER — READMISSION MANAGEMENT (OUTPATIENT)
Dept: CALL CENTER | Facility: HOSPITAL | Age: 81
End: 2021-11-04

## 2021-11-04 NOTE — OUTREACH NOTE
Medical Week 3 Survey      Responses   [facility] patient discharged from? [place]   Does the patient have one of the following disease processes/diagnoses(primary or secondary)? Other   Week 3 attempt successful? No   Unsuccessful attempts Attempt 1          Sultana Galindo RN

## 2022-12-06 ENCOUNTER — HOSPITAL ENCOUNTER (EMERGENCY)
Facility: HOSPITAL | Age: 82
Discharge: HOME | End: 2022-12-06
Attending: STUDENT IN AN ORGANIZED HEALTH CARE EDUCATION/TRAINING PROGRAM | Admitting: STUDENT IN AN ORGANIZED HEALTH CARE EDUCATION/TRAINING PROGRAM

## 2022-12-06 ENCOUNTER — APPOINTMENT (OUTPATIENT)
Dept: GENERAL RADIOLOGY | Facility: HOSPITAL | Age: 82
End: 2022-12-06

## 2022-12-06 VITALS
BODY MASS INDEX: 24.8 KG/M2 | DIASTOLIC BLOOD PRESSURE: 73 MMHG | WEIGHT: 158 LBS | HEIGHT: 67 IN | TEMPERATURE: 98.9 F | SYSTOLIC BLOOD PRESSURE: 170 MMHG | HEART RATE: 68 BPM | RESPIRATION RATE: 18 BRPM | OXYGEN SATURATION: 94 %

## 2022-12-06 DIAGNOSIS — E87.6 HYPOKALEMIA: ICD-10-CM

## 2022-12-06 DIAGNOSIS — R53.83 FATIGUE, UNSPECIFIED TYPE: Primary | ICD-10-CM

## 2022-12-06 LAB
ALBUMIN SERPL-MCNC: 3.1 G/DL (ref 3.5–5.2)
ALBUMIN/GLOB SERPL: 0.9 G/DL
ALP SERPL-CCNC: 75 U/L (ref 39–117)
ALT SERPL W P-5'-P-CCNC: 24 U/L (ref 1–33)
ANION GAP SERPL CALCULATED.3IONS-SCNC: 9 MMOL/L (ref 5–15)
AST SERPL-CCNC: 37 U/L (ref 1–32)
BACTERIA UR QL AUTO: ABNORMAL /HPF
BASOPHILS # BLD AUTO: 0.07 10*3/MM3 (ref 0–0.2)
BASOPHILS NFR BLD AUTO: 0.9 % (ref 0–1.5)
BILIRUB SERPL-MCNC: 1.1 MG/DL (ref 0–1.2)
BILIRUB UR QL STRIP: NEGATIVE
BUN SERPL-MCNC: 8 MG/DL (ref 8–23)
BUN/CREAT SERPL: 7.3 (ref 7–25)
CALCIUM SPEC-SCNC: 8.7 MG/DL (ref 8.6–10.5)
CHLORIDE SERPL-SCNC: 103 MMOL/L (ref 98–107)
CLARITY UR: ABNORMAL
CO2 SERPL-SCNC: 31 MMOL/L (ref 22–29)
COLOR UR: ABNORMAL
CREAT SERPL-MCNC: 1.09 MG/DL (ref 0.57–1)
D-LACTATE SERPL-SCNC: 1.8 MMOL/L (ref 0.5–2)
DEPRECATED RDW RBC AUTO: 45 FL (ref 37–54)
DIGOXIN SERPL-MCNC: <0.3 NG/ML (ref 0.6–1.2)
EGFRCR SERPLBLD CKD-EPI 2021: 50.5 ML/MIN/1.73
EOSINOPHIL # BLD AUTO: 0.08 10*3/MM3 (ref 0–0.4)
EOSINOPHIL NFR BLD AUTO: 1 % (ref 0.3–6.2)
ERYTHROCYTE [DISTWIDTH] IN BLOOD BY AUTOMATED COUNT: 13.2 % (ref 12.3–15.4)
FLUAV RNA RESP QL NAA+PROBE: NOT DETECTED
FLUBV RNA RESP QL NAA+PROBE: NOT DETECTED
GLOBULIN UR ELPH-MCNC: 3.6 GM/DL
GLUCOSE SERPL-MCNC: 117 MG/DL (ref 65–99)
GLUCOSE UR STRIP-MCNC: NEGATIVE MG/DL
HCT VFR BLD AUTO: 35.9 % (ref 34–46.6)
HGB BLD-MCNC: 11.2 G/DL (ref 12–15.9)
HGB UR QL STRIP.AUTO: NEGATIVE
HYALINE CASTS UR QL AUTO: ABNORMAL /LPF
IMM GRANULOCYTES # BLD AUTO: 0.04 10*3/MM3 (ref 0–0.05)
IMM GRANULOCYTES NFR BLD AUTO: 0.5 % (ref 0–0.5)
KETONES UR QL STRIP: NEGATIVE
LEUKOCYTE ESTERASE UR QL STRIP.AUTO: ABNORMAL
LYMPHOCYTES # BLD AUTO: 1.23 10*3/MM3 (ref 0.7–3.1)
LYMPHOCYTES NFR BLD AUTO: 15.3 % (ref 19.6–45.3)
MAGNESIUM SERPL-MCNC: 1.6 MG/DL (ref 1.6–2.4)
MCH RBC QN AUTO: 29.2 PG (ref 26.6–33)
MCHC RBC AUTO-ENTMCNC: 31.2 G/DL (ref 31.5–35.7)
MCV RBC AUTO: 93.5 FL (ref 79–97)
MONOCYTES # BLD AUTO: 0.66 10*3/MM3 (ref 0.1–0.9)
MONOCYTES NFR BLD AUTO: 8.2 % (ref 5–12)
NEUTROPHILS NFR BLD AUTO: 5.94 10*3/MM3 (ref 1.7–7)
NEUTROPHILS NFR BLD AUTO: 74.1 % (ref 42.7–76)
NITRITE UR QL STRIP: NEGATIVE
NRBC BLD AUTO-RTO: 0 /100 WBC (ref 0–0.2)
PH UR STRIP.AUTO: 8 [PH] (ref 5–9)
PLATELET # BLD AUTO: 214 10*3/MM3 (ref 140–450)
PMV BLD AUTO: 10.8 FL (ref 6–12)
POTASSIUM SERPL-SCNC: 3.3 MMOL/L (ref 3.5–5.2)
PROT SERPL-MCNC: 6.7 G/DL (ref 6–8.5)
PROT UR QL STRIP: NEGATIVE
RBC # BLD AUTO: 3.84 10*6/MM3 (ref 3.77–5.28)
RBC # UR STRIP: ABNORMAL /HPF
REF LAB TEST METHOD: ABNORMAL
SARS-COV-2 RNA RESP QL NAA+PROBE: NOT DETECTED
SODIUM SERPL-SCNC: 143 MMOL/L (ref 136–145)
SP GR UR STRIP: 1.01 (ref 1–1.03)
SQUAMOUS #/AREA URNS HPF: ABNORMAL /HPF
UROBILINOGEN UR QL STRIP: ABNORMAL
WBC # UR STRIP: ABNORMAL /HPF
WBC NRBC COR # BLD: 8.02 10*3/MM3 (ref 3.4–10.8)

## 2022-12-06 PROCEDURE — 81001 URINALYSIS AUTO W/SCOPE: CPT | Performed by: STUDENT IN AN ORGANIZED HEALTH CARE EDUCATION/TRAINING PROGRAM

## 2022-12-06 PROCEDURE — 80053 COMPREHEN METABOLIC PANEL: CPT | Performed by: STUDENT IN AN ORGANIZED HEALTH CARE EDUCATION/TRAINING PROGRAM

## 2022-12-06 PROCEDURE — 71045 X-RAY EXAM CHEST 1 VIEW: CPT

## 2022-12-06 PROCEDURE — 80162 ASSAY OF DIGOXIN TOTAL: CPT | Performed by: STUDENT IN AN ORGANIZED HEALTH CARE EDUCATION/TRAINING PROGRAM

## 2022-12-06 PROCEDURE — 87040 BLOOD CULTURE FOR BACTERIA: CPT | Performed by: STUDENT IN AN ORGANIZED HEALTH CARE EDUCATION/TRAINING PROGRAM

## 2022-12-06 PROCEDURE — 83605 ASSAY OF LACTIC ACID: CPT | Performed by: STUDENT IN AN ORGANIZED HEALTH CARE EDUCATION/TRAINING PROGRAM

## 2022-12-06 PROCEDURE — 87636 SARSCOV2 & INF A&B AMP PRB: CPT | Performed by: STUDENT IN AN ORGANIZED HEALTH CARE EDUCATION/TRAINING PROGRAM

## 2022-12-06 PROCEDURE — 99284 EMERGENCY DEPT VISIT MOD MDM: CPT

## 2022-12-06 PROCEDURE — 85025 COMPLETE CBC W/AUTO DIFF WBC: CPT | Performed by: STUDENT IN AN ORGANIZED HEALTH CARE EDUCATION/TRAINING PROGRAM

## 2022-12-06 PROCEDURE — 36415 COLL VENOUS BLD VENIPUNCTURE: CPT

## 2022-12-06 PROCEDURE — 83735 ASSAY OF MAGNESIUM: CPT | Performed by: STUDENT IN AN ORGANIZED HEALTH CARE EDUCATION/TRAINING PROGRAM

## 2022-12-06 RX ORDER — NITROFURANTOIN 25; 75 MG/1; MG/1
100 CAPSULE ORAL 2 TIMES DAILY
COMMUNITY

## 2022-12-06 RX ORDER — CETIRIZINE HYDROCHLORIDE 10 MG/1
10 TABLET ORAL DAILY
COMMUNITY

## 2022-12-06 RX ORDER — POTASSIUM CHLORIDE 750 MG/1
20 CAPSULE, EXTENDED RELEASE ORAL ONCE
Status: COMPLETED | OUTPATIENT
Start: 2022-12-06 | End: 2022-12-06

## 2022-12-06 RX ORDER — METOCLOPRAMIDE 10 MG/1
10 TABLET ORAL
COMMUNITY

## 2022-12-06 RX ADMIN — SODIUM CHLORIDE, POTASSIUM CHLORIDE, SODIUM LACTATE AND CALCIUM CHLORIDE 1000 ML: 600; 310; 30; 20 INJECTION, SOLUTION INTRAVENOUS at 10:32

## 2022-12-06 RX ADMIN — POTASSIUM CHLORIDE 20 MEQ: 750 CAPSULE, EXTENDED RELEASE ORAL at 13:18

## 2022-12-06 NOTE — ED PROVIDER NOTES
Subjective   History of Present Illness  82-year-old female comes to the ER with several day history of fatigue, generalized weakness, not feeling well.  Family state the patient has been getting more confused at home and has been taking antibiotics for a UTI she was diagnosed with.    History provided by:  Patient and relative   used: No        Review of Systems   Constitutional: Positive for activity change and fatigue. Negative for chills and fever.   HENT: Negative for drooling.    Eyes: Negative for redness.   Respiratory: Negative for cough, chest tightness, shortness of breath and wheezing.    Cardiovascular: Negative for chest pain and palpitations.   Gastrointestinal: Negative for abdominal pain, nausea and vomiting.   Genitourinary: Negative for flank pain.   Skin: Negative for color change.   Neurological: Negative for seizures.   Psychiatric/Behavioral: Negative for agitation and behavioral problems.       Past Medical History:   Diagnosis Date   • Arthritis    • Atrial fibrillation (HCC)    • Atrial fibrillation (HCC)    • Atrial fibrillation with rapid ventricular response (HCC) 1/20/2017   • Breast cancer (HCC)     right   • Cancer (HCC)    • Disease of thyroid gland    • Hypertension        Allergies   Allergen Reactions   • Perflutren Lipid Microsphere Other (See Comments)     Patient experienced leg and back pain on a scale of 10/10. Patient symptoms resolved with a few minutes.    • Penicillins Rash       Past Surgical History:   Procedure Laterality Date   • ABLATION OF DYSRHYTHMIC FOCUS     • BREAST SURGERY     • CARDIAC ELECTROPHYSIOLOGY PROCEDURE N/A 4/6/2017    Procedure: EP/Ablation;  Surgeon: Blake Mcallister MD;  Location: [facility] INVASIVE LOCATION;  Service:    • CARDIOVERSION     • CATARACT EXTRACTION W/ INTRAOCULAR LENS IMPLANT Right 8/9/2019    Procedure: REMOVE CATARACT AND IMPLANT INTRAOCULAR LENS;  Surgeon: Lenin Dennison MD;  Location: [facility]  "OR;  Service: Ophthalmology   • CATARACT EXTRACTION W/ INTRAOCULAR LENS IMPLANT Left 8/16/2019    Procedure: REMOVE CATARACT AND IMPLANT INTRAOCULAR LENS;  Surgeon: Lenin Dennison MD;  Location: [facility] OR;  Service: Ophthalmology   • CHOLECYSTECTOMY WITH INTRAOPERATIVE CHOLANGIOGRAM N/A 2/17/2020    Procedure: LAPAROSCOPIC CHOLECYSTECTOMY WITH INTRAOPERATIVE CHOLANGIOGRAM;  Surgeon: Denilson Richardson MD;  Location: [facility] OR;  Service: General;  Laterality: N/A;   • ENDOSCOPY N/A 6/9/2020    Procedure: ESOPHAGOGASTRODUODENOSCOPY;  Surgeon: Albina Ya MD;  Location: [facility] ENDOSCOPY;  Service: Gastroenterology;  Laterality: N/A;   • INSERT / REPLACE / REMOVE PACEMAKER     • PACEMAKER IMPLANTATION     • SKIN BIOPSY     • SKIN TAG REMOVAL     • UPPER GASTROINTESTINAL ENDOSCOPY  06/09/2020       Family History   Problem Relation Age of Onset   • Hypertension Daughter    • Hypertension Son    • No Known Problems Mother    • No Known Problems Father        Social History     Socioeconomic History   • Marital status:    Tobacco Use   • Smoking status: Never   • Smokeless tobacco: Former     Types: Snuff, Chew   Substance and Sexual Activity   • Alcohol use: No   • Drug use: No   • Sexual activity: Defer           Objective    Vitals:    12/06/22 0854 12/06/22 1100   BP: 174/77 152/68   Pulse: 73 66   Resp: 18    Temp: 98.9 °F (37.2 °C)    TempSrc: Oral    SpO2: 94% 93%   Weight: 71.7 kg (158 lb)    Height: 170.2 cm (67\")        Physical Exam  Vitals and nursing note reviewed.   Constitutional:       General: She is not in acute distress.     Appearance: She is well-developed. She is ill-appearing. She is not toxic-appearing or diaphoretic.   HENT:      Head: Normocephalic.      Right Ear: External ear normal.      Left Ear: External ear normal.   Eyes:      Conjunctiva/sclera: Conjunctivae normal.   Pulmonary:      Effort: Pulmonary effort is normal. No accessory muscle usage or respiratory distress. "   Chest:      Chest wall: No tenderness.   Abdominal:      Palpations: Abdomen is soft.      Tenderness: There is no abdominal tenderness (deep palpation). There is no guarding or rebound.   Skin:     General: Skin is warm and dry.      Capillary Refill: Capillary refill takes less than 2 seconds.   Neurological:      Mental Status: She is oriented to person, place, and time. Mental status is at baseline. She is lethargic.   Psychiatric:         Behavior: Behavior normal.         Procedures           ED Course      Results for orders placed or performed during the hospital encounter of 12/06/22   Comprehensive Metabolic Panel    Specimen: Blood   Result Value Ref Range    Glucose 117 (H) 65 - 99 mg/dL    BUN 8 8 - 23 mg/dL    Creatinine 1.09 (H) 0.57 - 1.00 mg/dL    Sodium 143 136 - 145 mmol/L    Potassium 3.3 (L) 3.5 - 5.2 mmol/L    Chloride 103 98 - 107 mmol/L    CO2 31.0 (H) 22.0 - 29.0 mmol/L    Calcium 8.7 8.6 - 10.5 mg/dL    Total Protein 6.7 6.0 - 8.5 g/dL    Albumin 3.10 (L) 3.50 - 5.20 g/dL    ALT (SGPT) 24 1 - 33 U/L    AST (SGOT) 37 (H) 1 - 32 U/L    Alkaline Phosphatase 75 39 - 117 U/L    Total Bilirubin 1.1 0.0 - 1.2 mg/dL    Globulin 3.6 gm/dL    A/G Ratio 0.9 g/dL    BUN/Creatinine Ratio 7.3 7.0 - 25.0    Anion Gap 9.0 5.0 - 15.0 mmol/L    eGFR 50.5 (L) >60.0 mL/min/1.73   Urinalysis With Microscopic If Indicated (No Culture) - Urine, Clean Catch    Specimen: Urine, Clean Catch   Result Value Ref Range    Color, UA Dark Yellow Yellow, Straw, Dark Yellow, Enedina    Appearance, UA Cloudy (A) Clear    pH, UA 8.0 5.0 - 9.0    Specific Gravity, UA 1.008 1.003 - 1.030    Glucose, UA Negative Negative    Ketones, UA Negative Negative    Bilirubin, UA Negative Negative    Blood, UA Negative Negative    Protein, UA Negative Negative    Leuk Esterase, UA Small (1+) (A) Negative    Nitrite, UA Negative Negative    Urobilinogen, UA 1.0 E.U./dL 0.2 - 1.0 E.U./dL   Lactic Acid, Plasma    Specimen: Blood   Result  Value Ref Range    Lactate 1.8 0.5 - 2.0 mmol/L   Magnesium    Specimen: Blood   Result Value Ref Range    Magnesium 1.6 1.6 - 2.4 mg/dL   Digoxin Level    Specimen: Blood   Result Value Ref Range    Digoxin <0.30 (L) 0.60 - 1.20 ng/mL   CBC Auto Differential    Specimen: Blood   Result Value Ref Range    WBC 8.02 3.40 - 10.80 10*3/mm3    RBC 3.84 3.77 - 5.28 10*6/mm3    Hemoglobin 11.2 (L) 12.0 - 15.9 g/dL    Hematocrit 35.9 34.0 - 46.6 %    MCV 93.5 79.0 - 97.0 fL    MCH 29.2 26.6 - 33.0 pg    MCHC 31.2 (L) 31.5 - 35.7 g/dL    RDW 13.2 12.3 - 15.4 %    RDW-SD 45.0 37.0 - 54.0 fl    MPV 10.8 6.0 - 12.0 fL    Platelets 214 140 - 450 10*3/mm3    Neutrophil % 74.1 42.7 - 76.0 %    Lymphocyte % 15.3 (L) 19.6 - 45.3 %    Monocyte % 8.2 5.0 - 12.0 %    Eosinophil % 1.0 0.3 - 6.2 %    Basophil % 0.9 0.0 - 1.5 %    Immature Grans % 0.5 0.0 - 0.5 %    Neutrophils, Absolute 5.94 1.70 - 7.00 10*3/mm3    Lymphocytes, Absolute 1.23 0.70 - 3.10 10*3/mm3    Monocytes, Absolute 0.66 0.10 - 0.90 10*3/mm3    Eosinophils, Absolute 0.08 0.00 - 0.40 10*3/mm3    Basophils, Absolute 0.07 0.00 - 0.20 10*3/mm3    Immature Grans, Absolute 0.04 0.00 - 0.05 10*3/mm3    nRBC 0.0 0.0 - 0.2 /100 WBC   Urinalysis, Microscopic Only - Urine, Clean Catch    Specimen: Urine, Clean Catch   Result Value Ref Range    RBC, UA 3-5 (A) None Seen /HPF    WBC, UA 6-12 (A) None Seen, 0-2, 3-5 /HPF    Bacteria, UA None Seen None Seen /HPF    Squamous Epithelial Cells, UA 0-2 None Seen, 0-2 /HPF    Hyaline Casts, UA 0-2 None Seen /LPF    Methodology Automated Microscopy            MDM  Number of Diagnoses or Management Options  Fatigue, unspecified type: new and requires workup  Hypokalemia: new and requires workup  Diagnosis management comments: Vital signs are stable, afebrile.  Neurologically intact.  Patient is alert and oriented.  Laboratory work-up is unremarkable.  Chest x-ray negative for acute findings.  Potassium level low at 3.3 and this was replaced  in the ER.  IVF bolus given as well.  Patient satting well on room air.  Recommend she follow-up with her PCP.  Return precautions given.  Patient states understanding and is agreeable to the plan.      Final diagnoses:   Fatigue, unspecified type   Hypokalemia       ED Disposition  ED Disposition     ED Disposition   Discharge    Condition   Stable    Comment   --             Heydi Tineo, APRN  [address] [address]  [phone]    Schedule an appointment as soon as possible for a visit in 2 days  ER follow up         Medication List      No changes were made to your prescriptions during this visit.          Vin Cruz MD  12/06/22 4617

## 2022-12-06 NOTE — ED NOTES
Patient daughter asked for help with placement for rehab to home stay at [facility],   I asked if I could have a second choice , they do not wish to go anywhere else.  She was recently in the hospital in [facility], her PCP and GI doctor is there.  I explained to the facility I do not have access to that information.  They will call and obtain that info.  I sent this ED visit info and we have obtained a COVID swab.  She will be discharged home with daughter and follow up with [facility] later today or tomarrow to see if placement there is possible.  Patient and daughter are agreeable to this plan.

## 2022-12-06 NOTE — DISCHARGE PLACEMENT REQUEST
"Cristiano Martin (82 y.o. Female)     Date of Birth   1940    Social Security Number       Address   [address] [place] [address]    Home Phone   [phone]    MRN   [id number]       Episcopal   Episcopal    Marital Status                               Admission Date   12/6/22    Admission Type   Emergency    Admitting Provider       Attending Provider   Vin Cruz MD    Department, Room/Bed   [facility] EMERGENCY DEPARTMENT, 15/15       Discharge Date       Discharge Disposition       Discharge Destination                               Attending Provider: Vin Cruz MD    Allergies: Perflutren Lipid Microsphere, Penicillins    Isolation: None   Infection: COVID (rule out) (12/06/22)   Code Status: Prior    Ht: 170.2 cm (67\")   Wt: 71.7 kg (158 lb)    Admission Cmt: None   Principal Problem: None                Active Insurance as of 12/6/2022     Primary Coverage     Payor Plan Insurance Group Employer/Plan Group    MEDICARE MEDICARE A & B      Payor Plan Address Payor Plan Phone Number Payor Plan Fax Number Effective Dates    [address] [phone]  10/1/2004 - None Entered    [facility] [id number]       Subscriber Name Subscriber Birth Date Member ID       CRISTIANO MARTIN 1940 [id number]           Secondary Coverage     Payor Plan Insurance Group Employer/Plan Group    [id number]     Payor Plan Address Payor Plan Phone Number Payor Plan Fax Number Effective Dates    [address] [address] [phone]  11/1/2015 - None Entered    [facility] [id number]       Subscriber Name Subscriber Birth Date Member ID       ALLAN MARTIN 1940 [id number]                 Emergency Contacts      (Rel.) Home Phone Work Phone Mobile Phone    Jaki Hilario (Daughter) [phone] -- [phone]    farhana barney (Grandchild) [phone] -- [phone]            Emergency Contact Information     Name Relation Home Work Mobile "    Jaki Hilario Daughter [phone]    farhana barney Grandchild [phone]          Insurance Information                MEDICARE/MEDICARE A & B Phone: [phone]    Subscriber: Sultana Lin Subscriber#: [id number]    Group#: -- Precert#: --        [employer] Phone: [phone]    Subscriber: Alex Lin Subscriber#: [id number]    Group#: [id number] Precert#: --             Emergency Department Notes      Vin Cruz MD at 12/06/22 0922          Subjective   History of Present Illness  82-year-old female comes to the ER with several day history of fatigue, generalized weakness, not feeling well.  Family state the patient has been getting more confused at home and has been taking antibiotics for a UTI she was diagnosed with.    History provided by:  Patient and relative   used: No        Review of Systems   Constitutional: Positive for activity change and fatigue. Negative for chills and fever.   HENT: Negative for drooling.    Eyes: Negative for redness.   Respiratory: Negative for cough, chest tightness, shortness of breath and wheezing.    Cardiovascular: Negative for chest pain and palpitations.   Gastrointestinal: Negative for abdominal pain, nausea and vomiting.   Genitourinary: Negative for flank pain.   Skin: Negative for color change.   Neurological: Negative for seizures.   Psychiatric/Behavioral: Negative for agitation and behavioral problems.       Past Medical History:   Diagnosis Date   • Arthritis    • Atrial fibrillation (HCC)    • Atrial fibrillation (HCC)    • Atrial fibrillation with rapid ventricular response (HCC) 1/20/2017   • Breast cancer (HCC)     right   • Cancer (HCC)    • Disease of thyroid gland    • Hypertension        Allergies   Allergen Reactions   • Perflutren Lipid Microsphere Other (See Comments)     Patient experienced leg and back pain on a scale of 10/10. Patient symptoms resolved with a few minutes.    •  Penicillins Rash       Past Surgical History:   Procedure Laterality Date   • ABLATION OF DYSRHYTHMIC FOCUS     • BREAST SURGERY     • CARDIAC ELECTROPHYSIOLOGY PROCEDURE N/A 4/6/2017    Procedure: EP/Ablation;  Surgeon: Blake Mcallister MD;  Location: [facility] CATH INVASIVE LOCATION;  Service:    • CARDIOVERSION     • CATARACT EXTRACTION W/ INTRAOCULAR LENS IMPLANT Right 8/9/2019    Procedure: REMOVE CATARACT AND IMPLANT INTRAOCULAR LENS;  Surgeon: Lenin Dennison MD;  Location: [facility] OR;  Service: Ophthalmology   • CATARACT EXTRACTION W/ INTRAOCULAR LENS IMPLANT Left 8/16/2019    Procedure: REMOVE CATARACT AND IMPLANT INTRAOCULAR LENS;  Surgeon: Lenin Dennison MD;  Location: [facility] OR;  Service: Ophthalmology   • CHOLECYSTECTOMY WITH INTRAOPERATIVE CHOLANGIOGRAM N/A 2/17/2020    Procedure: LAPAROSCOPIC CHOLECYSTECTOMY WITH INTRAOPERATIVE CHOLANGIOGRAM;  Surgeon: Denilson Richardson MD;  Location: [facility] OR;  Service: General;  Laterality: N/A;   • ENDOSCOPY N/A 6/9/2020    Procedure: ESOPHAGOGASTRODUODENOSCOPY;  Surgeon: Albina Ya MD;  Location: [facility] ENDOSCOPY;  Service: Gastroenterology;  Laterality: N/A;   • INSERT / REPLACE / REMOVE PACEMAKER     • PACEMAKER IMPLANTATION     • SKIN BIOPSY     • SKIN TAG REMOVAL     • UPPER GASTROINTESTINAL ENDOSCOPY  06/09/2020       Family History   Problem Relation Age of Onset   • Hypertension Daughter    • Hypertension Son    • No Known Problems Mother    • No Known Problems Father        Social History     Socioeconomic History   • Marital status:    Tobacco Use   • Smoking status: Never   • Smokeless tobacco: Former     Types: Snuff, Chew   Substance and Sexual Activity   • Alcohol use: No   • Drug use: No   • Sexual activity: Defer           Objective    Vitals:    12/06/22 0854 12/06/22 1100   BP: 174/77 152/68   Pulse: 73 66   Resp: 18    Temp: 98.9 °F (37.2 °C)    TempSrc: Oral    SpO2: 94% 93%   Weight: 71.7 kg (158 lb)    Height:  "170.2 cm (67\")        Physical Exam  Vitals and nursing note reviewed.   Constitutional:       General: She is not in acute distress.     Appearance: She is well-developed. She is ill-appearing. She is not toxic-appearing or diaphoretic.   HENT:      Head: Normocephalic.      Right Ear: External ear normal.      Left Ear: External ear normal.   Eyes:      Conjunctiva/sclera: Conjunctivae normal.   Pulmonary:      Effort: Pulmonary effort is normal. No accessory muscle usage or respiratory distress.   Chest:      Chest wall: No tenderness.   Abdominal:      Palpations: Abdomen is soft.      Tenderness: There is no abdominal tenderness (deep palpation). There is no guarding or rebound.   Skin:     General: Skin is warm and dry.      Capillary Refill: Capillary refill takes less than 2 seconds.   Neurological:      Mental Status: She is oriented to person, place, and time. Mental status is at baseline. She is lethargic.   Psychiatric:         Behavior: Behavior normal.         Procedures          ED Course      Results for orders placed or performed during the hospital encounter of 12/06/22   Comprehensive Metabolic Panel    Specimen: Blood   Result Value Ref Range    Glucose 117 (H) 65 - 99 mg/dL    BUN 8 8 - 23 mg/dL    Creatinine 1.09 (H) 0.57 - 1.00 mg/dL    Sodium 143 136 - 145 mmol/L    Potassium 3.3 (L) 3.5 - 5.2 mmol/L    Chloride 103 98 - 107 mmol/L    CO2 31.0 (H) 22.0 - 29.0 mmol/L    Calcium 8.7 8.6 - 10.5 mg/dL    Total Protein 6.7 6.0 - 8.5 g/dL    Albumin 3.10 (L) 3.50 - 5.20 g/dL    ALT (SGPT) 24 1 - 33 U/L    AST (SGOT) 37 (H) 1 - 32 U/L    Alkaline Phosphatase 75 39 - 117 U/L    Total Bilirubin 1.1 0.0 - 1.2 mg/dL    Globulin 3.6 gm/dL    A/G Ratio 0.9 g/dL    BUN/Creatinine Ratio 7.3 7.0 - 25.0    Anion Gap 9.0 5.0 - 15.0 mmol/L    eGFR 50.5 (L) >60.0 mL/min/1.73   Urinalysis With Microscopic If Indicated (No Culture) - Urine, Clean Catch    Specimen: Urine, Clean Catch   Result Value Ref Range    " Color, UA Dark Yellow Yellow, Straw, Dark Yellow, Enedina    Appearance, UA Cloudy (A) Clear    pH, UA 8.0 5.0 - 9.0    Specific Gravity, UA 1.008 1.003 - 1.030    Glucose, UA Negative Negative    Ketones, UA Negative Negative    Bilirubin, UA Negative Negative    Blood, UA Negative Negative    Protein, UA Negative Negative    Leuk Esterase, UA Small (1+) (A) Negative    Nitrite, UA Negative Negative    Urobilinogen, UA 1.0 E.U./dL 0.2 - 1.0 E.U./dL   Lactic Acid, Plasma    Specimen: Blood   Result Value Ref Range    Lactate 1.8 0.5 - 2.0 mmol/L   Magnesium    Specimen: Blood   Result Value Ref Range    Magnesium 1.6 1.6 - 2.4 mg/dL   Digoxin Level    Specimen: Blood   Result Value Ref Range    Digoxin <0.30 (L) 0.60 - 1.20 ng/mL   CBC Auto Differential    Specimen: Blood   Result Value Ref Range    WBC 8.02 3.40 - 10.80 10*3/mm3    RBC 3.84 3.77 - 5.28 10*6/mm3    Hemoglobin 11.2 (L) 12.0 - 15.9 g/dL    Hematocrit 35.9 34.0 - 46.6 %    MCV 93.5 79.0 - 97.0 fL    MCH 29.2 26.6 - 33.0 pg    MCHC 31.2 (L) 31.5 - 35.7 g/dL    RDW 13.2 12.3 - 15.4 %    RDW-SD 45.0 37.0 - 54.0 fl    MPV 10.8 6.0 - 12.0 fL    Platelets 214 140 - 450 10*3/mm3    Neutrophil % 74.1 42.7 - 76.0 %    Lymphocyte % 15.3 (L) 19.6 - 45.3 %    Monocyte % 8.2 5.0 - 12.0 %    Eosinophil % 1.0 0.3 - 6.2 %    Basophil % 0.9 0.0 - 1.5 %    Immature Grans % 0.5 0.0 - 0.5 %    Neutrophils, Absolute 5.94 1.70 - 7.00 10*3/mm3    Lymphocytes, Absolute 1.23 0.70 - 3.10 10*3/mm3    Monocytes, Absolute 0.66 0.10 - 0.90 10*3/mm3    Eosinophils, Absolute 0.08 0.00 - 0.40 10*3/mm3    Basophils, Absolute 0.07 0.00 - 0.20 10*3/mm3    Immature Grans, Absolute 0.04 0.00 - 0.05 10*3/mm3    nRBC 0.0 0.0 - 0.2 /100 WBC   Urinalysis, Microscopic Only - Urine, Clean Catch    Specimen: Urine, Clean Catch   Result Value Ref Range    RBC, UA 3-5 (A) None Seen /HPF    WBC, UA 6-12 (A) None Seen, 0-2, 3-5 /HPF    Bacteria, UA None Seen None Seen /HPF    Squamous Epithelial Cells,  UA 0-2 None Seen, 0-2 /HPF    Hyaline Casts, UA 0-2 None Seen /LPF    Methodology Automated Microscopy            MDM  Number of Diagnoses or Management Options  Fatigue, unspecified type: new and requires workup  Hypokalemia: new and requires workup  Diagnosis management comments: Vital signs are stable, afebrile.  Neurologically intact.  Patient is alert and oriented.  Laboratory work-up is unremarkable.  Chest x-ray negative for acute findings.  Potassium level low at 3.3 and this was replaced in the ER.  IVF bolus given as well.  Patient satting well on room air.  Recommend she follow-up with her PCP.  Return precautions given.  Patient states understanding and is agreeable to the plan.      Final diagnoses:   Fatigue, unspecified type   Hypokalemia       ED Disposition  ED Disposition     ED Disposition   Discharge    Condition   Stable    Comment   --             Heydi Tineo, APRN  [address] [facility] [address]  [phone]    Schedule an appointment as soon as possible for a visit in 2 days  ER follow up         Medication List      No changes were made to your prescriptions during this visit.          Vin Cruz MD  12/06/22 1230      Electronically signed by Vin Cruz MD at 12/06/22 1230

## 2022-12-11 LAB — BACTERIA SPEC AEROBE CULT: NORMAL

## 2023-02-08 ENCOUNTER — APPOINTMENT (OUTPATIENT)
Dept: GENERAL RADIOLOGY | Facility: HOSPITAL | Age: 83
End: 2023-02-08
Payer: MEDICARE

## 2023-02-08 ENCOUNTER — APPOINTMENT (OUTPATIENT)
Dept: CT IMAGING | Facility: HOSPITAL | Age: 83
End: 2023-02-08
Payer: MEDICARE

## 2023-02-08 ENCOUNTER — HOSPITAL ENCOUNTER (OUTPATIENT)
Facility: HOSPITAL | Age: 83
Setting detail: OBSERVATION
Discharge: HOME HEALTH | End: 2023-02-10
Attending: STUDENT IN AN ORGANIZED HEALTH CARE EDUCATION/TRAINING PROGRAM | Admitting: HOSPITALIST
Payer: MEDICARE

## 2023-02-08 DIAGNOSIS — I48.91 ATRIAL FIBRILLATION, UNSPECIFIED TYPE: Primary | ICD-10-CM

## 2023-02-08 DIAGNOSIS — Z74.09 IMPAIRED FUNCTIONAL MOBILITY, BALANCE, GAIT, AND ENDURANCE: ICD-10-CM

## 2023-02-08 DIAGNOSIS — Z74.09 IMPAIRED MOBILITY AND ADLS: ICD-10-CM

## 2023-02-08 DIAGNOSIS — Z78.9 IMPAIRED MOBILITY AND ADLS: ICD-10-CM

## 2023-02-08 DIAGNOSIS — R13.14 PHARYNGOESOPHAGEAL DYSPHAGIA: ICD-10-CM

## 2023-02-08 DIAGNOSIS — R77.8 ELEVATED TROPONIN: ICD-10-CM

## 2023-02-08 LAB
ALBUMIN SERPL-MCNC: 3.1 G/DL (ref 3.5–5.2)
ALBUMIN/GLOB SERPL: 0.9 G/DL
ALP SERPL-CCNC: 90 U/L (ref 39–117)
ALT SERPL W P-5'-P-CCNC: 40 U/L (ref 1–33)
ANION GAP SERPL CALCULATED.3IONS-SCNC: 8 MMOL/L (ref 5–15)
AST SERPL-CCNC: 50 U/L (ref 1–32)
BACTERIA UR QL AUTO: ABNORMAL /HPF
BASOPHILS # BLD AUTO: 0.12 10*3/MM3 (ref 0–0.2)
BASOPHILS NFR BLD AUTO: 1.4 % (ref 0–1.5)
BILIRUB SERPL-MCNC: 0.6 MG/DL (ref 0–1.2)
BILIRUB UR QL STRIP: NEGATIVE
BUN SERPL-MCNC: 15 MG/DL (ref 8–23)
BUN/CREAT SERPL: 14.6 (ref 7–25)
CALCIUM SPEC-SCNC: 8.8 MG/DL (ref 8.6–10.5)
CHLORIDE SERPL-SCNC: 105 MMOL/L (ref 98–107)
CLARITY UR: ABNORMAL
CO2 SERPL-SCNC: 27 MMOL/L (ref 22–29)
COD CRY URNS QL: ABNORMAL /HPF
COLOR UR: YELLOW
CREAT SERPL-MCNC: 1.03 MG/DL (ref 0.57–1)
D-LACTATE SERPL-SCNC: 1.4 MMOL/L (ref 0.5–2)
D-LACTATE SERPL-SCNC: 2.9 MMOL/L (ref 0.5–2)
DEPRECATED RDW RBC AUTO: 51.3 FL (ref 37–54)
DIGOXIN SERPL-MCNC: <0.3 NG/ML (ref 0.6–1.2)
EGFRCR SERPLBLD CKD-EPI 2021: 54.1 ML/MIN/1.73
EOSINOPHIL # BLD AUTO: 0.23 10*3/MM3 (ref 0–0.4)
EOSINOPHIL NFR BLD AUTO: 2.7 % (ref 0.3–6.2)
ERYTHROCYTE [DISTWIDTH] IN BLOOD BY AUTOMATED COUNT: 15.6 % (ref 12.3–15.4)
GEN 5 2HR TROPONIN T REFLEX: 33 NG/L
GLOBULIN UR ELPH-MCNC: 3.5 GM/DL
GLUCOSE SERPL-MCNC: 134 MG/DL (ref 65–99)
GLUCOSE UR STRIP-MCNC: NEGATIVE MG/DL
HCT VFR BLD AUTO: 38.5 % (ref 34–46.6)
HGB BLD-MCNC: 12.7 G/DL (ref 12–15.9)
HGB UR QL STRIP.AUTO: NEGATIVE
HOLD SPECIMEN: NORMAL
HYALINE CASTS UR QL AUTO: ABNORMAL /LPF
IMM GRANULOCYTES # BLD AUTO: 0.26 10*3/MM3 (ref 0–0.05)
IMM GRANULOCYTES NFR BLD AUTO: 3 % (ref 0–0.5)
KETONES UR QL STRIP: NEGATIVE
LEUKOCYTE ESTERASE UR QL STRIP.AUTO: ABNORMAL
LYMPHOCYTES # BLD AUTO: 1.68 10*3/MM3 (ref 0.7–3.1)
LYMPHOCYTES NFR BLD AUTO: 19.5 % (ref 19.6–45.3)
MAGNESIUM SERPL-MCNC: 2 MG/DL (ref 1.6–2.4)
MCH RBC QN AUTO: 30 PG (ref 26.6–33)
MCHC RBC AUTO-ENTMCNC: 33 G/DL (ref 31.5–35.7)
MCV RBC AUTO: 91 FL (ref 79–97)
MONOCYTES # BLD AUTO: 0.62 10*3/MM3 (ref 0.1–0.9)
MONOCYTES NFR BLD AUTO: 7.2 % (ref 5–12)
NEUTROPHILS NFR BLD AUTO: 5.72 10*3/MM3 (ref 1.7–7)
NEUTROPHILS NFR BLD AUTO: 66.2 % (ref 42.7–76)
NITRITE UR QL STRIP: NEGATIVE
NRBC BLD AUTO-RTO: 0 /100 WBC (ref 0–0.2)
NT-PROBNP SERPL-MCNC: 2743 PG/ML (ref 0–1800)
PH UR STRIP.AUTO: 6.5 [PH] (ref 5–9)
PLATELET # BLD AUTO: 224 10*3/MM3 (ref 140–450)
PMV BLD AUTO: 10.6 FL (ref 6–12)
POTASSIUM SERPL-SCNC: 3.8 MMOL/L (ref 3.5–5.2)
PROT SERPL-MCNC: 6.6 G/DL (ref 6–8.5)
PROT UR QL STRIP: ABNORMAL
RBC # BLD AUTO: 4.23 10*6/MM3 (ref 3.77–5.28)
RBC # UR STRIP: ABNORMAL /HPF
REF LAB TEST METHOD: ABNORMAL
SODIUM SERPL-SCNC: 140 MMOL/L (ref 136–145)
SP GR UR STRIP: 1.02 (ref 1–1.03)
SQUAMOUS #/AREA URNS HPF: ABNORMAL /HPF
TROPONIN T DELTA: -4 NG/L
TROPONIN T SERPL HS-MCNC: 36 NG/L
TROPONIN T SERPL HS-MCNC: 37 NG/L
UROBILINOGEN UR QL STRIP: ABNORMAL
WBC # UR STRIP: ABNORMAL /HPF
WBC NRBC COR # BLD: 8.63 10*3/MM3 (ref 3.4–10.8)
WHOLE BLOOD HOLD COAG: NORMAL

## 2023-02-08 PROCEDURE — 93005 ELECTROCARDIOGRAM TRACING: CPT | Performed by: STUDENT IN AN ORGANIZED HEALTH CARE EDUCATION/TRAINING PROGRAM

## 2023-02-08 PROCEDURE — P9612 CATHETERIZE FOR URINE SPEC: HCPCS

## 2023-02-08 PROCEDURE — 71250 CT THORAX DX C-: CPT

## 2023-02-08 PROCEDURE — 99285 EMERGENCY DEPT VISIT HI MDM: CPT

## 2023-02-08 PROCEDURE — 74176 CT ABD & PELVIS W/O CONTRAST: CPT

## 2023-02-08 PROCEDURE — 93010 ELECTROCARDIOGRAM REPORT: CPT | Performed by: INTERNAL MEDICINE

## 2023-02-08 PROCEDURE — 80162 ASSAY OF DIGOXIN TOTAL: CPT | Performed by: NURSE PRACTITIONER

## 2023-02-08 PROCEDURE — 93005 ELECTROCARDIOGRAM TRACING: CPT | Performed by: NURSE PRACTITIONER

## 2023-02-08 PROCEDURE — G0378 HOSPITAL OBSERVATION PER HR: HCPCS

## 2023-02-08 PROCEDURE — 80053 COMPREHEN METABOLIC PANEL: CPT | Performed by: NURSE PRACTITIONER

## 2023-02-08 PROCEDURE — 36415 COLL VENOUS BLD VENIPUNCTURE: CPT | Performed by: NURSE PRACTITIONER

## 2023-02-08 PROCEDURE — 85025 COMPLETE CBC W/AUTO DIFF WBC: CPT | Performed by: NURSE PRACTITIONER

## 2023-02-08 PROCEDURE — 81001 URINALYSIS AUTO W/SCOPE: CPT | Performed by: NURSE PRACTITIONER

## 2023-02-08 PROCEDURE — 83735 ASSAY OF MAGNESIUM: CPT | Performed by: NURSE PRACTITIONER

## 2023-02-08 PROCEDURE — 84484 ASSAY OF TROPONIN QUANT: CPT | Performed by: NURSE PRACTITIONER

## 2023-02-08 PROCEDURE — 71045 X-RAY EXAM CHEST 1 VIEW: CPT

## 2023-02-08 PROCEDURE — 70450 CT HEAD/BRAIN W/O DYE: CPT

## 2023-02-08 PROCEDURE — 83605 ASSAY OF LACTIC ACID: CPT | Performed by: NURSE PRACTITIONER

## 2023-02-08 PROCEDURE — 96365 THER/PROPH/DIAG IV INF INIT: CPT

## 2023-02-08 PROCEDURE — 71046 X-RAY EXAM CHEST 2 VIEWS: CPT

## 2023-02-08 PROCEDURE — 83880 ASSAY OF NATRIURETIC PEPTIDE: CPT | Performed by: NURSE PRACTITIONER

## 2023-02-08 PROCEDURE — 25010000002 CEFTRIAXONE PER 250 MG: Performed by: INTERNAL MEDICINE

## 2023-02-08 PROCEDURE — 87040 BLOOD CULTURE FOR BACTERIA: CPT | Performed by: INTERNAL MEDICINE

## 2023-02-08 RX ORDER — ONDANSETRON 2 MG/ML
4 INJECTION INTRAMUSCULAR; INTRAVENOUS EVERY 6 HOURS PRN
Status: DISCONTINUED | OUTPATIENT
Start: 2023-02-08 | End: 2023-02-10 | Stop reason: HOSPADM

## 2023-02-08 RX ORDER — LEVOTHYROXINE SODIUM 0.05 MG/1
50 TABLET ORAL
Status: DISCONTINUED | OUTPATIENT
Start: 2023-02-08 | End: 2023-02-10 | Stop reason: HOSPADM

## 2023-02-08 RX ORDER — SODIUM CHLORIDE 9 MG/ML
40 INJECTION, SOLUTION INTRAVENOUS AS NEEDED
Status: DISCONTINUED | OUTPATIENT
Start: 2023-02-08 | End: 2023-02-10 | Stop reason: HOSPADM

## 2023-02-08 RX ORDER — AMIODARONE HYDROCHLORIDE 200 MG/1
200 TABLET ORAL 2 TIMES DAILY
Status: DISCONTINUED | OUTPATIENT
Start: 2023-02-08 | End: 2023-02-10 | Stop reason: HOSPADM

## 2023-02-08 RX ORDER — CARVEDILOL 25 MG/1
25 TABLET ORAL 2 TIMES DAILY WITH MEALS
Status: DISCONTINUED | OUTPATIENT
Start: 2023-02-08 | End: 2023-02-10 | Stop reason: HOSPADM

## 2023-02-08 RX ORDER — HYDROCODONE BITARTRATE AND ACETAMINOPHEN 7.5; 325 MG/1; MG/1
1 TABLET ORAL EVERY 8 HOURS PRN
Status: DISCONTINUED | OUTPATIENT
Start: 2023-02-08 | End: 2023-02-10 | Stop reason: HOSPADM

## 2023-02-08 RX ORDER — ACETAMINOPHEN 325 MG/1
650 TABLET ORAL EVERY 4 HOURS PRN
Status: DISCONTINUED | OUTPATIENT
Start: 2023-02-08 | End: 2023-02-10 | Stop reason: HOSPADM

## 2023-02-08 RX ORDER — ROSUVASTATIN CALCIUM 10 MG/1
10 TABLET, COATED ORAL DAILY
Status: DISCONTINUED | OUTPATIENT
Start: 2023-02-08 | End: 2023-02-10 | Stop reason: HOSPADM

## 2023-02-08 RX ORDER — SODIUM CHLORIDE 0.9 % (FLUSH) 0.9 %
10 SYRINGE (ML) INJECTION EVERY 12 HOURS SCHEDULED
Status: DISCONTINUED | OUTPATIENT
Start: 2023-02-08 | End: 2023-02-10 | Stop reason: HOSPADM

## 2023-02-08 RX ORDER — GABAPENTIN 100 MG/1
100 CAPSULE ORAL EVERY 8 HOURS SCHEDULED
Status: DISCONTINUED | OUTPATIENT
Start: 2023-02-08 | End: 2023-02-09

## 2023-02-08 RX ORDER — SODIUM CHLORIDE 0.9 % (FLUSH) 0.9 %
10 SYRINGE (ML) INJECTION AS NEEDED
Status: DISCONTINUED | OUTPATIENT
Start: 2023-02-08 | End: 2023-02-10 | Stop reason: HOSPADM

## 2023-02-08 RX ORDER — MIRTAZAPINE 15 MG/1
15 TABLET, FILM COATED ORAL NIGHTLY
Status: DISCONTINUED | OUTPATIENT
Start: 2023-02-08 | End: 2023-02-10 | Stop reason: HOSPADM

## 2023-02-08 RX ORDER — CHOLESTYRAMINE LIGHT 4 G/5.7G
4 POWDER, FOR SUSPENSION ORAL 2 TIMES DAILY
COMMUNITY

## 2023-02-08 RX ORDER — DOXYCYCLINE 100 MG/1
100 CAPSULE ORAL EVERY 12 HOURS
Status: DISCONTINUED | OUTPATIENT
Start: 2023-02-08 | End: 2023-02-10 | Stop reason: HOSPADM

## 2023-02-08 RX ORDER — DILTIAZEM HYDROCHLORIDE 180 MG/1
180 CAPSULE, COATED, EXTENDED RELEASE ORAL
Status: DISCONTINUED | OUTPATIENT
Start: 2023-02-08 | End: 2023-02-10 | Stop reason: HOSPADM

## 2023-02-08 RX ADMIN — MIRTAZAPINE 15 MG: 15 TABLET, FILM COATED ORAL at 20:09

## 2023-02-08 RX ADMIN — SODIUM CHLORIDE 500 ML: 9 INJECTION, SOLUTION INTRAVENOUS at 12:50

## 2023-02-08 RX ADMIN — RIVAROXABAN 15 MG: 15 TABLET, FILM COATED ORAL at 20:09

## 2023-02-08 RX ADMIN — AMIODARONE HYDROCHLORIDE 200 MG: 200 TABLET ORAL at 20:09

## 2023-02-08 RX ADMIN — CEFTRIAXONE SODIUM 1 G: 1 INJECTION, POWDER, FOR SOLUTION INTRAMUSCULAR; INTRAVENOUS at 21:11

## 2023-02-08 RX ADMIN — ROSUVASTATIN CALCIUM 10 MG: 10 TABLET, FILM COATED ORAL at 20:09

## 2023-02-08 RX ADMIN — CARVEDILOL 25 MG: 25 TABLET, FILM COATED ORAL at 20:09

## 2023-02-08 RX ADMIN — Medication 10 ML: at 20:10

## 2023-02-08 RX ADMIN — DOXYCYCLINE 100 MG: 100 CAPSULE ORAL at 21:11

## 2023-02-08 RX ADMIN — SODIUM CHLORIDE 500 ML: 9 INJECTION, SOLUTION INTRAVENOUS at 11:06

## 2023-02-08 RX ADMIN — DILTIAZEM HYDROCHLORIDE 180 MG: 180 CAPSULE, COATED, EXTENDED RELEASE ORAL at 20:09

## 2023-02-08 NOTE — ED NOTES
Patient is at an increased risk for falls. Fall light activated, yellow falls bracelet . Call light within reach and patient instructed to call for assistance. Side rails up x2, bed alarm activated, and gait belt readily accessible.

## 2023-02-08 NOTE — H&P
[facility] Admission      Date of Admission: 2/8/2023      Primary Care Physician: Heydi Tineo APRN      Chief Complaint: shortness of breath    HPI:Patient thought she had pneumonia and went to see her PCP but then she wanted to come to ER before seeing her PCP; her granddaughter brought her here  She has had some mild cough, no fever, no chills  Also she was complaining of watery diarrhea for over a month and worse in the last 2 weeks, no blood in it, not associated with abdominal pain  She has been prescribed some powder for her diarrhea but she does not take it  She had a fall in October and after that she has been a flat affect  No fever, chills, shortness of breath, chest pain;     Concurrent Medical History:  has a past medical history of Arthritis, Atrial fibrillation (HCC), Atrial fibrillation (HCC), Atrial fibrillation with rapid ventricular response (HCC) (1/20/2017), Breast cancer (HCC), Cancer (HCC), Disease of thyroid gland, and Hypertension.    Past Surgical History:  has a past surgical history that includes Pacemaker Implantation; Breast surgery; Skin biopsy; Cardioversion; Ablation of dysrhythmic focus; Cardiac electrophysiology procedure (N/A, 4/6/2017); Insert / replace / remove pacemaker; Skin tag removal; Cataract extraction w/ intraocular lens implant (Right, 8/9/2019); Cataract extraction w/ intraocular lens implant (Left, 8/16/2019); cholecystectomy with intraoperative cholangiogram (N/A, 2/17/2020); Esophagogastroduodenoscopy (N/A, 6/9/2020); and Upper gastrointestinal endoscopy (06/09/2020).    Family History: family history includes Hypertension in her daughter and son; No Known Problems in her father and mother.     Social History:  reports that she has never smoked. She has quit using smokeless tobacco.  Her smokeless tobacco use included snuff and chew. She reports that she does not drink alcohol and does not use drugs.    She is full code;   MARCELA is her daughter, Sravanthi  She lives with her     Allergies:   Allergies   Allergen Reactions   • Perflutren Lipid Microsphere Other (See Comments)     Patient experienced leg and back pain on a scale of 10/10. Patient symptoms resolved with a few minutes.    • Penicillins Rash       Medications:   Prior to Admission medications    Medication Sig Start Date End Date Taking? Authorizing Provider   ALPRAZolam (XANAX) 0.25 MG tablet Take 0.25 mg by mouth 2 (Two) Times a Day As Needed for Anxiety.   Yes Bradly Montana MD   amiodarone (PACERONE) 200 MG tablet Take 1 tablet by mouth 2 (Two) Times a Day. 10/15/21  Yes Vin Hernandez MD   carvedilol (COREG) 25 MG tablet Take 1 tablet by mouth 2 (Two) Times a Day With Meals. 10/15/21  Yes Vin Hernandez MD   cetirizine (zyrTEC) 10 MG tablet Take 10 mg by mouth Daily.   Yes Bradly Montana MD   Diclofenac Sodium (VOLTAREN) 1 % gel gel Apply 4 g topically to the appropriate area as directed 4 (Four) Times a Day As Needed.   Yes Bradly Montana MD   digoxin (LANOXIN) 125 MCG tablet Take 1 tablet by mouth Daily. 10/15/21  Yes Vin Hernandez MD   dilTIAZem CD (CARDIZEM CD) 180 MG 24 hr capsule Take 1 capsule by mouth Daily. 10/16/21  Yes Vin Hernandez MD   esomeprazole (nexIUM) 40 MG capsule Take 40 mg by mouth Every Morning Before Breakfast.   Yes Bradly Montana MD   gabapentin (NEURONTIN) 300 MG capsule Take 300 mg by mouth 3 (Three) Times a Day As Needed. 5/15/20  Yes Bradly Montana MD   HYDROcodone-acetaminophen (NORCO) 7.5-325 MG per tablet Take 1 tablet by mouth Every 8 (Eight) Hours As Needed for Moderate Pain .   Yes Bradly Montana MD   levothyroxine (SYNTHROID, LEVOTHROID) 50 MCG tablet Take 50 mcg by mouth Daily.   Yes Bradly Montana MD   meclizine (ANTIVERT) 25 MG tablet Take 25 mg by mouth 3 (three) times a day as needed for dizziness.   Yes Bradly Montana  MD   metoclopramide (REGLAN) 10 MG tablet Take 10 mg by mouth 4 (Four) Times a Day Before Meals & at Bedtime.   Yes ProviderBradly MD   mirtazapine (REMERON) 15 MG tablet Take 15 mg by mouth Every Night.   Yes Provider, MD Bradly   nitrofurantoin, macrocrystal-monohydrate, (MACROBID) 100 MG capsule Take 100 mg by mouth 2 (Two) Times a Day.   Yes ProviderBradly MD   ondansetron (ZOFRAN) 4 MG tablet Take 4 mg by mouth Every 8 (Eight) Hours As Needed for Nausea or Vomiting.   Yes Provider, MD Bradly   potassium chloride (K-DUR,KLOR-CON) 10 MEQ CR tablet Take 10 mEq by mouth daily.   Yes ProviderBradly MD   rivaroxaban (XARELTO) 15 MG tablet Take 15 mg by mouth Daily With Dinner.   Yes Provider, MD Bradly   rosuvastatin (CRESTOR) 10 MG tablet Take 10 mg by mouth Daily.   Yes ProviderBradly MD   sucralfate (CARAFATE) 1 g tablet Take 1 tablet by mouth 4 (Four) Times a Day. 6/5/20  Yes Albina Ya MD               Review of Systems:  Review of Systems   Otherwise complete ROS is negative except as mentioned above.    Physical Exam:   Temp:  [97.4 °F (36.3 °C)] 97.4 °F (36.3 °C)  Heart Rate:  [] 120  Resp:  [16-18] 18  BP: ()/(57-74) 118/74  Physical Exam  Constitutional:       Appearance: She is ill-appearing.   HENT:      Head: Normocephalic.      Nose: Nose normal.      Mouth/Throat:      Mouth: Mucous membranes are moist.   Eyes:      Pupils: Pupils are equal, round, and reactive to light.   Cardiovascular:      Rate and Rhythm: Normal rate.      Heart sounds: Normal heart sounds.   Pulmonary:      Comments: Inspiratory crackles on right lung and left base  Abdominal:      Palpations: Abdomen is soft.   Musculoskeletal:         General: Normal range of motion.      Cervical back: Normal range of motion.   Skin:     General: Skin is warm.   Neurological:      General: No focal deficit present.      Mental Status: She is alert. Mental status is at baseline.    Psychiatric:         Mood and Affect: Mood normal.           Results Reviewed:  I have personally reviewed current lab, radiology, and data and agree with results.  Lab Results (last 24 hours)     Procedure Component Value Units Date/Time    Troponin [[id number]] Collected: 02/08/23 1639    Specimen: Blood Updated: 02/08/23 1639    STAT Lactic Acid, Reflex [[id number]]  (Normal) Collected: 02/08/23 1318    Specimen: Blood Updated: 02/08/23 1340     Lactate 1.4 mmol/L     High Sensitivity Troponin T 2Hr [[id number]]  (Abnormal) Collected: 02/08/23 1220    Specimen: Blood Updated: 02/08/23 1247     HS Troponin T 33 ng/L      Troponin T Delta -4 ng/L     Narrative:      High Sensitive Troponin T Reference Range:  <10.0 ng/L- Negative Female for AMI  <15.0 ng/L- Negative Male for AMI  >=10 - Abnormal Female indicating possible myocardial injury.  >=15 - Abnormal Male indicating possible myocardial injury.   Clinicians would have to utilize clinical acumen, EKG, Troponin, and serial changes to determine if it is an Acute Myocardial Infarction or myocardial injury due to an underlying chronic condition.         Urinalysis, Microscopic Only - Urine, Catheter [[id number]]  (Abnormal) Collected: 02/08/23 1118    Specimen: Urine, Catheter Updated: 02/08/23 1140     RBC, UA 0-2 /HPF      WBC, UA 3-5 /HPF      Bacteria, UA Trace /HPF      Squamous Epithelial Cells, UA 0-2 /HPF      Hyaline Casts, UA 3-6 /LPF      Calcium Oxalate Crystals, UA Small/1+ /HPF      Methodology Manual Light Microscopy    Extra Tubes [[id number]] Collected: 02/08/23 1026    Specimen: Blood, Venous Line Updated: 02/08/23 1130    Narrative:      The following orders were created for panel order Extra Tubes.  Procedure                               Abnormality         Status                     ---------                               -----------         ------                     Gold Top - SST[[id number]]                                   Final result                Light Blue Top[[id number]]                                   Final result                 Please view results for these tests on the individual orders.    Gold Top - SST [[id number]] Collected: 02/08/23 1026    Specimen: Blood Updated: 02/08/23 1130     Extra Tube Hold for add-ons.     Comment: Auto resulted.       Light Blue Top [[id number]] Collected: 02/08/23 1026    Specimen: Blood Updated: 02/08/23 1130     Extra Tube Hold for add-ons.     Comment: Auto resulted       Urinalysis With Microscopic If Indicated (No Culture) - Urine, Catheter [[id number]]  (Abnormal) Collected: 02/08/23 1118    Specimen: Urine, Catheter Updated: 02/08/23 1125     Color, UA Yellow     Appearance, UA Cloudy     pH, UA 6.5     Specific Gravity, UA 1.017     Glucose, UA Negative     Ketones, UA Negative     Bilirubin, UA Negative     Blood, UA Negative     Protein, UA Trace     Leuk Esterase, UA Small (1+)     Nitrite, UA Negative     Urobilinogen, UA 1.0 E.U./dL    Digoxin Level [[id number]]  (Abnormal) Collected: 02/08/23 1020    Specimen: Blood Updated: 02/08/23 1056     Digoxin <0.30 ng/mL     Lactic Acid, Plasma [[id number]]  (Abnormal) Collected: 02/08/23 1020    Specimen: Blood Updated: 02/08/23 1053     Lactate 2.9 mmol/L     Comprehensive Metabolic Panel [[id number]]  (Abnormal) Collected: 02/08/23 1020    Specimen: Blood Updated: 02/08/23 1053     Glucose 134 mg/dL      BUN 15 mg/dL      Creatinine 1.03 mg/dL      Sodium 140 mmol/L      Potassium 3.8 mmol/L      Chloride 105 mmol/L      CO2 27.0 mmol/L      Calcium 8.8 mg/dL      Total Protein 6.6 g/dL      Albumin 3.1 g/dL      ALT (SGPT) 40 U/L      AST (SGOT) 50 U/L      Alkaline Phosphatase 90 U/L      Total Bilirubin 0.6 mg/dL      Globulin 3.5 gm/dL      A/G Ratio 0.9 g/dL      BUN/Creatinine Ratio 14.6     Anion Gap 8.0 mmol/L      eGFR 54.1 mL/min/1.73     Narrative:      GFR Normal >60  Chronic Kidney Disease <60  Kidney Failure <15    The GFR formula is only valid  for adults with stable renal function between ages 18 and 70.    Magnesium [[id number]]  (Normal) Collected: 02/08/23 1020    Specimen: Blood Updated: 02/08/23 1053     Magnesium 2.0 mg/dL     Troponin [[id number]]  (Abnormal) Collected: 02/08/23 1020    Specimen: Blood Updated: 02/08/23 1053     HS Troponin T 37 ng/L     Narrative:      High Sensitive Troponin T Reference Range:  <10.0 ng/L- Negative Female for AMI  <15.0 ng/L- Negative Male for AMI  >=10 - Abnormal Female indicating possible myocardial injury.  >=15 - Abnormal Male indicating possible myocardial injury.   Clinicians would have to utilize clinical acumen, EKG, Troponin, and serial changes to determine if it is an Acute Myocardial Infarction or myocardial injury due to an underlying chronic condition.         BNP [[id number]]  (Abnormal) Collected: 02/08/23 1020    Specimen: Blood Updated: 02/08/23 1050     proBNP 2,743.0 pg/mL     Narrative:      Among patients with dyspnea, NT-proBNP is highly sensitive for the detection of acute congestive heart failure. In addition NT-proBNP of <300 pg/ml effectively rules out acute congestive heart failure with 99% negative predictive value.      CBC & Differential [[id number]]  (Abnormal) Collected: 02/08/23 1020    Specimen: Blood Updated: 02/08/23 1033    Narrative:      The following orders were created for panel order CBC & Differential.  Procedure                               Abnormality         Status                     ---------                               -----------         ------                     CBC Auto Differential[[id number]]        Abnormal            Final result                 Please view results for these tests on the individual orders.    CBC Auto Differential [[id number]]  (Abnormal) Collected: 02/08/23 1020    Specimen: Blood Updated: 02/08/23 1033     WBC 8.63 10*3/mm3      RBC 4.23 10*6/mm3      Hemoglobin 12.7 g/dL      Hematocrit 38.5 %      MCV 91.0 fL      MCH 30.0 pg      MCHC  33.0 g/dL      RDW 15.6 %      RDW-SD 51.3 fl      MPV 10.6 fL      Platelets 224 10*3/mm3      Neutrophil % 66.2 %      Lymphocyte % 19.5 %      Monocyte % 7.2 %      Eosinophil % 2.7 %      Basophil % 1.4 %      Immature Grans % 3.0 %      Neutrophils, Absolute 5.72 10*3/mm3      Lymphocytes, Absolute 1.68 10*3/mm3      Monocytes, Absolute 0.62 10*3/mm3      Eosinophils, Absolute 0.23 10*3/mm3      Basophils, Absolute 0.12 10*3/mm3      Immature Grans, Absolute 0.26 10*3/mm3      nRBC 0.0 /100 WBC         Imaging Results (Last 24 Hours)     Procedure Component Value Units Date/Time    XR Chest 2 View [[id number]] Collected: 02/08/23 1626     Updated: 02/08/23 1643    Narrative:      Chest x-ray PA and lateral     CLINICAL INDICATION: Shortness of breath. Tachycardia and  atrial  fibrillation    COMPARISON: Chest February 8, 2023. 10:38 AM.    FINDINGS: Cardiac silhouette is enlarged in size. Pulmonary  vascularity is unremarkable.   Pacemaker leads right atrium and right ventricle.    Surgical clips right axilla.  No focal infiltrate or consolidation.  No pleural effusion.  No  pneumothorax.      Impression:      Cardiomegaly. No evidence of active disease. No  change since prior exam.       Electronically signed by:  Alon Smith MD  2/8/2023 4:41 PM CST  Workstation: [id number]    CT Abdomen Pelvis Without Contrast [[id number]] Collected: 02/08/23 1422     Updated: 02/08/23 1456    Narrative:      EXAM:  CT ABDOMEN PELVIS WITHOUT IV CONTRAST    ORDERING PROVIDER:  IRAIDA ADAMS    CLINICAL HISTORY:  lower abdominal pain, diarrhea    COMPARISON:  2/14/2020    TECHNIQUE:   CT abdomen and pelvis performed without IV or oral contrast,  reformatted in the sagittal and coronal planes.     This examination was performed according to our departmental dose  optimization program which includes automated exposure control,  adjustment of the MA and kV according to patient size, and/or use  of iterative reconstruction  technique.    FINDINGS:    BASILAR CHEST: Bibasilar streaky changes which may be due to  chronic change. There is cardiomegaly with small pericardial  effusion.    LIVER: No mass, enlargement or abnormal density.    BILIARY TRACT: Prior cholecystectomy.    SPLEEN: No mass or enlargement.    PANCREAS: No mass or inflammatory process. Normal pancreatic duct    ADRENAL GLANDS: Unremarkable. No mass.    URINARY SYSTEM: No obstructing stone, hydronephrosis, or mass.  Normal ureters.  Bladder is normal without mass or stone.  Left  nephrolithiasis measuring 9 mm, previously 7.5 mm. Parenchymal  scarring in the right kidney, which may be due to prior insults  such as infection.    GI TRACT: No mass, dilation, or wall thickening.  No inflamed  diverticula.  No hernia.  Appendix is normal.      REPRODUCTIVE SYSTEM: No aggressive lesion    PERITONEAL SPACE:No free air, free fluid, mass or adenopathy.    RETROPERITONEAL SPACE:  No adenopathy, mass, aneurysm or  significant vascular abnormality.     BONES AND EXTRA-ABDOMINAL SOFT TISSUES: No aggressive osseous  lesion.  No inguinal adenopathy or hernia.      Impression:      No evidence of obstructive uropathy on either side.  No evidence of bowel obstruction or perienteric inflammation.  Parenchymal scarring in the right kidney, which may be due to  prior insults such as infection.  Bibasilar streaky changes which may be due to chronic change.   Small pericardial effusion.    Electronically signed by:  Froilan Shannon MD  2/8/2023 2:54 PM CST  Workstation: [id number]    CT Head Without Contrast [[id number]] Collected: 02/08/23 1138     Updated: 02/08/23 1213    Narrative:      CT CT head without IV contrast February 8, 2023    INDICATION: Acute confusion    TECHNIQUE: Spiral images obtained from foramen magnum through  vertex without IV contrast. Sagittal, axial and coronal  reformatted images generated retrospectively. Comparison with  previous study dated 2/20/2020    This exam was  performed according to our departmental  dose-optimization program, which includes automated exposure  control, adjustment of the mA and/or kV according to patient size  and/or use of iterative reconstruction technique.      FINDINGS:  No mass effect, midline shift, hemorrhage, hydrocephalus or  extra-axial fluid collections.  Mild atrophy.  Periventricular low-attenuation consistent with chronic small  vessel white matter ischemic change.  No significant focal or acute parenchymal pathology.  Visualized mastoids and sinuses grossly clear.  No focal or acute bony abnormality.      Impression:      Mild atrophy.  Evidence of chronic white matter ischemic change.  No significant focal or acute intracranial pathology.    Electronically signed by:  Kiran Rapp MD  2/8/2023 12:10 PM  CST Workstation: [id number]    XR Chest 1 View [[id number]] Collected: 02/08/23 1041     Updated: 02/08/23 1154    Narrative:        PROCEDURE: Single chest view portable    REASON FOR EXAM:shortness of breath    FINDINGS: Comparison exam dated December 6, 2022. Stable  pacemaker and leads. Cardiac and pulmonary vasculature are  normal. Lungs are clear. Pleural spaces are normal. No acute  osseous abnormality. Multiple surgical clips in the right axilla  consistent with prior surgery in this region.      Impression:      No acute cardiopulmonary abnormality.    Electronically signed by:  Ajay Britt MD  2/8/2023 11:51 AM CST  Workstation: [id number]        Assessment and Plan     Atrial fibrillation with RVR     Continue with home meds and xarelto    Bibasilar atelectasis/pneumonia     Empirically on doxycycline, rocephin     Check procal tomorrow am    Generalized weakness     PT and OT     Chronic medical problems     Essential hypertension     Hx of cholecystectomy     Chronic diarrhea     PPM         Medical Decision Making  Number and Complexity of problems: one major, one minor   Differential Diagnosis: pneumonia     Conditions  and Status:        Condition is improving.     [facility] Data  External documents reviewed: previous medical records  My EKG interpretation:   My plain film interpretation: no acute events     Decision rules/scores evaluated (example [id number], Wells, etc): N/A     Discussed with: patient and granddaughter     Treatment Plan      Care Planning  Shared decision making: her daughter  Code status and discussions: full code    Disposition  Social Determinants of Health that impact treatment or disposition: none  I expect the patient to be discharged to home in 2 to 3 days.     I confirmed that the patient's Advance Care Plan is present, code status is documented, or surrogate decision maker is listed in the patient's medical record.     Dangelo Shook MD

## 2023-02-08 NOTE — ED PROVIDER NOTES
Subjective   History of Present Illness  Presents to the ER with her granddaughter for complaints of lower abdominal suprapubic pain.  Patient states she thinks she may have a UTI because she has urinary frequency.  She denies dysuria.  She denies pain throughout the rest of her abdomen.  She does report intermittent shortness of breath without chest pain.  Patient with flat affect and is a poor historian.  Granddaughter states [facility] and patient all of a sudden said she needed to go to the emergency room because she did not feel like she could sit up anymore.  According to granddaughter, patient is acting at baseline.        Review of Systems   Constitutional: Negative for chills and fever.   Respiratory: Positive for shortness of breath (Intermittently but it is unclear if patient currently is having shortness of breath). Negative for cough.    Cardiovascular: Negative for chest pain.   Gastrointestinal: Positive for abdominal pain. Negative for nausea and vomiting.   Genitourinary: Positive for frequency. Negative for dysuria and flank pain.   Musculoskeletal: Negative for back pain.   Skin: Negative.    Neurological: Positive for weakness (Generalized that has progressively worsened since patient fell back in October). Negative for dizziness and syncope.       Past Medical History:   Diagnosis Date   • Arthritis    • Atrial fibrillation (HCC)    • Atrial fibrillation (HCC)    • Atrial fibrillation with rapid ventricular response (HCC) 1/20/2017   • Breast cancer (HCC)     right   • Cancer (HCC)    • Disease of thyroid gland    • Hypertension        Allergies   Allergen Reactions   • Perflutren Lipid Microsphere Other (See Comments)     Patient experienced leg and back pain on a scale of 10/10. Patient symptoms resolved with a few minutes.    • Penicillins Rash       Past Surgical History:   Procedure Laterality Date   • ABLATION OF DYSRHYTHMIC FOCUS     • BREAST SURGERY     • CARDIAC  ELECTROPHYSIOLOGY PROCEDURE N/A 4/6/2017    Procedure: EP/Ablation;  Surgeon: Blake Mcallister MD;  Location: [facility] CATH INVASIVE LOCATION;  Service:    • CARDIOVERSION     • CATARACT EXTRACTION W/ INTRAOCULAR LENS IMPLANT Right 8/9/2019    Procedure: REMOVE CATARACT AND IMPLANT INTRAOCULAR LENS;  Surgeon: Lenin Dennison MD;  Location: [facility] OR;  Service: Ophthalmology   • CATARACT EXTRACTION W/ INTRAOCULAR LENS IMPLANT Left 8/16/2019    Procedure: REMOVE CATARACT AND IMPLANT INTRAOCULAR LENS;  Surgeon: Lenin Dennison MD;  Location: [facility] OR;  Service: Ophthalmology   • CHOLECYSTECTOMY WITH INTRAOPERATIVE CHOLANGIOGRAM N/A 2/17/2020    Procedure: LAPAROSCOPIC CHOLECYSTECTOMY WITH INTRAOPERATIVE CHOLANGIOGRAM;  Surgeon: Denilson Richardson MD;  Location: [facility] OR;  Service: General;  Laterality: N/A;   • ENDOSCOPY N/A 6/9/2020    Procedure: ESOPHAGOGASTRODUODENOSCOPY;  Surgeon: Albina Ya MD;  Location: [facility] ENDOSCOPY;  Service: Gastroenterology;  Laterality: N/A;   • INSERT / REPLACE / REMOVE PACEMAKER     • PACEMAKER IMPLANTATION     • SKIN BIOPSY     • SKIN TAG REMOVAL     • UPPER GASTROINTESTINAL ENDOSCOPY  06/09/2020       Family History   Problem Relation Age of Onset   • Hypertension Daughter    • Hypertension Son    • No Known Problems Mother    • No Known Problems Father        Social History     Socioeconomic History   • Marital status:    Tobacco Use   • Smoking status: Never   • Smokeless tobacco: Former     Types: Snuff, Chew   Substance and Sexual Activity   • Alcohol use: No   • Drug use: No   • Sexual activity: Defer           Objective    /74   Pulse 120   Temp 97.4 °F (36.3 °C) (Axillary)   Resp 18   LMP  (LMP Unknown)   SpO2 96%     Physical Exam  Constitutional:       Appearance: She is not ill-appearing.   HENT:      Head: Normocephalic and atraumatic.   Cardiovascular:      Rate and Rhythm: Normal rate and regular rhythm.      Pulses: Normal  pulses.      Heart sounds: Normal heart sounds.   Pulmonary:      Effort: Pulmonary effort is normal.      Breath sounds: Normal breath sounds.   Abdominal:      General: There is no distension.      Palpations: Abdomen is soft.      Tenderness: There is abdominal tenderness. There is no guarding.   Musculoskeletal:         General: Normal range of motion.      Cervical back: Normal range of motion.   Skin:     General: Skin is warm and dry.      Capillary Refill: Capillary refill takes less than 2 seconds.   Neurological:      Mental Status: She is alert.      Comments: Patient is oriented to person and place, is confused on time.   Psychiatric:         Mood and Affect: Mood normal.         Behavior: Behavior normal.         Procedures  Results for orders placed or performed during the hospital encounter of 02/08/23   Urinalysis With Microscopic If Indicated (No Culture) - Urine, Catheter    Specimen: Urine, Catheter   Result Value Ref Range    Color, UA Yellow Yellow, Straw, Dark Yellow, [place]    Appearance, UA Cloudy (A) Clear    pH, UA 6.5 5.0 - 9.0    Specific Gravity, UA 1.017 1.003 - 1.030    Glucose, UA Negative Negative    Ketones, UA Negative Negative    Bilirubin, UA Negative Negative    Blood, UA Negative Negative    Protein, UA Trace (A) Negative    Leuk Esterase, UA Small (1+) (A) Negative    Nitrite, UA Negative Negative    Urobilinogen, UA 1.0 E.U./dL 0.2 - 1.0 E.U./dL   Comprehensive Metabolic Panel    Specimen: Blood   Result Value Ref Range    Glucose 134 (H) 65 - 99 mg/dL    BUN 15 8 - 23 mg/dL    Creatinine 1.03 (H) 0.57 - 1.00 mg/dL    Sodium 140 136 - 145 mmol/L    Potassium 3.8 3.5 - 5.2 mmol/L    Chloride 105 98 - 107 mmol/L    CO2 27.0 22.0 - 29.0 mmol/L    Calcium 8.8 8.6 - 10.5 mg/dL    Total Protein 6.6 6.0 - 8.5 g/dL    Albumin 3.1 (L) 3.5 - 5.2 g/dL    ALT (SGPT) 40 (H) 1 - 33 U/L    AST (SGOT) 50 (H) 1 - 32 U/L    Alkaline Phosphatase 90 39 - 117 U/L    Total Bilirubin 0.6 0.0 - 1.2  mg/dL    Globulin 3.5 gm/dL    A/G Ratio 0.9 g/dL    BUN/Creatinine Ratio 14.6 7.0 - 25.0    Anion Gap 8.0 5.0 - 15.0 mmol/L    eGFR 54.1 (L) >60.0 mL/min/1.73   CBC Auto Differential    Specimen: Blood   Result Value Ref Range    WBC 8.63 3.40 - 10.80 10*3/mm3    RBC 4.23 3.77 - 5.28 10*6/mm3    Hemoglobin 12.7 12.0 - 15.9 g/dL    Hematocrit 38.5 34.0 - 46.6 %    MCV 91.0 79.0 - 97.0 fL    MCH 30.0 26.6 - 33.0 pg    MCHC 33.0 31.5 - 35.7 g/dL    RDW 15.6 (H) 12.3 - 15.4 %    RDW-SD 51.3 37.0 - 54.0 fl    MPV 10.6 6.0 - 12.0 fL    Platelets 224 140 - 450 10*3/mm3    Neutrophil % 66.2 42.7 - 76.0 %    Lymphocyte % 19.5 (L) 19.6 - 45.3 %    Monocyte % 7.2 5.0 - 12.0 %    Eosinophil % 2.7 0.3 - 6.2 %    Basophil % 1.4 0.0 - 1.5 %    Immature Grans % 3.0 (H) 0.0 - 0.5 %    Neutrophils, Absolute 5.72 1.70 - 7.00 10*3/mm3    Lymphocytes, Absolute 1.68 0.70 - 3.10 10*3/mm3    Monocytes, Absolute 0.62 0.10 - 0.90 10*3/mm3    Eosinophils, Absolute 0.23 0.00 - 0.40 10*3/mm3    Basophils, Absolute 0.12 0.00 - 0.20 10*3/mm3    Immature Grans, Absolute 0.26 (H) 0.00 - 0.05 10*3/mm3    nRBC 0.0 0.0 - 0.2 /100 WBC   Lactic Acid, Plasma    Specimen: Blood   Result Value Ref Range    Lactate 2.9 (C) 0.5 - 2.0 mmol/L   Magnesium    Specimen: Blood   Result Value Ref Range    Magnesium 2.0 1.6 - 2.4 mg/dL   BNP    Specimen: Blood   Result Value Ref Range    proBNP 2,743.0 (H) 0.0 - 1,800.0 pg/mL   Troponin    Specimen: Blood   Result Value Ref Range    HS Troponin T 37 (H) <10 ng/L   Digoxin Level    Specimen: Blood   Result Value Ref Range    Digoxin <0.30 (L) 0.60 - 1.20 ng/mL   High Sensitivity Troponin T 2Hr    Specimen: Blood   Result Value Ref Range    HS Troponin T 33 (H) <10 ng/L    Troponin T Delta -4 (L) <= -/+ 4 Change ng/L   STAT Lactic Acid, Reflex    Specimen: Blood   Result Value Ref Range    Lactate 1.4 0.5 - 2.0 mmol/L   Urinalysis, Microscopic Only - Urine, Catheter    Specimen: Urine, Catheter   Result Value Ref  Range    RBC, UA 0-2 (A) None Seen /HPF    WBC, UA 3-5 None Seen, 0-2, 3-5 /HPF    Bacteria, UA Trace (A) None Seen /HPF    Squamous Epithelial Cells, UA 0-2 None Seen, 0-2 /HPF    Hyaline Casts, UA 3-6 None Seen /LPF    Calcium Oxalate Crystals, UA Small/1+ None Seen /HPF    Methodology Manual Light Microscopy    ECG 12 Lead Dyspnea   Result Value Ref Range    QT Interval 428 ms    QTC Interval 493 ms   ECG 12 Lead Other; UTI   Result Value Ref Range    QT Interval 404 ms    QTC Interval 460 ms   ECG 12 Lead Other; UTI   Result Value Ref Range    QT Interval 422 ms    QTC Interval 486 ms   ECG 12 Lead Rhythm Change   Result Value Ref Range    QT Interval 352 ms    QTC Interval 501 ms   Gold Top - SST   Result Value Ref Range    Extra Tube Hold for add-ons.    Light Blue Top   Result Value Ref Range    Extra Tube Hold for add-ons.      CT Abdomen Pelvis Without Contrast    Result Date: 2/8/2023  Narrative: EXAM: CT ABDOMEN PELVIS WITHOUT IV CONTRAST ORDERING PROVIDER: IRAIDA ADAMS CLINICAL HISTORY: lower abdominal pain, diarrhea COMPARISON: 2/14/2020 TECHNIQUE: CT abdomen and pelvis performed without IV or oral contrast, reformatted in the sagittal and coronal planes. This examination was performed according to our departmental dose optimization program which includes automated exposure control, adjustment of the MA and kV according to patient size, and/or use of iterative reconstruction technique. FINDINGS: BASILAR CHEST: Bibasilar streaky changes which may be due to chronic change. There is cardiomegaly with small pericardial effusion. LIVER: No mass, enlargement or abnormal density. BILIARY TRACT: Prior cholecystectomy. SPLEEN: No mass or enlargement. PANCREAS: No mass or inflammatory process. Normal pancreatic duct ADRENAL GLANDS: Unremarkable. No mass. URINARY SYSTEM: No obstructing stone, hydronephrosis, or mass. Normal ureters.  Bladder is normal without mass or stone.  Left nephrolithiasis  measuring 9 mm, previously 7.5 mm. Parenchymal scarring in the right kidney, which may be due to prior insults such as infection. GI TRACT: No mass, dilation, or wall thickening.  No inflamed diverticula.  No hernia.  Appendix is normal.  REPRODUCTIVE SYSTEM: No aggressive lesion PERITONEAL SPACE:No free air, free fluid, mass or adenopathy. RETROPERITONEAL SPACE:  No adenopathy, mass, aneurysm or significant vascular abnormality. BONES AND EXTRA-ABDOMINAL SOFT TISSUES: No aggressive osseous lesion.  No inguinal adenopathy or hernia.     Impression: No evidence of obstructive uropathy on either side. No evidence of bowel obstruction or perienteric inflammation. Parenchymal scarring in the right kidney, which may be due to prior insults such as infection. Bibasilar streaky changes which may be due to chronic change. Small pericardial effusion. Electronically signed by:  Froilan Shannon MD  2/8/2023 2:54 PM CST Workstation: [id number]    CT Head Without Contrast    Result Date: 2/8/2023  Narrative: CT CT head without IV contrast February 8, 2023 INDICATION: Acute confusion TECHNIQUE: Spiral images obtained from foramen magnum through vertex without IV contrast. Sagittal, axial and coronal reformatted images generated retrospectively. Comparison with previous study dated 2/20/2020 This exam was performed according to our departmental dose-optimization program, which includes automated exposure control, adjustment of the mA and/or kV according to patient size and/or use of iterative reconstruction technique. FINDINGS: No mass effect, midline shift, hemorrhage, hydrocephalus or extra-axial fluid collections. Mild atrophy. Periventricular low-attenuation consistent with chronic small vessel white matter ischemic change. No significant focal or acute parenchymal pathology. Visualized mastoids and sinuses grossly clear. No focal or acute bony abnormality.     Impression: Mild atrophy. Evidence of chronic white matter ischemic  change. No significant focal or acute intracranial pathology. Electronically signed by:  Kiran Rapp MD  2/8/2023 12:10 PM CST Workstation: [id number]    XR Chest 1 View    Result Date: 2/8/2023  Narrative: PROCEDURE: Single chest view portable REASON FOR EXAM:shortness of breath FINDINGS: Comparison exam dated December 6, 2022. Stable pacemaker and leads. Cardiac and pulmonary vasculature are normal. Lungs are clear. Pleural spaces are normal. No acute osseous abnormality. Multiple surgical clips in the right axilla consistent with prior surgery in this region.     Impression: No acute cardiopulmonary abnormality. Electronically signed by:  Ajay Britt MD  2/8/2023 11:51 AM CST Workstation: [id number]             ED Course  ED Course as of 02/08/23 1715   Wed Feb 08, 2023   1501 Dr. Shook paged to consult regarding elevated Trop and possible admission.  [SH]   1517 Dr. Shook repaged. [SH]   1536 Dr. Shook is in the ER seeing another patient. Will come see me when he gets down.  [SH]   1547 Dr. Shook in ER. Patient's daughter at desk and states patient's HR is 122. Placed on monitor. EKG repeat ordered. Patient reviewed with Dr. Shook. Agrees to admission at this time. Advised to order repeat CXR.  [SH]   1643 Dr. Shook in to see patient.  [SH]      ED Course User Index  [SH] Yue Mcmanus APRN                                           Medical Decision Making  Patient presents to the ER with complaints of generalized not feeling well and thinking she may have a UTI.  She states intermittent lower abdominal pain along with chronic diarrhea since she had her gallbladder removed years ago.  Patient reported intermittent shortness of breath but denied any kind of chest pain.  EKG initially showed a paced rhythm and heart rate was in the 80s.  Patient was given fluids and chest x-ray was clear.  Troponin was found to be slightly elevated.  Dr. Leon was consulted.  Patient was also found to have  an elevated heart rate suddenly up into the 120s.  Repeat EKG showed patient was Govindan in and out of paced versus A-fib.  We will continue with admission at this time.    Atrial fibrillation, unspecified type (HCC): acute illness or injury  Elevated troponin: acute illness or injury  Amount and/or Complexity of Data Reviewed  Labs: ordered.  Radiology: ordered.  ECG/medicine tests: ordered.      Risk  Prescription drug management.  Decision regarding hospitalization.          Final diagnoses:   Atrial fibrillation, unspecified type (HCC)   Elevated troponin       ED Disposition  ED Disposition     ED Disposition   Decision to Admit    Condition   --    Comment   Level of Care: Telemetry [5]   Diagnosis: Atrial fibrillation, unspecified type (HCC) [[id number]]   Admitting Physician: WESTON STERN [[id number]]   Attending Physician: WESTON STERN [[id number]]               No follow-up provider specified.       Medication List      No changes were made to your prescriptions during this visit.          Yue Mcmanus, APRN  02/08/23 0238

## 2023-02-08 NOTE — ED NOTES
Nursing report ED to floor  Sultana Lin  82 y.o.  female    HPI:   Chief Complaint   Patient presents with    Diarrhea    Possible UTI    Confusion       Admitting doctor:   Dangelo Shook MD    Consulting provider(s):  Consults       No orders found from 1/10/2023 to 2/9/2023.             Admitting diagnosis:   The primary encounter diagnosis was Atrial fibrillation, unspecified type (HCC). A diagnosis of Elevated troponin was also pertinent to this visit.    Code status:   Current Code Status       Date Active Code Status Order ID Comments User Context       Prior            Allergies:   Perflutren lipid microsphere and Penicillins    Intake and Output    Intake/Output Summary (Last 24 hours) at 2/8/2023 1634  Last data filed at 2/8/2023 1432  Gross per 24 hour   Intake 1000 ml   Output --   Net 1000 ml       Weight:   There were no vitals filed for this visit.    Most recent vitals:   Vitals:    02/08/23 1608 02/08/23 1609 02/08/23 1610 02/08/23 1611   BP:       BP Location:       Patient Position:       Pulse: (!) 122 120 120 120   Resp:       Temp:       TempSrc:       SpO2: 96% 96% 96% 96%     Oxygen Therapy: room air    Active LDAs/IV Access:   Lines, Drains & Airways       Active LDAs       Name Placement date Placement time Site Days    Peripheral IV 02/08/23 1018 Anterior;Right Forearm 02/08/23  1018  Forearm  less than 1                    Labs (abnormal labs have a star):   Labs Reviewed   URINALYSIS W/ MICROSCOPIC IF INDICATED (NO CULTURE) - Abnormal; Notable for the following components:       Result Value    Appearance, UA Cloudy (*)     Protein, UA Trace (*)     Leuk Esterase, UA Small (1+) (*)     All other components within normal limits   COMPREHENSIVE METABOLIC PANEL - Abnormal; Notable for the following components:    Glucose 134 (*)     Creatinine 1.03 (*)     Albumin 3.1 (*)     ALT (SGPT) 40 (*)     AST (SGOT) 50 (*)     eGFR 54.1 (*)     All other components within normal limits     Narrative:     GFR Normal >60  Chronic Kidney Disease <60  Kidney Failure <15    The GFR formula is only valid for adults with stable renal function between ages 18 and 70.   CBC WITH AUTO DIFFERENTIAL - Abnormal; Notable for the following components:    RDW 15.6 (*)     Lymphocyte % 19.5 (*)     Immature Grans % 3.0 (*)     Immature Grans, Absolute 0.26 (*)     All other components within normal limits   LACTIC ACID, PLASMA - Abnormal; Notable for the following components:    Lactate 2.9 (*)     All other components within normal limits   BNP (IN-HOUSE) - Abnormal; Notable for the following components:    proBNP 2,743.0 (*)     All other components within normal limits    Narrative:     Among patients with dyspnea, NT-proBNP is highly sensitive for the detection of acute congestive heart failure. In addition NT-proBNP of <300 pg/ml effectively rules out acute congestive heart failure with 99% negative predictive value.     TROPONIN - Abnormal; Notable for the following components:    HS Troponin T 37 (*)     All other components within normal limits    Narrative:     High Sensitive Troponin T Reference Range:  <10.0 ng/L- Negative Female for AMI  <15.0 ng/L- Negative Male for AMI  >=10 - Abnormal Female indicating possible myocardial injury.  >=15 - Abnormal Male indicating possible myocardial injury.   Clinicians would have to utilize clinical acumen, EKG, Troponin, and serial changes to determine if it is an Acute Myocardial Infarction or myocardial injury due to an underlying chronic condition.        DIGOXIN LEVEL - Abnormal; Notable for the following components:    Digoxin <0.30 (*)     All other components within normal limits   HIGH SENSITIVITIY TROPONIN T 2HR - Abnormal; Notable for the following components:    HS Troponin T 33 (*)     Troponin T Delta -4 (*)     All other components within normal limits    Narrative:     High Sensitive Troponin T Reference Range:  <10.0 ng/L- Negative Female for  AMI  <15.0 ng/L- Negative Male for AMI  >=10 - Abnormal Female indicating possible myocardial injury.  >=15 - Abnormal Male indicating possible myocardial injury.   Clinicians would have to utilize clinical acumen, EKG, Troponin, and serial changes to determine if it is an Acute Myocardial Infarction or myocardial injury due to an underlying chronic condition.        URINALYSIS, MICROSCOPIC ONLY - Abnormal; Notable for the following components:    RBC, UA 0-2 (*)     Bacteria, UA Trace (*)     All other components within normal limits   MAGNESIUM - Normal   LACTIC ACID, REFLEX - Normal   TROPONIN   CBC AND DIFFERENTIAL    Narrative:     The following orders were created for panel order CBC & Differential.  Procedure                               Abnormality         Status                     ---------                               -----------         ------                     CBC Auto Differential[[id number]]        Abnormal            Final result                 Please view results for these tests on the individual orders.   EXTRA TUBES    Narrative:     The following orders were created for panel order Extra Tubes.  Procedure                               Abnormality         Status                     ---------                               -----------         ------                     Gold Top - SST[[id number]]                                   Final result               Light Blue Top[[id number]]                                   Final result                 Please view results for these tests on the individual orders.   GOLD TOP - SST   LIGHT BLUE TOP       Meds given in ED:   Medications   sodium chloride 0.9 % flush 10 mL (has no administration in time range)   sodium chloride 0.9 % bolus 500 mL (0 mL Intravenous Stopped 2/8/23 1250)   sodium chloride 0.9 % bolus 500 mL (0 mL Intravenous Stopped 2/8/23 1432)           NIH Stroke Scale:       Isolation/Infection(s):  No active isolations   No active infections      COVID Testing  Collected no  Resulted no    Nursing report ED to floor:  Mental status: axo to person and place  Ambulatory status: assist  Precautions: none    ED nurse phone [phone]- [phone]

## 2023-02-09 ENCOUNTER — APPOINTMENT (OUTPATIENT)
Dept: CARDIOLOGY | Facility: HOSPITAL | Age: 83
End: 2023-02-09
Payer: MEDICARE

## 2023-02-09 PROBLEM — E43 SEVERE MALNUTRITION (HCC): Status: ACTIVE | Noted: 2023-02-09

## 2023-02-09 LAB
ADV 40+41 DNA STL QL NAA+NON-PROBE: NOT DETECTED
ANION GAP SERPL CALCULATED.3IONS-SCNC: 11 MMOL/L (ref 5–15)
ASTRO TYP 1-8 RNA STL QL NAA+NON-PROBE: NOT DETECTED
BASOPHILS # BLD AUTO: 0.09 10*3/MM3 (ref 0–0.2)
BASOPHILS NFR BLD AUTO: 0.9 % (ref 0–1.5)
BH CV ECHO MEAS - ACS: 1.8 CM
BH CV ECHO MEAS - AI P1/2T: 697.7 MSEC
BH CV ECHO MEAS - AO MAX PG: 4.1 MMHG
BH CV ECHO MEAS - AO MEAN PG: 2.37 MMHG
BH CV ECHO MEAS - AO ROOT DIAM: 2.44 CM
BH CV ECHO MEAS - AO V2 MAX: 101 CM/SEC
BH CV ECHO MEAS - AO V2 VTI: 13.8 CM
BH CV ECHO MEAS - AVA(I,D): 2.29 CM2
BH CV ECHO MEAS - EDV(CUBED): 59.9 ML
BH CV ECHO MEAS - EDV(MOD-SP2): 33.8 ML
BH CV ECHO MEAS - EDV(MOD-SP4): 42.6 ML
BH CV ECHO MEAS - EF(MOD-BP): 58 %
BH CV ECHO MEAS - EF(MOD-SP2): 60.9 %
BH CV ECHO MEAS - EF(MOD-SP4): 55.2 %
BH CV ECHO MEAS - ESV(CUBED): 22.7 ML
BH CV ECHO MEAS - ESV(MOD-SP2): 13.2 ML
BH CV ECHO MEAS - ESV(MOD-SP4): 19.1 ML
BH CV ECHO MEAS - FS: 27.6 %
BH CV ECHO MEAS - IVS/LVPW: 0.88 CM
BH CV ECHO MEAS - IVSD: 1.12 CM
BH CV ECHO MEAS - LA DIMENSION: 4.2 CM
BH CV ECHO MEAS - LAT PEAK E' VEL: 6.5 CM/SEC
BH CV ECHO MEAS - LV DIASTOLIC VOL/BSA (35-75): 25 CM2
BH CV ECHO MEAS - LV MASS(C)D: 160 GRAMS
BH CV ECHO MEAS - LV MAX PG: 1.93 MMHG
BH CV ECHO MEAS - LV MEAN PG: 0.93 MMHG
BH CV ECHO MEAS - LV SYSTOLIC VOL/BSA (12-30): 11.2 CM2
BH CV ECHO MEAS - LV V1 MAX: 69.4 CM/SEC
BH CV ECHO MEAS - LV V1 VTI: 10.4 CM
BH CV ECHO MEAS - LVIDD: 3.9 CM
BH CV ECHO MEAS - LVIDS: 2.8 CM
BH CV ECHO MEAS - LVOT AREA: 3 CM2
BH CV ECHO MEAS - LVOT DIAM: 1.97 CM
BH CV ECHO MEAS - LVPWD: 1.28 CM
BH CV ECHO MEAS - MED PEAK E' VEL: 5.3 CM/SEC
BH CV ECHO MEAS - MR MAX PG: 92 MMHG
BH CV ECHO MEAS - MR MAX VEL: 479.6 CM/SEC
BH CV ECHO MEAS - MV DEC SLOPE: 1499 CM/SEC2
BH CV ECHO MEAS - MV E MAX VEL: 122 CM/SEC
BH CV ECHO MEAS - MV MAX PG: 8 MMHG
BH CV ECHO MEAS - MV MEAN PG: 2.48 MMHG
BH CV ECHO MEAS - MV P1/2T: 26.5 MSEC
BH CV ECHO MEAS - MV V2 VTI: 18 CM
BH CV ECHO MEAS - MVA(P1/2T): 8.3 CM2
BH CV ECHO MEAS - MVA(VTI): 1.76 CM2
BH CV ECHO MEAS - PA V2 MAX: 59.9 CM/SEC
BH CV ECHO MEAS - PI END-D VEL: 68.6 CM/SEC
BH CV ECHO MEAS - RAP SYSTOLE: 3 MMHG
BH CV ECHO MEAS - RVDD: 2.5 CM
BH CV ECHO MEAS - RVSP: 21.9 MMHG
BH CV ECHO MEAS - SI(MOD-SP2): 12.1 ML/M2
BH CV ECHO MEAS - SI(MOD-SP4): 13.8 ML/M2
BH CV ECHO MEAS - SV(LVOT): 31.6 ML
BH CV ECHO MEAS - SV(MOD-SP2): 20.6 ML
BH CV ECHO MEAS - SV(MOD-SP4): 23.5 ML
BH CV ECHO MEAS - TR MAX PG: 18.9 MMHG
BH CV ECHO MEAS - TR MAX VEL: 217.2 CM/SEC
BH CV ECHO MEASUREMENTS AVERAGE E/E' RATIO: 20.68
BUN SERPL-MCNC: 15 MG/DL (ref 8–23)
BUN/CREAT SERPL: 14.3 (ref 7–25)
C CAYETANENSIS DNA STL QL NAA+NON-PROBE: NOT DETECTED
C COLI+JEJ+UPSA DNA STL QL NAA+NON-PROBE: NOT DETECTED
CALCIUM SPEC-SCNC: 8.1 MG/DL (ref 8.6–10.5)
CHLORIDE SERPL-SCNC: 111 MMOL/L (ref 98–107)
CO2 SERPL-SCNC: 21 MMOL/L (ref 22–29)
CREAT SERPL-MCNC: 1.05 MG/DL (ref 0.57–1)
CRYPTOSP DNA STL QL NAA+NON-PROBE: NOT DETECTED
DEPRECATED RDW RBC AUTO: 54.1 FL (ref 37–54)
E HISTOLYT DNA STL QL NAA+NON-PROBE: NOT DETECTED
EAEC PAA PLAS AGGR+AATA ST NAA+NON-PRB: NOT DETECTED
EC STX1+STX2 GENES STL QL NAA+NON-PROBE: NOT DETECTED
EGFRCR SERPLBLD CKD-EPI 2021: 52.8 ML/MIN/1.73
EOSINOPHIL # BLD AUTO: 0.2 10*3/MM3 (ref 0–0.4)
EOSINOPHIL NFR BLD AUTO: 2.1 % (ref 0.3–6.2)
EPEC EAE GENE STL QL NAA+NON-PROBE: NOT DETECTED
ERYTHROCYTE [DISTWIDTH] IN BLOOD BY AUTOMATED COUNT: 15.9 % (ref 12.3–15.4)
ETEC LTA+ST1A+ST1B TOX ST NAA+NON-PROBE: NOT DETECTED
G LAMBLIA DNA STL QL NAA+NON-PROBE: NOT DETECTED
GLUCOSE SERPL-MCNC: 115 MG/DL (ref 65–99)
HCT VFR BLD AUTO: 36.8 % (ref 34–46.6)
HGB BLD-MCNC: 12 G/DL (ref 12–15.9)
IMM GRANULOCYTES # BLD AUTO: 0.08 10*3/MM3 (ref 0–0.05)
IMM GRANULOCYTES NFR BLD AUTO: 0.8 % (ref 0–0.5)
LEFT ATRIUM VOLUME INDEX: 34 ML/M2
LYMPHOCYTES # BLD AUTO: 1.57 10*3/MM3 (ref 0.7–3.1)
LYMPHOCYTES NFR BLD AUTO: 16.4 % (ref 19.6–45.3)
MAGNESIUM SERPL-MCNC: 1.7 MG/DL (ref 1.6–2.4)
MAXIMAL PREDICTED HEART RATE: 137 BPM
MCH RBC QN AUTO: 30.3 PG (ref 26.6–33)
MCHC RBC AUTO-ENTMCNC: 32.6 G/DL (ref 31.5–35.7)
MCV RBC AUTO: 92.9 FL (ref 79–97)
MONOCYTES # BLD AUTO: 0.74 10*3/MM3 (ref 0.1–0.9)
MONOCYTES NFR BLD AUTO: 7.7 % (ref 5–12)
NEUTROPHILS NFR BLD AUTO: 6.89 10*3/MM3 (ref 1.7–7)
NEUTROPHILS NFR BLD AUTO: 72.1 % (ref 42.7–76)
NOROVIRUS GI+II RNA STL QL NAA+NON-PROBE: NOT DETECTED
NRBC BLD AUTO-RTO: 0 /100 WBC (ref 0–0.2)
P SHIGELLOIDES DNA STL QL NAA+NON-PROBE: NOT DETECTED
PLATELET # BLD AUTO: 190 10*3/MM3 (ref 140–450)
PMV BLD AUTO: 10.9 FL (ref 6–12)
POTASSIUM SERPL-SCNC: 3.4 MMOL/L (ref 3.5–5.2)
RBC # BLD AUTO: 3.96 10*6/MM3 (ref 3.77–5.28)
RVA RNA STL QL NAA+NON-PROBE: NOT DETECTED
S ENT+BONG DNA STL QL NAA+NON-PROBE: NOT DETECTED
SAPO I+II+IV+V RNA STL QL NAA+NON-PROBE: NOT DETECTED
SHIGELLA SP+EIEC IPAH ST NAA+NON-PROBE: NOT DETECTED
SODIUM SERPL-SCNC: 143 MMOL/L (ref 136–145)
STJ: 3 CM
STRESS TARGET HR: 116 BPM
V CHOL+PARA+VUL DNA STL QL NAA+NON-PROBE: NOT DETECTED
V CHOLERAE DNA STL QL NAA+NON-PROBE: NOT DETECTED
WBC NRBC COR # BLD: 9.57 10*3/MM3 (ref 3.4–10.8)
Y ENTEROCOL DNA STL QL NAA+NON-PROBE: NOT DETECTED

## 2023-02-09 PROCEDURE — G0378 HOSPITAL OBSERVATION PER HR: HCPCS

## 2023-02-09 PROCEDURE — 92610 EVALUATE SWALLOWING FUNCTION: CPT | Performed by: SPEECH-LANGUAGE PATHOLOGIST

## 2023-02-09 PROCEDURE — 80048 BASIC METABOLIC PNL TOTAL CA: CPT | Performed by: INTERNAL MEDICINE

## 2023-02-09 PROCEDURE — 25010000002 PERFLUTREN (DEFINITY) 8.476 MG IN SODIUM CHLORIDE (PF) 0.9 % 10 ML INJECTION: Performed by: HOSPITALIST

## 2023-02-09 PROCEDURE — 97165 OT EVAL LOW COMPLEX 30 MIN: CPT

## 2023-02-09 PROCEDURE — 83735 ASSAY OF MAGNESIUM: CPT | Performed by: INTERNAL MEDICINE

## 2023-02-09 PROCEDURE — 93306 TTE W/DOPPLER COMPLETE: CPT | Performed by: INTERNAL MEDICINE

## 2023-02-09 PROCEDURE — 25010000002 CEFTRIAXONE PER 250 MG: Performed by: INTERNAL MEDICINE

## 2023-02-09 PROCEDURE — 87507 IADNA-DNA/RNA PROBE TQ 12-25: CPT | Performed by: NURSE PRACTITIONER

## 2023-02-09 PROCEDURE — 85025 COMPLETE CBC W/AUTO DIFF WBC: CPT | Performed by: INTERNAL MEDICINE

## 2023-02-09 PROCEDURE — 93306 TTE W/DOPPLER COMPLETE: CPT

## 2023-02-09 PROCEDURE — 97162 PT EVAL MOD COMPLEX 30 MIN: CPT

## 2023-02-09 RX ORDER — POTASSIUM CHLORIDE 1.5 G/1.77G
40 POWDER, FOR SOLUTION ORAL ONCE
Status: COMPLETED | OUTPATIENT
Start: 2023-02-09 | End: 2023-02-09

## 2023-02-09 RX ORDER — POTASSIUM CHLORIDE 750 MG/1
40 CAPSULE, EXTENDED RELEASE ORAL ONCE
Status: DISCONTINUED | OUTPATIENT
Start: 2023-02-09 | End: 2023-02-09 | Stop reason: SDUPTHER

## 2023-02-09 RX ADMIN — DOXYCYCLINE 100 MG: 100 CAPSULE ORAL at 20:10

## 2023-02-09 RX ADMIN — SODIUM CHLORIDE 500 ML: 9 INJECTION, SOLUTION INTRAVENOUS at 17:03

## 2023-02-09 RX ADMIN — Medication 10 ML: at 20:10

## 2023-02-09 RX ADMIN — AMIODARONE HYDROCHLORIDE 200 MG: 200 TABLET ORAL at 08:19

## 2023-02-09 RX ADMIN — CEFTRIAXONE SODIUM 1 G: 1 INJECTION, POWDER, FOR SOLUTION INTRAMUSCULAR; INTRAVENOUS at 20:10

## 2023-02-09 RX ADMIN — POTASSIUM CHLORIDE 40 MEQ: 1.5 POWDER, FOR SOLUTION ORAL at 12:07

## 2023-02-09 RX ADMIN — DILTIAZEM HYDROCHLORIDE 180 MG: 180 CAPSULE, COATED, EXTENDED RELEASE ORAL at 08:19

## 2023-02-09 RX ADMIN — MIRTAZAPINE 15 MG: 15 TABLET, FILM COATED ORAL at 20:09

## 2023-02-09 RX ADMIN — CARVEDILOL 25 MG: 25 TABLET, FILM COATED ORAL at 17:04

## 2023-02-09 RX ADMIN — AMIODARONE HYDROCHLORIDE 200 MG: 200 TABLET ORAL at 20:10

## 2023-02-09 RX ADMIN — LEVOTHYROXINE SODIUM 50 MCG: 50 TABLET ORAL at 05:27

## 2023-02-09 RX ADMIN — DOXYCYCLINE 100 MG: 100 CAPSULE ORAL at 08:19

## 2023-02-09 RX ADMIN — CARVEDILOL 25 MG: 25 TABLET, FILM COATED ORAL at 08:19

## 2023-02-09 RX ADMIN — ROSUVASTATIN CALCIUM 10 MG: 10 TABLET, FILM COATED ORAL at 08:19

## 2023-02-09 RX ADMIN — RIVAROXABAN 15 MG: 15 TABLET, FILM COATED ORAL at 17:04

## 2023-02-09 RX ADMIN — Medication 10 ML: at 08:24

## 2023-02-09 RX ADMIN — NYSTATIN OINTMENT 1 APPLICATION: 100000 OINTMENT TOPICAL at 21:20

## 2023-02-09 RX ADMIN — PERFLUTREN 2 ML: 6.52 INJECTION, SUSPENSION INTRAVENOUS at 09:44

## 2023-02-09 NOTE — NURSING NOTE
"Pt family to bedside at this time. Took drink to pt and carrying a bag of food. Explained to family member there wasn't orders for pt to eat at this time. Family member states \"Well I've already went and got her food and everything and she will eat what I got for her tonight.\" RN explained to family that until orders were received from MD pt is NPO. Family argumentative that \"ER told them pt could eat and Pt is going to eat.\" RN explained that she will call MD and ask if it is ok for pt to have orders to eat. Family states \"Pt is eating food brought in tonight.\" Family getting food out and telling pt she is going to eat. RN left room at this time.   "

## 2023-02-09 NOTE — THERAPY EVALUATION
Acute Care - Speech Language Pathology   Swallow Initial Evaluation [facility]     Patient Name: Sultana Lin  : 1940  MRN: [id number]  Today's Date: 2023               Admit Date: 2023    Visit Dx:     ICD-10-CM ICD-9-CM   1. Atrial fibrillation, unspecified type (HCC)  I48.91 427.31   2. Elevated troponin  R77.8 790.6   3. Impaired mobility and ADLs  Z74.09 V49.89    Z78.9    4. Impaired functional mobility, balance, gait, and endurance  Z74.09 V49.89   5. Pharyngoesophageal dysphagia  R13.14 787.24     Patient Active Problem List   Diagnosis   • Hypothyroidism   • Hypertension   • Paroxysmal atrial fibrillation (HCC)   • Paroxysmal atrial fibrillation with rapid ventricular response (HCC)   • Acute cholecystitis   • Nausea   • Tachycardia   • Hypokalemia   • Atrial fibrillation, unspecified type (HCC)     Past Medical History:   Diagnosis Date   • Arthritis    • Atrial fibrillation (HCC)    • Atrial fibrillation (HCC)    • Atrial fibrillation with rapid ventricular response (HCC) 2017   • Breast cancer (HCC)     right   • Cancer (HCC)    • Disease of thyroid gland    • Hypertension      Past Surgical History:   Procedure Laterality Date   • ABLATION OF DYSRHYTHMIC FOCUS     • BREAST SURGERY     • CARDIAC ELECTROPHYSIOLOGY PROCEDURE N/A 2017    Procedure: EP/Ablation;  Surgeon: Blake Mcallister MD;  Location: [facility] INVASIVE LOCATION;  Service:    • CARDIOVERSION     • CATARACT EXTRACTION W/ INTRAOCULAR LENS IMPLANT Right 2019    Procedure: REMOVE CATARACT AND IMPLANT INTRAOCULAR LENS;  Surgeon: Lenin Dennison MD;  Location: [facility] OR;  Service: Ophthalmology   • CATARACT EXTRACTION W/ INTRAOCULAR LENS IMPLANT Left 2019    Procedure: REMOVE CATARACT AND IMPLANT INTRAOCULAR LENS;  Surgeon: Lenin Dennison MD;  Location: [facility] OR;  Service: Ophthalmology   • CHOLECYSTECTOMY WITH INTRAOPERATIVE CHOLANGIOGRAM N/A 2020    Procedure:  LAPAROSCOPIC CHOLECYSTECTOMY WITH INTRAOPERATIVE CHOLANGIOGRAM;  Surgeon: Denilson Richardson MD;  Location: [facility] OR;  Service: General;  Laterality: N/A;   • ENDOSCOPY N/A 6/9/2020    Procedure: ESOPHAGOGASTRODUODENOSCOPY;  Surgeon: Albina Ya MD;  Location: [facility] ENDOSCOPY;  Service: Gastroenterology;  Laterality: N/A;   • INSERT / REPLACE / REMOVE PACEMAKER     • PACEMAKER IMPLANTATION     • SKIN BIOPSY     • SKIN TAG REMOVAL     • UPPER GASTROINTESTINAL ENDOSCOPY  06/09/2020       SLP Recommendation and Plan  SLP Swallowing Diagnosis: suspected esophageal dysphagia (02/09/23 1007)  SLP Diet Recommendation: soft to chew textures, thin liquids, ground (02/09/23 1007)  Recommended Precautions and Strategies: upright posture during/after eating, small bites of food and sips of liquid, multiple swallows per bite of food, multiple swallows per sip of liquid, alternate between small bites of food and sips of liquid (02/09/23 1007)  SLP Rec. for Method of Medication Administration: with thin liquids, meds crushed, with puree, as tolerated (02/09/23 1007)     Monitor for Signs of Aspiration: yes, notify SLP if any concerns (02/09/23 1007)           Rehab Potential/Prognosis, Swallowing: good, to achieve stated therapy goals (02/09/23 1007)  Therapy Frequency (Swallow): evaluation only (02/09/23 1007)                           Treatment Assessment (SLP): no clinical signs of, aspiration (02/09/23 1007)  Treatment Assessment Comments (SLP): Swallow evaluation completed. Pt reports cannot eat meats because they do not go down. Pt reports no other difficulty other than does not eat meats. Pt with good tolerance of regular cracker with good mastication and bolus management. SLP will order soft to chew with ground meats in case decides to try meat. Pt in agreement and diet order changed to reflect. (02/09/23 1007)            Plan of Care Reviewed With: patient  Progress: improving  Outcome Evaluation: Swallow evaluation  completed. Pt reports cannot eat meats because they do not go down. Pt reports no other difficulty other than does not eat meats. Pt with good tolerance of regular cracker with good mastication and bolus management. SLP will order soft to chew with ground meats in case decides to try meat. Pt in agreement and diet order changed to reflect. ST to follow up 1-2 treatments to ensure diet safety and diet tolerance.      SWALLOW EVALUATION (last 72 hours)     SLP Adult Swallow Evaluation     Row Name 02/09/23 [facility]                   Rehab Evaluation    Document Type evaluation  -EK        Subjective Information no complaints  -EK        Patient Observations alert;cooperative;agree to therapy  -EK        Patient Effort good  -EK           General Information    Patient Profile Reviewed yes  -EK        Current Method of Nutrition regular textures;thin liquids  -EK           Pain Scale: Numbers Pre/Post-Treatment    Pretreatment Pain Rating 0/10 - no pain  -EK           Oral Motor Structure and Function    Dentition Assessment upper dentures/partial in place;lower dentures/partial in place  -EK        Secretion Management WNL/WFL  -EK        Mucosal Quality moist, healthy  -EK        Volitional Swallow WFL  -EK        Volitional Cough WFL  -EK           Oral Musculature and Cranial Nerve Assessment    Oral Motor General Assessment WFL  -EK           General Eating/Swallowing Observations    Respiratory Support Currently in Use room air  -EK        Eating/Swallowing Skills fed by SLP  -EK        Positioning During Eating upright 90 degree;upright in bed  -EK        Utensils Used straw  -EK           Clinical Swallow Eval    Oral Prep Phase WFL  -EK        Oral Transit WFL  -EK        Oral Residue WFL  -EK        Pharyngeal Phase WFL  -EK        Esophageal Phase suspected esophageal impairment  -EK           Swallowing Quality of Life Assessment    Palatable food preferences Does not like meat but eats wheeler occasionally  -EK            SLP Evaluation Clinical Impression    SLP Swallowing Diagnosis suspected esophageal dysphagia  -EK        Functional Impact no impact on function  -EK        Rehab Potential/Prognosis, Swallowing good, to achieve stated therapy goals  -EK           SLP Treatment Clinical Impressions    Treatment Assessment (SLP) no clinical signs of;aspiration  -EK        Treatment Assessment Comments (SLP) Swallow evaluation completed. Pt reports cannot eat meats because they do not go down. Pt reports no other difficulty other than does not eat meats. Pt with good tolerance of regular cracker with good mastication and bolus management. SLP will order soft to chew with ground meats in case decides to try meat. Pt in agreement and diet order changed to reflect.  -EK        Care Plan Review evaluation/treatment results reviewed  -EK           Recommendations    Therapy Frequency (Swallow) evaluation only  -EK        SLP Diet Recommendation soft to chew textures;thin liquids;ground  -EK        Recommended Precautions and Strategies upright posture during/after eating;small bites of food and sips of liquid;multiple swallows per bite of food;multiple swallows per sip of liquid;alternate between small bites of food and sips of liquid  -EK        Oral Care Recommendations Oral Care BID/PRN  -EK        SLP Rec. for Method of Medication Administration with thin liquids;meds crushed;with puree;as tolerated  -EK        Monitor for Signs of Aspiration yes;notify SLP if any concerns  -EK           Swallow Goals (SLP)    Swallow LTGs Patient will demonstrate functional swallow for  -EK           (LTG) Patient will demonstrate functional swallow for    Diet Texture (Demonstrate functional swallow) soft to chew (ground) textures  -EK        Liquid viscosity (Demonstrate functional swallow) thin liquids  -EK        [place] (Demonstrate functional swallow) independently (over 90% accuracy)  -EK        Progress/Outcomes (Demonstrate  functional swallow) new goal  -EK              User Key  (r) = Recorded By, (t) = Taken By, (c) = Cosigned By    Initials Name Effective Dates    Nanda Curry CCC-SLP 06/16/21 -                 EDUCATION  The patient has been educated in the following areas:   Dysphagia (Swallowing Impairment).        SLP GOALS     Row Name 02/09/23 1007             (LTG) Patient will demonstrate functional swallow for    Diet Texture (Demonstrate functional swallow) soft to chew (ground) textures  -EK      Liquid viscosity (Demonstrate functional swallow) thin liquids  -EK      [place] (Demonstrate functional swallow) independently (over 90% accuracy)  -EK      Progress/Outcomes (Demonstrate functional swallow) new goal  -EK            User Key  (r) = Recorded By, (t) = Taken By, (c) = Cosigned By    Initials Name Provider Type    Nanda Curry CCC-SLP Speech and Language Pathologist                   Time Calculation:    Time Calculation- SLP     Row Name 02/09/23 1048             Time Calculation- SLP    SLP Start Time 1007  -EK      SLP Stop Time 1023  -EK      SLP Time Calculation (min) 16 min  -EK      Total Timed Code Minutes- SLP 16 minute(s)  -EK      SLP Received On 02/09/23  -EK            User Key  (r) = Recorded By, (t) = Taken By, (c) = Cosigned By    Initials Name Provider Type    Nanda Curry CCC-SLP Speech and Language Pathologist                Therapy Charges for Today     Code Description Service Date Service Provider Modifiers Qty    [id number] HC ST EVAL ORAL PHARYNG SWALLOW 1 2/9/2023 Nanda Carrizales CCC-SLP GN 1               JEVON Chapman  2/9/2023

## 2023-02-09 NOTE — THERAPY EVALUATION
Patient Name: Sultana Lin  : 1940    MRN: [id number]                              Today's Date: 2023       Admit Date: 2023    Visit Dx:     ICD-10-CM ICD-9-CM   1. Atrial fibrillation, unspecified type (HCC)  I48.91 427.31   2. Elevated troponin  R77.8 790.6   3. Impaired mobility and ADLs  Z74.09 V49.89    Z78.9      Patient Active Problem List   Diagnosis   • Hypothyroidism   • Hypertension   • Paroxysmal atrial fibrillation (HCC)   • Paroxysmal atrial fibrillation with rapid ventricular response (HCC)   • Acute cholecystitis   • Nausea   • Tachycardia   • Hypokalemia   • Atrial fibrillation, unspecified type (HCC)     Past Medical History:   Diagnosis Date   • Arthritis    • Atrial fibrillation (HCC)    • Atrial fibrillation (HCC)    • Atrial fibrillation with rapid ventricular response (HCC) 2017   • Breast cancer (HCC)     right   • Cancer (HCC)    • Disease of thyroid gland    • Hypertension      Past Surgical History:   Procedure Laterality Date   • ABLATION OF DYSRHYTHMIC FOCUS     • BREAST SURGERY     • CARDIAC ELECTROPHYSIOLOGY PROCEDURE N/A 2017    Procedure: EP/Ablation;  Surgeon: Blake Mcallister MD;  Location: [facility] INVASIVE LOCATION;  Service:    • CARDIOVERSION     • CATARACT EXTRACTION W/ INTRAOCULAR LENS IMPLANT Right 2019    Procedure: REMOVE CATARACT AND IMPLANT INTRAOCULAR LENS;  Surgeon: Lenin Dennison MD;  Location: [facility];  Service: Ophthalmology   • CATARACT EXTRACTION W/ INTRAOCULAR LENS IMPLANT Left 2019    Procedure: REMOVE CATARACT AND IMPLANT INTRAOCULAR LENS;  Surgeon: Lenin Dennison MD;  Location: [facility];  Service: Ophthalmology   • CHOLECYSTECTOMY WITH INTRAOPERATIVE CHOLANGIOGRAM N/A 2020    Procedure: LAPAROSCOPIC CHOLECYSTECTOMY WITH INTRAOPERATIVE CHOLANGIOGRAM;  Surgeon: Denilson Richardson MD;  Location: [facility];  Service: General;  Laterality: N/A;   • ENDOSCOPY N/A 2020    Procedure:  ESOPHAGOGASTRODUODENOSCOPY;  Surgeon: Albina Ya MD;  Location: [facility] ENDOSCOPY;  Service: Gastroenterology;  Laterality: N/A;   • INSERT / REPLACE / REMOVE PACEMAKER     • PACEMAKER IMPLANTATION     • SKIN BIOPSY     • SKIN TAG REMOVAL     • UPPER GASTROINTESTINAL ENDOSCOPY  06/09/2020      General Information     Row Name 02/09/23 0938          OT Time and Intention    Document Type evaluation  -     Mode of Treatment occupational therapy;physical therapy  -     Row Name 02/09/23 0938          General Information    Patient Profile Reviewed yes  -SJ     Prior Level of Function independent:;gait;transfer;bed mobility;feeding;grooming;min assist:;dressing;bathing  -     Existing Precautions/Restrictions fall  -     Barriers to Rehab medically complex  -     Row Name 02/09/23 0938          Living Environment    People in Home spouse;child(nieves), adult  -     Row Name 02/09/23 0938          Home Main Entrance    Number of Stairs, Main Entrance one  -SJ     Stair Railings, Main Entrance railings on both sides of stairs  -     Row Name 02/09/23 0938          Stairs Within Home, Primary    Stairs, Within Home, Primary Someone is with patient at all times whether son, grandaughter, etc. Has a tub shower with shower chair and grab bars. Has a bedside commode but doesn't use it. Uses a rollator at all times, inside and outside her home. She is active with HH therapy.  -     Row Name 02/09/23 0938          Cognition    Orientation Status (Cognition) oriented x 4  -     Row Name 02/09/23 0938          Safety Issues, Functional Mobility    Safety Issues Affecting Function (Mobility) ability to follow commands;insight into deficits/self-awareness;safety precautions follow-through/compliance;safety precaution awareness;problem-solving  -     Impairments Affecting Function (Mobility) balance;endurance/activity tolerance;strength  -           User Key  (r) = Recorded By, (t) = Taken By, (c) = Cosigned  By    Initials Name Provider Type     Sean Alvarez OT Occupational Therapist                 Mobility/ADL's     Row Name 02/09/23 0938          Bed Mobility    Bed Mobility supine-sit;sit-supine  -     Supine-Sit [place] (Bed Mobility) minimum assist (75% patient effort)  -     Sit-Supine [place] (Bed Mobility) dependent (less than 25% patient effort);2 person assist  -     Assistive Device (Bed Mobility) bed rails;head of bed elevated  -           User Key  (r) = Recorded By, (t) = Taken By, (c) = Cosigned By    Initials Name Provider Type     Sean Alvarez OT Occupational Therapist               Obj/Interventions     Row Name 02/09/23 0938          Sensory Assessment (Somatosensory)    Sensory Assessment (Somatosensory) UE sensation intact  -[facility] Name 02/09/23 0938          Range of Motion Comprehensive    General Range of Motion bilateral lower extremity ROM WFL  -[facility] Name 02/09/23 0938          Strength Comprehensive (MMT)    General Manual Muscle Testing (MMT) Assessment other (see comments)  -     Comment, General Manual Muscle Testing (MMT) Assessment BUE 4-/5 grossly  -           User Key  (r) = Recorded By, (t) = Taken By, (c) = Cosigned By    Initials Name Provider Type     Sean Alvarez OT Occupational Therapist               Goals/Plan     Row Name 02/09/23 0938          Transfer Goal 1 (OT)    Activity/Assistive Device (Transfer Goal 1, OT) toilet  -     [place] Level/Cues Needed (Transfer Goal 1, OT) modified independence  -     Time Frame (Transfer Goal 1, OT) long term goal (LTG);by discharge  -     Progress/Outcome (Transfer Goal 1, OT) goal not met  -     Row Name 02/09/23 0938          Bathing Goal 1 (OT)    Activity/Device (Bathing Goal 1, OT) lower body bathing  -     [place] Level/Cues Needed (Bathing Goal 1, OT) supervision required  -     Time Frame (Bathing Goal 1, OT) long term goal (LTG);by discharge  -      Progress/Outcomes (Bathing Goal 1, OT) goal not met  -     Row Name 02/09/23 0938          Dressing Goal 1 (OT)    Activity/Device (Dressing Goal 1, OT) lower body dressing  -     [place]/Cues Needed (Dressing Goal 1, OT) minimum assist (75% or more patient effort)  -SJ     Time Frame (Dressing Goal 1, OT) long term goal (LTG);by discharge  -     Progress/Outcome (Dressing Goal 1, OT) goal not met  -[facility] Name 02/09/23 0938          Toileting Goal 1 (OT)    Activity/Device (Toileting Goal 1, OT) toileting skills, all  -     [place] Level/Cues Needed (Toileting Goal 1, OT) independent  -SJ     Time Frame (Toileting Goal 1, OT) long term goal (LTG);by discharge  -     Progress/Outcome (Toileting Goal 1, OT) goal not met  -[facility] Name 02/09/23 0938          Problem Specific Goal 1 (OT)    Problem Specific Goal 1 (OT) Patient to verbalize 3 falls prevention methods with independence.  -SJ     Time Frame (Problem Specific Goal 1, OT) long term goal (LTG);by discharge  -     Progress/Outcome (Problem Specific Goal 1, OT) goal not met  -[facility] Name 02/09/23 0938          Therapy Assessment/Plan (OT)    Planned Therapy Interventions (OT) activity tolerance training;adaptive equipment training;BADL retraining;cognitive/visual perception retraining;edema control/reduction;manual therapy/joint mobilization;IADL retraining;functional balance retraining;occupation/activity based interventions;passive ROM/stretching;patient/caregiver education/training;neuromuscular control/coordination retraining;transfer/mobility retraining;strengthening exercise;ROM/therapeutic exercise  -           User Key  (r) = Recorded By, (t) = Taken By, (c) = Cosigned By    Initials Name Provider Type     Sean Alvarez OT Occupational Therapist               Clinical Impression     Row Name 02/09/23 0938          Pain Assessment    Pretreatment Pain Rating 0/10 - no pain  -     Posttreatment Pain Rating 0/10  - no pain  -     Row Name 02/09/23 0938          Plan of Care Review    Plan of Care Reviewed With patient  -     Outcome Evaluation OT eval complete, co-eval with PT, supine in bed upon arrival. Alert x4, agreeable for evaluation. Per RN, patient was dizzy earlier at her ECHO. Patient reported light dizziness supine in bed, but agreeable for EOB sitting. Supine to sit with min A, patient reports increased dizziness. Attempted to take BP, however, patient face flushed, presenting with s/s of hypotension. Patient returned supine dependent x 2, trendelenberg, BP: 105/66 . APRN in room, notified, also notified RN. Once supine, patient more responsive, answering questions, reports feeling at baseline with light dizzness. Patient presents with decreased upright tolerance, decreased safety in transfers, decreased independence in ADLs, and decreased strenght. Cont inpatient OT. Recommend cont therapy at d/c, based on current presentation, rehab to home recommended.  -     Row Name 02/09/23 0938          Therapy Assessment/Plan (OT)    Patient/Family Therapy Goal Statement (OT) return home  -     Rehab Potential (OT) fair, will monitor progress closely  -     Criteria for Skilled Therapeutic Interventions Met (OT) yes;skilled treatment is necessary  -     Therapy Frequency (OT) other (see comments)  5-7 d/wk  -     Predicted Duration of Therapy Intervention (OT) until d/c or all goals met  -     Row Name 02/09/23 0938          Therapy Plan Review/Discharge Plan (OT)    Anticipated Discharge Disposition (OT) skilled nursing facility;LT (long-term care [facility])  -     Row Name 02/09/23 0938          Vital Signs    Pre Systolic BP Rehab 118  -SJ     Pre Treatment Diastolic BP 67  -SJ     Post Systolic BP Rehab 105  -SJ     Post Treatment Diastolic BP 66  -SJ     Pretreatment Heart Rate (beats/min) 125  -SJ     Posttreatment Heart Rate (beats/min) 124  -SJ     Pre SpO2 (%) 93  -SJ     O2 Delivery Pre  Treatment room air  -SJ     Post SpO2 (%) 93  -SJ     O2 Delivery Post Treatment room air  -SJ     Pre Patient Position Supine  -SJ     Post Patient Position Supine  -SJ     Row Name 02/09/23 0938          Positioning and Restraints    Pre-Treatment Position in bed  -SJ     Post Treatment Position bed  -SJ     In Bed notified nsg;supine;call light within reach;encouraged to call for assist;exit alarm on;side rails up x2  -SJ           User Key  (r) = Recorded By, (t) = Taken By, (c) = Cosigned By    Initials Name Provider Type    Sean Vargas, OT Occupational Therapist               Outcome Measures     Row Name 02/09/23 0938          How much help from another is currently needed...    Putting on and taking off regular lower body clothing? 1  -SJ     Bathing (including washing, rinsing, and drying) 2  -SJ     Toileting (which includes using toilet bed pan or urinal) 2  -SJ     Putting on and taking off regular upper body clothing 3  -SJ     Taking care of personal grooming (such as brushing teeth) 3  -SJ     Eating meals 4  -SJ     AM-PAC 6 Clicks Score (OT) 15  -SJ     Row Name 02/09/23 0940          How much help from another person do you currently need...    Turning from your back to your side while in flat bed without using bedrails? 3  -HM     Moving from lying on back to sitting on the side of a flat bed without bedrails? 3  -HM     Moving to and from a bed to a chair (including a wheelchair)? 1  -HM     Standing up from a chair using your arms (e.g., wheelchair, bedside chair)? 1  -HM     Climbing 3-5 steps with a railing? 1  -HM     To walk in hospital room? 1  -HM     AM-PAC 6 Clicks Score (PT) 10  -HM     Highest level of mobility 4 --> Transferred to chair/commode  -     Row Name 02/09/23 0940 02/09/23 0938       Functional Assessment    Outcome Measure Options AM-PAC 6 Clicks Basic Mobility (PT)  -HM AM-PAC 6 Clicks Daily Activity (OT)  -          User Key  (r) = Recorded By, (t) = Taken By,  (c) = Cosigned By    Initials Name Provider Type     Sean Alvarez OT Occupational Therapist     Gabriella Hayes, PT Physical Therapist                Occupational Therapy Education     Title: PT OT SLP Therapies (In Progress)     Topic: Occupational Therapy (In Progress)     Point: ADL training (In Progress)     Description:   Instruct learner(s) on proper safety adaptation and remediation techniques during self care or transfers.   Instruct in proper use of assistive devices.              Learning Progress Summary           Patient Acceptance, E,TB, NR by  at 2/9/2023 1014    Comment: POC, role of OT, transfer training                   Point: Home exercise program (Not Started)     Description:   Instruct learner(s) on appropriate technique for monitoring, assisting and/or progressing therapeutic exercises/activities.              Learner Progress:  Not documented in this visit.          Point: Precautions (Not Started)     Description:   Instruct learner(s) on prescribed precautions during self-care and functional transfers.              Learner Progress:  Not documented in this visit.          Point: Body mechanics (Not Started)     Description:   Instruct learner(s) on proper positioning and spine alignment during self-care, functional mobility activities and/or exercises.              Learner Progress:  Not documented in this visit.                      User Key     Initials Effective Dates Name Provider Type Discipline     06/14/21 -  Sean Alvarez OT Occupational Therapist OT              OT Recommendation and Plan  Planned Therapy Interventions (OT): activity tolerance training, adaptive equipment training, BADL retraining, cognitive/visual perception retraining, edema control/reduction, manual therapy/joint mobilization, IADL retraining, functional balance retraining, occupation/activity based interventions, passive ROM/stretching, patient/caregiver education/training, neuromuscular  control/coordination retraining, transfer/mobility retraining, strengthening exercise, ROM/therapeutic exercise  Therapy Frequency (OT): other (see comments) (5-7 d/wk)  Plan of Care Review  Plan of Care Reviewed With: patient  Outcome Evaluation: OT stewart complete, co-eval with PT, supine in bed upon arrival. Alert x4, agreeable for evaluation. Per RN, patient was dizzy earlier at her ECHO. Patient reported light dizziness supine in bed, but agreeable for EOB sitting. Supine to sit with min A, patient reports increased dizziness. Attempted to take BP, however, patient face flushed, presenting with s/s of hypotension. Patient returned supine dependent x 2, trendelenberg, BP: 105/66 . APRN in room, notified, also notified RN. Once supine, patient more responsive, answering questions, reports feeling at baseline with light dizzness. Patient presents with decreased upright tolerance, decreased safety in transfers, decreased independence in ADLs, and decreased strenght. Cont inpatient OT. Recommend cont therapy at d/c, based on current presentation, rehab to home recommended.     Time Calculation:    Time Calculation- OT     Row Name 02/09/23 1014             Time Calculation- OT    OT Start Time 0938  -      OT Stop Time 1005  -      OT Time Calculation (min) 27 min  -      OT Received On 02/09/23  -      OT Goal Re-Cert Due Date 02/22/23  -         Untimed Charges    OT Eval/Re-eval Minutes 27  -         Total Minutes    Untimed Charges Total Minutes 27  -       Total Minutes 27  -            User Key  (r) = Recorded By, (t) = Taken By, (c) = Cosigned By    Initials Name Provider Type     Sean Alvarez OT Occupational Therapist              Therapy Charges for Today     Code Description Service Date Service Provider Modifiers Qty    [id number] HC OT EVAL LOW COMPLEXITY 3 2/9/2023 Sean Alvarez OT GO 1               Sean Alvarez OT  2/9/2023

## 2023-02-09 NOTE — PLAN OF CARE
Goal Outcome Evaluation:  Plan of Care Reviewed With: patient        Progress: improving  Outcome Evaluation: Swallow evaluation completed. Pt reports cannot eat meats because they do not go down. Pt reports no other difficulty other than does not eat meats. Pt with good tolerance of regular cracker with good mastication and bolus management. SLP will order soft to chew with ground meats in case decides to try meat. Pt in agreement and diet order changed to reflect. ST to follow up 1-2 treatments to ensure diet safety and diet tolerance.

## 2023-02-09 NOTE — PLAN OF CARE
Goal Outcome Evaluation:  Plan of Care Reviewed With: patient        Progress: no change  Outcome Evaluation: Patient has multiple small bowel momvements. Stool sample collected and sent to lab. potassium was 3.4 and replaced. ef 56-60% will continue to monitor.

## 2023-02-09 NOTE — PLAN OF CARE
Goal Outcome Evaluation:  Plan of Care Reviewed With: patient           Outcome Evaluation: OT eval complete, co-eval with PT, supine in bed upon arrival. Alert x4, agreeable for evaluation. Per RN, patient was dizzy earlier at her ECHO. Patient reported light dizziness supine in bed, but agreeable for EOB sitting. Supine to sit with min A, patient reports increased dizziness. Attempted to take BP, however, patient face flushed, presenting with s/s of hypotension. Patient returned supine dependent x 2, trendelenberg, BP: 105/66 . APRN in room, notified, also notified RN. Once supine, patient more responsive, answering questions, reports feeling at baseline with light dizzness. Patient presents with decreased upright tolerance, decreased safety in transfers, decreased independence in ADLs, and decreased strenght. Cont inpatient OT. Recommend cont therapy at d/c, based on current presentation, rehab to home recommended.

## 2023-02-09 NOTE — PLAN OF CARE
Goal Outcome Evaluation:  Plan of Care Reviewed With: patient           Outcome Evaluation: PT Eval completed today. Co-Eval w/OT. Patient is agreeable to therapy but reports still feeling a little dizzy. Monitored VS throughout. Supine to sit Luke and required Luke for static sitting. Patient became more dizzy, face flushed and requested to lie down, dependent for sit > sup. Unable to get BP read in sitting but once lieing back down, BP was 106/66. LE strength was tested in supine w/ patient able to move all joints but not hold against resistance. APRN entered room and informed of BP and symptoms. Also notified RN. Once in supine again patient reported decreased dizziness. Deferred standing today and will continue to monitor VS and response to therapy. Patient would benefit as able from skilled PT during hospital stay to increase strength and endurance for improved functional mobility. At current level recommending SNF for rehab to home however if patient's function improves, may be appropriate for home with HHPT.

## 2023-02-09 NOTE — PROGRESS NOTES
[facility] Medicine Services  INPATIENT PROGRESS NOTE    Length of Stay: 0  Date of Admission: 2/8/2023  Primary Care Physician: Heydi Tineo APRN    Subjective   Chief Complaint: dizzy while working with therapy   HPI:  82 year old female with past medical history of atrial fibrillation, breast cancer, thyroid disease, HTN who presented on 2/8/23 with complaints of diarrhea, shortness of breath, weakness.  She is currently admitted for issues including atrial fibrillation with RVR, bibasilar atelectasis/pneumonia, diarrhea, hypokalemia, mild dehydration.  During today's visit, patient reports dizziness while working with therapy.  Blood pressure is slightly low and tachycardia noted.    Review of Systems   Constitutional: Negative for chills, fatigue and fever.   HENT: Negative for congestion, rhinorrhea and sore throat.    Respiratory: Negative for cough, chest tightness and shortness of breath.    Cardiovascular: Negative for chest pain, palpitations and leg swelling.   Gastrointestinal: Negative for abdominal pain, diarrhea, nausea and vomiting.   Musculoskeletal: Negative for back pain and neck pain.   Skin: Negative for pallor.   Neurological: Positive for dizziness and weakness.   Psychiatric/Behavioral: Negative for confusion. The patient is not nervous/anxious.         All pertinent negatives and positives are as above. All other systems have been reviewed and are negative unless otherwise stated.     Objective    Temp:  [95.8 °F (35.4 °C)-98 °F (36.7 °C)] 97.7 °F (36.5 °C)  Heart Rate:  [] 77  Resp:  [16-18] 18  BP: (110-126)/(59-81) 123/59    Physical Exam  Vitals and nursing note reviewed.   Constitutional:       General: She is not in acute distress.     Appearance: Normal appearance. She is not ill-appearing.   HENT:      Head: Normocephalic and atraumatic.      Right Ear: External ear normal. Decreased hearing noted.      Left Ear: External ear  normal. Decreased hearing noted.      Nose: Nose normal.      Mouth/Throat:      Mouth: Mucous membranes are moist.      Pharynx: Oropharynx is clear.   Eyes:      General: No scleral icterus.        Right eye: No discharge.         Left eye: No discharge.      Conjunctiva/sclera: Conjunctivae normal.   Cardiovascular:      Rate and Rhythm: Regular rhythm. Tachycardia present.      Heart sounds: Normal heart sounds. No murmur heard.    No friction rub. No gallop.   Pulmonary:      Effort: Pulmonary effort is normal. No respiratory distress.      Breath sounds: Normal breath sounds. No stridor. No wheezing, rhonchi or rales.   Abdominal:      General: Bowel sounds are normal. There is no distension.      Palpations: Abdomen is soft.      Tenderness: There is no abdominal tenderness.   Musculoskeletal:         General: No swelling. Normal range of motion.      Cervical back: Normal range of motion and neck supple.   Skin:     General: Skin is warm and dry.   Neurological:      General: No focal deficit present.      Mental Status: She is alert and oriented to person, place, and time.   Psychiatric:         Mood and Affect: Mood normal.         Behavior: Behavior normal.             Results Review:  I have reviewed the labs, radiology results, and diagnostic studies.    Laboratory Data:   Results from last 7 days   Lab Units 02/09/23  0636 02/08/23  1020   SODIUM mmol/L 143 140   POTASSIUM mmol/L 3.4* 3.8   CHLORIDE mmol/L 111* 105   CO2 mmol/L 21.0* 27.0   BUN mg/dL 15 15   CREATININE mg/dL 1.05* 1.03*   GLUCOSE mg/dL 115* 134*   CALCIUM mg/dL 8.1* 8.8   BILIRUBIN mg/dL  --  0.6   ALK PHOS U/L  --  90   ALT (SGPT) U/L  --  40*   AST (SGOT) U/L  --  50*   ANION GAP mmol/L 11.0 8.0     Estimated Creatinine Clearance: 39.2 mL/min (A) (by C-G formula based on SCr of 1.05 mg/dL (H)).  Results from last 7 days   Lab Units 02/09/23  0636 02/08/23  1020   MAGNESIUM mg/dL 1.7 2.0         Results from last 7 days   Lab Units  02/09/23  0636 02/08/23  1020   WBC 10*3/mm3 9.57 8.63   HEMOGLOBIN g/dL 12.0 12.7   HEMATOCRIT % 36.8 38.5   PLATELETS 10*3/mm3 190 224           Culture Data:   No results found for: BLOODCX  No results found for: URINECX  No results found for: RESPCX  No results found for: WOUNDCX  No results found for: STOOLCX  No components found for: BODYFLD    Radiology Data:   Imaging Results (Last 24 Hours)     Procedure Component Value Units Date/Time    CT Chest Without Contrast Diagnostic [[id number]] Collected: 02/08/23 1728     Updated: 02/08/23 1825    Narrative:      EXAM:    CT Chest Without Intravenous Contrast    CLINICAL HISTORY:    The patient is 82 years old and is Female; Pneumonia,  complication suspected, xray done, I48.91 Unspecified atrial  fibrillation R77.8 Other specified abnormalities of plasma  proteins    TECHNIQUE:    Axial computed tomography images of the chest without  intravenous contrast.  Sagittal and coronal reformatted images  were created and reviewed.  This CT exam was performed using one  or more of the following dose reduction techniques:  automated  exposure control, adjustment of the mA and/or kV according to  patient size, and/or use of iterative reconstruction technique.    COMPARISON:    Chest radiograph 2/8/2023.    FINDINGS:    LUNGS:  Mild bibasilar peripheral reticulations. Mild bibasilar  atelectasis.    PLEURAL SPACE:  Unremarkable.  No pneumothorax.  No significant  effusion.    HEART:  Moderate cardiomegaly.  No significant pericardial  effusion.  No significant coronary artery calcifications.    BONES/JOINTS:  Kyphosis.  No acute fracture.  No dislocation.    SOFT TISSUES:  Surgical clips in the right axilla.    VASCULATURE:  Unremarkable.  No thoracic aortic aneurysm.    LYMPH NODES:  Unremarkable.  No enlarged lymph nodes.    GALLBLADDER AND BILE DUCTS:  Cholecystectomy.    TUBES, LINES AND DEVICES:  Left-sided pacemaker.      Impression:      1.  Mild bibasilar  atelectasis. Superimposed infection cannot  excluded.  2.  Mild bibasilar scarring.  3.  Moderate cardiomegaly.    Electronically signed by:  Neda Koroma MD  2/8/2023 6:23 PM CST  Workstation: [id number]    XR Chest 2 View [[id number]] Collected: 02/08/23 1626     Updated: 02/08/23 1643    Narrative:      Chest x-ray PA and lateral     CLINICAL INDICATION: Shortness of breath. Tachycardia and  atrial  fibrillation    COMPARISON: Chest February 8, 2023. 10:38 AM.    FINDINGS: Cardiac silhouette is enlarged in size. Pulmonary  vascularity is unremarkable.   Pacemaker leads right atrium and right ventricle.    Surgical clips right axilla.  No focal infiltrate or consolidation.  No pleural effusion.  No  pneumothorax.      Impression:      Cardiomegaly. No evidence of active disease. No  change since prior exam.       Electronically signed by:  Alon Smith MD  2/8/2023 4:41 PM CST  Workstation: [id number]          I have reviewed the patient's current medications.     Assessment/Plan     Active Hospital Problems    Diagnosis    • **Atrial fibrillation, unspecified type (HCC)    • Severe malnutrition (HCC)        Plan:    1. Atrial fibrillation with RVR: currently rate controlled aside from slight rate increase while working with PT.  Continue Coreg, Amiodarone, Xarelto.   2. Bibasilar atelectasis/pneumonia: continue rocephin, doxycycline.   3. Diarrhea: check GI PCR  4. Hypokalemia: PO replacement ordered.   5. HTN: hypotension noted today.  BP meds held.   6. Weakness: Pt/OT following.     Medical Decision Making  Number and Complexity of problems: 6 moderate complexity     Conditions and Status:        Condition is improving.     [facility] Data  External documents reviewed: vital signs, labs including troponin, BMP, lactate, magnesium, CBC  My EKG interpretation: sinus tach  My CT interpretation: bibasilar atelectasis   Tests considered but not ordered: n/a     Decision rules/scores evaluated (example [id number], Wells,  etc): n/a      Discussed with: patient, charge RN, case management team.    Care Planning  Code status and discussions: full code status on file.     Disposition  Social Determinants of Health that impact treatment or disposition: none identified   I expect the patient to be discharged to home in 1-2 days.     This document has been electronically signed by DOC Rubio on February 9, 2023 16:17 CST

## 2023-02-09 NOTE — PROGRESS NOTES
"Adult Nutrition  Assessment/PES    Patient Name:  Sultana Lin  YOB: 1940  MRN: [id number]  Admit Date:  2/8/2023    Assessment Date:  2/9/2023    Comments:  Pt admitted with atrial fibrillation and chronic watery diarrhea. PMH of HTN and breast cancer. RDN staff seeing for PI, difficulty chewing/swallowing, and unintended wt loss. Pt state nkfa and denies N/V. Pt states she has a very poor appetite and has not been eating very much. Pt states she has difficulty swallowing. Granddaughter confirms difficulty swallowing and stated she has had her esophagus stretched in the past. Noted SLP consulted and modified diet to soft-to-chew: ground. Pt reports having chronic diarrhea for \"a long time\" - noted watery diarrhea for over one month that has gotten worse in the past two weeks.  Pt states #, per epic wt 12/2022 158#, # with a BMI 21. Pt meets ASPEN criteria for severe, acute malnutrition r/t significant wt loss and NFPE findings of muscle wasting and fat loss - See MSA. Suspect chronic malnutrition with wt in 10/2021 178#. Pt has increased needs r/t PI. Dietetic intern briefly discussed the importance of high-protein/high-calorie food for wound healing and preventing further weight loss. Pt interviewed for preferences. Pt drinks ensure at home but does not like boost products or CIB. Granddaughter stated she could bring her Ensure from home. Pt agreed to magic cup. RDN staff will follow hospital course.      Reason for Assessment     Row Name 02/09/23 1214          Reason for Assessment    Reason For Assessment identified at risk by screening criteria (P)      Identified At Risk by Screening Criteria MST SCORE 2+;difficulty chewing/swallowing;large or nonhealing wound, burn or pressure injury (P)                 Nutrition/Diet History     Row Name 02/09/23 1216          Nutrition/Diet History    Typical Intake (Food/Fluid/EN/PN) Pt states she has not had much of an appetite. " Reports difficulty swallowing. (P)      Supplemental Drinks/Foods/Additives Pt drinks ensure at home. Does not like boost or CIB. (P)      Factors Affecting Nutritional Intake appetite;difficulty/impaired swallowing (P)                 Labs/Tests/Procedures/Meds     Row Name 02/09/23 1218          Labs/Procedures/Meds    Lab Results Reviewed reviewed, pertinent (P)      Lab Results Comments glu 115, K 3.4, Cr 1.05, ALT 40, Alb 3.1 (P)         Medications    Pertinent Medications Reviewed reviewed, pertinent (P)      Pertinent Medications Comments Home medication: Questran (P)                   Estimated/Assessed Needs - Anthropometrics     Row Name 02/09/23 1219 02/09/23 0509       Anthropometrics    Weight -- 61.1 kg (134 lb 9.6 oz)    Weight for Calculation 60.8 kg (134 lb) (P)  --       Estimated/Assessed Needs    Additional Documentation Protein Requirements (Group);KCAL/KG (Group);Fluid Requirements (Group) (P)  --       KCAL/KG    KCAL/KG 25 Kcal/Kg (kcal) (P)  --    25 Kcal/Kg (kcal) 1519.55 (P)  --       Protein Requirements    Weight Used For Protein Calculations 60.8 kg (134 lb) (P)  --    Est Protein Requirement Amount (gms/kg) 1.1 gm protein (P)  --    Estimated Protein Requirements (gms/day) 66.86 (P)  --       Fluid Requirements    Fluid Requirements (mL/day) 1500 (P)  --    RDA Method (mL) 1500 (P)  --               Nutrition Prescription Ordered     Row Name 02/09/23 1220          Nutrition Prescription PO    Current PO Diet Soft Texture (P)      Texture Ground (P)      Common Modifiers Cardiac (P)                   Malnutrition Severity Assessment     Row Name 02/09/23 1220          Malnutrition Severity Assessment    Malnutrition Type Acute Disease or Injury - Related Malnutrition (P)         Insufficient Energy Intake     Insufficient Energy Intake Findings None (P)   Insufficient data        Unintentional Weight Loss     Unintentional Weight Loss Findings Severe (P)      Unintentional Weight Loss   Weight loss greater than 5% in one month (P)   12/2022 158, , 15.2% in two months        Muscle Loss    Loss of Muscle Mass Findings Severe (P)      [place] Region Severe - deep hollowing/scooping, lack of muscle to touch, facial bones well defined (P)      Clavicle Bone Region Moderate - some protrusion in females, visible in males (P)      Acromion Bone Region Moderate - acromion may slightly protrude (P)      Scapular Bone Region Severe - prominent bones, depressions easily visible between ribs, scapula, spine, shoulders (P)      Dorsal Hand Region Severe - prominent depression (P)      Patellar Region Severe - prominent bone, square looking, very little muscle definition (P)      Anterior Thigh Region Moderate - mild depression on inner thigh (P)      Posterior Calf Region Severe - thin with very little definition/firmness (P)         Fat Loss    Subcutaneous Fat Loss Findings Severe (P)      Orbital Region  Moderate -  somewhat hollowness, slightly dark circles (P)      Upper Arm Region Severe - mostly skin, very little space between folds, fingers touch (P)      Thoracic & Lumbar Region Severe - ribs visible with prominent depressions, iliac crest very prominent (P)         Criteria Met (Must meet criteria for severity in at least 2 of these categories: M Wasting, Fat Loss, Fluid, Secondary Signs, Wt. Status, Intake)    Patient meets criteria for  Severe Malnutrition (P)                  Problem/Interventions:   Problem 1     Row Name 02/09/23 1236 02/09/23 1229       Nutrition Diagnoses Problem 1    Problem 1 -- Unintended Weight Loss (P)     Macronutrient Kcal;Protein (P)  --    Etiology (related to) -- Factors Affecting Nutrition (P)     Appetite -- Poor (P)     Reported GI Symptoms -- Diarrhea (P)     Signs/Symptoms (evidenced by) -- % UBW;Unintended Weight Change;Report of Mnimal PO Intake (P)     Percent (%) UBW -- 75 % (P)     Unintended Weight Change -- Loss (P)     Number of Pounds Lost -- 15  (P)     Weight loss time period -- 2 months (P)                Problem 2     Row Name 02/09/23 1231          Nutrition Diagnoses Problem 2    Problem 2 Malnutrition (P)      Etiology (related to) Factors Affecting Nutrition (P)      Appetite Poor (P)      Reported GI Symptoms Diarrhea (P)      Signs/Symptoms (evidenced by) Unintended Weight Change;% UBW;Report of Minimal PO Intake;Biochemical (P)      Specific Labs Noted Albumin (P)   Alb 3.1     Unintended Weight Change Loss (P)                 Problem 3     Row Name 02/09/23 1232          Nutrition Diagnoses Problem 3    Problem 3 Swallowing Difficulty (P)      Etiology (related to) Factors Affecting Nutrition (P)      Reported/Observed By Patient (P)      Signs/Symptoms (evidenced by) SLP/Swallow eval (P)      Swallow eval status Done (P)      Type of SLP Evaluation Bedside (P)                 Problem 4     Row Name 02/09/23 1236          Nutrition Diagnoses Problem 4    Problem 4 Increased Nutrient Needs (P)      Etiology (related to) Medical Diagnosis (P)      Skin Pressure injury (P)      Signs/Symptoms (evidenced by) Report of Minimal PO Intake;Unintended Weight Change (P)         Nutrition Diagnoses Problem 1    Macronutrient Kcal;Protein (P)                 Intervention Goal     Row Name 02/09/23 1234          Intervention Goal    General Maintain nutrition;Meet nutritional needs for age/condition (P)      PO Increase intake;Meet estimated needs;Modify texture/consistency (P)      Weight Maintain weight (P)                 Nutrition Intervention     Row Name 02/09/23 1234          Nutrition Intervention    RD/Tech Action Advise alternate selection;Advise available snack;Interview for preference;Follow Tx progress;Recommend/ordered;Care plan reviewd (P)      Recommended/Ordered Supplement (P)                 Nutrition Prescription     Row Name 02/09/23 1235          Nutrition Prescription PO    PO Prescription Begin/change supplement (P)      Supplement  Magic Cup (P)      Supplement Frequency 3 times a day (P)                 Education/Evaluation     Row Name 02/09/23 1236          Education    Education Provided education regarding;Education topics (P)      Provided education regarding Diet rationale (P)      Education Topics Basic nutrition;Weight management - maintain;Protein (P)         Monitor/Evaluation    Monitor Per protocol;PO intake;Supplement intake;Weight;Skin status;Symptoms (P)                  Electronically signed by:  Johana Delgado  02/09/23 12:38 CST

## 2023-02-09 NOTE — PLAN OF CARE
Goal Outcome Evaluation:      Pt has had several BM last night which Is chronic for her. Asked MD to reorder home meds of Questran light.   Pt HR still between afib/ paced on monitor.   Wound care consult placed for ongoing skin issues related to chronic diarrhea. Zinc cream applied multiple times overnight.   Abx started for pneumonia. Pt on roomair at this time.

## 2023-02-09 NOTE — THERAPY EVALUATION
Patient Name: Sultana Lin  : 1940    MRN: [id number]                              Today's Date: 2023       Admit Date: 2023    Visit Dx:     ICD-10-CM ICD-9-CM   1. Atrial fibrillation, unspecified type (HCC)  I48.91 427.31   2. Elevated troponin  R77.8 790.6   3. Impaired mobility and ADLs  Z74.09 V49.89    Z78.9    4. Impaired functional mobility, balance, gait, and endurance  Z74.09 V49.89     Patient Active Problem List   Diagnosis   • Hypothyroidism   • Hypertension   • Paroxysmal atrial fibrillation (HCC)   • Paroxysmal atrial fibrillation with rapid ventricular response (HCC)   • Acute cholecystitis   • Nausea   • Tachycardia   • Hypokalemia   • Atrial fibrillation, unspecified type (HCC)     Past Medical History:   Diagnosis Date   • Arthritis    • Atrial fibrillation (HCC)    • Atrial fibrillation (HCC)    • Atrial fibrillation with rapid ventricular response (HCC) 2017   • Breast cancer (HCC)     right   • Cancer (HCC)    • Disease of thyroid gland    • Hypertension      Past Surgical History:   Procedure Laterality Date   • ABLATION OF DYSRHYTHMIC FOCUS     • BREAST SURGERY     • CARDIAC ELECTROPHYSIOLOGY PROCEDURE N/A 2017    Procedure: EP/Ablation;  Surgeon: Blake Mcallister MD;  Location: [facility] INVASIVE LOCATION;  Service:    • CARDIOVERSION     • CATARACT EXTRACTION W/ INTRAOCULAR LENS IMPLANT Right 2019    Procedure: REMOVE CATARACT AND IMPLANT INTRAOCULAR LENS;  Surgeon: Lenin Dennison MD;  Location: [facility] OR;  Service: Ophthalmology   • CATARACT EXTRACTION W/ INTRAOCULAR LENS IMPLANT Left 2019    Procedure: REMOVE CATARACT AND IMPLANT INTRAOCULAR LENS;  Surgeon: Lenin Dennison MD;  Location: [facility] OR;  Service: Ophthalmology   • CHOLECYSTECTOMY WITH INTRAOPERATIVE CHOLANGIOGRAM N/A 2020    Procedure: LAPAROSCOPIC CHOLECYSTECTOMY WITH INTRAOPERATIVE CHOLANGIOGRAM;  Surgeon: Denilson Richardson MD;  Location: [facility] OR;  Service:  General;  Laterality: N/A;   • ENDOSCOPY N/A 6/9/2020    Procedure: ESOPHAGOGASTRODUODENOSCOPY;  Surgeon: Albina Ya MD;  Location: [facility] ENDOSCOPY;  Service: Gastroenterology;  Laterality: N/A;   • INSERT / REPLACE / REMOVE PACEMAKER     • PACEMAKER IMPLANTATION     • SKIN BIOPSY     • SKIN TAG REMOVAL     • UPPER GASTROINTESTINAL ENDOSCOPY  06/09/2020      General Information     Row Name 02/09/23 0942          Physical Therapy Time and Intention    Document Type evaluation  -     Mode of Treatment co-treatment;physical therapy;occupational therapy  -     Row Name 02/09/23 0942          General Information    Patient Profile Reviewed yes  -     Prior Level of Function independent:;all household mobility;transfer;bed mobility;min assist:;dressing;bathing  -     Existing Precautions/Restrictions pacemaker;fall  -     Barriers to Rehab medically complex  -     Row Name 02/09/23 0942          Living Environment    People in Home spouse;child(nieves), adult  -     Row Name 02/09/23 0942          Home Main Entrance    Number of Stairs, Main Entrance one  -     Stair Railings, Main Entrance railing on right side (ascending)  -     Row Name 02/09/23 0942          Stairs Within Home, Primary    Stairs, Within Home, Primary Someone is with patient at all times whether son, grandaughter, etc. Has a tub shower with chair and grab bars. Has a bedside comode but doesn't use it.  -     Number of Stairs, Within Home, Primary none  -     Row Name 02/09/23 0942          Cognition    Orientation Status (Cognition) oriented x 4  -     Row Name 02/09/23 0942          Safety Issues, Functional Mobility    Safety Issues Affecting Function (Mobility) ability to follow commands;insight into deficits/self-awareness;safety precautions follow-through/compliance;safety precaution awareness;problem-solving  -     Impairments Affecting Function (Mobility) balance;endurance/activity tolerance;strength  -            User Key  (r) = Recorded By, (t) = Taken By, (c) = Cosigned By    Initials Name Provider Type     Gabriella Hayes PT Physical Therapist               Mobility     Row Name 02/09/23 1003          Bed Mobility    Bed Mobility supine-sit;sit-supine  -     Supine-Sit [place] (Bed Mobility) minimum assist (75% patient effort)  -     Sit-Supine [place] (Bed Mobility) dependent (less than 25% patient effort)  -     Comment, (Bed Mobility) Dep sit > sup due to feeling dizzy and possibly hypotensive but couldn't get a reading  -     Row Name 02/09/23 1003          Sit-Stand Transfer    Sit-Stand [place] (Transfers) unable to assess  -     Row Name 02/09/23 1003          Gait/Stairs (Locomotion)    [place] Level (Gait) unable to assess  -           User Key  (r) = Recorded By, (t) = Taken By, (c) = Cosigned By    Initials Name Provider Type     Gabriella Hayes PT Physical Therapist               Obj/Interventions     Row Name 02/09/23 1005          Range of Motion Comprehensive    General Range of Motion bilateral lower extremity ROM WFL  -     Row Name 02/09/23 1005          Strength Comprehensive (MMT)    General Manual Muscle Testing (MMT) Assessment lower extremity strength deficits identified  -     Comment, General Manual Muscle Testing (MMT) Assessment BLE 3+/5 grossly  -     Row Name 02/09/23 1005          Balance    Balance Assessment sitting static balance  -     Static Sitting Balance minimal assist  -     Position, Sitting Balance sitting edge of bed  -     Row Name 02/09/23 1005          Sensory Assessment (Somatosensory)    Sensory Assessment (Somatosensory) LE sensation intact  -           User Key  (r) = Recorded By, (t) = Taken By, (c) = Cosigned By    Initials Name Provider Type     Gabriella Hayes PT Physical Therapist               Goals/Plan     Row Name 02/09/23 0940          Bed Mobility Goal 1 (PT)    Activity/Assistive Device (Bed  Mobility Goal 1, PT) bed mobility activities, all  -HM     [place] Level/Cues Needed (Bed Mobility Goal 1, PT) contact guard required  -HM     Time Frame (Bed Mobility Goal 1, PT) by discharge  -HM     Progress/Outcomes (Bed Mobility Goal 1, PT) new goal;goal not met  -     Row Name 02/09/23 0940          Transfer Goal 1 (PT)    Activity/Assistive Device (Transfer Goal 1, PT) sit-to-stand/stand-to-sit  -HM     [place] Level/Cues Needed (Transfer Goal 1, PT) minimum assist (75% or more patient effort)  -HM     Time Frame (Transfer Goal 1, PT) by discharge  -     Strategies/Barriers (Transfers Goal 1, PT) to FWW, asymptomatic and with VSS stable  -HM     Progress/Outcome (Transfer Goal 1, PT) new goal;goal not met  -     Row Name 02/09/23 0940          Gait Training Goal 1 (PT)    Activity/Assistive Device (Gait Training Goal 1, PT) increase endurance/gait distance  -HM     [place] Level (Gait Training Goal 1, PT) minimum assist (75% or more patient effort)  -HM     Distance (Gait Training Goal 1, PT) 5' to get to bathroom using FWW, VSS and asymptomatic  -HM     Time Frame (Gait Training Goal 1, PT) by discharge  -HM     Progress/Outcome (Gait Training Goal 1, PT) new goal;goal not met  -     Row Name 02/09/23 0940          Problem Specific Goal 1 (PT)    Problem Specific Goal 1 (PT) Improve LE strength to 4/5  -HM     Time Frame (Problem Specific Goal 1, PT) by discharge  -     Progress/Outcome (Problem Specific Goal 1, PT) new goal;goal not met  -     Row Name 02/09/23 0940          Therapy Assessment/Plan (PT)    Planned Therapy Interventions (PT) balance training;bed mobility training;gait training;neuromuscular re-education;patient/family education;postural re-education;strengthening;transfer training  -           User Key  (r) = Recorded By, (t) = Taken By, (c) = Cosigned By    Initials Name Provider Type    Gabriella Wilson, PT Physical Therapist               Clinical  Impression     Row Name 02/09/23 0942          Pain    Pretreatment Pain Rating 0/10 - no pain  -     Row Name 02/09/23 0942          Plan of Care Review    Plan of Care Reviewed With patient  -     Outcome Evaluation PT [place] completed today. Co-[place] w/OT. Patient is agreeable to therapy but reports still feeling a little dizzy. Monitored VS throughout. Supine to sit Luke and required Luke for static sitting. Patient became more dizzy, face flushed and requested to lie down, dependent for sit > sup. Unable to get BP read in sitting but once lieing back down, BP was 106/66. LE strength was tested in supine w/ patient able to move all joints but not hold against resistance. APRN entered room and informed of BP and symptoms. Also notified RN. Once in supine again patient reported decreased dizziness. Deferred standing today and will continue to monitor VS and response to therapy. Patient would benefit as able from skilled PT during hospital stay to increase strength and endurance for improved functional mobility. At current level recommending SNF for rehab to home however if patient's function improves, may be appropriate for home with HHPT.  -     Row Name 02/09/23 0942          Therapy Assessment/Plan (PT)    Rehab Potential (PT) good, to achieve stated therapy goals  -     Criteria for Skilled Interventions Met (PT) yes;meets criteria;skilled treatment is necessary  -     Therapy Frequency (PT) --  5-7 d/wk  -     Row Name 02/09/23 0942          Vital Signs    Pre Systolic BP Rehab 118  -HM     Pre Treatment Diastolic BP 67  -HM     Post Systolic BP Rehab 105   -HM     Post Treatment Diastolic BP 66   -HM     Pretreatment Heart Rate (beats/min) 125   -HM     Posttreatment Heart Rate (beats/min) 124  -HM     Pre SpO2 (%) 93  -HM     O2 Delivery Pre Treatment room air  -HM     Post SpO2 (%) 93  -HM     O2 Delivery Post Treatment room air  -HM     Pre Patient Position Supine  -HM     Intra Patient Position  Sitting  -     Post Patient Position Supine  -     Row Name 02/09/23 0942          Positioning and Restraints    Pre-Treatment Position in bed  -     Post Treatment Position bed  -HM     In Bed notified nsg;supine;call light within reach;encouraged to call for assist;exit alarm on  -           User Key  (r) = Recorded By, (t) = Taken By, (c) = Cosigned By    Initials Name Provider Type     Gabriella Hayes PT Physical Therapist               Outcome Measures     Row Name 02/09/23 0940          How much help from another person do you currently need...    Turning from your back to your side while in flat bed without using bedrails? 3  -HM     Moving from lying on back to sitting on the side of a flat bed without bedrails? 3  -HM     Moving to and from a bed to a chair (including a wheelchair)? 1  -HM     Standing up from a chair using your arms (e.g., wheelchair, bedside chair)? 1  -HM     Climbing 3-5 steps with a railing? 1  -HM     To walk in hospital room? 1  -     AM-PAC 6 Clicks Score (PT) 10  -     Highest level of mobility 4 --> Transferred to chair/commode  -     Row Name 02/09/23 0940 02/09/23 0938       Functional Assessment    Outcome Measure Options AM-PAC 6 Clicks Basic Mobility (PT)  - AM-PAC 6 Clicks Daily Activity (OT)  -          User Key  (r) = Recorded By, (t) = Taken By, (c) = Cosigned By    Initials Name Provider Type     Sean Alvarez, OT Occupational Therapist     Gabriella Hayes, CHRISTA Physical Therapist                             Physical Therapy Education     Title: PT OT SLP Therapies (In Progress)     Topic: Physical Therapy (In Progress)     Point: Mobility training (In Progress)     Learning Progress Summary           Patient Acceptance, E, NR by  at 2/9/2023 1012    Comment: POC and goals                   Point: Home exercise program (Not Started)     Learner Progress:  Not documented in this visit.          Point: Body mechanics (Not Started)      Learner Progress:  Not documented in this visit.          Point: Precautions (Not Started)     Learner Progress:  Not documented in this visit.                      User Key     Initials Effective Dates Name Provider Type Discipline     01/09/23 -  Gabriella Hayes PT Physical Therapist PT              PT Recommendation and Plan  Planned Therapy Interventions (PT): balance training, bed mobility training, gait training, neuromuscular re-education, patient/family education, postural re-education, strengthening, transfer training  Plan of Care Reviewed With: patient  Outcome Evaluation: PT Eval completed today. Co-Eval w/OT. Patient is agreeable to therapy but reports still feeling a little dizzy. Monitored VS throughout. Supine to sit Luke and required Luke for static sitting. Patient became more dizzy, face flushed and requested to lie down, dependent for sit > sup. Unable to get BP read in sitting but once lieing back down, BP was 106/66. LE strength was tested in supine w/ patient able to move all joints but not hold against resistance. APRN entered room and informed of BP and symptoms. Also notified RN. Once in supine again patient reported decreased dizziness. Deferred standing today and will continue to monitor VS and response to therapy. Patient would benefit as able from skilled PT during hospital stay to increase strength and endurance for improved functional mobility. At current level recommending SNF for rehab to home however if patient's function improves, may be appropriate for home with HHPT.     Time Calculation:    PT Charges     Row Name 02/09/23 0940             Time Calculation    Start Time 0940  -HM      Stop Time 1013  -HM      Time Calculation (min) 33 min  -HM      PT Received On 02/09/23  -      PT Goal Re-Cert Due Date 02/22/23  -         Untimed Charges    PT Eval/Re-eval Minutes 33  -HM         Total Minutes    Untimed Charges Total Minutes 33  -HM       Total Minutes 33  -HM             User Key  (r) = Recorded By, (t) = Taken By, (c) = Cosigned By    Initials Name Provider Type     Gabriella Hayes, PT Physical Therapist              Therapy Charges for Today     Code Description Service Date Service Provider Modifiers Qty    [id number] HC PT EVAL MOD COMPLEXITY 2 2/9/2023 Gabriella Hayes, PT GP 1          PT G-Codes  Outcome Measure Options: AM-PAC 6 Clicks Basic Mobility (PT)  AM-PAC 6 Clicks Score (PT): 10  AM-PAC 6 Clicks Score (OT): 15  PT Discharge Summary  Anticipated Discharge Disposition (PT): home with assist, home with home health, skilled nursing facility    Gabriella Hayes PT  2/9/2023

## 2023-02-09 NOTE — NURSING NOTE
Patient has stage II to left buttock that measures approximately 2 x 2 cm; wound bed granulating.  Stage I to right buttock and severe excoriation to gluteal fold extending into perineum.  Patient needs wound care, offloading and protection. Present on admission.

## 2023-02-09 NOTE — PLAN OF CARE
Goal Outcome Evaluation:  Plan of Care Reviewed With: (P) patient, family            Pt admitted with atrial fibrillation and chronic watery diarrhea. RDN staff seeing for PI, difficulty chewing/swallowing, and unintended wt loss.  Pt states she has difficulty swallowing. Noted SLP consulted and modified diet to soft-to-chew: ground. Pt states #, per epic wt 12/2022 158#, # with a BMI 21. Pt meets ASPEN criteria for severe, acute malnutrition - See MSA. Suspect chronic malnutrition with wt in 10/2021 178#. Briefly discussed the importance of high-protein/high-calorie food. Pt drinks ensure at home but does not like boost products or CIB. Pt agreed to magic cup. RDN staff will follow hospital course.

## 2023-02-10 VITALS
OXYGEN SATURATION: 93 % | WEIGHT: 141.4 LBS | HEIGHT: 67 IN | BODY MASS INDEX: 22.19 KG/M2 | DIASTOLIC BLOOD PRESSURE: 60 MMHG | HEART RATE: 61 BPM | TEMPERATURE: 97.3 F | RESPIRATION RATE: 16 BRPM | SYSTOLIC BLOOD PRESSURE: 121 MMHG

## 2023-02-10 LAB
ANION GAP SERPL CALCULATED.3IONS-SCNC: 9 MMOL/L (ref 5–15)
BASOPHILS # BLD AUTO: 0.08 10*3/MM3 (ref 0–0.2)
BASOPHILS NFR BLD AUTO: 0.9 % (ref 0–1.5)
BUN SERPL-MCNC: 15 MG/DL (ref 8–23)
BUN/CREAT SERPL: 15.3 (ref 7–25)
CALCIUM SPEC-SCNC: 8.1 MG/DL (ref 8.6–10.5)
CHLORIDE SERPL-SCNC: 111 MMOL/L (ref 98–107)
CO2 SERPL-SCNC: 23 MMOL/L (ref 22–29)
CREAT SERPL-MCNC: 0.98 MG/DL (ref 0.57–1)
DEPRECATED RDW RBC AUTO: 54.7 FL (ref 37–54)
EGFRCR SERPLBLD CKD-EPI 2021: 57.4 ML/MIN/1.73
EOSINOPHIL # BLD AUTO: 0.31 10*3/MM3 (ref 0–0.4)
EOSINOPHIL NFR BLD AUTO: 3.7 % (ref 0.3–6.2)
ERYTHROCYTE [DISTWIDTH] IN BLOOD BY AUTOMATED COUNT: 15.9 % (ref 12.3–15.4)
GLUCOSE SERPL-MCNC: 98 MG/DL (ref 65–99)
HCT VFR BLD AUTO: 35 % (ref 34–46.6)
HGB BLD-MCNC: 11.2 G/DL (ref 12–15.9)
IMM GRANULOCYTES # BLD AUTO: 0.11 10*3/MM3 (ref 0–0.05)
IMM GRANULOCYTES NFR BLD AUTO: 1.3 % (ref 0–0.5)
LYMPHOCYTES # BLD AUTO: 1.81 10*3/MM3 (ref 0.7–3.1)
LYMPHOCYTES NFR BLD AUTO: 21.3 % (ref 19.6–45.3)
MAGNESIUM SERPL-MCNC: 1.7 MG/DL (ref 1.6–2.4)
MCH RBC QN AUTO: 29.7 PG (ref 26.6–33)
MCHC RBC AUTO-ENTMCNC: 32 G/DL (ref 31.5–35.7)
MCV RBC AUTO: 92.8 FL (ref 79–97)
MONOCYTES # BLD AUTO: 0.58 10*3/MM3 (ref 0.1–0.9)
MONOCYTES NFR BLD AUTO: 6.8 % (ref 5–12)
NEUTROPHILS NFR BLD AUTO: 5.6 10*3/MM3 (ref 1.7–7)
NEUTROPHILS NFR BLD AUTO: 66 % (ref 42.7–76)
NRBC BLD AUTO-RTO: 0 /100 WBC (ref 0–0.2)
PLATELET # BLD AUTO: 192 10*3/MM3 (ref 140–450)
PMV BLD AUTO: 10.9 FL (ref 6–12)
POTASSIUM SERPL-SCNC: 3.7 MMOL/L (ref 3.5–5.2)
RBC # BLD AUTO: 3.77 10*6/MM3 (ref 3.77–5.28)
SODIUM SERPL-SCNC: 143 MMOL/L (ref 136–145)
WBC NRBC COR # BLD: 8.49 10*3/MM3 (ref 3.4–10.8)

## 2023-02-10 PROCEDURE — 85025 COMPLETE CBC W/AUTO DIFF WBC: CPT | Performed by: INTERNAL MEDICINE

## 2023-02-10 PROCEDURE — G0378 HOSPITAL OBSERVATION PER HR: HCPCS

## 2023-02-10 PROCEDURE — 80048 BASIC METABOLIC PNL TOTAL CA: CPT | Performed by: INTERNAL MEDICINE

## 2023-02-10 PROCEDURE — 83735 ASSAY OF MAGNESIUM: CPT | Performed by: INTERNAL MEDICINE

## 2023-02-10 PROCEDURE — 97535 SELF CARE MNGMENT TRAINING: CPT

## 2023-02-10 RX ORDER — CEFDINIR 300 MG/1
300 CAPSULE ORAL 2 TIMES DAILY
Qty: 8 CAPSULE | Refills: 0 | Status: SHIPPED | OUTPATIENT
Start: 2023-02-10 | End: 2023-02-14

## 2023-02-10 RX ORDER — DOXYCYCLINE HYCLATE 100 MG/1
100 CAPSULE ORAL 2 TIMES DAILY
Qty: 8 CAPSULE | Refills: 0 | Status: SHIPPED | OUTPATIENT
Start: 2023-02-10 | End: 2023-02-14

## 2023-02-10 RX ADMIN — LEVOTHYROXINE SODIUM 50 MCG: 50 TABLET ORAL at 05:19

## 2023-02-10 RX ADMIN — CARVEDILOL 25 MG: 25 TABLET, FILM COATED ORAL at 08:07

## 2023-02-10 RX ADMIN — ROSUVASTATIN CALCIUM 10 MG: 10 TABLET, FILM COATED ORAL at 08:06

## 2023-02-10 RX ADMIN — AMIODARONE HYDROCHLORIDE 200 MG: 200 TABLET ORAL at 08:06

## 2023-02-10 RX ADMIN — DOXYCYCLINE 100 MG: 100 CAPSULE ORAL at 08:06

## 2023-02-10 RX ADMIN — DILTIAZEM HYDROCHLORIDE 180 MG: 180 CAPSULE, COATED, EXTENDED RELEASE ORAL at 08:06

## 2023-02-10 RX ADMIN — NYSTATIN OINTMENT 1 APPLICATION: 100000 OINTMENT TOPICAL at 08:07

## 2023-02-10 RX ADMIN — Medication 10 ML: at 08:10

## 2023-02-10 NOTE — DISCHARGE PLACEMENT REQUEST
"Cristiano Martin (82 y.o. Female)     Date of Birth   1940    Social Security Number       Address   [address] [place] [address]    Home Phone   [phone]    MRN   [id number]       Cheondoism   Anglican    Marital Status                               Admission Date   2/8/23    Admission Type   Emergency    Admitting Provider       Attending Provider   Bryon Cuenca MD    Department, Room/Bed   [address], [facility]/1       Discharge Date       Discharge Disposition   Home-Health Care [facility]    Discharge Destination                               Attending Provider: Bryon Cuenca MD    Allergies: Perflutren Lipid Microsphere, Penicillins    Isolation: None   Infection: None   Code Status: CPR    Ht: 170.2 cm (67\")   Wt: 64.1 kg (141 lb 6.4 oz)    Admission Cmt: None   Principal Problem: Atrial fibrillation, unspecified type (HCC) [I48.91]                 Active Insurance as of 2/8/2023     Primary Coverage     Payor Plan Insurance Group Employer/Plan Group    MEDICARE MEDICARE A & B      Payor Plan Address Payor Plan Phone Number Payor Plan Fax Number Effective Dates    [address] [phone]  10/1/2004 - None Entered    [facility] [id number]       Subscriber Name Subscriber Birth Date Member ID       CRISTIANO MARTIN 1940 [id number]           Secondary Coverage     Payor Plan Insurance Group Employer/Plan Group    [facility] [id number]     Payor Plan Address Payor Plan Phone Number Payor Plan Fax Number Effective Dates    [address] [address] [phone]  11/1/2015 - None Entered    [facility] [id number]       Subscriber Name Subscriber Birth Date Member ID       ALLAN MARTIN 1940 [id number]                 Emergency Contacts      (Rel.) Home Phone Work Phone Mobile Phone    Jaki Hilario (Daughter) [phone] -- [phone]    farhana barney (Grandchild) [phone] -- [phone]        [facility] " "[address] [address]  Dept. Phone:  [phone]  Dept. Fax:  [phone] Date Ordered: Feb 10, 2023         Patient:  Sultana Lin MRN:  [id number]   [address] [facility] [address] :  1940  SSN:    Phone: [phone] Sex:  F     Weight: 64.1 kg (141 lb 6.4 oz)         Ht Readings from Last 1 Encounters:   23 170.2 cm (67\")         Miscellaneous DME   (Order ID: [id number])    Diagnosis:  Impaired functional mobility, balance, gait, and endurance (Z74.09 [ICD-10-CM] V49.89 [ICD-9-CM])   Quantity:  1     Type of DME: hospital bed  Length of Need (99 Months = Lifetime): 99 Months = Lifetime        Authorizing Provider's Phone: [phone]  Authorizing Provider:Emma Smart APRN  Authorizing Provider's NPI: [id number]  Order Entered By: Emma Smart APRN 2/10/2023  9:46 AM     Electronically signed by: Emma Smart APRN 2/10/2023  9:46 AM         Insurance Information                MEDICARE/MEDICARE A & B Phone: [phone]    Subscriber: Sultana Lin Subscriber#: [id number]    Group#: -- Precert#: --        UM[employer] Phone: [phone]    Subscriber: Alex Lin Subscriber#: [id number]    Group#: [id number] Precert#: --          "

## 2023-02-10 NOTE — PLAN OF CARE
Goal Outcome Evaluation:  Plan of Care Reviewed With: patient        Progress: improving     VSS. Fluid intake encouraged with patient. No acute events overnight.

## 2023-02-10 NOTE — DISCHARGE SUMMARY
[facility]  DISCHARGE SUMMARY       Date of Admission: 2/8/2023  Date of Discharge:  2/10/2023  Primary Care Physician: Heydi Tineo APRN    Presenting Problem/History of Present Illness:  Elevated troponin [R77.8]  Atrial fibrillation, unspecified type (HCC) [I48.91]       Final Discharge Diagnoses:  Active Hospital Problems    Diagnosis    • **Atrial fibrillation, unspecified type (HCC)    • Severe malnutrition (HCC)        Consults:   Consults     No orders found from 1/10/2023 to 2/9/2023.          Procedures Performed:                 Pertinent Test Results:   Lab Results (last 24 hours)     Procedure Component Value Units Date/Time    Basic Metabolic Panel [[id number]]  (Abnormal) Collected: 02/10/23 0558    Specimen: Blood Updated: 02/10/23 0633     Glucose 98 mg/dL      BUN 15 mg/dL      Creatinine 0.98 mg/dL      Sodium 143 mmol/L      Potassium 3.7 mmol/L      Chloride 111 mmol/L      CO2 23.0 mmol/L      Calcium 8.1 mg/dL      BUN/Creatinine Ratio 15.3     Anion Gap 9.0 mmol/L      eGFR 57.4 mL/min/1.73     Narrative:      GFR Normal >60  Chronic Kidney Disease <60  Kidney Failure <15    The GFR formula is only valid for adults with stable renal function between ages 18 and 70.    Magnesium [[id number]]  (Normal) Collected: 02/10/23 0558    Specimen: Blood Updated: 02/10/23 0633     Magnesium 1.7 mg/dL     CBC & Differential [[id number]]  (Abnormal) Collected: 02/10/23 0558    Specimen: Blood Updated: 02/10/23 0610    Narrative:      The following orders were created for panel order CBC & Differential.  Procedure                               Abnormality         Status                     ---------                               -----------         ------                     CBC Auto Differential[[id number]]        Abnormal            Final result                 Please view results for these tests on the individual orders.    CBC Auto  Differential [[id number]]  (Abnormal) Collected: 02/10/23 0558    Specimen: Blood Updated: 02/10/23 0610     WBC 8.49 10*3/mm3      RBC 3.77 10*6/mm3      Hemoglobin 11.2 g/dL      Hematocrit 35.0 %      MCV 92.8 fL      MCH 29.7 pg      MCHC 32.0 g/dL      RDW 15.9 %      RDW-SD 54.7 fl      MPV 10.9 fL      Platelets 192 10*3/mm3      Neutrophil % 66.0 %      Lymphocyte % 21.3 %      Monocyte % 6.8 %      Eosinophil % 3.7 %      Basophil % 0.9 %      Immature Grans % 1.3 %      Neutrophils, Absolute 5.60 10*3/mm3      Lymphocytes, Absolute 1.81 10*3/mm3      Monocytes, Absolute 0.58 10*3/mm3      Eosinophils, Absolute 0.31 10*3/mm3      Basophils, Absolute 0.08 10*3/mm3      Immature Grans, Absolute 0.11 10*3/mm3      nRBC 0.0 /100 WBC     Blood Culture - Blood, Hand, Right [[id number]]  (Normal) Collected: 02/08/23 2039    Specimen: Blood from Hand, Right Updated: 02/09/23 2100     Blood Culture No growth at 24 hours    Blood Culture - Blood, Hand, Left [[id number]]  (Normal) Collected: 02/08/23 2039    Specimen: Blood from Hand, Left Updated: 02/09/23 2100     Blood Culture No growth at 24 hours    Gastrointestinal Panel, PCR - Stool, Per Rectum [[id number]]  (Normal) Collected: 02/09/23 1534    Specimen: Stool from Per Rectum Updated: 02/09/23 1756     Campylobacter Not Detected     Plesiomonas shigelloides Not Detected     Salmonella Not Detected     Vibrio Not Detected     Vibrio cholerae Not Detected     Yersinia enterocolitica Not Detected     Enteroaggregative E. coli (EAEC) Not Detected     Enteropathogenic E. coli (EPEC) Not Detected     Enterotoxigenic E. coli (ETEC) lt/st Not Detected     Shiga-like toxin-producing E. coli (STEC) stx1/stx2 Not Detected     Shigella/Enteroinvasive E. coli (EIEC) Not Detected     Cryptosporidium Not Detected     Cyclospora cayetanensis Not Detected     Entamoeba histolytica Not Detected     Giardia lamblia Not Detected     Adenovirus F40/41 Not Detected     Astrovirus Not  Detected     Norovirus GI/GII Not Detected     Rotavirus A Not Detected     Sapovirus (I, II, IV or V) Not Detected    Narrative:      Testing performed by multiplex PCR system.        Imaging Results (All)     Procedure Component Value Units Date/Time    CT Chest Without Contrast Diagnostic [[id number]] Collected: 02/08/23 1728     Updated: 02/08/23 1825    Narrative:      EXAM:    CT Chest Without Intravenous Contrast    CLINICAL HISTORY:    The patient is 82 years old and is Female; Pneumonia,  complication suspected, xray done, I48.91 Unspecified atrial  fibrillation R77.8 Other specified abnormalities of plasma  proteins    TECHNIQUE:    Axial computed tomography images of the chest without  intravenous contrast.  Sagittal and coronal reformatted images  were created and reviewed.  This CT exam was performed using one  or more of the following dose reduction techniques:  automated  exposure control, adjustment of the mA and/or kV according to  patient size, and/or use of iterative reconstruction technique.    COMPARISON:    Chest radiograph 2/8/2023.    FINDINGS:    LUNGS:  Mild bibasilar peripheral reticulations. Mild bibasilar  atelectasis.    PLEURAL SPACE:  Unremarkable.  No pneumothorax.  No significant  effusion.    HEART:  Moderate cardiomegaly.  No significant pericardial  effusion.  No significant coronary artery calcifications.    BONES/JOINTS:  Kyphosis.  No acute fracture.  No dislocation.    SOFT TISSUES:  Surgical clips in the right axilla.    VASCULATURE:  Unremarkable.  No thoracic aortic aneurysm.    LYMPH NODES:  Unremarkable.  No enlarged lymph nodes.    GALLBLADDER AND BILE DUCTS:  Cholecystectomy.    TUBES, LINES AND DEVICES:  Left-sided pacemaker.      Impression:      1.  Mild bibasilar atelectasis. Superimposed infection cannot  excluded.  2.  Mild bibasilar scarring.  3.  Moderate cardiomegaly.    Electronically signed by:  Neda Koroma MD  2/8/2023 6:23 PM [facility]  Workstation: [id number]     XR Chest 2 View [[id number]] Collected: 02/08/23 1626     Updated: 02/08/23 1643    Narrative:      Chest x-ray PA and lateral     CLINICAL INDICATION: Shortness of breath. Tachycardia and  atrial  fibrillation    COMPARISON: Chest February 8, 2023. 10:38 AM.    FINDINGS: Cardiac silhouette is enlarged in size. Pulmonary  vascularity is unremarkable.   Pacemaker leads right atrium and right ventricle.    Surgical clips right axilla.  No focal infiltrate or consolidation.  No pleural effusion.  No  pneumothorax.      Impression:      Cardiomegaly. No evidence of active disease. No  change since prior exam.       Electronically signed by:  Alon Smith MD  2/8/2023 4:41 PM CST  Workstation: [id number]    CT Abdomen Pelvis Without Contrast [[id number]] Collected: 02/08/23 1422     Updated: 02/08/23 1456    Narrative:      EXAM:  CT ABDOMEN PELVIS WITHOUT IV CONTRAST    ORDERING PROVIDER:  IRAIDA ADAMS    CLINICAL HISTORY:  lower abdominal pain, diarrhea    COMPARISON:  2/14/2020    TECHNIQUE:   CT abdomen and pelvis performed without IV or oral contrast,  reformatted in the sagittal and coronal planes.     This examination was performed according to our departmental dose  optimization program which includes automated exposure control,  adjustment of the MA and kV according to patient size, and/or use  of iterative reconstruction technique.    FINDINGS:    BASILAR CHEST: Bibasilar streaky changes which may be due to  chronic change. There is cardiomegaly with small pericardial  effusion.    LIVER: No mass, enlargement or abnormal density.    BILIARY TRACT: Prior cholecystectomy.    SPLEEN: No mass or enlargement.    PANCREAS: No mass or inflammatory process. Normal pancreatic duct    ADRENAL GLANDS: Unremarkable. No mass.    URINARY SYSTEM: No obstructing stone, hydronephrosis, or mass.  Normal ureters.  Bladder is normal without mass or stone.  Left  nephrolithiasis measuring 9 mm, previously 7.5 mm.  Parenchymal  scarring in the right kidney, which may be due to prior insults  such as infection.    GI TRACT: No mass, dilation, or wall thickening.  No inflamed  diverticula.  No hernia.  Appendix is normal.      REPRODUCTIVE SYSTEM: No aggressive lesion    PERITONEAL SPACE:No free air, free fluid, mass or adenopathy.    RETROPERITONEAL SPACE:  No adenopathy, mass, aneurysm or  significant vascular abnormality.     BONES AND EXTRA-ABDOMINAL SOFT TISSUES: No aggressive osseous  lesion.  No inguinal adenopathy or hernia.      Impression:      No evidence of obstructive uropathy on either side.  No evidence of bowel obstruction or perienteric inflammation.  Parenchymal scarring in the right kidney, which may be due to  prior insults such as infection.  Bibasilar streaky changes which may be due to chronic change.   Small pericardial effusion.    Electronically signed by:  Froilan Shannon MD  2/8/2023 2:54 PM CST  Workstation: [id number]    CT Head Without Contrast [[id number]] Collected: 02/08/23 1138     Updated: 02/08/23 1213    Narrative:      CT CT head without IV contrast February 8, 2023    INDICATION: Acute confusion    TECHNIQUE: Spiral images obtained from foramen magnum through  vertex without IV contrast. Sagittal, axial and coronal  reformatted images generated retrospectively. Comparison with  previous study dated 2/20/2020    This exam was performed according to our departmental  dose-optimization program, which includes automated exposure  control, adjustment of the mA and/or kV according to patient size  and/or use of iterative reconstruction technique.      FINDINGS:  No mass effect, midline shift, hemorrhage, hydrocephalus or  extra-axial fluid collections.  Mild atrophy.  Periventricular low-attenuation consistent with chronic small  vessel white matter ischemic change.  No significant focal or acute parenchymal pathology.  Visualized mastoids and sinuses grossly clear.  No focal or acute bony  abnormality.      Impression:      Mild atrophy.  Evidence of chronic white matter ischemic change.  No significant focal or acute intracranial pathology.    Electronically signed by:  Kiran Rapp MD  2/8/2023 12:10 PM  CST Workstation: [id number]    XR Chest 1 View [[id number]] Collected: 02/08/23 1041     Updated: 02/08/23 1154    Narrative:        PROCEDURE: Single chest view portable    REASON FOR EXAM:shortness of breath    FINDINGS: Comparison exam dated December 6, 2022. Stable  pacemaker and leads. Cardiac and pulmonary vasculature are  normal. Lungs are clear. Pleural spaces are normal. No acute  osseous abnormality. Multiple surgical clips in the right axilla  consistent with prior surgery in this region.      Impression:      No acute cardiopulmonary abnormality.    Electronically signed by:  Ajay Britt MD  2/8/2023 11:51 AM CST  Workstation: [id number]            Chief Complaint on Day of Discharge: none    Hospital Course:  82 year old female with past medical history of atrial fibrillation, breast cancer, thyroid disease, HTN who presented on 2/8/23 with complaints of diarrhea, shortness of breath, weakness.  She was  admitted for issues including atrial fibrillation with RVR, bibasilar atelectasis/pneumonia, diarrhea, hypokalemia, mild dehydration.  She received IV Rocephin and doxycycline therapy for pneumonia and continues on oral meds at discharge for completion.  She underwent GI pathogen panel in regards to diarrhea and results were negative. Her diarrhea has resolved, electrolytes replaced, and she has received IV fluids for rehydration.  Patient is in stable condition and rate is now controlled with hydration and treatment of infection.  She will discharge home today with home health care and assistance from family.  Hospital bed was ordered per patient's request.         Condition on Discharge:  Stable     Physical Exam on Discharge:  /60 (BP Location: Left arm, Patient Position:  "Lying)   Pulse 61   Temp 97.3 °F (36.3 °C) (Infrared)   Resp 16   Ht 170.2 cm (67\")   Wt 64.1 kg (141 lb 6.4 oz)   LMP  (LMP Unknown)   SpO2 93%   BMI 22.15 kg/m²   Physical Exam  Vitals and nursing note reviewed.   Constitutional:       General: She is not in acute distress.     Appearance: Normal appearance. She is not ill-appearing.   HENT:      Head: Normocephalic and atraumatic.      Right Ear: External ear normal. Decreased hearing noted.      Left Ear: External ear normal. Decreased hearing noted.      Nose: Nose normal.      Mouth/Throat:      Mouth: Mucous membranes are moist.      Pharynx: Oropharynx is clear.   Eyes:      General: No scleral icterus.        Right eye: No discharge.         Left eye: No discharge.      Conjunctiva/sclera: Conjunctivae normal.   Cardiovascular:      Rate and Rhythm: Regular rhythm rate normal.      Heart sounds: Normal heart sounds. No murmur heard.    No friction rub. No gallop.   Pulmonary:      Effort: Pulmonary effort is normal. No respiratory distress.      Breath sounds: Normal breath sounds. No stridor. No wheezing, rhonchi or rales.   Abdominal:      General: Bowel sounds are normal. There is no distension.      Palpations: Abdomen is soft.      Tenderness: There is no abdominal tenderness.   Musculoskeletal:         General: No swelling. Normal range of motion.      Cervical back: Normal range of motion and neck supple.   Skin:     General: Skin is warm and dry.   Neurological:      General: No focal deficit present.      Mental Status: She is alert and oriented to person, place, and time.   Psychiatric:         Mood and Affect: Mood normal.         Behavior: Behavior normal.     Discharge Disposition:  Home-Health Care [facility]    Discharge Medications:     Discharge Medications      New Medications      Instructions Start Date   cefdinir 300 MG capsule  Commonly known as: OMNICEF   300 mg, Oral, 2 Times Daily      doxycycline 100 MG capsule  Commonly known as: " VIBRAMYCIN   100 mg, Oral, 2 Times Daily         Continue These Medications      Instructions Start Date   ALPRAZolam 0.25 MG tablet  Commonly known as: XANAX   0.25 mg, Oral, 2 Times Daily PRN      amiodarone 200 MG tablet  Commonly known as: PACERONE   200 mg, Oral, 2 Times Daily      carvedilol 25 MG tablet  Commonly known as: COREG   25 mg, Oral, 2 Times Daily With Meals      cetirizine 10 MG tablet  Commonly known as: zyrTEC   10 mg, Oral, Daily      cholestyramine light 4 g packet   4 g, Oral, 2 Times Daily      Diclofenac Sodium 1 % gel gel  Commonly known as: VOLTAREN   4 g, Topical, 4 Times Daily PRN      digoxin 125 MCG tablet  Commonly known as: LANOXIN   125 mcg, Oral, Daily Digoxin      dilTIAZem  MG 24 hr capsule  Commonly known as: CARDIZEM CD   180 mg, Oral, Every 24 Hours Scheduled      esomeprazole 40 MG capsule  Commonly known as: nexIUM   40 mg, Oral, Every Morning Before Breakfast      gabapentin 300 MG capsule  Commonly known as: NEURONTIN   300 mg, Oral, 3 Times Daily PRN      HYDROcodone-acetaminophen 7.5-325 MG per tablet  Commonly known as: NORCO   1 tablet, Oral, Every 8 Hours PRN      levothyroxine 50 MCG tablet  Commonly known as: SYNTHROID, LEVOTHROID   50 mcg, Oral, Daily      meclizine 25 MG tablet  Commonly known as: ANTIVERT   25 mg, Oral, 3 Times Daily PRN      metoclopramide 10 MG tablet  Commonly known as: REGLAN   10 mg, Oral, 4 Times Daily Before Meals & Nightly      mirtazapine 15 MG tablet  Commonly known as: REMERON   15 mg, Oral, Nightly      nitrofurantoin (macrocrystal-monohydrate) 100 MG capsule  Commonly known as: MACROBID   100 mg, Oral, 2 Times Daily      ondansetron 4 MG tablet  Commonly known as: ZOFRAN   4 mg, Oral, Every 8 Hours PRN      potassium chloride 10 MEQ CR tablet  Commonly known as: K-DUR,KLOR-CON   10 mEq, Oral, Daily      rivaroxaban 15 MG tablet  Commonly known as: XARELTO   15 mg, Oral, Daily With Dinner      rosuvastatin 10 MG tablet  Commonly  known as: CRESTOR   10 mg, Oral, Daily      sucralfate 1 g tablet  Commonly known as: CARAFATE   1 g, Oral, 4 Times Daily             Discharge Diet: heart healthy     Activity at Discharge: as tolerated     Discharge Care Plan/Instructions: Follow up with PCP within one week of discharge.     Follow-up Appointments:   Additional Instructions for the Follow-ups that You Need to Schedule     Ambulatory Referral to Home Health (Hospital)   As directed      Face to Face Visit Date: 2/10/2023    Follow-up provider for Plan of Care?: I treated the patient in an acute care facility and will not continue treatment after discharge.    Follow-up provider: DEVENDRA MADRIGAL [[id number]]    Reason/Clinical Findings: deconditioning r/t pneumonia    Describe mobility limitations that make leaving home difficult: deconditioning r/t pneumonia    Nursing/Therapeutic Services Requested: Skilled Nursing Physical Therapy Occupational Therapy    Skilled nursing orders: Cardiopulmonary assessments    PT orders: Strengthening    Occupational orders: Strengthening    Frequency: 1 Week 1            Follow-up Information     Lois Huber APRN. Go on 2/17/2023.    Specialty: Nurse Practitioner  Why: FRIDAY FEBRUARY 17TH 10:00 HOSPITAL FOLLOW UP APPOINTMENT  DEVENDRA ROACH OUT OF OFFICE UNTIL NEXT MONTH  Contact information:  [address] [place] [id number]  [phone]                         Test Results Pending at Discharge:   Pending Labs     Order Current Status    Blood Culture - Blood, Hand, Left Preliminary result    Blood Culture - Blood, Hand, Right Preliminary result              This document has been electronically signed by ODC Rubio on February 10, 2023 14:59 CST        Time: 30 minutes spent on assessment, discussion, management, and discharge planning for this patient.

## 2023-02-10 NOTE — DISCHARGE PLACEMENT REQUEST
"Cristiano Martin (82 y.o. Female)     Date of Birth   1940    Social Security Number       Address   [address] [place] [address]    Home Phone   [phone]    MRN   [id number]       Baptism   Holiness    Marital Status                               Admission Date   2/8/23    Admission Type   Emergency    Admitting Provider       Attending Provider   Bryon Cuenca MD    Department, Room/Bed   [address], [facility]/1       Discharge Date       Discharge Disposition   Home-Health Care [facility]    Discharge Destination                               Attending Provider: Bryon Cuenca MD    Allergies: Perflutren Lipid Microsphere, Penicillins    Isolation: None   Infection: None   Code Status: CPR    Ht: 170.2 cm (67\")   Wt: 64.1 kg (141 lb 6.4 oz)    Admission Cmt: None   Principal Problem: Atrial fibrillation, unspecified type (HCC) [I48.91]                 Active Insurance as of 2/8/2023     Primary Coverage     Payor Plan Insurance Group Employer/Plan Group    MEDICARE MEDICARE A & B      Payor Plan Address Payor Plan Phone Number Payor Plan Fax Number Effective Dates    [address] [phone]  10/1/2004 - None Entered    [facility] [address]       Subscriber Name Subscriber Birth Date Member ID       CRISTIANO MARTIN 1940 [id number]           Secondary Coverage     Payor Plan Insurance Group Employer/Plan Group    [facility] [id number]     Payor Plan Address Payor Plan Phone Number Payor Plan Fax Number Effective Dates    [address] [address] [phone]  11/1/2015 - None Entered    [facility] [id number]       Subscriber Name Subscriber Birth Date Member ID       ALLAN MARTIN 1940 [id number]                 Emergency Contacts      (Rel.) Home Phone Work Phone Mobile Phone    Jaki Hilario (Daughter) [phone] -- [phone]    farhana barney (Grandchild) [phone] -- [phone]        [facility] " [address] [address]  Phone:  [phone]  Fax:  [phone] Date: Feb 10, 2023      Ambulatory Referral to Home Health ([facility])     Patient:  Sultana Lin MRN:  [id number]   [address] [facility] [address] :  1940  SSN:    Phone: [phone] Sex:  F      INSURANCE PAYOR PLAN GROUP # SUBSCRIBER ID   Primary:  Secondary:    MEDICARE  UMR [id number]  [id number]    [id number]  [id number]      Referring Provider Information:  KELI BURR Phone: [phone] Fax: [phone]       Referral Information:   # Visits:  999 Referral Type: Home Health [42]   Urgency:  Routine Referral Reason: Specialty Services Required   Start Date: Feb 10, 2023 End Date:  To be determined by Insurer   Diagnosis: Impaired functional mobility, balance, gait, and endurance (Z74.09 [ICD-10-CM] V49.89 [ICD-9-CM])      Refer to Dept:   Refer to Provider:   Refer to Provider Phone:   Refer to Facility:       Face to Face Visit Date: 2/10/2023  Follow-up provider for Plan of Care? I treated the patient in an acute care facility and will not continue treatment after discharge.  Follow-up provider: DEVENDRA MADRIGAL [[id number]]  Reason/Clinical Findings: deconditioning r/t pneumonia  Describe mobility limitations that make leaving home difficult: deconditioning r/t pneumonia  Nursing/Therapeutic Services Requested: Skilled Nursing  Nursing/Therapeutic Services Requested: Physical Therapy  Nursing/Therapeutic Services Requested: Occupational Therapy  Skilled nursing orders: Cardiopulmonary assessments  PT orders: Strengthening  Occupational orders: Strengthening  Frequency: 1 Week 1     This document serves as a request of services and does not constitute Insurance authorization or approval of services.  To determine eligibility, please contact the members Insurance carrier to verify and review coverage.     If you have medical questions regarding this request for  services. Please contact [address] at [phone] during normal business hours.        Authorizing Provider:Emma Smart APRN  Authorizing Provider's NPI: [id number]  Order Entered By: Emma Smart APRN 2/10/2023  9:46 AM     Electronically signed by: Emma Smart APRN 2/10/2023  9:46 AM         Insurance Information                MEDICARE/MEDICARE A & B Phone: [phone]    Subscriber: Sultana Lin Subscriber#: [id number]    Group#: -- Precert#: --        UMR/UMR Phone: [phone]    Subscriber: Alex Lin Subscriber#: [id number]    Group#: [id number] Precert#: --             History & Physical      Dangelo Shook MD at 02/08/23 1645                [facility] Medicine Admission      Date of Admission: 2/8/2023      Primary Care Physician: Heydi Tineo APRN      Chief Complaint: shortness of breath    HPI:Patient thought she had pneumonia and went to see her PCP but then she wanted to come to ER before seeing her PCP; her granddaughter brought her here  She has had some mild cough, no fever, no chills  Also she was complaining of watery diarrhea for over a month and worse in the last 2 weeks, no blood in it, not associated with abdominal pain  She has been prescribed some powder for her diarrhea but she does not take it  She had a fall in October and after that she has been a flat affect  No fever, chills, shortness of breath, chest pain;     Concurrent Medical History:  has a past medical history of Arthritis, Atrial fibrillation (HCC), Atrial fibrillation (HCC), Atrial fibrillation with rapid ventricular response (HCC) (1/20/2017), Breast cancer (HCC), Cancer (HCC), Disease of thyroid gland, and Hypertension.    Past Surgical History:  has a past surgical history that includes Pacemaker Implantation; Breast surgery; Skin biopsy; Cardioversion; Ablation of dysrhythmic focus; Cardiac  electrophysiology procedure (N/A, 4/6/2017); Insert / replace / remove pacemaker; Skin tag removal; Cataract extraction w/ intraocular lens implant (Right, 8/9/2019); Cataract extraction w/ intraocular lens implant (Left, 8/16/2019); cholecystectomy with intraoperative cholangiogram (N/A, 2/17/2020); Esophagogastroduodenoscopy (N/A, 6/9/2020); and Upper gastrointestinal endoscopy (06/09/2020).    Family History: family history includes Hypertension in her daughter and son; No Known Problems in her father and mother.     Social History:  reports that she has never smoked. She has quit using smokeless tobacco.  Her smokeless tobacco use included snuff and chew. She reports that she does not drink alcohol and does not use drugs.   She is full code;   MARECLA is her daughter, Sravanthi  She lives with her     Allergies:   Allergies   Allergen Reactions   • Perflutren Lipid Microsphere Other (See Comments)     Patient experienced leg and back pain on a scale of 10/10. Patient symptoms resolved with a few minutes.    • Penicillins Rash       Medications:   Prior to Admission medications    Medication Sig Start Date End Date Taking? Authorizing Provider   ALPRAZolam (XANAX) 0.25 MG tablet Take 0.25 mg by mouth 2 (Two) Times a Day As Needed for Anxiety.   Yes Bradly Montana MD   amiodarone (PACERONE) 200 MG tablet Take 1 tablet by mouth 2 (Two) Times a Day. 10/15/21  Yes Vin Hernandez MD   carvedilol (COREG) 25 MG tablet Take 1 tablet by mouth 2 (Two) Times a Day With Meals. 10/15/21  Yes Vin Hernandez MD   cetirizine (zyrTEC) 10 MG tablet Take 10 mg by mouth Daily.   Yes Bradly Montana MD   Diclofenac Sodium (VOLTAREN) 1 % gel gel Apply 4 g topically to the appropriate area as directed 4 (Four) Times a Day As Needed.   Yes Bradly Montana MD   digoxin (LANOXIN) 125 MCG tablet Take 1 tablet by mouth Daily. 10/15/21  Yes Vin Hernandez MD   dilTIAZem CD (CARDIZEM CD) 180 MG 24 hr  capsule Take 1 capsule by mouth Daily. 10/16/21  Yes Vin Hernandez MD   esomeprazole (nexIUM) 40 MG capsule Take 40 mg by mouth Every Morning Before Breakfast.   Yes Bradly Montana MD   gabapentin (NEURONTIN) 300 MG capsule Take 300 mg by mouth 3 (Three) Times a Day As Needed. 5/15/20  Yes Bradly Montana MD   HYDROcodone-acetaminophen (NORCO) 7.5-325 MG per tablet Take 1 tablet by mouth Every 8 (Eight) Hours As Needed for Moderate Pain .   Yes Bradly Montana MD   levothyroxine (SYNTHROID, LEVOTHROID) 50 MCG tablet Take 50 mcg by mouth Daily.   Yes Bradly Montana MD   meclizine (ANTIVERT) 25 MG tablet Take 25 mg by mouth 3 (three) times a day as needed for dizziness.   Yes Bradly Montana MD   metoclopramide (REGLAN) 10 MG tablet Take 10 mg by mouth 4 (Four) Times a Day Before Meals & at Bedtime.   Yes Bradly Montana MD   mirtazapine (REMERON) 15 MG tablet Take 15 mg by mouth Every Night.   Yes Bradly Montana MD   nitrofurantoin, macrocrystal-monohydrate, (MACROBID) 100 MG capsule Take 100 mg by mouth 2 (Two) Times a Day.   Yes Bradly Montana MD   ondansetron (ZOFRAN) 4 MG tablet Take 4 mg by mouth Every 8 (Eight) Hours As Needed for Nausea or Vomiting.   Yes Bradly Montana MD   potassium chloride (K-DUR,KLOR-CON) 10 MEQ CR tablet Take 10 mEq by mouth daily.   Yes Bradly Montana MD   rivaroxaban (XARELTO) 15 MG tablet Take 15 mg by mouth Daily With Dinner.   Yes Bradly Montana MD   rosuvastatin (CRESTOR) 10 MG tablet Take 10 mg by mouth Daily.   Yes Bradly Montana MD   sucralfate (CARAFATE) 1 g tablet Take 1 tablet by mouth 4 (Four) Times a Day. 6/5/20  Yes Albina Ya MD               Review of Systems:  Review of Systems   Otherwise complete ROS is negative except as mentioned above.    Physical Exam:   Temp:  [97.4 °F (36.3 °C)] 97.4 °F (36.3 °C)  Heart Rate:  [] 120  Resp:  [16-18] 18  BP: ()/(57-74)  118/74  Physical Exam  Constitutional:       Appearance: She is ill-appearing.   HENT:      Head: Normocephalic.      Nose: Nose normal.      Mouth/Throat:      Mouth: Mucous membranes are moist.   Eyes:      Pupils: Pupils are equal, round, and reactive to light.   Cardiovascular:      Rate and Rhythm: Normal rate.      Heart sounds: Normal heart sounds.   Pulmonary:      Comments: Inspiratory crackles on right lung and left base  Abdominal:      Palpations: Abdomen is soft.   Musculoskeletal:         General: Normal range of motion.      Cervical back: Normal range of motion.   Skin:     General: Skin is warm.   Neurological:      General: No focal deficit present.      Mental Status: She is alert. Mental status is at baseline.   Psychiatric:         Mood and Affect: Mood normal.           Results Reviewed:  I have personally reviewed current lab, radiology, and data and agree with results.  Lab Results (last 24 hours)     Procedure Component Value Units Date/Time    Troponin [[id number]] Collected: 02/08/23 1639    Specimen: Blood Updated: 02/08/23 1639    STAT Lactic Acid, Reflex [[id number]]  (Normal) Collected: 02/08/23 1318    Specimen: Blood Updated: 02/08/23 1340     Lactate 1.4 mmol/L     High Sensitivity Troponin T 2Hr [[id number]]  (Abnormal) Collected: 02/08/23 1220    Specimen: Blood Updated: 02/08/23 1247     HS Troponin T 33 ng/L      Troponin T Delta -4 ng/L     Narrative:      High Sensitive Troponin T Reference Range:  <10.0 ng/L- Negative Female for AMI  <15.0 ng/L- Negative Male for AMI  >=10 - Abnormal Female indicating possible myocardial injury.  >=15 - Abnormal Male indicating possible myocardial injury.   Clinicians would have to utilize clinical acumen, EKG, Troponin, and serial changes to determine if it is an Acute Myocardial Infarction or myocardial injury due to an underlying chronic condition.         Urinalysis, Microscopic Only - Urine, Catheter [[id number]]  (Abnormal) Collected:  02/08/23 1118    Specimen: Urine, Catheter Updated: 02/08/23 1140     RBC, UA 0-2 /HPF      WBC, UA 3-5 /HPF      Bacteria, UA Trace /HPF      Squamous Epithelial Cells, UA 0-2 /HPF      Hyaline Casts, UA 3-6 /LPF      Calcium Oxalate Crystals, UA Small/1+ /HPF      Methodology Manual Light Microscopy    Extra Tubes [[id number]] Collected: 02/08/23 1026    Specimen: Blood, Venous Line Updated: 02/08/23 1130    Narrative:      The following orders were created for panel order Extra Tubes.  Procedure                               Abnormality         Status                     ---------                               -----------         ------                     Gold Top - SST[[id number]]                                   Final result               Light Blue Top[[id number]]                                   Final result                 Please view results for these tests on the individual orders.    Gold Top - SST [[id number]] Collected: 02/08/23 1026    Specimen: Blood Updated: 02/08/23 1130     Extra Tube Hold for add-ons.     Comment: Auto resulted.       Light Blue Top [[id number]] Collected: 02/08/23 1026    Specimen: Blood Updated: 02/08/23 1130     Extra Tube Hold for add-ons.     Comment: Auto resulted       Urinalysis With Microscopic If Indicated (No Culture) - Urine, Catheter [[id number]]  (Abnormal) Collected: 02/08/23 1118    Specimen: Urine, Catheter Updated: 02/08/23 1125     Color, UA Yellow     Appearance, UA Cloudy     pH, UA 6.5     Specific Gravity, UA 1.017     Glucose, UA Negative     Ketones, UA Negative     Bilirubin, UA Negative     Blood, UA Negative     Protein, UA Trace     Leuk Esterase, UA Small (1+)     Nitrite, UA Negative     Urobilinogen, UA 1.0 E.U./dL    Digoxin Level [[id number]]  (Abnormal) Collected: 02/08/23 1020    Specimen: Blood Updated: 02/08/23 1056     Digoxin <0.30 ng/mL     Lactic Acid, Plasma [[id number]]  (Abnormal) Collected: 02/08/23 1020    Specimen: Blood Updated: 02/08/23  1053     Lactate 2.9 mmol/L     Comprehensive Metabolic Panel [[id number]]  (Abnormal) Collected: 02/08/23 1020    Specimen: Blood Updated: 02/08/23 1053     Glucose 134 mg/dL      BUN 15 mg/dL      Creatinine 1.03 mg/dL      Sodium 140 mmol/L      Potassium 3.8 mmol/L      Chloride 105 mmol/L      CO2 27.0 mmol/L      Calcium 8.8 mg/dL      Total Protein 6.6 g/dL      Albumin 3.1 g/dL      ALT (SGPT) 40 U/L      AST (SGOT) 50 U/L      Alkaline Phosphatase 90 U/L      Total Bilirubin 0.6 mg/dL      Globulin 3.5 gm/dL      A/G Ratio 0.9 g/dL      BUN/Creatinine Ratio 14.6     Anion Gap 8.0 mmol/L      eGFR 54.1 mL/min/1.73     Narrative:      GFR Normal >60  Chronic Kidney Disease <60  Kidney Failure <15    The GFR formula is only valid for adults with stable renal function between ages 18 and 70.    Magnesium [[id number]]  (Normal) Collected: 02/08/23 1020    Specimen: Blood Updated: 02/08/23 1053     Magnesium 2.0 mg/dL     Troponin [[id number]]  (Abnormal) Collected: 02/08/23 1020    Specimen: Blood Updated: 02/08/23 1053     HS Troponin T 37 ng/L     Narrative:      High Sensitive Troponin T Reference Range:  <10.0 ng/L- Negative Female for AMI  <15.0 ng/L- Negative Male for AMI  >=10 - Abnormal Female indicating possible myocardial injury.  >=15 - Abnormal Male indicating possible myocardial injury.   Clinicians would have to utilize clinical acumen, EKG, Troponin, and serial changes to determine if it is an Acute Myocardial Infarction or myocardial injury due to an underlying chronic condition.         BNP [[id number]]  (Abnormal) Collected: 02/08/23 1020    Specimen: Blood Updated: 02/08/23 1050     proBNP 2,743.0 pg/mL     Narrative:      Among patients with dyspnea, NT-proBNP is highly sensitive for the detection of acute congestive heart failure. In addition NT-proBNP of <300 pg/ml effectively rules out acute congestive heart failure with 99% negative predictive value.      CBC & Differential [[id number]]   (Abnormal) Collected: 02/08/23 1020    Specimen: Blood Updated: 02/08/23 1033    Narrative:      The following orders were created for panel order CBC & Differential.  Procedure                               Abnormality         Status                     ---------                               -----------         ------                     CBC Auto Differential[[id number]]        Abnormal            Final result                 Please view results for these tests on the individual orders.    CBC Auto Differential [[id number]]  (Abnormal) Collected: 02/08/23 1020    Specimen: Blood Updated: 02/08/23 1033     WBC 8.63 10*3/mm3      RBC 4.23 10*6/mm3      Hemoglobin 12.7 g/dL      Hematocrit 38.5 %      MCV 91.0 fL      MCH 30.0 pg      MCHC 33.0 g/dL      RDW 15.6 %      RDW-SD 51.3 fl      MPV 10.6 fL      Platelets 224 10*3/mm3      Neutrophil % 66.2 %      Lymphocyte % 19.5 %      Monocyte % 7.2 %      Eosinophil % 2.7 %      Basophil % 1.4 %      Immature Grans % 3.0 %      Neutrophils, Absolute 5.72 10*3/mm3      Lymphocytes, Absolute 1.68 10*3/mm3      Monocytes, Absolute 0.62 10*3/mm3      Eosinophils, Absolute 0.23 10*3/mm3      Basophils, Absolute 0.12 10*3/mm3      Immature Grans, Absolute 0.26 10*3/mm3      nRBC 0.0 /100 WBC         Imaging Results (Last 24 Hours)     Procedure Component Value Units Date/Time    XR Chest 2 View [[id number]] Collected: 02/08/23 1626     Updated: 02/08/23 1643    Narrative:      Chest x-ray PA and lateral     CLINICAL INDICATION: Shortness of breath. Tachycardia and  atrial  fibrillation    COMPARISON: Chest February 8, 2023. 10:38 AM.    FINDINGS: Cardiac silhouette is enlarged in size. Pulmonary  vascularity is unremarkable.   Pacemaker leads right atrium and right ventricle.    Surgical clips right axilla.  No focal infiltrate or consolidation.  No pleural effusion.  No  pneumothorax.      Impression:      Cardiomegaly. No evidence of active disease. No  change since prior  exam.       Electronically signed by:  Alon Smith MD  2/8/2023 4:41 PM CST  Workstation: [id number]    CT Abdomen Pelvis Without Contrast [[id number]] Collected: 02/08/23 1422     Updated: 02/08/23 1456    Narrative:      EXAM:  CT ABDOMEN PELVIS WITHOUT IV CONTRAST    ORDERING PROVIDER:  IRAIDA ADAMS    CLINICAL HISTORY:  lower abdominal pain, diarrhea    COMPARISON:  2/14/2020    TECHNIQUE:   CT abdomen and pelvis performed without IV or oral contrast,  reformatted in the sagittal and coronal planes.     This examination was performed according to our departmental dose  optimization program which includes automated exposure control,  adjustment of the MA and kV according to patient size, and/or use  of iterative reconstruction technique.    FINDINGS:    BASILAR CHEST: Bibasilar streaky changes which may be due to  chronic change. There is cardiomegaly with small pericardial  effusion.    LIVER: No mass, enlargement or abnormal density.    BILIARY TRACT: Prior cholecystectomy.    SPLEEN: No mass or enlargement.    PANCREAS: No mass or inflammatory process. Normal pancreatic duct    ADRENAL GLANDS: Unremarkable. No mass.    URINARY SYSTEM: No obstructing stone, hydronephrosis, or mass.  Normal ureters.  Bladder is normal without mass or stone.  Left  nephrolithiasis measuring 9 mm, previously 7.5 mm. Parenchymal  scarring in the right kidney, which may be due to prior insults  such as infection.    GI TRACT: No mass, dilation, or wall thickening.  No inflamed  diverticula.  No hernia.  Appendix is normal.      REPRODUCTIVE SYSTEM: No aggressive lesion    PERITONEAL SPACE:No free air, free fluid, mass or adenopathy.    RETROPERITONEAL SPACE:  No adenopathy, mass, aneurysm or  significant vascular abnormality.     BONES AND EXTRA-ABDOMINAL SOFT TISSUES: No aggressive osseous  lesion.  No inguinal adenopathy or hernia.      Impression:      No evidence of obstructive uropathy on either side.  No evidence of  bowel obstruction or perienteric inflammation.  Parenchymal scarring in the right kidney, which may be due to  prior insults such as infection.  Bibasilar streaky changes which may be due to chronic change.   Small pericardial effusion.    Electronically signed by:  Froilan Shannon MD  2/8/2023 2:54 PM CST  Workstation: [id number]    CT Head Without Contrast [[id number]] Collected: 02/08/23 1138     Updated: 02/08/23 1213    Narrative:      CT CT head without IV contrast February 8, 2023    INDICATION: Acute confusion    TECHNIQUE: Spiral images obtained from foramen magnum through  vertex without IV contrast. Sagittal, axial and coronal  reformatted images generated retrospectively. Comparison with  previous study dated 2/20/2020    This exam was performed according to our departmental  dose-optimization program, which includes automated exposure  control, adjustment of the mA and/or kV according to patient size  and/or use of iterative reconstruction technique.      FINDINGS:  No mass effect, midline shift, hemorrhage, hydrocephalus or  extra-axial fluid collections.  Mild atrophy.  Periventricular low-attenuation consistent with chronic small  vessel white matter ischemic change.  No significant focal or acute parenchymal pathology.  Visualized mastoids and sinuses grossly clear.  No focal or acute bony abnormality.      Impression:      Mild atrophy.  Evidence of chronic white matter ischemic change.  No significant focal or acute intracranial pathology.    Electronically signed by:  Kiran Rapp MD  2/8/2023 12:10 PM  CST Workstation: [id number]    XR Chest 1 View [[id number]] Collected: 02/08/23 1041     Updated: 02/08/23 1154    Narrative:        PROCEDURE: Single chest view portable    REASON FOR EXAM:shortness of breath    FINDINGS: Comparison exam dated December 6, 2022. Stable  pacemaker and leads. Cardiac and pulmonary vasculature are  normal. Lungs are clear. Pleural spaces are normal. No acute  osseous  abnormality. Multiple surgical clips in the right axilla  consistent with prior surgery in this region.      Impression:      No acute cardiopulmonary abnormality.    Electronically signed by:  Ajay Britt MD  2/8/2023 11:51 AM CST  Workstation: [id number]        Assessment and Plan     Atrial fibrillation with RVR     Continue with home meds and xarelto    Bibasilar atelectasis/pneumonia     Empirically on doxycycline, rocephin     Check procal tomorrow am    Generalized weakness     PT and OT     Chronic medical problems     Essential hypertension     Hx of cholecystectomy     Chronic diarrhea     PPM         Medical Decision Making  Number and Complexity of problems: one major, one minor   Differential Diagnosis: pneumonia     Conditions and Status:        Condition is improving.     MDM Data  External documents reviewed: previous medical records  My EKG interpretation:   My plain film interpretation: no acute events     Decision rules/scores evaluated (example [id number], Wells, etc): N/A     Discussed with: patient and granddaughter     Treatment Plan      Care Planning  Shared decision making: her daughter  Code status and discussions: full code    Disposition  Social Determinants of Health that impact treatment or disposition: none  I expect the patient to be discharged to home in 2 to 3 days.     I confirmed that the patient's Advance Care Plan is present, code status is documented, or surrogate decision maker is listed in the patient's medical record.     Dangelo Shook MD                Electronically signed by Dangelo Shook MD at 02/08/23 2010

## 2023-02-10 NOTE — THERAPY TREATMENT NOTE
Patient Name: Sultana Lin  : 1940    MRN: [id number]                              Today's Date: 2/10/2023       Admit Date: 2023    Visit Dx:     ICD-10-CM ICD-9-CM   1. Atrial fibrillation, unspecified type (HCC)  I48.91 427.31   2. Elevated troponin  R77.8 790.6   3. Impaired mobility and ADLs  Z74.09 V49.89    Z78.9    4. Impaired functional mobility, balance, gait, and endurance  Z74.09 V49.89   5. Pharyngoesophageal dysphagia  R13.14 787.24     Patient Active Problem List   Diagnosis   • Hypothyroidism   • Hypertension   • Paroxysmal atrial fibrillation (HCC)   • Paroxysmal atrial fibrillation with rapid ventricular response (HCC)   • Acute cholecystitis   • Nausea   • Tachycardia   • Hypokalemia   • Atrial fibrillation, unspecified type (HCC)   • Severe malnutrition (HCC)     Past Medical History:   Diagnosis Date   • Arthritis    • Atrial fibrillation (HCC)    • Atrial fibrillation (HCC)    • Atrial fibrillation with rapid ventricular response (HCC) 2017   • Breast cancer (HCC)     right   • Cancer (HCC)    • Disease of thyroid gland    • Hypertension      Past Surgical History:   Procedure Laterality Date   • ABLATION OF DYSRHYTHMIC FOCUS     • BREAST SURGERY     • CARDIAC ELECTROPHYSIOLOGY PROCEDURE N/A 2017    Procedure: EP/Ablation;  Surgeon: Blake Mcallister MD;  Location: [facility] INVASIVE LOCATION;  Service:    • CARDIOVERSION     • CATARACT EXTRACTION W/ INTRAOCULAR LENS IMPLANT Right 2019    Procedure: REMOVE CATARACT AND IMPLANT INTRAOCULAR LENS;  Surgeon: Lenin Dennison MD;  Location: [facility] OR;  Service: Ophthalmology   • CATARACT EXTRACTION W/ INTRAOCULAR LENS IMPLANT Left 2019    Procedure: REMOVE CATARACT AND IMPLANT INTRAOCULAR LENS;  Surgeon: Lenin Dennison MD;  Location: [facility] OR;  Service: Ophthalmology   • CHOLECYSTECTOMY WITH INTRAOPERATIVE CHOLANGIOGRAM N/A 2020    Procedure: LAPAROSCOPIC CHOLECYSTECTOMY WITH  INTRAOPERATIVE CHOLANGIOGRAM;  Surgeon: Denilson Richardson MD;  Location: [facility] OR;  Service: General;  Laterality: N/A;   • ENDOSCOPY N/A 6/9/2020    Procedure: ESOPHAGOGASTRODUODENOSCOPY;  Surgeon: Albina Ya MD;  Location: [facility] ENDOSCOPY;  Service: Gastroenterology;  Laterality: N/A;   • INSERT / REPLACE / REMOVE PACEMAKER     • PACEMAKER IMPLANTATION     • SKIN BIOPSY     • SKIN TAG REMOVAL     • UPPER GASTROINTESTINAL ENDOSCOPY  06/09/2020      General Information     Row Name 02/10/23 0935          OT Time and Intention    Document Type therapy note (daily note)  -BB     Mode of Treatment occupational therapy;individual therapy  -     Row Name 02/10/23 0935          General Information    Patient Profile Reviewed yes  -BB     Existing Precautions/Restrictions pacemaker;fall  -BB     Row Name 02/10/23 0935          Cognition    Orientation Status (Cognition) oriented x 4  -     Row Name 02/10/23 0935          Safety Issues, Functional Mobility    Impairments Affecting Function (Mobility) balance;endurance/activity tolerance;strength  -BB           User Key  (r) = Recorded By, (t) = Taken By, (c) = Cosigned By    Initials Name Provider Type    BB Vonnie Graves COTA Occupational Therapist Assistant                 Mobility/ADL's     Row Name 02/10/23 0935          Bed Mobility    Bed Mobility supine-sit;sit-supine  -BB     Supine-Sit [place] (Bed Mobility) minimum assist (75% patient effort)  -BB     Sit-Supine [place] (Bed Mobility) dependent (less than 25% patient effort)  -BB     Assistive Device (Bed Mobility) bed rails;head of bed elevated  -     Row Name 02/10/23 0935          Sit-Stand Transfer    Sit-Stand [place] (Transfers) unable to assess  -BB     Row Name 02/10/23 0935          Activities of Daily Living    BADL Assessment/Intervention bathing;upper body dressing;lower body dressing;grooming  -     Row Name 02/10/23 0935          Bathing Assessment/Intervention     [place] Level (Bathing) upper body;standby assist;lower body;dependent (less than 25% patient effort)  -BB     Position (Bathing) sitting up in bed  -BB     Row Name 02/10/23 0935          Upper Body Dressing Assessment/Training    [place] Level (Upper Body Dressing) doff;don;contact guard assist  hospital gown  -BB     Position (Upper Body Dressing) edge of bed sitting  -BB     Row Name 02/10/23 0935          Lower Body Dressing Assessment/Training    [place] Level (Lower Body Dressing) doff;don;dependent (less than 25% patient effort);socks  -BB     Position (Lower Body Dressing) sitting up in bed  -BB     Row Name 02/10/23 0935          Grooming Assessment/Training    [place] Level (Grooming) hair care, combing/brushing;oral care regimen;wash face, hands;contact guard assist  shampoo hair  -BB     Position (Grooming) edge of bed sitting  -BB           User Key  (r) = Recorded By, (t) = Taken By, (c) = Cosigned By    Initials Name Provider Type    Vonnie Swain COTA Occupational Therapist Assistant               Obj/Interventions     Row Name 02/10/23 0935          Range of Motion Comprehensive    General Range of Motion bilateral lower extremity ROM WFL  -BB     Row Name 02/10/23 0935          Strength Comprehensive (MMT)    General Manual Muscle Testing (MMT) Assessment lower extremity strength deficits identified  -BB           User Key  (r) = Recorded By, (t) = Taken By, (c) = Cosigned By    Initials Name Provider Type    Vonnie Swain COTA Occupational Therapist Assistant               Goals/Plan     Row Name 02/10/23 0935          Transfer Goal 1 (OT)    Activity/Assistive Device (Transfer Goal 1, OT) toilet  -BB     [place] Level/Cues Needed (Transfer Goal 1, OT) modified independence  -BB     Time Frame (Transfer Goal 1, OT) long term goal (LTG);by discharge  -BB     Progress/Outcome (Transfer Goal 1, OT) goal not met  -BB     Row Name 02/10/23 0935           Bathing Goal 1 (OT)    Activity/Device (Bathing Goal 1, OT) lower body bathing  -BB     [place] Level/Cues Needed (Bathing Goal 1, OT) supervision required  -BB     Time Frame (Bathing Goal 1, OT) long term goal (LTG);by discharge  -BB     Progress/Outcomes (Bathing Goal 1, OT) goal not met  -BB     Row Name 02/10/23 0935          Dressing Goal 1 (OT)    Activity/Device (Dressing Goal 1, OT) lower body dressing  -BB     [place]/Cues Needed (Dressing Goal 1, OT) minimum assist (75% or more patient effort)  -BB     Time Frame (Dressing Goal 1, OT) long term goal (LTG);by discharge  -BB     Progress/Outcome (Dressing Goal 1, OT) goal not met  -BB     Row Name 02/10/23 0935          Toileting Goal 1 (OT)    Activity/Device (Toileting Goal 1, OT) toileting skills, all  -BB     [place] Level/Cues Needed (Toileting Goal 1, OT) independent  -BB     Time Frame (Toileting Goal 1, OT) long term goal (LTG);by discharge  -BB     Progress/Outcome (Toileting Goal 1, OT) goal not met  -BB     Row Name 02/10/23 0935          Problem Specific Goal 1 (OT)    Problem Specific Goal 1 (OT) Patient to verbalize 3 falls prevention methods with independence.  -BB     Time Frame (Problem Specific Goal 1, OT) long term goal (LTG);by discharge  -BB     Progress/Outcome (Problem Specific Goal 1, OT) goal met  -BB           User Key  (r) = Recorded By, (t) = Taken By, (c) = Cosigned By    Initials Name Provider Type    BB Vonnie Graves COTA Occupational Therapist Assistant               Clinical Impression     Row Name 02/10/23 0935          Pain Assessment    Pretreatment Pain Rating 0/10 - no pain  -BB     Posttreatment Pain Rating 0/10 - no pain  -BB     Row Name 02/10/23 0935          Plan of Care Review    Plan of Care Reviewed With patient  -BB     Progress improving  -BB     Outcome Evaluation Pt agreeable to OT tx. Pt is SBA for UB bathing and CGA for UB dressing. Pt is dependent for LB ADL. Home safety/fall  prevention discussed and pt able to verbalize 3 tips. Pt to D/C home today.  -BB     Row Name 02/10/23 0935          Therapy Assessment/Plan (OT)    Rehab Potential (OT) fair, will monitor progress closely  -BB     Criteria for Skilled Therapeutic Interventions Met (OT) yes;skilled treatment is necessary  -BB     Therapy Frequency (OT) other (see comments)  5-7 d/wk  -BB     Row Name 02/10/23 0935          Therapy Plan Review/Discharge Plan (OT)    Anticipated Discharge Disposition (OT) skilled nursing facility;[facility] ([facility])  -BB     Row Name 02/10/23 0935          Vital Signs    Pre Systolic BP Rehab 121  -BB     Pre Treatment Diastolic BP 68  -BB     Pretreatment Heart Rate (beats/min) 100  -BB     Pre SpO2 (%) 94  -BB     O2 Delivery Pre Treatment room air  -BB     Pre Patient Position Supine  -BB     Row Name 02/10/23 0935          Positioning and Restraints    Pre-Treatment Position in bed  -BB     Post Treatment Position bed  -BB     In Bed fowlers;call light within reach;encouraged to call for assist;exit alarm on  -BB           User Key  (r) = Recorded By, (t) = Taken By, (c) = Cosigned By    Initials Name Provider Type    BB Vonnie Graves COTA Occupational Therapist Assistant               Outcome Measures     Row Name 02/10/23 0935          How much help from another is currently needed...    Putting on and taking off regular lower body clothing? 1  -BB     Bathing (including washing, rinsing, and drying) 2  -BB     Toileting (which includes using toilet bed pan or urinal) 1  -BB     Putting on and taking off regular upper body clothing 3  -BB     Taking care of personal grooming (such as brushing teeth) 3  -BB     Eating meals 4  -BB     AM-PAC 6 Clicks Score (OT) 14  -BB     Row Name 02/10/23 0806          How much help from another person do you currently need...    Turning from your back to your side while in flat bed without using bedrails? 3  -CAMPOS     Moving from lying on back  to sitting on the side of a flat bed without bedrails? 3  -CAMPOS     Moving to and from a bed to a chair (including a wheelchair)? 1  -CAMPOS     Standing up from a chair using your arms (e.g., wheelchair, bedside chair)? 1  -CAMPOS     Climbing 3-5 steps with a railing? 1  -CAMPOS     To walk in hospital room? 1  -CAMPOS     AM-PAC 6 Clicks Score (PT) 10  -CAMPOS     Highest level of mobility 4 --> Transferred to chair/commode  -CAMPOS           User Key  (r) = Recorded By, (t) = Taken By, (c) = Cosigned By    Initials Name Provider Type    Hien Healy, RN Registered Nurse    Vonnie Swain COTA Occupational Therapist Assistant                Occupational Therapy Education     Title: PT OT SLP Therapies (In Progress)     Topic: Occupational Therapy (In Progress)     Point: ADL training (In Progress)     Description:   Instruct learner(s) on proper safety adaptation and remediation techniques during self care or transfers.   Instruct in proper use of assistive devices.              Learning Progress Summary           Patient Acceptance, E, NR by BB at 2/10/2023 1444    Acceptance, E,TB, NR by ALEXX at 2/9/2023 1014    Comment: POC, role of OT, transfer training                   Point: Home exercise program (Not Started)     Description:   Instruct learner(s) on appropriate technique for monitoring, assisting and/or progressing therapeutic exercises/activities.              Learner Progress:  Not documented in this visit.          Point: Precautions (Not Started)     Description:   Instruct learner(s) on prescribed precautions during self-care and functional transfers.              Learner Progress:  Not documented in this visit.          Point: Body mechanics (In Progress)     Description:   Instruct learner(s) on proper positioning and spine alignment during self-care, functional mobility activities and/or exercises.              Learning Progress Summary           Patient Acceptance, E, NR by BRIAN at 2/10/2023 8891                                User Key     Initials Effective Dates Name Provider Type Discipline    BB 06/16/21 -  Vonnie Graves COTA Occupational Therapist Assistant OT    SJ 06/14/21 -  Sean Alvarez, OT Occupational Therapist OT              OT Recommendation and Plan  Therapy Frequency (OT): other (see comments) (5-7 d/wk)  Plan of Care Review  Plan of Care Reviewed With: patient  Progress: improving  Outcome Evaluation: Pt agreeable to OT tx. Pt is SBA for UB bathing and CGA for UB dressing. Pt is dependent for LB ADL. Home safety/fall prevention discussed and pt able to verbalize 3 tips. Pt to D/C home today.     Time Calculation:    Time Calculation- OT     Row Name 02/10/23 1444             Time Calculation- OT    OT Start Time 0935  -BB      OT Stop Time 1045  -BB      OT Time Calculation (min) 70 min  -BB      Total Timed Code Minutes- OT 70 minute(s)  -BB      OT Received On 02/10/23  -BB         Timed Charges    56399 - OT Self Care/Mgmt Minutes 70  -BB         Total Minutes    Timed Charges Total Minutes 70  -BB       Total Minutes 70  -BB            User Key  (r) = Recorded By, (t) = Taken By, (c) = Cosigned By    Initials Name Provider Type    BB Vonnie Graves COTA Occupational Therapist Assistant              Therapy Charges for Today     Code Description Service Date Service Provider Modifiers Qty    [id number] HC OT SELF CARE/MGMT/TRAIN EA 15 MIN 2/10/2023 Vonnie Graves COTA GO 5               EBONY Looney  2/10/2023

## 2023-02-13 LAB
BACTERIA SPEC AEROBE CULT: NORMAL
BACTERIA SPEC AEROBE CULT: NORMAL

## 2023-03-16 LAB
QT INTERVAL: 404 MS
QT INTERVAL: 416 MS
QT INTERVAL: 422 MS
QT INTERVAL: 428 MS
QTC INTERVAL: 460 MS
QTC INTERVAL: 486 MS
QTC INTERVAL: 493 MS
QTC INTERVAL: 580 MS

## 2023-06-17 ENCOUNTER — APPOINTMENT (OUTPATIENT)
Dept: GENERAL RADIOLOGY | Facility: HOSPITAL | Age: 83
End: 2023-06-17
Payer: MEDICARE

## 2023-06-17 ENCOUNTER — HOSPITAL ENCOUNTER (EMERGENCY)
Facility: HOSPITAL | Age: 83
Discharge: HOME | End: 2023-06-17
Attending: EMERGENCY MEDICINE
Payer: MEDICARE

## 2023-06-17 VITALS
HEIGHT: 67 IN | WEIGHT: 138 LBS | BODY MASS INDEX: 21.66 KG/M2 | SYSTOLIC BLOOD PRESSURE: 174 MMHG | HEART RATE: 76 BPM | RESPIRATION RATE: 18 BRPM | OXYGEN SATURATION: 98 % | DIASTOLIC BLOOD PRESSURE: 97 MMHG

## 2023-06-17 DIAGNOSIS — R06.00 DYSPNEA, UNSPECIFIED TYPE: Primary | ICD-10-CM

## 2023-06-17 DIAGNOSIS — R53.1 WEAKNESS: ICD-10-CM

## 2023-06-17 LAB
ALBUMIN SERPL-MCNC: 3.4 G/DL (ref 3.5–5.2)
ALBUMIN/GLOB SERPL: 1.1 G/DL
ALP SERPL-CCNC: 64 U/L (ref 39–117)
ALT SERPL W P-5'-P-CCNC: 22 U/L (ref 1–33)
ANION GAP SERPL CALCULATED.3IONS-SCNC: 11 MMOL/L (ref 5–15)
AST SERPL-CCNC: 32 U/L (ref 1–32)
BASOPHILS # BLD AUTO: 0.16 10*3/MM3 (ref 0–0.2)
BASOPHILS NFR BLD AUTO: 1.9 % (ref 0–1.5)
BILIRUB SERPL-MCNC: 0.3 MG/DL (ref 0–1.2)
BILIRUB UR QL STRIP: NEGATIVE
BUN SERPL-MCNC: 17 MG/DL (ref 8–23)
BUN/CREAT SERPL: 13.3 (ref 7–25)
CALCIUM SPEC-SCNC: 8.8 MG/DL (ref 8.6–10.5)
CHLORIDE SERPL-SCNC: 106 MMOL/L (ref 98–107)
CLARITY UR: CLEAR
CO2 SERPL-SCNC: 22 MMOL/L (ref 22–29)
COLOR UR: YELLOW
CREAT SERPL-MCNC: 1.28 MG/DL (ref 0.57–1)
DEPRECATED RDW RBC AUTO: 49 FL (ref 37–54)
DIGOXIN SERPL-MCNC: <0.3 NG/ML (ref 0.6–1.2)
EGFRCR SERPLBLD CKD-EPI 2021: 41.7 ML/MIN/1.73
EOSINOPHIL # BLD AUTO: 0.18 10*3/MM3 (ref 0–0.4)
EOSINOPHIL NFR BLD AUTO: 2.1 % (ref 0.3–6.2)
ERYTHROCYTE [DISTWIDTH] IN BLOOD BY AUTOMATED COUNT: 13.8 % (ref 12.3–15.4)
GLOBULIN UR ELPH-MCNC: 3.1 GM/DL
GLUCOSE SERPL-MCNC: 109 MG/DL (ref 65–99)
GLUCOSE UR STRIP-MCNC: NEGATIVE MG/DL
HCT VFR BLD AUTO: 34.6 % (ref 34–46.6)
HGB BLD-MCNC: 11.4 G/DL (ref 12–15.9)
HGB UR QL STRIP.AUTO: NEGATIVE
HOLD SPECIMEN: NORMAL
IMM GRANULOCYTES # BLD AUTO: 0.13 10*3/MM3 (ref 0–0.05)
IMM GRANULOCYTES NFR BLD AUTO: 1.5 % (ref 0–0.5)
KETONES UR QL STRIP: NEGATIVE
LEUKOCYTE ESTERASE UR QL STRIP.AUTO: NEGATIVE
LYMPHOCYTES # BLD AUTO: 1.86 10*3/MM3 (ref 0.7–3.1)
LYMPHOCYTES NFR BLD AUTO: 22 % (ref 19.6–45.3)
MCH RBC QN AUTO: 31.6 PG (ref 26.6–33)
MCHC RBC AUTO-ENTMCNC: 32.9 G/DL (ref 31.5–35.7)
MCV RBC AUTO: 95.8 FL (ref 79–97)
MONOCYTES # BLD AUTO: 0.76 10*3/MM3 (ref 0.1–0.9)
MONOCYTES NFR BLD AUTO: 9 % (ref 5–12)
NEUTROPHILS NFR BLD AUTO: 5.37 10*3/MM3 (ref 1.7–7)
NEUTROPHILS NFR BLD AUTO: 63.5 % (ref 42.7–76)
NITRITE UR QL STRIP: NEGATIVE
NRBC BLD AUTO-RTO: 0 /100 WBC (ref 0–0.2)
NT-PROBNP SERPL-MCNC: 608.5 PG/ML (ref 0–1800)
PH UR STRIP.AUTO: 7 [PH] (ref 5–9)
PLATELET # BLD AUTO: 219 10*3/MM3 (ref 140–450)
PMV BLD AUTO: 10 FL (ref 6–12)
POTASSIUM SERPL-SCNC: 5 MMOL/L (ref 3.5–5.2)
PROT SERPL-MCNC: 6.5 G/DL (ref 6–8.5)
PROT UR QL STRIP: NEGATIVE
QT INTERVAL: 390 MS
QTC INTERVAL: 475 MS
RBC # BLD AUTO: 3.61 10*6/MM3 (ref 3.77–5.28)
SODIUM SERPL-SCNC: 139 MMOL/L (ref 136–145)
SP GR UR STRIP: 1.01 (ref 1–1.03)
TROPONIN T SERPL HS-MCNC: 44 NG/L
TROPONIN T SERPL HS-MCNC: 50 NG/L
UROBILINOGEN UR QL STRIP: NORMAL
WBC NRBC COR # BLD: 8.46 10*3/MM3 (ref 3.4–10.8)
WHOLE BLOOD HOLD COAG: NORMAL

## 2023-06-17 PROCEDURE — 80053 COMPREHEN METABOLIC PANEL: CPT | Performed by: EMERGENCY MEDICINE

## 2023-06-17 PROCEDURE — 85025 COMPLETE CBC W/AUTO DIFF WBC: CPT | Performed by: EMERGENCY MEDICINE

## 2023-06-17 PROCEDURE — 71046 X-RAY EXAM CHEST 2 VIEWS: CPT

## 2023-06-17 PROCEDURE — 84484 ASSAY OF TROPONIN QUANT: CPT | Performed by: EMERGENCY MEDICINE

## 2023-06-17 PROCEDURE — 93005 ELECTROCARDIOGRAM TRACING: CPT | Performed by: EMERGENCY MEDICINE

## 2023-06-17 PROCEDURE — 81003 URINALYSIS AUTO W/O SCOPE: CPT | Performed by: EMERGENCY MEDICINE

## 2023-06-17 PROCEDURE — 80162 ASSAY OF DIGOXIN TOTAL: CPT | Performed by: EMERGENCY MEDICINE

## 2023-06-17 PROCEDURE — 83880 ASSAY OF NATRIURETIC PEPTIDE: CPT | Performed by: EMERGENCY MEDICINE

## 2023-06-17 RX ADMIN — SODIUM CHLORIDE 1000 ML: 9 INJECTION, SOLUTION INTRAVENOUS at 16:28

## 2023-06-17 NOTE — ED PROVIDER NOTES
Subjective   History of Present Illness  This is an 82-year-old female who presents for shortness of breath.  She states that she was doing some chores and noticed shortness of breath that has since abated.  Shortness of breath was present for maybe 20 or 30 minutes.  She denies fevers chills or cough.  She denies chest pain.  She denies recent blood loss.  She denies medication changes.  She denies being anxious.  She denies similar episodes in the past.      Review of Systems   All other systems reviewed and are negative.    Past Medical History:   Diagnosis Date    Arthritis     Atrial fibrillation     Atrial fibrillation     Atrial fibrillation with rapid ventricular response 1/20/2017    Breast cancer     right    Cancer     Disease of thyroid gland     Hypertension        Allergies   Allergen Reactions    Perflutren Lipid Microsphere Other (See Comments)     Patient experienced leg and back pain on a scale of 10/10. Patient symptoms resolved with a few minutes.     Penicillins Rash       Past Surgical History:   Procedure Laterality Date    ABLATION OF DYSRHYTHMIC FOCUS      BREAST SURGERY      CARDIAC ELECTROPHYSIOLOGY PROCEDURE N/A 4/6/2017    Procedure: EP/Ablation;  Surgeon: Blake Mcallister MD;  [facility];  Service:     CARDIOVERSION      CATARACT EXTRACTION W/ INTRAOCULAR LENS IMPLANT Right 8/9/2019    Procedure: REMOVE CATARACT AND IMPLANT INTRAOCULAR LENS;  Surgeon: Lenin Dennison MD;  Location: [facility] OR;  Service: Ophthalmology    CATARACT EXTRACTION W/ INTRAOCULAR LENS IMPLANT Left 8/16/2019    Procedure: REMOVE CATARACT AND IMPLANT INTRAOCULAR LENS;  Surgeon: Lenin Dennison MD;  Location: [facility] OR;  Service: Ophthalmology    CHOLECYSTECTOMY WITH INTRAOPERATIVE CHOLANGIOGRAM N/A 2/17/2020    Procedure: LAPAROSCOPIC CHOLECYSTECTOMY WITH INTRAOPERATIVE CHOLANGIOGRAM;  Surgeon: Denilson Richardson MD;  Location: [facility] OR;  Service: General;  Laterality:  N/A;    ENDOSCOPY N/A 6/9/2020    Procedure: ESOPHAGOGASTRODUODENOSCOPY;  Surgeon: Albina Ya MD;  Location: [facility] ENDOSCOPY;  Service: Gastroenterology;  Laterality: N/A;    INSERT / REPLACE / REMOVE PACEMAKER      PACEMAKER IMPLANTATION      SKIN BIOPSY      SKIN TAG REMOVAL      UPPER GASTROINTESTINAL ENDOSCOPY  06/09/2020       Family History   Problem Relation Age of Onset    Hypertension Daughter     Hypertension Son     No Known Problems Mother     No Known Problems Father        Social History     Socioeconomic History    Marital status:    Tobacco Use    Smoking status: Never    Smokeless tobacco: Former     Types: Snuff, Chew   Vaping Use    Vaping Use: Never used   Substance and Sexual Activity    Alcohol use: No    Drug use: No    Sexual activity: Defer           Objective   Physical Exam  Vitals and nursing note reviewed.   Constitutional:       Appearance: She is not ill-appearing.   HENT:      Head: Normocephalic and atraumatic.      Mouth/Throat:      Mouth: Mucous membranes are moist.   Neck:      Vascular: No JVD.   Cardiovascular:      Rate and Rhythm: Normal rate and regular rhythm.      Heart sounds: No murmur heard.  Pulmonary:      Effort: Pulmonary effort is normal.      Breath sounds: Normal breath sounds.   Chest:      Chest wall: No tenderness.   Abdominal:      Palpations: Abdomen is soft.      Tenderness: There is no abdominal tenderness.   Musculoskeletal:      Right lower leg: No tenderness. No edema.      Left lower leg: No tenderness. No edema.   Skin:     General: Skin is warm and dry.   Neurological:      Mental Status: She is alert and oriented to person, place, and time.   Psychiatric:         Behavior: Behavior normal.       Procedures           ED Course  ED Course as of 06/17/23 1825   Sat Jun 17, 2023   1605 Single High Sensitivity Troponin T(!)  Single moderately elevated high-sensitivity troponin.  Not far from baseline elevation.  Will obtain a repeat. [JV]  "  1606 Comprehensive Metabolic Panel(!)  Renal function is slightly worse than baseline. [JV]   1606 CBC & Differential(!)  Chronic mild anemia slightly improved from most recent testing. [JV]   1733 Single High Sensitivity Troponin T(!)  Downtrending high-sensitivity troponin. [JV]      ED Course User Index  [JV] Jone Jordan, DO                                           Medical Decision Making  The patient's recent past medical charts for this facility as well as outside facilities via the \"care everywhere\" application of epic reviewed.  The patient was admitted earlier this year for atrial fibrillation, pneumonia, and malnutrition.  An echocardiogram from earlier this year showed normal ejection fraction with some degree of diastolic dysfunction.    The differential diagnosis includes COPD, CHF, acute coronary syndrome, and pneumonia, among others.    On final reevaluation the patient states that she is feeling better.  Urinalysis is normal.  The patient received IV fluids as requested by family.  Labs reveal no acute changes.  They will follow-up with primary care.            Problems Addressed:  Dyspnea, unspecified type: complicated acute illness or injury  Weakness: complicated acute illness or injury    Amount and/or Complexity of Data Reviewed  Independent Historian: caregiver and EMS     Details: Additional historical details provided by EMS personnel.    Case discussed with a family member the patient who states that she thinks that she is more fatigued and short of breath and that this may be coming from a UTI or dehydration.  She would like the patient to have a urinalysis and receive IV fluids.  Labs: ordered. Decision-making details documented in ED Course.  Radiology: ordered.  ECG/medicine tests: ordered and independent interpretation performed.     Details: EKG interpretation: Interpreted myself.  Sinus rhythm.  Rate 89.  Normal QRS and axis.  Normal QTc interval.  ST segments are " normal.    Risk  OTC drugs.  Prescription drug management.        Final diagnoses:   Dyspnea, unspecified type   Weakness       ED Disposition  ED Disposition       ED Disposition   Discharge    Condition   Stable    Comment   --               Heydi Tineo, APRN  [address] [facility] [address] [phone]    Call in 2 days  To make an appointment to be reevaluated after an emergency department visit with shortness of breath and a sense of weakness    [facility] EMERGENCY DEPARTMENT  [facility] [address] [phone]    As needed, If symptoms worsen at any time         Medication List      No changes were made to your prescriptions during this visit.            Jone Jordan, DO  06/17/23 1825

## (undated) DEVICE — CANN CO2/O2 NASL A/

## (undated) DEVICE — PDS II VLT 0 107CM AG ST3: Brand: ENDOLOOP

## (undated) DEVICE — ANTIBACTERIAL UNDYED BRAIDED (POLYGLACTIN 910), SYNTHETIC ABSORBABLE SUTURE: Brand: COATED VICRYL

## (undated) DEVICE — CORE TRUMPET FOR SINGLE SOLUTION BAG: Brand: CORE DYNAMICS

## (undated) DEVICE — OPEN END URETERAL CATHETER: Brand: URETERAL CATHETER

## (undated) DEVICE — APPL HEMOS FOR DELIVERY FLOSEAL

## (undated) DEVICE — STERILE CABLE: Brand: ELECTROPHYSIOLOGY CABLE

## (undated) DEVICE — 3M™ STERI-STRIP™ REINFORCED ADHESIVE SKIN CLOSURES, R1547, 1/2 IN X 4 IN (12 MM X 100 MM), 6 STRIPS/ENVELOPE: Brand: 3M™ STERI-STRIP™

## (undated) DEVICE — Device

## (undated) DEVICE — SOL IRR H2O BTL 1000ML STRL

## (undated) DEVICE — PK CATH CARD 30

## (undated) DEVICE — GLV SURG TRIUMPH LT PF LTX 7 STRL

## (undated) DEVICE — SPNG DRN AMD EXCILON 6PLY 4X4IN PK/2

## (undated) DEVICE — SUT NLY 2/0 664G

## (undated) DEVICE — UNIDIRECTIONAL STEERABLE DIAGNOSTIC CATHETER: Brand: POLARIS X™

## (undated) DEVICE — CVR SURG EQUIP BND RECTG 36X28

## (undated) DEVICE — UNDYED BRAIDED (POLYGLACTIN 910), SYNTHETIC ABSORBABLE SUTURE: Brand: COATED VICRYL

## (undated) DEVICE — PINNACLE INTRODUCER SHEATH: Brand: PINNACLE

## (undated) DEVICE — VISUALIZATION SYSTEM: Brand: CLEARIFY

## (undated) DEVICE — SOL IRR NACL 0.9PCT BT 1000ML

## (undated) DEVICE — STERILE POLYISOPRENE POWDER-FREE SURGICAL GLOVES: Brand: PROTEXIS

## (undated) DEVICE — TRAP,MUCUS SPECIMEN,40CC: Brand: MEDLINE

## (undated) DEVICE — STERILE POLYISOPRENE POWDER-FREE SURGICAL GLOVES WITH EMOLLIENT COATING: Brand: PROTEXIS

## (undated) DEVICE — ENDOPATH XCEL BLADELESS TROCARS WITH STABILITY SLEEVES: Brand: ENDOPATH XCEL

## (undated) DEVICE — DRN WND JP RND W TROC SIL 19F 1/4IN

## (undated) DEVICE — GLV SURG SENSICARE MICRO PF LF 7.5 STRL

## (undated) DEVICE — GLV SURG TRIUMPH PF LTX 6.5 STRL

## (undated) DEVICE — ELECTRD PAD DEFIB A/

## (undated) DEVICE — SOLIDIFIER LIQUI LOC PLUS 2000CC

## (undated) DEVICE — SUT VICRYL 0 TIES J112T

## (undated) DEVICE — SUT VIC 3/0 SH 27IN J416H

## (undated) DEVICE — KT ELECTRD ENSITE PRECISION SURF

## (undated) DEVICE — RESERVOIR,SUCTION,100CC,SILICONE: Brand: MEDLINE

## (undated) DEVICE — ADHS LIQ MASTISOL 2/3ML

## (undated) DEVICE — GLV SURG SENSICARE PI PF LF 7 GRN STRL

## (undated) DEVICE — TEMPERATURE ABLATION CATHETER: Brand: BLAZER® II XP

## (undated) DEVICE — MONOPOLAR METZENBAUM SCISSOR TIP, DISPOSABLE: Brand: MONOPOLAR METZENBAUM SCISSOR TIP, DISPOSABLE

## (undated) DEVICE — GLV SURG SENSICARE POLYISPRN W/ALOE PF LF 6.5 GRN STRL

## (undated) DEVICE — RETRV BG SPECI ENDOBAG 3X6IN 20.9CM

## (undated) DEVICE — GLV SURG SENSICARE POLYISPRN W/ALOE PF LF 6 GRN STRL

## (undated) DEVICE — PAD E/S GRND SGL/FOIL 9FT/CORD DISP

## (undated) DEVICE — GOWN,AURORA,NOREINF,RAGLAN,XL,STERILE: Brand: MEDLINE

## (undated) DEVICE — GLV SURG SENSICARE PI LF PF 8 GRN STRL

## (undated) DEVICE — TROCAR SITE CLOSURE DEVICE: Brand: ENDO CLOSE

## (undated) DEVICE — CATH IV CATHLON FEP POLY NON/RO STR 14G 2IN CLR

## (undated) DEVICE — SINGLE-USE BIOPSY FORCEPS: Brand: RADIAL JAW 4

## (undated) DEVICE — DECANT BG O JET

## (undated) DEVICE — THE KUMAR CATHETER®, USED IN CONJUNCTION WITH KUMAR CHOLANGIOGRAPHY® CLAMP, IS MEANT TO PROVIDE A MEANS OF LAPAROSCOPIC CHOLANGIOGRAPHY. IT COMPRISES A TRANSLUCENT TUBING ( 76 CM. LENGTH AND 16 GA. ) THAT CARRIES A 19 GA., 1.25 CM LONG NEEDLE AT THE END. THE KUMAR CATHETER® IS USED TO PUNCTURE THE HARTMANN'S POUCH OF THE GALLBLADDER FOR BILIARY ACCESS AND / OR ASPIRATION. PRODUCT IS LATEX FREE.: Brand: KUMAR CATHETER®

## (undated) DEVICE — PK LAP CHOLE LF 60

## (undated) DEVICE — BITEBLOCK ENDO W/STRAP 60F A/ LF DISP

## (undated) DEVICE — GLV SURG TRIUMPH LT PF LTX 7.5 STRL

## (undated) DEVICE — CANN SMPL SOFTECH BIFLO ETCO2 A/M 7FT

## (undated) DEVICE — BIDIRECTIONAL STEERABLE DIAGNOSTIC CATHETER: Brand: BLAZER™ DX-20

## (undated) DEVICE — SOL IRRIG NACL 1000ML

## (undated) DEVICE — GLV SURG SENSICARE PI PF LF 8.5 GRN STRL